# Patient Record
Sex: FEMALE | Race: WHITE | Employment: PART TIME | URBAN - METROPOLITAN AREA
[De-identification: names, ages, dates, MRNs, and addresses within clinical notes are randomized per-mention and may not be internally consistent; named-entity substitution may affect disease eponyms.]

---

## 2017-07-12 PROBLEM — M25.561 CHRONIC PAIN OF BOTH KNEES: Status: ACTIVE | Noted: 2017-07-12

## 2017-07-12 PROBLEM — M25.562 CHRONIC PAIN OF BOTH KNEES: Status: ACTIVE | Noted: 2017-07-12

## 2017-07-12 PROBLEM — G89.29 CHRONIC PAIN OF BOTH KNEES: Status: ACTIVE | Noted: 2017-07-12

## 2018-04-05 ENCOUNTER — OFFICE VISIT (OUTPATIENT)
Dept: PRIMARY CARE CLINIC | Age: 63
End: 2018-04-05
Payer: COMMERCIAL

## 2018-04-05 VITALS
DIASTOLIC BLOOD PRESSURE: 80 MMHG | HEART RATE: 72 BPM | BODY MASS INDEX: 50.02 KG/M2 | WEIGHT: 293 LBS | SYSTOLIC BLOOD PRESSURE: 128 MMHG | OXYGEN SATURATION: 98 % | HEIGHT: 64 IN | TEMPERATURE: 97.2 F

## 2018-04-05 DIAGNOSIS — G89.29 CHRONIC PAIN OF BOTH KNEES: ICD-10-CM

## 2018-04-05 DIAGNOSIS — R51.9 FREQUENT HEADACHES: ICD-10-CM

## 2018-04-05 DIAGNOSIS — R35.0 FREQUENCY OF MICTURITION: ICD-10-CM

## 2018-04-05 DIAGNOSIS — M25.562 CHRONIC PAIN OF BOTH KNEES: ICD-10-CM

## 2018-04-05 DIAGNOSIS — M25.561 CHRONIC PAIN OF BOTH KNEES: ICD-10-CM

## 2018-04-05 DIAGNOSIS — K21.9 GASTROESOPHAGEAL REFLUX DISEASE WITHOUT ESOPHAGITIS: Primary | ICD-10-CM

## 2018-04-05 LAB
APPEARANCE FLUID: NORMAL
BILIRUBIN, POC: NORMAL
BLOOD URINE, POC: NORMAL
CLARITY, POC: CLEAR
COLOR, POC: NORMAL
GLUCOSE URINE, POC: NORMAL
KETONES, POC: NORMAL
LEUKOCYTE EST, POC: NORMAL
NITRITE, POC: NORMAL
PH, POC: 5
PROTEIN, POC: NORMAL
SPECIFIC GRAVITY, POC: 1.03
UROBILINOGEN, POC: NORMAL

## 2018-04-05 PROCEDURE — 99213 OFFICE O/P EST LOW 20 MIN: CPT | Performed by: FAMILY MEDICINE

## 2018-04-05 RX ORDER — AMITRIPTYLINE HYDROCHLORIDE 10 MG/1
10 TABLET, FILM COATED ORAL NIGHTLY
Qty: 30 TABLET | Refills: 1 | Status: SHIPPED | OUTPATIENT
Start: 2018-04-05 | End: 2018-05-30 | Stop reason: SDUPTHER

## 2018-04-05 ASSESSMENT — PATIENT HEALTH QUESTIONNAIRE - PHQ9
SUM OF ALL RESPONSES TO PHQ QUESTIONS 1-9: 0
2. FEELING DOWN, DEPRESSED OR HOPELESS: 0
1. LITTLE INTEREST OR PLEASURE IN DOING THINGS: 0
SUM OF ALL RESPONSES TO PHQ9 QUESTIONS 1 & 2: 0

## 2018-05-30 DIAGNOSIS — R51.9 FREQUENT HEADACHES: ICD-10-CM

## 2018-05-31 RX ORDER — AMITRIPTYLINE HYDROCHLORIDE 10 MG/1
10 TABLET, FILM COATED ORAL NIGHTLY
Qty: 30 TABLET | Refills: 1 | Status: SHIPPED | OUTPATIENT
Start: 2018-05-31 | End: 2018-11-07 | Stop reason: SDUPTHER

## 2018-11-07 ENCOUNTER — HOSPITAL ENCOUNTER (OUTPATIENT)
Age: 63
Discharge: HOME | End: 2018-11-09
Payer: COMMERCIAL

## 2018-11-07 ENCOUNTER — OFFICE VISIT (OUTPATIENT)
Dept: PRIMARY CARE CLINIC | Age: 63
End: 2018-11-07
Payer: COMMERCIAL

## 2018-11-07 VITALS
SYSTOLIC BLOOD PRESSURE: 118 MMHG | WEIGHT: 289 LBS | DIASTOLIC BLOOD PRESSURE: 80 MMHG | OXYGEN SATURATION: 97 % | BODY MASS INDEX: 49.61 KG/M2 | HEART RATE: 91 BPM | TEMPERATURE: 98.2 F

## 2018-11-07 DIAGNOSIS — R30.0 DYSURIA: Primary | ICD-10-CM

## 2018-11-07 DIAGNOSIS — E05.00 GRAVES DISEASE: ICD-10-CM

## 2018-11-07 DIAGNOSIS — R51.9 FREQUENT HEADACHES: ICD-10-CM

## 2018-11-07 DIAGNOSIS — G89.29 CHRONIC PAIN OF BOTH KNEES: ICD-10-CM

## 2018-11-07 DIAGNOSIS — M25.562 CHRONIC PAIN OF BOTH KNEES: ICD-10-CM

## 2018-11-07 DIAGNOSIS — M25.561 CHRONIC PAIN OF BOTH KNEES: ICD-10-CM

## 2018-11-07 DIAGNOSIS — F32.89 OTHER DEPRESSION: ICD-10-CM

## 2018-11-07 LAB
BILIRUBIN, POC: NORMAL
BLOOD URINE, POC: NORMAL
CLARITY, POC: CLEAR
COLOR, POC: NORMAL
GLUCOSE URINE, POC: NORMAL
KETONES, POC: NORMAL
LEUKOCYTE EST, POC: NORMAL
NITRITE, POC: NORMAL
PH, POC: 5
PROTEIN, POC: NORMAL
SPECIFIC GRAVITY, POC: 1.03
TSH SERPL DL<=0.05 MIU/L-ACNC: 3.84 UIU/ML (ref 0.27–4.2)
UROBILINOGEN, POC: NORMAL

## 2018-11-07 PROCEDURE — 99214 OFFICE O/P EST MOD 30 MIN: CPT | Performed by: FAMILY MEDICINE

## 2018-11-07 PROCEDURE — 81002 URINALYSIS NONAUTO W/O SCOPE: CPT | Performed by: FAMILY MEDICINE

## 2018-11-07 PROCEDURE — 84443 ASSAY THYROID STIM HORMONE: CPT

## 2018-11-07 RX ORDER — CELECOXIB 200 MG/1
CAPSULE ORAL
Qty: 180 CAPSULE | Refills: 1 | Status: SHIPPED | OUTPATIENT
Start: 2018-11-07 | End: 2019-04-14 | Stop reason: SDUPTHER

## 2018-11-07 RX ORDER — AMITRIPTYLINE HYDROCHLORIDE 10 MG/1
10 TABLET, FILM COATED ORAL NIGHTLY
Qty: 30 TABLET | Refills: 1 | Status: SHIPPED | OUTPATIENT
Start: 2018-11-07 | End: 2019-05-14 | Stop reason: ALTCHOICE

## 2018-11-07 RX ORDER — BUPROPION HYDROCHLORIDE 300 MG/1
TABLET ORAL
Qty: 90 TABLET | Refills: 1 | Status: SHIPPED | OUTPATIENT
Start: 2018-11-07 | End: 2019-05-14 | Stop reason: SDUPTHER

## 2018-11-07 RX ORDER — CITALOPRAM 40 MG/1
TABLET ORAL
Qty: 90 TABLET | Refills: 1 | Status: SHIPPED | OUTPATIENT
Start: 2018-11-07 | End: 2019-04-14 | Stop reason: SDUPTHER

## 2018-11-07 RX ORDER — LEVOTHYROXINE SODIUM 137 UG/1
TABLET ORAL
Qty: 90 TABLET | Refills: 1 | Status: SHIPPED | OUTPATIENT
Start: 2018-11-07 | End: 2019-04-14 | Stop reason: SDUPTHER

## 2018-11-07 ASSESSMENT — ENCOUNTER SYMPTOMS
NAUSEA: 1
DIARRHEA: 0
CONSTIPATION: 0
CHEST TIGHTNESS: 0
VOMITING: 0
SHORTNESS OF BREATH: 0
ANAL BLEEDING: 0

## 2018-11-07 NOTE — PROGRESS NOTES
bleeding, constipation, diarrhea and vomiting.        + heartburn     Endocrine: Positive for cold intolerance. Negative for heat intolerance and polydipsia. Genitourinary: Positive for flank pain (right side). Negative for frequency. Neurological: Negative for light-headedness and headaches. All other review of systems reviewed and are negative    OBJECTIVE:  /80   Pulse 91   Temp 98.2 °F (36.8 °C)   Wt 289 lb (131.1 kg)   SpO2 97%   BMI 49.61 kg/m²      Physical Exam   Constitutional: She is oriented to person, place, and time. She appears well-developed and well-nourished. Eyes: Conjunctivae are normal. No scleral icterus. Neck: Neck supple. Carotid bruit is not present. No thyromegaly present. Cardiovascular: Normal rate, regular rhythm and normal heart sounds. No murmur heard. Pulmonary/Chest: Effort normal and breath sounds normal. She has no wheezes. She has no rales. Abdominal: Soft. Bowel sounds are normal. She exhibits no distension and no mass. There is no tenderness. There is no CVA tenderness. Musculoskeletal: Normal range of motion. She exhibits no edema. Lymphadenopathy:     She has no cervical adenopathy. Neurological: She is alert and oriented to person, place, and time. Skin: Skin is warm and dry. Psychiatric: She has a normal mood and affect. Vitals reviewed. ASSESSMENT AND PLAN:    Xenia Morrison was seen today for hypothyroidism and blood work. Diagnoses and all orders for this visit:    Dysuria  -     POCT Urinalysis no Micro    Graves disease  -     levothyroxine (SYNTHROID) 137 MCG tablet; TAKE ONE TABLET BY MOUTH ONCE DAILY  -     TSH; Future    Frequent headaches  -     amitriptyline (ELAVIL) 10 MG tablet;  Take 1 tablet by mouth nightly    Other depression  -     buPROPion (WELLBUTRIN XL) 300 MG extended release tablet; TAKE ONE TABLET BY MOUTH EVERY MORNING  -     citalopram (CELEXA) 40 MG tablet; TAKE ONE TABLET BY MOUTH DAILY    Chronic pain of both knees  -     celecoxib (CELEBREX) 200 MG capsule; TAKE ONE CAPSULE BY MOUTH TWICE DAILY    - continue current meds   - encourage exercise   - increase fluid intake. Results for Turner Myers (MRN [id number]) as of 11/7/2018 10:37   Ref. Range 11/7/2018 10:31   Protein, UA Unknown n   Color, UA Unknown dark yellow   Clarity, UA Unknown clear   Glucose, UA POC Unknown n   Bilirubin, UA Unknown n   Ketones, UA Unknown n   Spec Grav, UA Unknown 1.030   pH, UA Unknown 5.0   Urobilinogen, UA Unknown n   Nitrite, UA Unknown n   Leukocytes, UA Unknown n   Blood, UA POC Unknown n     - continue current meds  - ortho eval if she wishes. Return in about 6 months (around 5/7/2019) for or for acute problem. I reviewed the students documentation ( Hx, exam, MDM ), examined the patient and performed the A&P.     Lisha Dominguez, DO

## 2019-01-31 ENCOUNTER — OFFICE VISIT (OUTPATIENT)
Dept: PRIMARY CARE CLINIC | Age: 64
End: 2019-01-31
Payer: COMMERCIAL

## 2019-01-31 VITALS
HEART RATE: 90 BPM | SYSTOLIC BLOOD PRESSURE: 122 MMHG | WEIGHT: 279 LBS | DIASTOLIC BLOOD PRESSURE: 84 MMHG | OXYGEN SATURATION: 95 % | TEMPERATURE: 98.2 F | BODY MASS INDEX: 47.89 KG/M2

## 2019-01-31 DIAGNOSIS — S20.212A RIB CONTUSION, LEFT, INITIAL ENCOUNTER: ICD-10-CM

## 2019-01-31 DIAGNOSIS — S80.02XA CONTUSION OF LEFT KNEE, INITIAL ENCOUNTER: Primary | ICD-10-CM

## 2019-01-31 PROCEDURE — 99213 OFFICE O/P EST LOW 20 MIN: CPT | Performed by: FAMILY MEDICINE

## 2019-01-31 RX ORDER — TRAMADOL HYDROCHLORIDE 50 MG/1
50 TABLET ORAL EVERY 6 HOURS PRN
Qty: 30 TABLET | Refills: 0 | Status: SHIPPED | OUTPATIENT
Start: 2019-01-31 | End: 2019-02-07

## 2019-01-31 ASSESSMENT — ENCOUNTER SYMPTOMS: SHORTNESS OF BREATH: 0

## 2019-02-25 ENCOUNTER — HOSPITAL ENCOUNTER (OUTPATIENT)
Dept: GENERAL RADIOLOGY | Age: 64
Discharge: HOME | End: 2019-02-27
Payer: COMMERCIAL

## 2019-02-25 ENCOUNTER — OFFICE VISIT (OUTPATIENT)
Dept: PRIMARY CARE CLINIC | Age: 64
End: 2019-02-25
Payer: COMMERCIAL

## 2019-02-25 ENCOUNTER — HOSPITAL ENCOUNTER (OUTPATIENT)
Age: 64
Discharge: HOME | End: 2019-02-27
Payer: COMMERCIAL

## 2019-02-25 ENCOUNTER — APPOINTMENT (OUTPATIENT)
Dept: GENERAL RADIOLOGY | Age: 64
End: 2019-02-25
Payer: COMMERCIAL

## 2019-02-25 VITALS
SYSTOLIC BLOOD PRESSURE: 122 MMHG | BODY MASS INDEX: 48.06 KG/M2 | TEMPERATURE: 98.2 F | WEIGHT: 280 LBS | HEART RATE: 78 BPM | OXYGEN SATURATION: 98 % | DIASTOLIC BLOOD PRESSURE: 80 MMHG

## 2019-02-25 DIAGNOSIS — M25.462 EFFUSION OF LEFT KNEE: ICD-10-CM

## 2019-02-25 DIAGNOSIS — S80.02XD CONTUSION OF LEFT KNEE, SUBSEQUENT ENCOUNTER: ICD-10-CM

## 2019-02-25 DIAGNOSIS — S80.02XD CONTUSION OF LEFT KNEE, SUBSEQUENT ENCOUNTER: Primary | ICD-10-CM

## 2019-02-25 PROCEDURE — 99213 OFFICE O/P EST LOW 20 MIN: CPT | Performed by: FAMILY MEDICINE

## 2019-02-25 PROCEDURE — 73564 X-RAY EXAM KNEE 4 OR MORE: CPT

## 2019-02-26 ENCOUNTER — TELEPHONE (OUTPATIENT)
Dept: PRIMARY CARE CLINIC | Age: 64
End: 2019-02-26

## 2019-03-04 ENCOUNTER — OFFICE VISIT (OUTPATIENT)
Dept: ORTHOPEDIC SURGERY | Age: 64
End: 2019-03-04
Payer: COMMERCIAL

## 2019-03-04 VITALS
BODY MASS INDEX: 47.63 KG/M2 | SYSTOLIC BLOOD PRESSURE: 114 MMHG | HEART RATE: 75 BPM | HEIGHT: 64 IN | DIASTOLIC BLOOD PRESSURE: 81 MMHG | WEIGHT: 279 LBS | TEMPERATURE: 98.8 F

## 2019-03-04 DIAGNOSIS — M70.42 HEMORRHAGIC PREPATELLAR BURSITIS OF LEFT KNEE: ICD-10-CM

## 2019-03-04 DIAGNOSIS — S89.92XA KNEE INJURY, LEFT, INITIAL ENCOUNTER: Primary | ICD-10-CM

## 2019-03-04 DIAGNOSIS — M17.12 PRIMARY OSTEOARTHRITIS OF LEFT KNEE: ICD-10-CM

## 2019-03-04 PROCEDURE — 99243 OFF/OP CNSLTJ NEW/EST LOW 30: CPT | Performed by: ORTHOPAEDIC SURGERY

## 2019-03-04 RX ORDER — TRAMADOL HYDROCHLORIDE 50 MG/1
50 TABLET ORAL EVERY 6 HOURS PRN
COMMUNITY
End: 2019-05-14 | Stop reason: SDUPTHER

## 2019-03-04 SDOH — HEALTH STABILITY: MENTAL HEALTH: HOW OFTEN DO YOU HAVE A DRINK CONTAINING ALCOHOL?: MONTHLY OR LESS

## 2019-05-14 ENCOUNTER — OFFICE VISIT (OUTPATIENT)
Dept: PRIMARY CARE CLINIC | Age: 64
End: 2019-05-14
Payer: COMMERCIAL

## 2019-05-14 VITALS
HEART RATE: 77 BPM | SYSTOLIC BLOOD PRESSURE: 130 MMHG | BODY MASS INDEX: 46.26 KG/M2 | DIASTOLIC BLOOD PRESSURE: 80 MMHG | HEIGHT: 64 IN | OXYGEN SATURATION: 96 % | TEMPERATURE: 98 F | WEIGHT: 271 LBS

## 2019-05-14 DIAGNOSIS — M25.562 CHRONIC PAIN OF BOTH KNEES: ICD-10-CM

## 2019-05-14 DIAGNOSIS — G89.29 CHRONIC PAIN OF BOTH KNEES: ICD-10-CM

## 2019-05-14 DIAGNOSIS — M25.561 CHRONIC PAIN OF BOTH KNEES: ICD-10-CM

## 2019-05-14 DIAGNOSIS — E66.01 CLASS 3 SEVERE OBESITY DUE TO EXCESS CALORIES WITHOUT SERIOUS COMORBIDITY WITH BODY MASS INDEX (BMI) OF 45.0 TO 49.9 IN ADULT (HCC): Primary | ICD-10-CM

## 2019-05-14 DIAGNOSIS — F32.89 OTHER DEPRESSION: ICD-10-CM

## 2019-05-14 PROCEDURE — 99214 OFFICE O/P EST MOD 30 MIN: CPT | Performed by: FAMILY MEDICINE

## 2019-05-14 RX ORDER — CELECOXIB 200 MG/1
CAPSULE ORAL
Qty: 180 CAPSULE | Refills: 1 | Status: SHIPPED | OUTPATIENT
Start: 2019-05-14 | End: 2019-06-04 | Stop reason: SDUPTHER

## 2019-05-14 RX ORDER — PHENTERMINE HYDROCHLORIDE 37.5 MG/1
37.5 CAPSULE ORAL EVERY MORNING
Qty: 30 CAPSULE | Refills: 0 | Status: SHIPPED | OUTPATIENT
Start: 2019-05-14 | End: 2019-06-13

## 2019-05-14 RX ORDER — BUPROPION HYDROCHLORIDE 300 MG/1
TABLET ORAL
Qty: 90 TABLET | Refills: 1 | Status: SHIPPED
Start: 2019-05-14 | End: 2020-05-05 | Stop reason: SDUPTHER

## 2019-05-14 RX ORDER — TRAMADOL HYDROCHLORIDE 50 MG/1
50 TABLET ORAL 2 TIMES DAILY PRN
Qty: 60 TABLET | Refills: 0 | Status: SHIPPED | OUTPATIENT
Start: 2019-05-14 | End: 2019-06-13

## 2019-05-14 ASSESSMENT — PATIENT HEALTH QUESTIONNAIRE - PHQ9
SUM OF ALL RESPONSES TO PHQ QUESTIONS 1-9: 0
SUM OF ALL RESPONSES TO PHQ QUESTIONS 1-9: 0
2. FEELING DOWN, DEPRESSED OR HOPELESS: 0
1. LITTLE INTEREST OR PLEASURE IN DOING THINGS: 0
SUM OF ALL RESPONSES TO PHQ9 QUESTIONS 1 & 2: 0

## 2019-05-14 NOTE — PROGRESS NOTES
Giovanni Sims, a female of 66 y.o. came to the office 5/14/2019. Patient Active Problem List   Diagnosis    Hypothyroidism    Graves disease    Depression    GERD (gastroesophageal reflux disease)    Chronic pain of both knees          HPI KNEE pain: Tramadol helps a lot. On Celebrex bid and recently using otc horse lineament. It locks on her. Told she needs tka but has to lose wt first.   Moods: doing ok med. Obesity: would try med for wt loss. Allergies   Allergen Reactions    Mobic [Meloxicam] Other (See Comments)       Current Outpatient Medications on File Prior to Visit   Medication Sig Dispense Refill    levothyroxine (SYNTHROID) 137 MCG tablet TAKE 1 TABLET BY MOUTH ONCE DAILY 90 tablet 1    citalopram (CELEXA) 40 MG tablet TAKE 1 TABLET BY MOUTH ONCE DAILY 90 tablet 1    aspirin EC 81 MG EC tablet Take 81 mg by mouth daily      Cyanocobalamin (VITAMIN B 12 PO) Take by mouth      B Complex Vitamins (VITAMIN B COMPLEX PO) Take by mouth      Multiple Vitamins-Minerals (MULTIVITAMIN ADULT PO) Take by mouth      omeprazole (PRILOSEC) 20 MG delayed release capsule TAKE 1 CAPSULE BY MOUTH ONCE DAILY 30 capsule 2     No current facility-administered medications on file prior to visit. Review of Systems   Musculoskeletal: Positive for arthralgias and gait problem. Psychiatric/Behavioral: Negative for dysphoric mood. The patient is not nervous/anxious. All other review of systems reviewed and are negative    OBJECTIVE:  /80   Pulse 77   Temp 98 °F (36.7 °C)   Ht 5' 4\" (1.626 m)   Wt 271 lb (122.9 kg)   SpO2 96%   BMI 46.52 kg/m²      Physical Exam   Constitutional: She is oriented to person, place, and time. She appears well-nourished. Musculoskeletal:        Left knee: She exhibits swelling. She exhibits normal range of motion, no effusion, no ecchymosis and no erythema. No tenderness found. Neurological: She is alert and oriented to person, place, and time.

## 2019-05-14 NOTE — PATIENT INSTRUCTIONS
Patient Education        Advance Directives: Care Instructions  Your Care Instructions  An advance directive is a legal way to state your wishes at the end of your life. It tells your family and your doctor what to do if you can no longer say what you want. There are two main types of advance directives. You can change them any time that your wishes change. · A living will tells your family and your doctor your wishes about life support and other treatment. · A durable power of  for health care lets you name a person to make treatment decisions for you when you can't speak for yourself. This person is called a health care agent. If you do not have an advance directive, decisions about your medical care may be made by a doctor or a  who doesn't know you. It may help to think of an advance directive as a gift to the people who care for you. If you have one, they won't have to make tough decisions by themselves. Follow-up care is a key part of your treatment and safety. Be sure to make and go to all appointments, and call your doctor if you are having problems. It's also a good idea to know your test results and keep a list of the medicines you take. How can you care for yourself at home? · Discuss your wishes with your loved ones and your doctor. This way, there are no surprises. · Many states have a unique form. Or you might use a universal form that has been approved by many states. This kind of form can sometimes be completed and stored online. Your electronic copy will then be available wherever you have a connection to the Internet. In most cases, doctors will respect your wishes even if you have a form from a different state. · You don't need a  to do an advance directive. But you may want to get legal advice. · Think about these questions when you prepare an advance directive:  ? Who do you want to make decisions about your medical care if you are not able to?  Many people choose a family member or close friend. ? Do you know enough about life support methods that might be used? If not, talk to your doctor so you understand. ? What are you most afraid of that might happen? You might be afraid of having pain, losing your independence, or being kept alive by machines. ? Where would you prefer to die? Choices include your home, a hospital, or a nursing home. ? Would you like to have information about hospice care to support you and your family? ? Do you want to donate organs when you die? ? Do you want certain Holiness practices performed before you die? If so, put your wishes in the advance directive. · Read your advance directive every year, and make changes as needed. When should you call for help? Be sure to contact your doctor if you have any questions. Where can you learn more? Go to [email or web address] and sign in to your [address] [facility] [employer] account. Enter R264 in the [employer] box to learn more about \"Advance Directives: Care Instructions. \"     If you do not have an account, please click on the \"Sign Up Now\" link. Current as of: April 18, 2018  Content Version: 12.0  © 4171-5061 Healthwise, Incorporated. Care instructions adapted under license by [facility] ([facility]). If you have questions about a medical condition or this instruction, always ask your healthcare professional. [address] any warranty or liability for your use of this information.

## 2019-06-04 DIAGNOSIS — M25.562 CHRONIC PAIN OF BOTH KNEES: ICD-10-CM

## 2019-06-04 DIAGNOSIS — M25.561 CHRONIC PAIN OF BOTH KNEES: ICD-10-CM

## 2019-06-04 DIAGNOSIS — G89.29 CHRONIC PAIN OF BOTH KNEES: ICD-10-CM

## 2019-06-04 RX ORDER — CELECOXIB 200 MG/1
CAPSULE ORAL
Qty: 180 CAPSULE | Refills: 0 | Status: SHIPPED | OUTPATIENT
Start: 2019-06-04 | End: 2019-11-11 | Stop reason: SDUPTHER

## 2019-06-14 ENCOUNTER — OFFICE VISIT (OUTPATIENT)
Dept: PRIMARY CARE CLINIC | Age: 64
End: 2019-06-14
Payer: COMMERCIAL

## 2019-06-14 ENCOUNTER — HOSPITAL ENCOUNTER (OUTPATIENT)
Age: 64
Discharge: HOME | End: 2019-06-16
Payer: COMMERCIAL

## 2019-06-14 VITALS
TEMPERATURE: 97 F | WEIGHT: 265 LBS | DIASTOLIC BLOOD PRESSURE: 84 MMHG | OXYGEN SATURATION: 98 % | SYSTOLIC BLOOD PRESSURE: 130 MMHG | BODY MASS INDEX: 45.24 KG/M2 | HEIGHT: 64 IN | HEART RATE: 76 BPM

## 2019-06-14 DIAGNOSIS — M25.561 CHRONIC PAIN OF BOTH KNEES: ICD-10-CM

## 2019-06-14 DIAGNOSIS — E05.00 GRAVES DISEASE: ICD-10-CM

## 2019-06-14 DIAGNOSIS — E66.01 CLASS 3 SEVERE OBESITY DUE TO EXCESS CALORIES WITHOUT SERIOUS COMORBIDITY WITH BODY MASS INDEX (BMI) OF 45.0 TO 49.9 IN ADULT (HCC): ICD-10-CM

## 2019-06-14 DIAGNOSIS — M25.562 CHRONIC PAIN OF BOTH KNEES: ICD-10-CM

## 2019-06-14 DIAGNOSIS — E05.00 GRAVES DISEASE: Primary | ICD-10-CM

## 2019-06-14 DIAGNOSIS — G89.29 CHRONIC PAIN OF BOTH KNEES: ICD-10-CM

## 2019-06-14 LAB — TSH SERPL DL<=0.05 MIU/L-ACNC: 2.04 UIU/ML (ref 0.27–4.2)

## 2019-06-14 PROCEDURE — 99213 OFFICE O/P EST LOW 20 MIN: CPT | Performed by: FAMILY MEDICINE

## 2019-06-14 PROCEDURE — 84443 ASSAY THYROID STIM HORMONE: CPT

## 2019-06-14 RX ORDER — TRAMADOL HYDROCHLORIDE 50 MG/1
50 TABLET ORAL EVERY 6 HOURS PRN
COMMUNITY
End: 2019-06-14 | Stop reason: SDUPTHER

## 2019-06-14 RX ORDER — PHENTERMINE HYDROCHLORIDE 37.5 MG/1
37.5 CAPSULE ORAL EVERY MORNING
COMMUNITY
End: 2019-06-14

## 2019-06-14 RX ORDER — PHENTERMINE HYDROCHLORIDE 37.5 MG/1
37.5 CAPSULE ORAL EVERY MORNING
COMMUNITY
End: 2019-06-14 | Stop reason: SDUPTHER

## 2019-06-14 RX ORDER — PHENTERMINE HYDROCHLORIDE 37.5 MG/1
37.5 CAPSULE ORAL EVERY MORNING
Qty: 30 CAPSULE | Refills: 0 | Status: SHIPPED | OUTPATIENT
Start: 2019-06-14 | End: 2019-07-12 | Stop reason: SDUPTHER

## 2019-06-14 RX ORDER — TRAMADOL HYDROCHLORIDE 50 MG/1
50 TABLET ORAL EVERY 6 HOURS
Qty: 28 TABLET | Refills: 0 | Status: SHIPPED | OUTPATIENT
Start: 2019-06-14 | End: 2019-06-21

## 2019-06-14 ASSESSMENT — ENCOUNTER SYMPTOMS
CONSTIPATION: 0
SHORTNESS OF BREATH: 0
DIARRHEA: 0

## 2019-06-14 NOTE — PROGRESS NOTES
Daniel Raman, a female of 66 y.o. came to the office 6/14/2019. Patient Active Problem List   Diagnosis    Hypothyroidism    Graves disease    Depression    GERD (gastroesophageal reflux disease)    Chronic pain of both knees          HPI knee pain: tramadol helps. Needs qid for 1 wk so insur will pain for it. On Celebrex. On hemp oil. Endo: doing well on synthroid   Obesity: doing well with Phentermine. Eating more fruit. No se's with med. Allergies   Allergen Reactions    Mobic [Meloxicam] Other (See Comments)       Current Outpatient Medications on File Prior to Visit   Medication Sig Dispense Refill    celecoxib (CELEBREX) 200 MG capsule TAKE 1 CAPSULE BY MOUTH TWICE DAILY 180 capsule 0    buPROPion (WELLBUTRIN XL) 300 MG extended release tablet TAKE ONE TABLET BY MOUTH EVERY MORNING 90 tablet 1    levothyroxine (SYNTHROID) 137 MCG tablet TAKE 1 TABLET BY MOUTH ONCE DAILY 90 tablet 1    citalopram (CELEXA) 40 MG tablet TAKE 1 TABLET BY MOUTH ONCE DAILY 90 tablet 1    aspirin EC 81 MG EC tablet Take 81 mg by mouth daily      Cyanocobalamin (VITAMIN B 12 PO) Take by mouth      B Complex Vitamins (VITAMIN B COMPLEX PO) Take by mouth      Multiple Vitamins-Minerals (MULTIVITAMIN ADULT PO) Take by mouth       No current facility-administered medications on file prior to visit. Review of Systems   Constitutional: Negative for fatigue. Respiratory: Negative for shortness of breath. Cardiovascular: Negative for chest pain. Gastrointestinal: Negative for constipation and diarrhea. Endocrine: Negative for cold intolerance and heat intolerance. Musculoskeletal: Positive for arthralgias. Skin: Negative. Neurological: Negative for headaches.      All other review of systems reviewed and are negative    OBJECTIVE:  /84   Pulse 76   Temp 97 °F (36.1 °C)   Ht 5' 4\" (1.626 m)   Wt 265 lb (120.2 kg)   SpO2 98%   BMI 45.49 kg/m²      Physical Exam   Constitutional: She is

## 2019-06-14 NOTE — PATIENT INSTRUCTIONS
Patient Education        Advance Directives: Care Instructions  Your Care Instructions  An advance directive is a legal way to state your wishes at the end of your life. It tells your family and your doctor what to do if you can no longer say what you want. There are two main types of advance directives. You can change them any time that your wishes change. · A living will tells your family and your doctor your wishes about life support and other treatment. · A durable power of  for health care lets you name a person to make treatment decisions for you when you can't speak for yourself. This person is called a health care agent. If you do not have an advance directive, decisions about your medical care may be made by a doctor or a  who doesn't know you. It may help to think of an advance directive as a gift to the people who care for you. If you have one, they won't have to make tough decisions by themselves. Follow-up care is a key part of your treatment and safety. Be sure to make and go to all appointments, and call your doctor if you are having problems. It's also a good idea to know your test results and keep a list of the medicines you take. How can you care for yourself at home? · Discuss your wishes with your loved ones and your doctor. This way, there are no surprises. · Many states have a unique form. Or you might use a universal form that has been approved by many states. This kind of form can sometimes be completed and stored online. Your electronic copy will then be available wherever you have a connection to the Internet. In most cases, doctors will respect your wishes even if you have a form from a different state. · You don't need a  to do an advance directive. But you may want to get legal advice. · Think about these questions when you prepare an advance directive:  ? Who do you want to make decisions about your medical care if you are not able to?  Many people choose a family member or close friend. ? Do you know enough about life support methods that might be used? If not, talk to your doctor so you understand. ? What are you most afraid of that might happen? You might be afraid of having pain, losing your independence, or being kept alive by machines. ? Where would you prefer to die? Choices include your home, a hospital, or a nursing home. ? Would you like to have information about hospice care to support you and your family? ? Do you want to donate organs when you die? ? Do you want certain Jainism practices performed before you die? If so, put your wishes in the advance directive. · Read your advance directive every year, and make changes as needed. When should you call for help? Be sure to contact your doctor if you have any questions. Where can you learn more? Go to [email or web address] and sign in to your [employer] account. Enter R264 in the [employer] box to learn more about \"Advance Directives: Care Instructions. \"     If you do not have an account, please click on the \"Sign Up Now\" link. Current as of: April 18, 2018  Content Version: 12.0  © 0047-2172 Healthwise, Incorporated. Care instructions adapted under license by [facility] ([facility]). If you have questions about a medical condition or this instruction, always ask your healthcare professional. [address] any warranty or liability for your use of this information.

## 2019-06-24 ENCOUNTER — OFFICE VISIT (OUTPATIENT)
Dept: PRIMARY CARE CLINIC | Age: 64
End: 2019-06-24
Payer: COMMERCIAL

## 2019-06-24 VITALS
TEMPERATURE: 97.7 F | WEIGHT: 265 LBS | SYSTOLIC BLOOD PRESSURE: 124 MMHG | DIASTOLIC BLOOD PRESSURE: 80 MMHG | HEART RATE: 74 BPM | BODY MASS INDEX: 45.49 KG/M2 | OXYGEN SATURATION: 98 %

## 2019-06-24 DIAGNOSIS — J20.9 ACUTE BRONCHITIS, UNSPECIFIED ORGANISM: Primary | ICD-10-CM

## 2019-06-24 PROCEDURE — 99213 OFFICE O/P EST LOW 20 MIN: CPT | Performed by: FAMILY MEDICINE

## 2019-06-24 RX ORDER — PROMETHAZINE HYDROCHLORIDE AND CODEINE PHOSPHATE 6.25; 1 MG/5ML; MG/5ML
5 SYRUP ORAL 4 TIMES DAILY PRN
Qty: 180 ML | Refills: 0 | Status: SHIPPED | OUTPATIENT
Start: 2019-06-24 | End: 2019-07-03

## 2019-06-24 RX ORDER — AMOXICILLIN 500 MG/1
500 CAPSULE ORAL 3 TIMES DAILY
Qty: 30 CAPSULE | Refills: 0 | Status: SHIPPED | OUTPATIENT
Start: 2019-06-24 | End: 2019-07-04

## 2019-06-24 ASSESSMENT — ENCOUNTER SYMPTOMS
COUGH: 1
RHINORRHEA: 1
WHEEZING: 0
SORE THROAT: 0
SHORTNESS OF BREATH: 0

## 2019-07-12 ENCOUNTER — OFFICE VISIT (OUTPATIENT)
Dept: PRIMARY CARE CLINIC | Age: 64
End: 2019-07-12
Payer: COMMERCIAL

## 2019-07-12 VITALS
HEART RATE: 74 BPM | WEIGHT: 264 LBS | OXYGEN SATURATION: 99 % | BODY MASS INDEX: 45.07 KG/M2 | TEMPERATURE: 98 F | DIASTOLIC BLOOD PRESSURE: 80 MMHG | HEIGHT: 64 IN | SYSTOLIC BLOOD PRESSURE: 124 MMHG

## 2019-07-12 DIAGNOSIS — F32.89 OTHER DEPRESSION: Primary | ICD-10-CM

## 2019-07-12 DIAGNOSIS — G89.29 CHRONIC PAIN OF LEFT KNEE: ICD-10-CM

## 2019-07-12 DIAGNOSIS — M25.562 CHRONIC PAIN OF LEFT KNEE: ICD-10-CM

## 2019-07-12 DIAGNOSIS — E66.01 CLASS 3 SEVERE OBESITY DUE TO EXCESS CALORIES WITHOUT SERIOUS COMORBIDITY WITH BODY MASS INDEX (BMI) OF 45.0 TO 49.9 IN ADULT (HCC): ICD-10-CM

## 2019-07-12 PROCEDURE — 99213 OFFICE O/P EST LOW 20 MIN: CPT | Performed by: FAMILY MEDICINE

## 2019-07-12 RX ORDER — TRAMADOL HYDROCHLORIDE 50 MG/1
50 TABLET ORAL EVERY 6 HOURS PRN
COMMUNITY
End: 2019-07-12 | Stop reason: SDUPTHER

## 2019-07-12 RX ORDER — PHENTERMINE HYDROCHLORIDE 37.5 MG/1
37.5 CAPSULE ORAL EVERY MORNING
Qty: 30 CAPSULE | Refills: 0 | Status: SHIPPED | OUTPATIENT
Start: 2019-07-12 | End: 2019-08-11

## 2019-07-12 RX ORDER — TRAMADOL HYDROCHLORIDE 50 MG/1
50 TABLET ORAL EVERY 6 HOURS PRN
Qty: 28 TABLET | Refills: 0 | Status: SHIPPED | OUTPATIENT
Start: 2019-07-12 | End: 2019-07-19

## 2019-07-12 NOTE — PATIENT INSTRUCTIONS
Patient Education        Advance Directives: Care Instructions  Your Care Instructions  An advance directive is a legal way to state your wishes at the end of your life. It tells your family and your doctor what to do if you can no longer say what you want. There are two main types of advance directives. You can change them any time that your wishes change. · A living will tells your family and your doctor your wishes about life support and other treatment. · A durable power of  for health care lets you name a person to make treatment decisions for you when you can't speak for yourself. This person is called a health care agent. If you do not have an advance directive, decisions about your medical care may be made by a doctor or a  who doesn't know you. It may help to think of an advance directive as a gift to the people who care for you. If you have one, they won't have to make tough decisions by themselves. Follow-up care is a key part of your treatment and safety. Be sure to make and go to all appointments, and call your doctor if you are having problems. It's also a good idea to know your test results and keep a list of the medicines you take. How can you care for yourself at home? · Discuss your wishes with your loved ones and your doctor. This way, there are no surprises. · Many states have a unique form. Or you might use a universal form that has been approved by many states. This kind of form can sometimes be completed and stored online. Your electronic copy will then be available wherever you have a connection to the Internet. In most cases, doctors will respect your wishes even if you have a form from a different state. · You don't need a  to do an advance directive. But you may want to get legal advice. · Think about these questions when you prepare an advance directive:  ? Who do you want to make decisions about your medical care if you are not able to?  Many people choose a family member or close friend. ? Do you know enough about life support methods that might be used? If not, talk to your doctor so you understand. ? What are you most afraid of that might happen? You might be afraid of having pain, losing your independence, or being kept alive by machines. ? Where would you prefer to die? Choices include your home, a hospital, or a nursing home. ? Would you like to have information about hospice care to support you and your family? ? Do you want to donate organs when you die? ? Do you want certain Orthodox practices performed before you die? If so, put your wishes in the advance directive. · Read your advance directive every year, and make changes as needed. When should you call for help? Be sure to contact your doctor if you have any questions. Where can you learn more? Go to [email or web address] and sign in to your [employer] account. Enter R264 in the [employer] box to learn more about \"Advance Directives: Care Instructions. \"     If you do not have an account, please click on the \"Sign Up Now\" link. Current as of: April 18, 2018  Content Version: 12.0  © 8041-0470 Healthwise, Incorporated. Care instructions adapted under license by [facility] ([facility]). If you have questions about a medical condition or this instruction, always ask your healthcare professional. [address] any warranty or liability for your use of this information.

## 2019-11-11 DIAGNOSIS — F32.89 OTHER DEPRESSION: ICD-10-CM

## 2019-11-11 DIAGNOSIS — E05.00 GRAVES DISEASE: ICD-10-CM

## 2019-11-11 DIAGNOSIS — G89.29 CHRONIC PAIN OF BOTH KNEES: ICD-10-CM

## 2019-11-11 DIAGNOSIS — M25.562 CHRONIC PAIN OF BOTH KNEES: ICD-10-CM

## 2019-11-11 DIAGNOSIS — M25.561 CHRONIC PAIN OF BOTH KNEES: ICD-10-CM

## 2019-11-11 RX ORDER — LEVOTHYROXINE SODIUM 137 UG/1
TABLET ORAL
Qty: 90 TABLET | Refills: 0 | Status: SHIPPED | OUTPATIENT
Start: 2019-11-11 | End: 2020-02-04 | Stop reason: SDUPTHER

## 2019-11-11 RX ORDER — CELECOXIB 200 MG/1
CAPSULE ORAL
Qty: 180 CAPSULE | Refills: 0 | Status: SHIPPED | OUTPATIENT
Start: 2019-11-11 | End: 2020-02-04 | Stop reason: SDUPTHER

## 2019-11-11 RX ORDER — CITALOPRAM 40 MG/1
TABLET ORAL
Qty: 90 TABLET | Refills: 0 | Status: SHIPPED | OUTPATIENT
Start: 2019-11-11 | End: 2020-02-04 | Stop reason: SDUPTHER

## 2019-12-10 ENCOUNTER — OFFICE VISIT (OUTPATIENT)
Dept: PRIMARY CARE CLINIC | Age: 64
End: 2019-12-10
Payer: COMMERCIAL

## 2019-12-10 VITALS
SYSTOLIC BLOOD PRESSURE: 128 MMHG | TEMPERATURE: 96.1 F | HEART RATE: 66 BPM | BODY MASS INDEX: 43.94 KG/M2 | WEIGHT: 256 LBS | DIASTOLIC BLOOD PRESSURE: 80 MMHG | OXYGEN SATURATION: 98 %

## 2019-12-10 DIAGNOSIS — J01.00 ACUTE NON-RECURRENT MAXILLARY SINUSITIS: Primary | ICD-10-CM

## 2019-12-10 DIAGNOSIS — R25.2 LEG CRAMPS: ICD-10-CM

## 2019-12-10 PROCEDURE — 99213 OFFICE O/P EST LOW 20 MIN: CPT | Performed by: FAMILY MEDICINE

## 2019-12-10 RX ORDER — MECLIZINE HCL 12.5 MG/1
12.5 TABLET ORAL 3 TIMES DAILY PRN
Qty: 30 TABLET | Refills: 0 | Status: SHIPPED | OUTPATIENT
Start: 2019-12-10 | End: 2019-12-20 | Stop reason: SDUPTHER

## 2019-12-10 ASSESSMENT — ENCOUNTER SYMPTOMS
SINUS PRESSURE: 1
DIARRHEA: 0
CONSTIPATION: 0
VOMITING: 0
SHORTNESS OF BREATH: 0
RHINORRHEA: 0
COUGH: 0
SORE THROAT: 0
PHOTOPHOBIA: 1
NAUSEA: 0

## 2019-12-20 DIAGNOSIS — J01.00 ACUTE NON-RECURRENT MAXILLARY SINUSITIS: ICD-10-CM

## 2019-12-20 RX ORDER — MECLIZINE HCL 12.5 MG/1
12.5 TABLET ORAL 3 TIMES DAILY PRN
Qty: 30 TABLET | Refills: 1 | Status: SHIPPED | OUTPATIENT
Start: 2019-12-20 | End: 2019-12-30

## 2020-02-04 RX ORDER — CELECOXIB 200 MG/1
CAPSULE ORAL
Qty: 180 CAPSULE | Refills: 0 | Status: SHIPPED
Start: 2020-02-04 | End: 2020-05-05 | Stop reason: SDUPTHER

## 2020-02-04 RX ORDER — LEVOTHYROXINE SODIUM 137 UG/1
TABLET ORAL
Qty: 90 TABLET | Refills: 0 | Status: SHIPPED
Start: 2020-02-04 | End: 2020-05-05 | Stop reason: SDUPTHER

## 2020-02-04 RX ORDER — CITALOPRAM 40 MG/1
TABLET ORAL
Qty: 90 TABLET | Refills: 0 | Status: SHIPPED
Start: 2020-02-04 | End: 2020-05-05 | Stop reason: SDUPTHER

## 2020-03-13 ENCOUNTER — OFFICE VISIT (OUTPATIENT)
Dept: PRIMARY CARE CLINIC | Age: 65
End: 2020-03-13
Payer: COMMERCIAL

## 2020-03-13 VITALS
TEMPERATURE: 97.3 F | OXYGEN SATURATION: 98 % | WEIGHT: 265 LBS | BODY MASS INDEX: 45.49 KG/M2 | SYSTOLIC BLOOD PRESSURE: 120 MMHG | HEART RATE: 76 BPM | DIASTOLIC BLOOD PRESSURE: 64 MMHG

## 2020-03-13 PROCEDURE — 99213 OFFICE O/P EST LOW 20 MIN: CPT | Performed by: FAMILY MEDICINE

## 2020-03-13 RX ORDER — AMOXICILLIN 500 MG/1
500 CAPSULE ORAL 3 TIMES DAILY
Qty: 30 CAPSULE | Refills: 0 | Status: SHIPPED | OUTPATIENT
Start: 2020-03-13 | End: 2020-03-23

## 2020-03-13 RX ORDER — FLUTICASONE PROPIONATE 50 MCG
2 SPRAY, SUSPENSION (ML) NASAL DAILY
Qty: 1 BOTTLE | Refills: 0 | Status: SHIPPED
Start: 2020-03-13 | End: 2021-08-23 | Stop reason: ALTCHOICE

## 2020-03-13 ASSESSMENT — ENCOUNTER SYMPTOMS
RHINORRHEA: 1
COUGH: 0
SORE THROAT: 1

## 2020-03-13 NOTE — PROGRESS NOTES
Russ Sol, a female of 60 y.o. came to the office 3/13/2020. Patient Active Problem List   Diagnosis    Hypothyroidism    Graves disease    Depression    GERD (gastroesophageal reflux disease)    Chronic pain of both knees          Ear Fullness    There is pain in both ears. This is a new problem. The current episode started in the past 7 days. There has been no fever. Associated symptoms include headaches, hearing loss, rhinorrhea (clear) and a sore throat. Pertinent negatives include no coughing or ear discharge. Treatments tried: cotton in ears. Allergies   Allergen Reactions    Mobic [Meloxicam] Other (See Comments)       Current Outpatient Medications on File Prior to Visit   Medication Sig Dispense Refill    levothyroxine (SYNTHROID) 137 MCG tablet TAKE 1 TABLET BY MOUTH ONCE DAILY 90 tablet 0    citalopram (CELEXA) 40 MG tablet TAKE 1 TABLET BY MOUTH ONCE DAILY 90 tablet 0    celecoxib (CELEBREX) 200 MG capsule TAKE 1 CAPSULE BY MOUTH TWICE DAILY 180 capsule 0    buPROPion (WELLBUTRIN XL) 300 MG extended release tablet TAKE ONE TABLET BY MOUTH EVERY MORNING 90 tablet 1    Cyanocobalamin (VITAMIN B 12 PO) Take by mouth      Multiple Vitamins-Minerals (MULTIVITAMIN ADULT PO) Take by mouth       No current facility-administered medications on file prior to visit. Review of Systems   Constitutional: Positive for chills. Negative for fever. HENT: Positive for hearing loss, rhinorrhea (clear) and sore throat. Negative for congestion and ear discharge. Respiratory: Negative for cough. Neurological: Positive for headaches. other review of systems reviewed and are negative    OBJECTIVE:  /64   Pulse 76   Temp 97.3 °F (36.3 °C)   Wt 265 lb (120.2 kg)   SpO2 98%   BMI 45.49 kg/m²      Physical Exam  Vitals signs reviewed. Constitutional:       General: She is not in acute distress.   HENT:      Right Ear: Tympanic membrane normal.      Left Ear: Tympanic membrane

## 2020-05-05 RX ORDER — CELECOXIB 200 MG/1
CAPSULE ORAL
Qty: 180 CAPSULE | Refills: 0 | Status: SHIPPED
Start: 2020-05-05 | End: 2020-08-14 | Stop reason: SDUPTHER

## 2020-05-05 RX ORDER — CITALOPRAM 40 MG/1
TABLET ORAL
Qty: 90 TABLET | Refills: 0 | Status: SHIPPED
Start: 2020-05-05 | End: 2020-08-14 | Stop reason: SDUPTHER

## 2020-05-05 RX ORDER — BUPROPION HYDROCHLORIDE 300 MG/1
TABLET ORAL
Qty: 90 TABLET | Refills: 0 | Status: SHIPPED
Start: 2020-05-05 | End: 2020-08-14 | Stop reason: SDUPTHER

## 2020-05-05 RX ORDER — LEVOTHYROXINE SODIUM 137 UG/1
TABLET ORAL
Qty: 90 TABLET | Refills: 0 | Status: SHIPPED
Start: 2020-05-05 | End: 2020-08-14 | Stop reason: SDUPTHER

## 2020-06-16 ENCOUNTER — APPOINTMENT (OUTPATIENT)
Dept: GENERAL RADIOLOGY | Age: 65
End: 2020-06-16
Payer: COMMERCIAL

## 2020-06-16 ENCOUNTER — HOSPITAL ENCOUNTER (EMERGENCY)
Age: 65
Discharge: HOME | End: 2020-06-16
Payer: COMMERCIAL

## 2020-06-16 VITALS
OXYGEN SATURATION: 98 % | DIASTOLIC BLOOD PRESSURE: 89 MMHG | HEART RATE: 76 BPM | TEMPERATURE: 97.6 F | SYSTOLIC BLOOD PRESSURE: 126 MMHG | RESPIRATION RATE: 18 BRPM

## 2020-06-16 PROCEDURE — 73080 X-RAY EXAM OF ELBOW: CPT

## 2020-06-16 PROCEDURE — 99283 EMERGENCY DEPT VISIT LOW MDM: CPT

## 2020-06-16 PROCEDURE — 73090 X-RAY EXAM OF FOREARM: CPT

## 2020-06-16 PROCEDURE — 73110 X-RAY EXAM OF WRIST: CPT

## 2020-06-16 RX ORDER — DIAPER,BRIEF,INFANT-TODD,DISP
EACH MISCELLANEOUS ONCE
Status: DISCONTINUED | OUTPATIENT
Start: 2020-06-16 | End: 2020-06-16 | Stop reason: HOSPADM

## 2020-06-16 ASSESSMENT — PAIN SCALES - GENERAL: PAINLEVEL_OUTOF10: 10

## 2020-06-16 NOTE — ED PROVIDER NOTES
[facility]     Department of Emergency Medicine   ED  Provider Note  Admit Date/RoomTime: 6/16/2020  5:05 PM  ED Room: 31/31    Chief Complaint     Elbow Pain (fall outside today chasing dogs left elbow pain) and Numbness (left fingers )    History of Present Illness   Source of history provided by:  patient. History/Exam Limitations: none       Steve Caldwell is a 60 y.o. old female who has a past medical history of:   Past Medical History:   Diagnosis Date    Arthritis     Depression     GERD (gastroesophageal reflux disease)     Graves disease     Hypothyroidism     SECONDARY TO GRAVES DISEASE   presents to the emergency department by private vehicle, for Left elbow pain which occured 1 hour(s) prior to arrival.  Cause of complaint: fall while at home. The symptoms are associated with pain radiating to the Olecranial and slightly into the forearm. Patient states she was with her dogs and got tangled in leash tripping and falling landing on her elbow. There has been a history of no prior problems with this area in the past.   She is right handed. The patients tetanus status is NOT up to date. Since onset the symptoms have been mild in degree. Her pain is aggraveated by movement, use and palpation and relieved by nothing. ROS   Pertinent positives and negatives are stated within HPI, all other systems reviewed and are negative. Past Surgical History:   Procedure Laterality Date    COLONOSCOPY  11/10/10    DR Easton Philippe GASTRIC BYPASS SURGERY  5/2/06    DR HAIRSTON   Social History:  reports that she quit smoking about 43 years ago. Her smoking use included cigarettes. She started smoking about 45 years ago. She has a 3.00 pack-year smoking history. She has never used smokeless tobacco. She reports current alcohol use. Family History: family history is not on file.    Allergies: Mobic [meloxicam]    Physical Exam           ED Triage Vitals   BP Temp Temp src Pulse Resp SpO2 Height Weight prognosis. Questions are answered at this time and they are agreeable with the plan. Assessment      1. Fall, initial encounter    2. Contusion of left elbow, initial encounter    3. Contusion of left forearm, initial encounter      Plan   Discharge to home  Patient condition is stable    New Medications     New Prescriptions    No medications on file     Electronically signed by Nikita Perdue PA-C   DD: 6/16/20  **This report was transcribed using voice recognition software. Every effort was made to ensure accuracy; however, inadvertent computerized transcription errors may be present.   END OF ED PROVIDER NOTE     Nikita Perdue PA-C  06/16/20 [facility]  06/16/20 [id number]

## 2020-06-23 ENCOUNTER — HOSPITAL ENCOUNTER (OUTPATIENT)
Age: 65
Discharge: HOME | End: 2020-06-25
Payer: COMMERCIAL

## 2020-06-23 ENCOUNTER — OFFICE VISIT (OUTPATIENT)
Dept: PRIMARY CARE CLINIC | Age: 65
End: 2020-06-23
Payer: COMMERCIAL

## 2020-06-23 VITALS
WEIGHT: 275 LBS | HEART RATE: 78 BPM | OXYGEN SATURATION: 96 % | SYSTOLIC BLOOD PRESSURE: 110 MMHG | TEMPERATURE: 97.2 F | BODY MASS INDEX: 47.2 KG/M2 | DIASTOLIC BLOOD PRESSURE: 80 MMHG

## 2020-06-23 LAB
T4 FREE: 1.51 NG/DL (ref 0.93–1.7)
TSH SERPL DL<=0.05 MIU/L-ACNC: 1.03 UIU/ML (ref 0.27–4.2)

## 2020-06-23 PROCEDURE — 84443 ASSAY THYROID STIM HORMONE: CPT

## 2020-06-23 PROCEDURE — 99214 OFFICE O/P EST MOD 30 MIN: CPT | Performed by: FAMILY MEDICINE

## 2020-06-23 PROCEDURE — 84439 ASSAY OF FREE THYROXINE: CPT

## 2020-06-23 ASSESSMENT — ENCOUNTER SYMPTOMS
DIARRHEA: 0
CONSTIPATION: 0

## 2020-07-09 ENCOUNTER — OFFICE VISIT (OUTPATIENT)
Dept: PRIMARY CARE CLINIC | Age: 65
End: 2020-07-09
Payer: COMMERCIAL

## 2020-07-09 VITALS
SYSTOLIC BLOOD PRESSURE: 112 MMHG | OXYGEN SATURATION: 97 % | DIASTOLIC BLOOD PRESSURE: 80 MMHG | WEIGHT: 277 LBS | HEART RATE: 84 BPM | BODY MASS INDEX: 46.15 KG/M2 | TEMPERATURE: 97.2 F | HEIGHT: 65 IN

## 2020-07-09 PROCEDURE — 99213 OFFICE O/P EST LOW 20 MIN: CPT | Performed by: FAMILY MEDICINE

## 2020-07-09 RX ORDER — MECLIZINE HCL 12.5 MG/1
12.5 TABLET ORAL 3 TIMES DAILY PRN
Qty: 30 TABLET | Refills: 0 | Status: SHIPPED
Start: 2020-07-09 | End: 2020-12-01 | Stop reason: SDUPTHER

## 2020-07-09 ASSESSMENT — ENCOUNTER SYMPTOMS
RHINORRHEA: 0
SINUS PAIN: 1
NAUSEA: 0
VOMITING: 0
SORE THROAT: 0
COUGH: 1
SINUS PRESSURE: 1

## 2020-07-09 NOTE — PROGRESS NOTES
97.2 °F (36.2 °C)   Ht 5' 5\" (1.651 m)   Wt 277 lb (125.6 kg)   SpO2 97%   BMI 46.10 kg/m²      Physical Exam  Constitutional:       General: She is not in acute distress. HENT:      Right Ear: Tympanic membrane normal.      Left Ear: Tympanic membrane normal.      Ears:      Comments: - Hallpike maneuver       Nose: No mucosal edema or rhinorrhea. Right Sinus: No maxillary sinus tenderness or frontal sinus tenderness. Left Sinus: No maxillary sinus tenderness or frontal sinus tenderness. Mouth/Throat:      Pharynx: Uvula midline. No oropharyngeal exudate or posterior oropharyngeal erythema. Neck:      Musculoskeletal: Neck supple. Cardiovascular:      Rate and Rhythm: Normal rate and regular rhythm. Pulmonary:      Effort: Pulmonary effort is normal.      Breath sounds: Normal breath sounds. Lymphadenopathy:      Cervical: No cervical adenopathy. ASSESSMENT AND PLAN:    Janee Langley was seen today for dizziness. Diagnoses and all orders for this visit:    Dizziness  -     meclizine (ANTIVERT) 12.5 MG tablet; Take 1 tablet by mouth 3 times daily as needed for Dizziness    - continue Flonase NS  - pop ears  - sudafed otc prn     Return if symptoms worsen or fail to improve.     Karey Dominguez,

## 2020-07-14 ENCOUNTER — TELEPHONE (OUTPATIENT)
Dept: PRIMARY CARE CLINIC | Age: 65
End: 2020-07-14

## 2020-07-14 RX ORDER — LORATADINE 10 MG/1
10 CAPSULE, LIQUID FILLED ORAL DAILY
Qty: 30 CAPSULE | Refills: 0 | Status: SHIPPED
Start: 2020-07-14 | End: 2020-08-06 | Stop reason: SDUPTHER

## 2020-07-30 ENCOUNTER — OFFICE VISIT (OUTPATIENT)
Dept: PRIMARY CARE CLINIC | Age: 65
End: 2020-07-30
Payer: COMMERCIAL

## 2020-07-30 VITALS
HEART RATE: 83 BPM | DIASTOLIC BLOOD PRESSURE: 84 MMHG | TEMPERATURE: 96.8 F | WEIGHT: 280 LBS | SYSTOLIC BLOOD PRESSURE: 138 MMHG | OXYGEN SATURATION: 94 % | BODY MASS INDEX: 46.59 KG/M2

## 2020-07-30 PROCEDURE — 99213 OFFICE O/P EST LOW 20 MIN: CPT | Performed by: FAMILY MEDICINE

## 2020-07-30 RX ORDER — TRAMADOL HYDROCHLORIDE 50 MG/1
50 TABLET ORAL EVERY 6 HOURS PRN
Qty: 28 TABLET | Refills: 0 | Status: SHIPPED
Start: 2020-07-30 | End: 2021-11-02 | Stop reason: SDUPTHER

## 2020-07-30 NOTE — PROGRESS NOTES
Deejay Garcia, a female of 60 y.o. came to the office 7/30/2020. Patient Active Problem List   Diagnosis    Hypothyroidism    Graves disease    Depression    GERD (gastroesophageal reflux disease)    Chronic pain of both knees          Knee Pain    The incident occurred more than 1 week ago. There was no injury mechanism. The pain is present in the right knee and left knee. The quality of the pain is described as aching. Associated symptoms include a loss of motion. The symptoms are aggravated by movement. Treatments tried: steroid shots, Dr Shannan Blair. on Celebrex bid presently. tramadol helped. Allergies   Allergen Reactions    Mobic [Meloxicam] Other (See Comments)       Current Outpatient Medications on File Prior to Visit   Medication Sig Dispense Refill    loratadine (CLARITIN) 10 MG capsule Take 1 capsule by mouth daily 30 capsule 0    celecoxib (CELEBREX) 200 MG capsule TAKE 1 CAPSULE BY MOUTH TWICE DAILY 180 capsule 0    levothyroxine (SYNTHROID) 137 MCG tablet TAKE 1 TABLET BY MOUTH ONCE DAILY 90 tablet 0    citalopram (CELEXA) 40 MG tablet TAKE 1 TABLET BY MOUTH ONCE DAILY 90 tablet 0    buPROPion (WELLBUTRIN XL) 300 MG extended release tablet TAKE ONE TABLET BY MOUTH EVERY MORNING 90 tablet 0    fluticasone (FLONASE) 50 MCG/ACT nasal spray 2 sprays by Nasal route daily 1 Bottle 0    Cyanocobalamin (VITAMIN B 12 PO) Take by mouth      Multiple Vitamins-Minerals (MULTIVITAMIN ADULT PO) Take by mouth       No current facility-administered medications on file prior to visit. Review of Systems   Musculoskeletal: Positive for arthralgias and gait problem. other review of systems reviewed and are negative    OBJECTIVE:  /84   Pulse 83   Temp 96.8 °F (36 °C)   Wt 280 lb (127 kg)   SpO2 94%   BMI 46.59 kg/m²      Physical Exam  Constitutional:       General: She is not in acute distress. Appearance: Normal appearance.  She is not ill-appearing, toxic-appearing or diaphoretic. HENT:      Head: Normocephalic. Eyes:      General: No scleral icterus. Pulmonary:      Effort: Pulmonary effort is normal.   Musculoskeletal:      Right knee: She exhibits swelling. She exhibits normal range of motion and no effusion. Tenderness found. Medial joint line tenderness noted. Left knee: She exhibits decreased range of motion (extension) and swelling. She exhibits no effusion. Tenderness found. Medial joint line tenderness noted. Neurological:      Mental Status: She is alert and oriented to person, place, and time. Psychiatric:         Mood and Affect: Mood normal.         ASSESSMENT AND PLAN:    Anthony Skinner was seen today for knee pain. Diagnoses and all orders for this visit:    Chronic pain of both knees  -     traMADol (ULTRAM) 50 MG tablet; Take 1 tablet by mouth every 6 hours as needed for Pain for up to 7 days. Primary osteoarthritis of both knees  -     Belén Montana MD, Orthopaedics, [facility]    - continue Celebrex but take once daily vs bid  - recommend otc Instaflex gel to rub on knee or Voltaren gel  Controlled substances monitoring: possible medication side effects, risk of tolerance and/or dependence, and alternative treatments discussed and no signs of potential drug abuse or diversion identified     - follow up with ortho. Return if symptoms worsen or fail to improve.     Conchis Dominguez,

## 2020-08-06 RX ORDER — LORATADINE 10 MG/1
10 CAPSULE, LIQUID FILLED ORAL DAILY
Qty: 90 CAPSULE | Refills: 1 | Status: SHIPPED
Start: 2020-08-06 | End: 2021-08-23

## 2020-08-12 ENCOUNTER — HOSPITAL ENCOUNTER (OUTPATIENT)
Dept: GENERAL RADIOLOGY | Age: 65
Discharge: HOME | End: 2020-08-14
Payer: COMMERCIAL

## 2020-08-12 ENCOUNTER — OFFICE VISIT (OUTPATIENT)
Dept: ORTHOPEDIC SURGERY | Age: 65
End: 2020-08-12
Payer: COMMERCIAL

## 2020-08-12 VITALS
SYSTOLIC BLOOD PRESSURE: 131 MMHG | BODY MASS INDEX: 46.82 KG/M2 | HEART RATE: 63 BPM | WEIGHT: 281 LBS | HEIGHT: 65 IN | DIASTOLIC BLOOD PRESSURE: 72 MMHG

## 2020-08-12 PROCEDURE — 73565 X-RAY EXAM OF KNEES: CPT

## 2020-08-12 PROCEDURE — 2500000003 HC RX 250 WO HCPCS

## 2020-08-12 PROCEDURE — 6360000002 HC RX W HCPCS

## 2020-08-12 PROCEDURE — 20610 DRAIN/INJ JOINT/BURSA W/O US: CPT | Performed by: ORTHOPAEDIC SURGERY

## 2020-08-12 PROCEDURE — 99202 OFFICE O/P NEW SF 15 MIN: CPT | Performed by: ORTHOPAEDIC SURGERY

## 2020-08-12 PROCEDURE — 99203 OFFICE O/P NEW LOW 30 MIN: CPT | Performed by: ORTHOPAEDIC SURGERY

## 2020-08-12 RX ORDER — BUPIVACAINE HYDROCHLORIDE 2.5 MG/ML
6 INJECTION, SOLUTION EPIDURAL; INFILTRATION; INTRACAUDAL ONCE
Status: COMPLETED | OUTPATIENT
Start: 2020-08-12 | End: 2020-08-12

## 2020-08-12 RX ORDER — TRIAMCINOLONE ACETONIDE 40 MG/ML
40 INJECTION, SUSPENSION INTRA-ARTICULAR; INTRAMUSCULAR ONCE
Status: COMPLETED | OUTPATIENT
Start: 2020-08-12 | End: 2020-08-12

## 2020-08-12 RX ORDER — TRAMADOL HYDROCHLORIDE 50 MG/1
50 TABLET ORAL EVERY 6 HOURS PRN
COMMUNITY
End: 2021-10-18 | Stop reason: DRUGHIGH

## 2020-08-12 RX ORDER — LIDOCAINE HYDROCHLORIDE 10 MG/ML
6 INJECTION, SOLUTION INFILTRATION; PERINEURAL ONCE
Status: COMPLETED | OUTPATIENT
Start: 2020-08-12 | End: 2020-08-12

## 2020-08-12 RX ADMIN — BUPIVACAINE HYDROCHLORIDE 15 MG: 2.5 INJECTION, SOLUTION EPIDURAL; INFILTRATION; INTRACAUDAL at 11:52

## 2020-08-12 RX ADMIN — TRIAMCINOLONE ACETONIDE 40 MG: 40 INJECTION, SUSPENSION INTRA-ARTICULAR; INTRAMUSCULAR at 11:53

## 2020-08-12 RX ADMIN — LIDOCAINE HYDROCHLORIDE 6 ML: 10 INJECTION, SOLUTION INFILTRATION; PERINEURAL at 11:52

## 2020-08-12 NOTE — PROGRESS NOTES
Chief Complaint   Patient presents with    Knee Pain      BL knee pain. Reports left being worse than the right. Referred by Dr. Brandon Combs previous injection given Lt knee post fall aprox. 20 years ago. SUBJECTIVE: Patient is a very pleasant 60-year-old female comes in today for chief complaint of bilateral knee pain. She has worse pain on the right than the left although they both hurt fairly badly. She states she has 8 out of 10 activity on activity. She does have difficulty with her ADLs. She had injections years ago but has not had any treatment recently. She denies any recent falls or traumas and she works as a . Review of Systems   Constitutional: Negative for fever, chills, diaphoresis, appetite change and fatigue. HENT: Negative for dental issues, hearing loss and tinnitus. Negative for congestion, sinus pressure, sneezing, sore throat. Negative for headache. Eyes: Negative for visual disturbance, blurred and double vision. Negative for pain, discharge, redness and itching  Respiratory: Negative for cough, shortness of breath and wheezing. Cardiovascular: Negative for chest pain, palpitations and leg swelling. No dyspnea on exertion   Gastrointestinal:   Negative for nausea, vomiting, abdominal pain, diarrhea, constipation  or black or bloody. Hematologic\Lymphatic:  negative for bleeding, petechiae,   Genitourinary: Negative for hematuria and difficulty urinating. Musculoskeletal: Negative for neck pain and stiffness. Negative for back pain, see HPI  Skin: Negative for pallor, rash and wound. Neurological: Negative for dizziness, tremors, seizures, weakness, light-headedness, no TIA or stroke symptoms. No numbness and headaches. Psychiatric/Behavioral: Negative.         Past Medical History:   Diagnosis Date    Arthritis     Depression     GERD (gastroesophageal reflux disease)     Graves disease     Hypothyroidism     SECONDARY TO GRAVES Compartments soft and compressible. NVID. 2/4 DP and PT pulses. Significant crepitus on range of motion bilaterally   Calves soft and NT.     5/5 EHL, TA, GS. Range of motion is 0 to 95 degrees in the right and 0 to 90 degrees in the left      /72   Pulse 63   Ht 5' 5\" (1.651 m)   Wt 281 lb (127.5 kg)   BMI 46.76 kg/m²      XR: 8/12/20-x-rays feel bilateral severe tricompartmental osteoarthritis most pronounced in the medial compartment. There is no obvious fracture dislocation. ASSESSMENT:     Diagnosis Orders   1. Primary osteoarthritis of both knees  Belén Asencio MD, Bariatrics, [facility]    RI ARTHROCENTESIS ASPIR&/INJ MAJOR JT/BURSA W/O US    RI ARTHROCENTESIS ASPIR&/INJ MAJOR JT/BURSA W/O US   2. Graves disease  Juanito Hernandez MD, Bariatrics, [facility]    RI ARTHROCENTESIS ASPIR&/INJ MAJOR JT/BURSA W/O US    RI ARTHROCENTESIS ASPIR&/INJ MAJOR JT/BURSA W/O US        Discussion:Discussion- I discussed the treatment of OA with the patient in detail. I explained that this starts with NSAIDS and PT and an injection if warranted. I explained that the end of the road is TKA. Patient understands. Patient's symptoms are severe enough I offered her injection bilaterally. She stated that this is what she wanted. I explained the risk of cartilage degradation and infection and she would like to proceed. Procedure Note  Skin prepped with alcohol.  21ga. Needle used to inject 3cc 1% Lidocaine, 3cc 0.25% Marcaine, and 1cc Kenalog into the right knee through anterior medial portal.  Patient tolerated well and band aid applied. This process was repeated on the left knee with the same injection. Both injections went very well. The patient walked out of the office with no issues.        PLAN:    Injection bilateral knees    Follow-up in 3 months    Call with any questions or concerns  ELECTRONICALLY signed by:    Bambi Rodrigues MD  8/12/20

## 2020-08-14 RX ORDER — CELECOXIB 200 MG/1
CAPSULE ORAL
Qty: 180 CAPSULE | Refills: 1 | Status: ON HOLD
Start: 2020-08-14 | End: 2020-09-18 | Stop reason: HOSPADM

## 2020-08-14 RX ORDER — CITALOPRAM 40 MG/1
TABLET ORAL
Qty: 90 TABLET | Refills: 1 | Status: SHIPPED
Start: 2020-08-14 | End: 2020-11-17 | Stop reason: ALTCHOICE

## 2020-08-14 RX ORDER — LEVOTHYROXINE SODIUM 137 UG/1
TABLET ORAL
Qty: 90 TABLET | Refills: 1 | Status: SHIPPED
Start: 2020-08-14 | End: 2021-02-17 | Stop reason: SDUPTHER

## 2020-08-14 RX ORDER — BUPROPION HYDROCHLORIDE 300 MG/1
TABLET ORAL
Qty: 90 TABLET | Refills: 1 | Status: SHIPPED
Start: 2020-08-14 | End: 2021-03-07

## 2020-09-16 ENCOUNTER — APPOINTMENT (OUTPATIENT)
Dept: GENERAL RADIOLOGY | Age: 65
DRG: 247 | End: 2020-09-16
Payer: COMMERCIAL

## 2020-09-16 ENCOUNTER — HOSPITAL ENCOUNTER (INPATIENT)
Age: 65
LOS: 2 days | Discharge: HOME | DRG: 247 | End: 2020-09-18
Attending: EMERGENCY MEDICINE | Admitting: INTERNAL MEDICINE
Payer: COMMERCIAL

## 2020-09-16 PROBLEM — E66.01 MORBID OBESITY (HCC): Chronic | Status: ACTIVE | Noted: 2020-09-16

## 2020-09-16 PROBLEM — I21.4 NSTEMI (NON-ST ELEVATED MYOCARDIAL INFARCTION) (HCC): Status: ACTIVE | Noted: 2020-09-16

## 2020-09-16 LAB
ALBUMIN SERPL-MCNC: 3.8 G/DL (ref 3.5–5.2)
ALP BLD-CCNC: 54 U/L (ref 35–104)
ALT SERPL-CCNC: 16 U/L (ref 0–32)
ANION GAP SERPL CALCULATED.3IONS-SCNC: 14 MMOL/L (ref 7–16)
APTT: 182.7 SEC (ref 24.5–35.1)
AST SERPL-CCNC: 41 U/L (ref 0–31)
BACTERIA: NORMAL /HPF
BASOPHILS ABSOLUTE: 0.05 E9/L (ref 0–0.2)
BASOPHILS RELATIVE PERCENT: 0.9 % (ref 0–2)
BILIRUB SERPL-MCNC: 0.3 MG/DL (ref 0–1.2)
BILIRUBIN URINE: NEGATIVE
BLOOD, URINE: NEGATIVE
BUN BLDV-MCNC: 13 MG/DL (ref 8–23)
CALCIUM SERPL-MCNC: 9.5 MG/DL (ref 8.6–10.2)
CHLORIDE BLD-SCNC: 100 MMOL/L (ref 98–107)
CLARITY: CLEAR
CO2: 23 MMOL/L (ref 22–29)
COLOR: YELLOW
CREAT SERPL-MCNC: 0.9 MG/DL (ref 0.5–1)
EKG ATRIAL RATE: 63 BPM
EKG ATRIAL RATE: 63 BPM
EKG ATRIAL RATE: 66 BPM
EKG P AXIS: 23 DEGREES
EKG P AXIS: 36 DEGREES
EKG P AXIS: 39 DEGREES
EKG P-R INTERVAL: 192 MS
EKG P-R INTERVAL: 198 MS
EKG P-R INTERVAL: 198 MS
EKG Q-T INTERVAL: 402 MS
EKG Q-T INTERVAL: 416 MS
EKG Q-T INTERVAL: 418 MS
EKG QRS DURATION: 80 MS
EKG QRS DURATION: 80 MS
EKG QRS DURATION: 86 MS
EKG QTC CALCULATION (BAZETT): 411 MS
EKG QTC CALCULATION (BAZETT): 427 MS
EKG QTC CALCULATION (BAZETT): 436 MS
EKG R AXIS: -3 DEGREES
EKG R AXIS: 0 DEGREES
EKG T AXIS: 27 DEGREES
EKG T AXIS: 33 DEGREES
EKG T AXIS: 61 DEGREES
EKG VENTRICULAR RATE: 63 BPM
EKG VENTRICULAR RATE: 63 BPM
EKG VENTRICULAR RATE: 66 BPM
EOSINOPHILS ABSOLUTE: 0.15 E9/L (ref 0.05–0.5)
EOSINOPHILS RELATIVE PERCENT: 2.6 % (ref 0–6)
GFR AFRICAN AMERICAN: >60
GFR NON-AFRICAN AMERICAN: >60 ML/MIN/1.73
GLUCOSE BLD-MCNC: 89 MG/DL (ref 74–99)
GLUCOSE URINE: NEGATIVE MG/DL
HCT VFR BLD CALC: 36.1 % (ref 34–48)
HCT VFR BLD CALC: 39.9 % (ref 34–48)
HEMOGLOBIN: 10.9 G/DL (ref 11.5–15.5)
HEMOGLOBIN: 11.8 G/DL (ref 11.5–15.5)
IMMATURE GRANULOCYTES #: 0.02 E9/L
IMMATURE GRANULOCYTES %: 0.3 % (ref 0–5)
KETONES, URINE: NEGATIVE MG/DL
LEUKOCYTE ESTERASE, URINE: NEGATIVE
LIPASE: 42 U/L (ref 13–60)
LYMPHOCYTES ABSOLUTE: 1.75 E9/L (ref 1.5–4)
LYMPHOCYTES RELATIVE PERCENT: 29.8 % (ref 20–42)
MAGNESIUM: 2.5 MG/DL (ref 1.6–2.6)
MCH RBC QN AUTO: 23.9 PG (ref 26–35)
MCH RBC QN AUTO: 24 PG (ref 26–35)
MCHC RBC AUTO-ENTMCNC: 29.6 % (ref 32–34.5)
MCHC RBC AUTO-ENTMCNC: 30.2 % (ref 32–34.5)
MCV RBC AUTO: 79.5 FL (ref 80–99.9)
MCV RBC AUTO: 80.8 FL (ref 80–99.9)
MONOCYTES ABSOLUTE: 0.59 E9/L (ref 0.1–0.95)
MONOCYTES RELATIVE PERCENT: 10.1 % (ref 2–12)
NEUTROPHILS ABSOLUTE: 3.31 E9/L (ref 1.8–7.3)
NEUTROPHILS RELATIVE PERCENT: 56.3 % (ref 43–80)
NITRITE, URINE: NEGATIVE
PDW BLD-RTO: 18.9 FL (ref 11.5–15)
PDW BLD-RTO: 19.4 FL (ref 11.5–15)
PH UA: 7 (ref 5–9)
PLATELET # BLD: 247 E9/L (ref 130–450)
PLATELET # BLD: 261 E9/L (ref 130–450)
PMV BLD AUTO: 10 FL (ref 7–12)
PMV BLD AUTO: 10.6 FL (ref 7–12)
POTASSIUM SERPL-SCNC: 5 MMOL/L (ref 3.5–5)
PROTEIN UA: NEGATIVE MG/DL
RBC # BLD: 4.54 E12/L (ref 3.5–5.5)
RBC # BLD: 4.94 E12/L (ref 3.5–5.5)
RBC UA: NORMAL /HPF (ref 0–2)
SODIUM BLD-SCNC: 137 MMOL/L (ref 132–146)
SPECIFIC GRAVITY UA: 1.01 (ref 1–1.03)
T4 TOTAL: 8.8 MCG/DL (ref 4.5–11.7)
TOTAL PROTEIN: 7.7 G/DL (ref 6.4–8.3)
TROPONIN: 0.2 NG/ML (ref 0–0.03)
TROPONIN: 1.4 NG/ML (ref 0–0.03)
TROPONIN: <0.01 NG/ML (ref 0–0.03)
TSH SERPL DL<=0.05 MIU/L-ACNC: 2.47 UIU/ML (ref 0.27–4.2)
UROBILINOGEN, URINE: 0.2 E.U./DL
WBC # BLD: 5.9 E9/L (ref 4.5–11.5)
WBC # BLD: 6.9 E9/L (ref 4.5–11.5)
WBC UA: NORMAL /HPF (ref 0–5)

## 2020-09-16 PROCEDURE — 71045 X-RAY EXAM CHEST 1 VIEW: CPT

## 2020-09-16 PROCEDURE — 6370000000 HC RX 637 (ALT 250 FOR IP): Performed by: STUDENT IN AN ORGANIZED HEALTH CARE EDUCATION/TRAINING PROGRAM

## 2020-09-16 PROCEDURE — 85027 COMPLETE CBC AUTOMATED: CPT

## 2020-09-16 PROCEDURE — 83735 ASSAY OF MAGNESIUM: CPT

## 2020-09-16 PROCEDURE — 84443 ASSAY THYROID STIM HORMONE: CPT

## 2020-09-16 PROCEDURE — 6370000000 HC RX 637 (ALT 250 FOR IP): Performed by: INTERNAL MEDICINE

## 2020-09-16 PROCEDURE — 83690 ASSAY OF LIPASE: CPT

## 2020-09-16 PROCEDURE — 93005 ELECTROCARDIOGRAM TRACING: CPT | Performed by: STUDENT IN AN ORGANIZED HEALTH CARE EDUCATION/TRAINING PROGRAM

## 2020-09-16 PROCEDURE — 6360000002 HC RX W HCPCS: Performed by: STUDENT IN AN ORGANIZED HEALTH CARE EDUCATION/TRAINING PROGRAM

## 2020-09-16 PROCEDURE — 2500000003 HC RX 250 WO HCPCS: Performed by: STUDENT IN AN ORGANIZED HEALTH CARE EDUCATION/TRAINING PROGRAM

## 2020-09-16 PROCEDURE — 36415 COLL VENOUS BLD VENIPUNCTURE: CPT

## 2020-09-16 PROCEDURE — APPSS60 APP SPLIT SHARED TIME 46-60 MINUTES: Performed by: CLINICAL NURSE SPECIALIST

## 2020-09-16 PROCEDURE — 84436 ASSAY OF TOTAL THYROXINE: CPT

## 2020-09-16 PROCEDURE — 96375 TX/PRO/DX INJ NEW DRUG ADDON: CPT

## 2020-09-16 PROCEDURE — 2580000003 HC RX 258: Performed by: INTERNAL MEDICINE

## 2020-09-16 PROCEDURE — 80053 COMPREHEN METABOLIC PANEL: CPT

## 2020-09-16 PROCEDURE — 84484 ASSAY OF TROPONIN QUANT: CPT

## 2020-09-16 PROCEDURE — 96374 THER/PROPH/DIAG INJ IV PUSH: CPT

## 2020-09-16 PROCEDURE — 93005 ELECTROCARDIOGRAM TRACING: CPT | Performed by: INTERNAL MEDICINE

## 2020-09-16 PROCEDURE — 85025 COMPLETE CBC W/AUTO DIFF WBC: CPT

## 2020-09-16 PROCEDURE — 93010 ELECTROCARDIOGRAM REPORT: CPT | Performed by: INTERNAL MEDICINE

## 2020-09-16 PROCEDURE — 99285 EMERGENCY DEPT VISIT HI MDM: CPT

## 2020-09-16 PROCEDURE — 99254 IP/OBS CNSLTJ NEW/EST MOD 60: CPT | Performed by: INTERNAL MEDICINE

## 2020-09-16 PROCEDURE — 85730 THROMBOPLASTIN TIME PARTIAL: CPT

## 2020-09-16 PROCEDURE — 81001 URINALYSIS AUTO W/SCOPE: CPT

## 2020-09-16 PROCEDURE — 2140000000 HC CCU INTERMEDIATE R&B

## 2020-09-16 RX ORDER — PANTOPRAZOLE SODIUM 40 MG/1
40 TABLET, DELAYED RELEASE ORAL ONCE
Status: COMPLETED | OUTPATIENT
Start: 2020-09-16 | End: 2020-09-16

## 2020-09-16 RX ORDER — CITALOPRAM 20 MG/1
20 TABLET ORAL DAILY
Status: DISCONTINUED | OUTPATIENT
Start: 2020-09-16 | End: 2020-09-18 | Stop reason: HOSPADM

## 2020-09-16 RX ORDER — ACETAMINOPHEN 325 MG/1
650 TABLET ORAL EVERY 6 HOURS PRN
Status: DISCONTINUED | OUTPATIENT
Start: 2020-09-16 | End: 2020-09-17 | Stop reason: SDUPTHER

## 2020-09-16 RX ORDER — HEPARIN SODIUM 1000 [USP'U]/ML
60 INJECTION, SOLUTION INTRAVENOUS; SUBCUTANEOUS PRN
Status: DISCONTINUED | OUTPATIENT
Start: 2020-09-16 | End: 2020-09-17

## 2020-09-16 RX ORDER — POLYETHYLENE GLYCOL 3350 17 G/17G
17 POWDER, FOR SOLUTION ORAL DAILY PRN
Status: DISCONTINUED | OUTPATIENT
Start: 2020-09-16 | End: 2020-09-18 | Stop reason: HOSPADM

## 2020-09-16 RX ORDER — BUPROPION HYDROCHLORIDE 300 MG/1
300 TABLET ORAL DAILY
Status: DISCONTINUED | OUTPATIENT
Start: 2020-09-16 | End: 2020-09-18 | Stop reason: HOSPADM

## 2020-09-16 RX ORDER — ASPIRIN 81 MG/1
81 TABLET ORAL DAILY
Status: DISCONTINUED | OUTPATIENT
Start: 2020-09-17 | End: 2020-09-17 | Stop reason: SDUPTHER

## 2020-09-16 RX ORDER — NITROGLYCERIN 0.4 MG/1
0.4 TABLET SUBLINGUAL EVERY 5 MIN PRN
Status: DISCONTINUED | OUTPATIENT
Start: 2020-09-16 | End: 2020-09-18 | Stop reason: HOSPADM

## 2020-09-16 RX ORDER — LEVOTHYROXINE SODIUM 137 UG/1
137 TABLET ORAL
Status: DISCONTINUED | OUTPATIENT
Start: 2020-09-17 | End: 2020-09-18 | Stop reason: HOSPADM

## 2020-09-16 RX ORDER — ACETAMINOPHEN 325 MG/1
650 TABLET ORAL EVERY 4 HOURS PRN
Status: DISCONTINUED | OUTPATIENT
Start: 2020-09-16 | End: 2020-09-16 | Stop reason: SDUPTHER

## 2020-09-16 RX ORDER — PROMETHAZINE HYDROCHLORIDE 25 MG/1
12.5 TABLET ORAL EVERY 6 HOURS PRN
Status: DISCONTINUED | OUTPATIENT
Start: 2020-09-16 | End: 2020-09-18 | Stop reason: HOSPADM

## 2020-09-16 RX ORDER — TRAMADOL HYDROCHLORIDE 50 MG/1
50 TABLET ORAL EVERY 6 HOURS PRN
Status: DISCONTINUED | OUTPATIENT
Start: 2020-09-16 | End: 2020-09-18 | Stop reason: HOSPADM

## 2020-09-16 RX ORDER — ATORVASTATIN CALCIUM 40 MG/1
40 TABLET, FILM COATED ORAL NIGHTLY
Status: DISCONTINUED | OUTPATIENT
Start: 2020-09-16 | End: 2020-09-18 | Stop reason: HOSPADM

## 2020-09-16 RX ORDER — ONDANSETRON 2 MG/ML
4 INJECTION INTRAMUSCULAR; INTRAVENOUS EVERY 6 HOURS PRN
Status: DISCONTINUED | OUTPATIENT
Start: 2020-09-16 | End: 2020-09-18 | Stop reason: HOSPADM

## 2020-09-16 RX ORDER — SODIUM CHLORIDE 0.9 % (FLUSH) 0.9 %
10 SYRINGE (ML) INJECTION EVERY 12 HOURS SCHEDULED
Status: DISCONTINUED | OUTPATIENT
Start: 2020-09-16 | End: 2020-09-16 | Stop reason: SDUPTHER

## 2020-09-16 RX ORDER — HEPARIN SODIUM 10000 [USP'U]/100ML
12 INJECTION, SOLUTION INTRAVENOUS CONTINUOUS
Status: DISCONTINUED | OUTPATIENT
Start: 2020-09-16 | End: 2020-09-17

## 2020-09-16 RX ORDER — HEPARIN SODIUM 1000 [USP'U]/ML
60 INJECTION, SOLUTION INTRAVENOUS; SUBCUTANEOUS ONCE
Status: COMPLETED | OUTPATIENT
Start: 2020-09-16 | End: 2020-09-16

## 2020-09-16 RX ORDER — SODIUM CHLORIDE 0.9 % (FLUSH) 0.9 %
10 SYRINGE (ML) INJECTION EVERY 12 HOURS SCHEDULED
Status: DISCONTINUED | OUTPATIENT
Start: 2020-09-16 | End: 2020-09-17 | Stop reason: SDUPTHER

## 2020-09-16 RX ORDER — SODIUM CHLORIDE 0.9 % (FLUSH) 0.9 %
10 SYRINGE (ML) INJECTION PRN
Status: DISCONTINUED | OUTPATIENT
Start: 2020-09-16 | End: 2020-09-17 | Stop reason: SDUPTHER

## 2020-09-16 RX ORDER — SODIUM CHLORIDE 0.9 % (FLUSH) 0.9 %
10 SYRINGE (ML) INJECTION PRN
Status: DISCONTINUED | OUTPATIENT
Start: 2020-09-16 | End: 2020-09-16 | Stop reason: SDUPTHER

## 2020-09-16 RX ORDER — NITROGLYCERIN 0.4 MG/1
0.4 TABLET SUBLINGUAL ONCE
Status: COMPLETED | OUTPATIENT
Start: 2020-09-16 | End: 2020-09-16

## 2020-09-16 RX ORDER — PANTOPRAZOLE SODIUM 40 MG/1
40 TABLET, DELAYED RELEASE ORAL
Status: DISCONTINUED | OUTPATIENT
Start: 2020-09-17 | End: 2020-09-16

## 2020-09-16 RX ORDER — HEPARIN SODIUM 1000 [USP'U]/ML
30 INJECTION, SOLUTION INTRAVENOUS; SUBCUTANEOUS PRN
Status: DISCONTINUED | OUTPATIENT
Start: 2020-09-16 | End: 2020-09-17

## 2020-09-16 RX ORDER — ASPIRIN 81 MG/1
81 TABLET, CHEWABLE ORAL ONCE
Status: DISCONTINUED | OUTPATIENT
Start: 2020-09-16 | End: 2020-09-17

## 2020-09-16 RX ORDER — ACETAMINOPHEN 650 MG/1
650 SUPPOSITORY RECTAL EVERY 6 HOURS PRN
Status: DISCONTINUED | OUTPATIENT
Start: 2020-09-16 | End: 2020-09-18 | Stop reason: HOSPADM

## 2020-09-16 RX ADMIN — HEPARIN SODIUM AND DEXTROSE 1524 UNITS/HR: 10000; 5 INJECTION INTRAVENOUS at 12:04

## 2020-09-16 RX ADMIN — SODIUM CHLORIDE, PRESERVATIVE FREE 10 ML: 5 INJECTION INTRAVENOUS at 21:41

## 2020-09-16 RX ADMIN — HEPARIN SODIUM 7620 UNITS: 1000 INJECTION INTRAVENOUS; SUBCUTANEOUS at 12:05

## 2020-09-16 RX ADMIN — NITROGLYCERIN 0.4 MG: 0.4 TABLET, ORALLY DISINTEGRATING SUBLINGUAL at 13:46

## 2020-09-16 RX ADMIN — NITROGLYCERIN 0.4 MG: 0.4 TABLET, ORALLY DISINTEGRATING SUBLINGUAL at 12:00

## 2020-09-16 RX ADMIN — LIDOCAINE HYDROCHLORIDE: 20 SOLUTION ORAL; TOPICAL at 07:24

## 2020-09-16 RX ADMIN — ATORVASTATIN CALCIUM 40 MG: 40 TABLET, FILM COATED ORAL at 21:40

## 2020-09-16 RX ADMIN — PANTOPRAZOLE SODIUM 40 MG: 40 TABLET, DELAYED RELEASE ORAL at 09:36

## 2020-09-16 RX ADMIN — TRAMADOL HYDROCHLORIDE 50 MG: 50 TABLET, FILM COATED ORAL at 21:46

## 2020-09-16 RX ADMIN — FAMOTIDINE 20 MG: 10 INJECTION INTRAVENOUS at 07:24

## 2020-09-16 ASSESSMENT — ENCOUNTER SYMPTOMS
CHEST TIGHTNESS: 1
VOMITING: 0
ABDOMINAL PAIN: 0
CONSTIPATION: 0
BACK PAIN: 0
COLOR CHANGE: 0
SHORTNESS OF BREATH: 0
DIARRHEA: 0
WHEEZING: 0
NAUSEA: 0
COUGH: 0
CHOKING: 0

## 2020-09-16 ASSESSMENT — PAIN SCALES - GENERAL
PAINLEVEL_OUTOF10: 5
PAINLEVEL_OUTOF10: 0
PAINLEVEL_OUTOF10: 2
PAINLEVEL_OUTOF10: 9

## 2020-09-16 ASSESSMENT — PAIN DESCRIPTION - PAIN TYPE: TYPE: ACUTE PAIN

## 2020-09-16 ASSESSMENT — PAIN DESCRIPTION - DESCRIPTORS: DESCRIPTORS: PRESSURE

## 2020-09-16 ASSESSMENT — PAIN DESCRIPTION - ORIENTATION: ORIENTATION: MID

## 2020-09-16 ASSESSMENT — PAIN DESCRIPTION - LOCATION: LOCATION: CHEST

## 2020-09-16 ASSESSMENT — PAIN DESCRIPTION - FREQUENCY: FREQUENCY: CONTINUOUS

## 2020-09-16 NOTE — ED PROVIDER NOTES
ATTENDING PROVIDER ATTESTATION:     Tobie Fabry presented to the emergency department for evaluation of Chest Pain (pressure started this am. pain is mid sternal and both arms are numb to her fingers. took excedrin no asa. no N/V)   and was initially evaluated by the Medical Resident. See Original ED Note for H&P and ED course above. I have reviewed and discussed the case, including pertinent history (medical, surgical, family and social) and exam findings with the Medical Resident assigned to Tobie Fabry. I have personally performed and/or participated in the history, exam, medical decision making, and procedures and agree with all pertinent clinical information and any additional changes or corrections are noted below in my assessment and plan. I have discussed this patient in detail with the resident, and provided the instruction and education,       I have reviewed my findings and recommendations with the assigned Medical Resident, Tobie Fabry and members of family present at the time of disposition. I have performed a history and physical examination of this patient and directly supervised the resident caring for this patient      History of Present Illness:    Presents to the ED for chest pain, beginning this morning. The complaint has been persistent, moderate in severity, and worsened by nothing. Patient reports retrosternal chest pressure this morning. She reports she feels like she has indigestion. She reports the pain radiates to both arms. She reports that she was having pressure and tingling in her fingers. She took 2 Excedrin with aspirin and no relief. He says she still has some symptoms. She denies shortness of breath. No fevers or chills. No back pain. No orthopnea. No syncope. No peripheral edema.         Review of Systems:   Pertinent positives and negatives are stated within HPI, all other systems reviewed and are negative.    --------------------------------------------- by me. The cardiac monitor was ordered secondary to the patient's chest pain and to monitor the patient for dysrhythmia. CPT [id number]      I, Dr. Kathya Blackburn, am the primary provider of record    My Medical Decision Making:         Chest pain  First trop neg  Second trop +  Took aspirin prior to arrival  NTG improved chest pain  EKG without ST elevation  Cardiology consulted  Heparin ordered, heparin drinp  Medicine to admit    CRITICAL CARE:  Please note that the withdrawal or failure to initiate urgent interventions for this patient would likely result in a life threatening deterioration or permanent disability. Accordingly this patient received 30 minutes of critical care time, including coordination of care, and direct bedside care and excluding separately billable procedures.           1. NSTEMI (non-ST elevated myocardial infarction) ([facility] [address]    2. Chest pain, unspecified type            Kristel Bradford MD  09/16/20 [id number]

## 2020-09-16 NOTE — ED NOTES
Pt. Reports mid sternal chest pressure went from 7/8 to 3 w Nitro. Daxa Wilson CNP for Cardiology at bedside & aware of same.      Roselle Hamman, RN  09/16/20 [id number]

## 2020-09-16 NOTE — H&P
[address]                  [address] [facility],  [facility]                              HISTORY AND PHYSICAL    PATIENT NAME: Ami Hoyos                       :        1955  MED REC NO:   [id number]                            ROOM:         ACCOUNT NO:   [de-identified]                           ADMIT DATE: 2020  PROVIDER:     Chau Macario DO    CHIEF COMPLAINT:  Chest pain. HISTORY OF PRESENT ILLNESS:  The patient is a 24-year-old   female who presented to the emergency room after acute onset of chest  pain in the morning described as a pressure which was persistent. She  took an antacid without relief. She took an aspirin at home. She  presented to the emergency room. Diagnostic evaluation revealed  findings consistent with acute non-ST elevation MI. She was started on  IV heparin. She was seen by Cardiology in the emergency room and  admitted for further evaluation and treatment. PAST MEDICAL HISTORY:  Morbid obesity; Graves disease, status post  radioactive iodine, now hypothyroid; GERD; osteoarthritis; anxiety;  depression; recent COVID-19 infection one month ago. MEDICATIONS PRIOR TO ADMISSION:  Wellbutrin, Celebrex, Synthroid,  Celexa, Ultram, Claritin, Flonase, vitamin B12, multivitamin. REVIEW OF SYSTEMS:  Remarkable for above-stated chief complaint. ALLERGIES:  MOBIC. SOCIAL HISTORY:  No tobacco, no alcohol. PRIMARY CARE PHYSICIAN:  Ruperto Nolasco DO    PHYSICAL EXAMINATION:  GENERAL APPEARANCE:  Reveals a 24-year-old  female who is alert  and oriented x3, cooperative and a good historian. VITAL SIGNS:  On admission, temperature 96.9, pulse 68, respirations 18,  blood pressure 171/105. HEENT:  Head:  Normocephalic, atraumatic. Eyes:  Pupils equal and  reactive to light. Extraocular muscles intact. Fundi not well  visualized. Nose, no obstruction, polyp or discharge noted. Mouth  mucosa without lesion. Pharynx noninjected without exudate. NECK:  Supple. No JVD. No thyromegaly. No carotid bruits. HEART:  Regular rate and rhythm without murmur. LUNGS:  Clear to auscultation bilaterally. ABDOMEN:  Obese, positive bowel sounds, soft, nontender. No rebound or  guarding. No hepatosplenomegaly. No masses. BACK:  Intact without lesion. EXTREMITIES:  With bilateral lower extremity varicose veins. LYMPH NODES:  No adenopathy noted. SKIN:  Without rash or lesion. IMPRESSION:  Acute non-ST elevation MI, morbid obesity, depression,  GERD, hypothyroidism. PLAN:  Admit. Continue IV heparin, aspirin, statin. Cardiology to see. DISCHARGE PLAN:  Home when stable.         Rk Rehman DO    D: 09/16/2020 13:52:36       T: 09/16/2020 13:59:08     [email or web address]  Job#: [id number]     Doc#: [id number]    CC:

## 2020-09-16 NOTE — CONSULTS
Inpatient Cardiology Consultation      Reason for Consult:  Chest pain    Consulting Physician: Dr. Malick Wayne    Requesting Physician:  Dr. Carla Wong     Date of Consultation: 9/16/2020    History of Present Illness:     Ms. Leon Hernandez is a 63year old lady who is previously unknown to our practice; she denies having had previous cardiology evaluation. She was getting ready for work this morning around 4:00 when she had sudden onset of heaviness/pressure in the center of chest, followed by tingling in both hands and heaviness in both arms, with the pain rating an 8 to 9 out of 10. She took four antacids and two Excedrin without relief. She felt as though she broke out into a sweat. She arrived in the ER around 6:00 am and received one sublingual nitroglycerin with decrease in her chest pain to a 3 out of 10. She has been started on intravenous Heparin. During the physical exam, her pain was a 7 to 8 out of 10; the tingling in her hands had resolved but she continued to have bilateral arm heaviness. Past Medical History:    1. Morbid Obesity  A. History of gastric bypass surgery > 10 years ago: she initially weighed 370, went down to 260 and is currently 280 lbs   2. Grave's Disease s/p radioactive iodine and now on Synthroid   3. Varicose veins   4. GERD  5. History of vertigo   6. Osteoarthritis of both knees s/p injections, last in August   7. Anxiety   8. Depression   9. Right carpal tunnel release surgery   10. COVID 19 infection diagnosed on 8/13/2020  11. Family history of early CAD     Medications Prior to Admit:  Prior to Admission medications    Medication Sig Start Date End Date Taking?  Authorizing Provider   buPROPion (WELLBUTRIN XL) 300 MG extended release tablet TAKE ONE TABLET BY MOUTH EVERY MORNING 8/14/20   Feliciano Dominguez, DO   celecoxib (CELEBREX) 200 MG capsule TAKE 1 CAPSULE BY MOUTH TWICE DAILY 8/14/20   Raquel Dominguez, DO   levothyroxine (SYNTHROID) 137 MCG tablet TAKE 1 TABLET BY MOUTH ONCE DAILY 20   Feliciano Dominguez,    citalopram (CELEXA) 40 MG tablet TAKE 1 TABLET BY MOUTH ONCE DAILY 20   Feliciano Dominguez DO   traMADol (ULTRAM) 50 MG tablet Take 50 mg by mouth every 6 hours as needed. Historical Provider, MD   loratadine (CLARITIN) 10 MG capsule Take 1 capsule by mouth daily 20   Evens Dominguez DO   fluticasone (FLONASE) 50 MCG/ACT nasal spray 2 sprays by Nasal route daily 3/13/20   Evens Dominguez DO   Cyanocobalamin (VITAMIN B 12 PO) Take by mouth    Historical Provider, MD   Multiple Vitamins-Minerals (MULTIVITAMIN ADULT PO) Take by mouth    Historical Provider, MD       Current Medications:    Current Facility-Administered Medications: aspirin chewable tablet 81 mg, 81 mg, Oral, Once  nitroGLYCERIN (NITROSTAT) SL tablet 0.4 mg, 0.4 mg, Sublingual, Q5 Min PRN  heparin (porcine) injection 7,620 Units, 60 Units/kg, Intravenous, Once  heparin (porcine) injection 7,620 Units, 60 Units/kg, Intravenous, PRN  heparin (porcine) injection 3,810 Units, 30 Units/kg, Intravenous, PRN  heparin 25,000 units in dextrose 5% 250 mL infusion, 12 Units/kg/hr, Intravenous, Continuous    Allergies:  Mobic [meloxicam]    Social History:    She is a    She lives with her  in a one story home without basement  She performs ADLs without difficulty   She never smoked and denies alcohol or illicit drug use. She drinks between four and six cups of coffee daily. Family History:   Father  at 64  in his sleep of suspected MI. He also had diabetes and was obese  Mother  at 80 and was a diabetic diagnosed in her 62s and had no know CAD  Brother had an ICD in his 62s, but she is unsure of he had CABG or stents  One sister in her 62s who had PCI    Review of Systems:     · Constitutional: Denies fatigue, fevers, chills or night sweats  · ENT: Positive for frequent headaches. Denies epistaxis or bleeding gums. · Cardiovascular: Chest pain and palpitations as above. Positive for leg cramps for which she takes magnesium. No lower extremity edema. · Respiratory: Denies dyspnea orthopnea or PND. She snores and was tested for sleep apnea but states she didn't sleep during the testing and never had results. She has had a persistent cough since her COVID infection. · Gastrointestinal: Positive for GERD and reflux. Denies hematemesis or anorexia. No hematochezia or melena    · Genitourinary: Denies urgency, dysuria or hematuria. Positive for nocturia. · Musculoskeletal: Denies falls. Positive for arthritic pains in both knees and has been told that she needs knee replacement surgery. She had injections in both knees in August.   · Integumentary: Denies rash, hives or pruritis   · Neurological: Denies syncope. She has a history of vertigo and has been prescribed meclizine. She has tingling in both hands as above. · Psychiatric: Positive for anxiety and depression. She has gained about 20 lbs since having COVID. · Hematologic/Lymphatic: Denies abnormal bruising or bleeding. Physical Exam:   BP (!) 140/84   Pulse 61   Temp 96.8 °F (36 °C)   Resp 16   Ht 5' 4\" (1.626 m)   Wt 280 lb (127 kg)   SpO2 98%   BMI 48.06 kg/m²   CONST:  Well developed, obese middle aged lady who appears of stated age. Awake, alert and cooperative. No apparent distress. HEENT:   Head- Normocephalic, atraumatic   Throat- Oral mucosa pink and moist  Neck-  No stridor, trachea midline, no jugular venous distention or carotid bruit (thick neck). CHEST: Chest symmetrical and non-tender to palpation. Respirations unlabored. RESPIRATORY: Lung sounds - clear throughout fields   CARDIOVASCULAR:     Heart Ausculation- Regular rate and rhythm, no murmur, s3, s4 or rub   PV: No lower extremity edema.  + varicosities. Pedal pulses palpable   ABDOMEN: Soft, non-tender to light palpation. Bowel sounds present.  No palpable masses   MS: Good muscle strength and tone. No atrophy or abnormal movements. : Deferred  SKIN: Warm and dry. Multiple areas of ecchymosis to legs. NEURO / PSYCH: Oriented to person, place and time. Speech clear and appropriate. Follows all commands. Pleasant affect     Telemetry: SR, 60s  ECG 6:10 am: SR with less than 1 mm ST depression in I and AVL  ECG 11:58 am: SR with PACs, resolution of ST depression     No intake or output data in the 24 hours ending 09/16/20 1155    Labs:   CBC:   Recent Labs     09/16/20  0703   WBC 5.9   HGB 10.9*   HCT 36.1        BMP:   Recent Labs     09/16/20  0703      K 5.0   CO2 23   BUN 13   CREATININE 0.9   LABGLOM >60   CALCIUM 9.5     Mag:   Recent Labs     09/16/20  0703   MG 2.5     Phos: No results for input(s): PHOS in the last 72 hours. TSH:   Recent Labs     09/16/20  0703   TSH 2.470     CARDIAC ENZYMES:  Recent Labs     09/16/20  0703 09/16/20  1056   TROPONINI <0.01 0.20*     FASTING LIPID PANEL:  Lab Results   Component Value Date    CHOL 170 07/22/2016    HDL 64 07/22/2016    LDLCALC 93 07/22/2016    TRIG 65 07/22/2016     LIVER PROFILE:  Recent Labs     09/16/20  0703   AST 41*   ALT 16   LABALBU 3.8     CXR 9/16/2020: Impression:      Normal chest     Assessment:  1. Unstable angina/NSTEMI    2. Morbid Obesity    3. Hypertension: not previously diagnosed     4. Varicose veins    5. Recent COVID19 in infection     6. Elevated AST: likely secondary to #1    Recommendations:  1. Agree with IV Heparin    2. Urgent cardiac catheterization +/- PCI this afternoon with Dr. Annetta Meadows (AUC: 8, 3). The procedure, alternatives, risks, and benefits will be discussed with her by Dr. Annetta Meadows prior to the procedure. 3. Aggressive risk factor modification. 4. Further recommendations to follow pending results of catheterization. The above assessment and treatment plan was made in collaboration with Dr. Annetta Meadows.      Electronically signed by BATSHEVA Jane PTINRWAR  PTT:    Lab Results   Component Value Date    APTT 182.7 09/16/2020     PTT Heparin:  No components found for: APTTHEP  Magnesium:    Lab Results   Component Value Date    MG 2.5 09/16/2020     TSH:    Lab Results   Component Value Date    TSH 2.470 09/16/2020     TROPONIN:  No components found for: TROP  BNP:  No results found for: BNP  FASTING LIPID PANEL:    Lab Results   Component Value Date    CHOL 170 07/22/2016    HDL 64 07/22/2016    TRIG 65 07/22/2016     XR CHEST PORTABLE   Final Result   Normal chest                 I have personally reviewed the laboratory, cardiac diagnostic and radiographic testing as outlined above:    IMPRESSION:  1. Non-ST elevation MI: Will need cardiac catheterization, procedure, alternatives, risks, benefits, discussed with her, she is willing to proceed. 2.        Status post gastric bypass surgery  3. Family history of early CAD  3. History of Graves' disease status post radioactive iodine treatment      RECOMMENDATIONS:   1. Cardiac catheterization: Indications, procedures, risks and benefits of cardiac catheterization were discussed with the patient with risks including, but not limited to, infection, vessel damage, bleeding, dye allergy, nephrotoxicity, dysrhythmia, MI, CVA and death as well as the potential need for emergent cardiac surgery. Patient verbalized understanding and is agreeable to proceed. 2. Echocardiogram  3. Statin, beta-blocker, IV heparin and aspirin  4. Further cardiac recommendations will be forthcoming pending her clinical course and diagnostic test findings    I have reviewed my findings and recommendations with patient    Thank you for the consult    Electronically signed by Robinson Felton MD on 9/16/2020 at 7:00 PM  NOTE: This report was transcribed using voice recognition software.  Every effort was made to ensure accuracy; however, inadvertent computerized transcription errors may be present

## 2020-09-16 NOTE — ED PROVIDER NOTES
Patient is a 29-year-old female who presents the ED today with chief complaint of chest pain. Patient states that she woke up at approximately 4:30 AM which is the normal time she wakes up that she is a  and that she was having a pressure sensation that was midsternal.  Patient states that she does have history of GERD and she thought she was having acid reflux so she took some Tums which did remit her symptoms for a few minutes but then it returned. She states that the pressure was not going away so she decided to come to the ED for further evaluation. She denies any history of cardiac issues, denies any history of DVT or PE or stroke. States that the pain is dull in nature in addition to the pressure sensation. Denies any headache, dizziness, fever, cough, chest pain, shortness of breath           Review of Systems   Constitutional: Negative for chills and fever. HENT: Negative for drooling. Eyes: Negative for visual disturbance. Respiratory: Positive for chest tightness. Negative for cough, choking, shortness of breath and wheezing. Cardiovascular: Positive for chest pain. Negative for palpitations. Gastrointestinal: Negative for abdominal pain, constipation, diarrhea, nausea and vomiting. Genitourinary: Negative for dyspareunia and enuresis. Musculoskeletal: Negative for back pain. Skin: Negative for color change and wound. Neurological: Negative for headaches. Psychiatric/Behavioral: Negative for confusion. Physical Exam  Constitutional:       Appearance: Normal appearance. She is well-developed and well-groomed. She is morbidly obese. HENT:      Head: Normocephalic and atraumatic. Right Ear: External ear normal.      Left Ear: External ear normal.      Nose: Nose normal.      Mouth/Throat:      Mouth: Mucous membranes are moist.      Pharynx: Oropharynx is clear. Eyes:      Extraocular Movements: Extraocular movements intact. Conjunctiva/sclera: Conjunctivae normal.      Pupils: Pupils are equal, round, and reactive to light. Neck:      Musculoskeletal: Normal range of motion and neck supple. Cardiovascular:      Rate and Rhythm: Normal rate and regular rhythm. Pulses: Normal pulses. Heart sounds: Normal heart sounds. Pulmonary:      Effort: Pulmonary effort is normal.      Breath sounds: Normal breath sounds. Abdominal:      General: There is no distension. Tenderness: There is no abdominal tenderness. There is no guarding or rebound. Musculoskeletal: Normal range of motion. Skin:     General: Skin is warm and dry. Neurological:      General: No focal deficit present. Mental Status: She is alert and oriented to person, place, and time. Psychiatric:         Mood and Affect: Mood normal.         Behavior: Behavior normal.         Thought Content: Thought content normal.         Judgment: Judgment normal.          Procedures     MDM  Number of Diagnoses or Management Options  Chest pain, unspecified type:   Diagnosis management comments: Patient presented today with chief complaint of chest pressure and pain which been ongoing since this morning. Initial labs were all within normal limits, chest x-ray unremarkable, as was initial EKG. Patient was reevaluated after work-up was complete and she was still having a pressure sensation, therefore decision was made to trend troponin on patient. Repeat troponin came back elevated at 0.2. EKG was repeated which once again demonstrated no acute changes, however it appears the patient is having an NSTEMI. Patient did take aspirin prior to arrival, in addition to aspirin she was given nitroglycerin and started on heparin. Did consult [facility] cardiology who will see patient. Patient to be admitted to hospital at this time. Spoke to Dr. Kermit Nava who will accept the patient. All questions have been answered. EKG:   This EKG is signed and interpreted by me.    Rate: 63  Rhythm: Sinus  Interpretation: Normal sinus rhythm, no ST segment changes, no STEMI, no axis deviation, essentially unremarkable ECG  Comparison: no previous EKG      The HEART Risk Score for Acute Coronary Syndrome  Age <46 years, 55-63, 65+  ?  1   > 2 Risk Factors for CAD?*, 1-2, 0  1   History: slight, moderate, highly suspicious  1   EKG: Normal, nonspecific repolarization changes, ST depression   0   Troponin; low, 1-3x normal  Limit, 3x+  1   Total HEART Score:  4     *Risk factors as follows:                  - Family history of CAD    - Hypertension      - Hyperlipidemia     - Diabetes mellitus  - Current smoker  - Obesity    Predicts 6-week risk of major adverse cardiac event. Low Score (0-3 points), risk of MACE of 0.9-1.7%. Moderate Score (4-6 points), risk of MACE of 12-16.6%. High Score (7-10 points), risk of MACE of 50-65%.           --------------------------------------------- PAST HISTORY ---------------------------------------------  Past Medical History:  has a past medical history of Arthritis, Depression, GERD (gastroesophageal reflux disease), Graves disease, Hypothyroidism, and NSTEMI (non-ST elevated myocardial infarction) ([facility] [address] Past Surgical History:  has a past surgical history that includes Gastric bypass surgery (5/2/06) and Colonoscopy (11/10/10). Social History:  reports that she quit smoking about 43 years ago. Her smoking use included cigarettes. She started smoking about 45 years ago. She has a 3.00 pack-year smoking history. She has never used smokeless tobacco. She reports current alcohol use. Family History: family history is not on file. The patients home medications have been reviewed.     Allergies: Mobic [meloxicam]    -------------------------------------------------- RESULTS -------------------------------------------------    LABS:  Results for orders placed or performed during the hospital encounter of 09/16/20   CBC auto differential Result Value Ref Range    WBC 5.9 4.5 - 11.5 E9/L    RBC 4.54 3.50 - 5.50 E12/L    Hemoglobin 10.9 (L) 11.5 - 15.5 g/dL    Hematocrit 36.1 34.0 - 48.0 %    MCV 79.5 (L) 80.0 - 99.9 fL    MCH 24.0 (L) 26.0 - 35.0 pg    MCHC 30.2 (L) 32.0 - 34.5 %    RDW 18.9 (H) 11.5 - 15.0 fL    Platelets 093 949 - 818 E9/L    MPV 10.6 7.0 - 12.0 fL    Neutrophils % 56.3 43.0 - 80.0 %    Immature Granulocytes % 0.3 0.0 - 5.0 %    Lymphocytes % 29.8 20.0 - 42.0 %    Monocytes % 10.1 2.0 - 12.0 %    Eosinophils % 2.6 0.0 - 6.0 %    Basophils % 0.9 0.0 - 2.0 %    Neutrophils Absolute 3.31 1.80 - 7.30 E9/L    Immature Granulocytes # 0.02 E9/L    Lymphocytes Absolute 1.75 1.50 - 4.00 E9/L    Monocytes Absolute 0.59 0.10 - 0.95 E9/L    Eosinophils Absolute 0.15 0.05 - 0.50 E9/L    Basophils Absolute 0.05 0.00 - 0.20 E9/L   Comprehensive Metabolic Panel   Result Value Ref Range    Sodium 137 132 - 146 mmol/L    Potassium 5.0 3.5 - 5.0 mmol/L    Chloride 100 98 - 107 mmol/L    CO2 23 22 - 29 mmol/L    Anion Gap 14 7 - 16 mmol/L    Glucose 89 74 - 99 mg/dL    BUN 13 8 - 23 mg/dL    CREATININE 0.9 0.5 - 1.0 mg/dL    GFR Non-African American >60 >=60 mL/min/1.73    GFR African American >60     Calcium 9.5 8.6 - 10.2 mg/dL    Total Protein 7.7 6.4 - 8.3 g/dL    Alb 3.8 3.5 - 5.2 g/dL    Total Bilirubin 0.3 0.0 - 1.2 mg/dL    Alkaline Phosphatase 54 35 - 104 U/L    ALT 16 0 - 32 U/L    AST 41 (H) 0 - 31 U/L   Magnesium   Result Value Ref Range    Magnesium 2.5 1.6 - 2.6 mg/dL   TSH without Reflex   Result Value Ref Range    TSH 2.470 0.270 - 4.200 uIU/mL   T4   Result Value Ref Range    T4, Total 8.8 4.5 - 11.7 mcg/dL   Troponin   Result Value Ref Range    Troponin <0.01 0.00 - 0.03 ng/mL   Urinalysis with Microscopic   Result Value Ref Range    Color, UA Yellow Straw/Yellow    Clarity, UA Clear Clear    Glucose, Ur Negative Negative mg/dL    Bilirubin Urine Negative Negative    Ketones, Urine Negative Negative mg/dL    Specific [place], UA 1.015 1.005 - 1.030    Blood, Urine Negative Negative    pH, UA 7.0 5.0 - 9.0    Protein, UA Negative Negative mg/dL    Urobilinogen, Urine 0.2 <2.0 E.U./dL    Nitrite, Urine Negative Negative    Leukocyte Esterase, Urine Negative Negative    WBC, UA NONE 0 - 5 /HPF    RBC, UA NONE 0 - 2 /HPF    Bacteria, UA NONE SEEN None Seen /HPF   Lipase   Result Value Ref Range    Lipase 42 13 - 60 U/L   Troponin   Result Value Ref Range    Troponin 0.20 (H) 0.00 - 0.03 ng/mL   EKG 12 Lead   Result Value Ref Range    Ventricular Rate 63 BPM    Atrial Rate 63 BPM    P-R Interval 198 ms    QRS Duration 80 ms    Q-T Interval 402 ms    QTc Calculation (Bazett) 411 ms    P Axis 39 degrees    R Axis -3 degrees    T Axis 61 degrees   EKG 12 Lead   Result Value Ref Range    Ventricular Rate 66 BPM    Atrial Rate 66 BPM    P-R Interval 192 ms    QRS Duration 80 ms    Q-T Interval 416 ms    QTc Calculation (Bazett) 436 ms    P Axis 36 degrees    R Axis 0 degrees    T Axis 27 degrees   EKG 12 Lead   Result Value Ref Range    Ventricular Rate 63 BPM    Atrial Rate 63 BPM    P-R Interval 198 ms    QRS Duration 86 ms    Q-T Interval 418 ms    QTc Calculation (Bazett) 427 ms    P Axis 23 degrees    T Axis 33 degrees       RADIOLOGY:  XR CHEST PORTABLE   Final Result   Normal chest                       ------------------------- NURSING NOTES AND VITALS REVIEWED ---------------------------  Date / Time Roomed:  9/16/2020  6:19 AM  ED Bed Assignment:  KAROLYN/KAROLYN    The nursing notes within the ED encounter and vital signs as below have been reviewed.      Patient Vitals for the past 24 hrs:   BP Temp Temp src Pulse Resp SpO2 Height Weight   09/16/20 1842 (!) 147/88 -- -- 70 16 -- -- --   09/16/20 1630 (!) 151/85 -- -- 64 16 97 % -- --   09/16/20 1600 (!) 158/88 97.2 °F (36.2 °C) -- 66 16 -- -- --   09/16/20 1401 (!) 152/83 -- -- 58 16 97 % -- --   09/16/20 1330 (!) 146/77 -- -- 62 18 97 % -- --   09/16/20 1212 (!) 144/79 -- -- 68 16 97 % -- --

## 2020-09-17 LAB
ABO/RH: NORMAL
ANTIBODY SCREEN: NORMAL
APTT: 67.1 SEC (ref 24.5–35.1)
CHOLESTEROL, TOTAL: 160 MG/DL (ref 0–199)
EKG ATRIAL RATE: 65 BPM
EKG P AXIS: 38 DEGREES
EKG P-R INTERVAL: 178 MS
EKG Q-T INTERVAL: 434 MS
EKG QRS DURATION: 80 MS
EKG QTC CALCULATION (BAZETT): 451 MS
EKG R AXIS: -18 DEGREES
EKG T AXIS: -16 DEGREES
EKG VENTRICULAR RATE: 65 BPM
HDLC SERPL-MCNC: 68 MG/DL
LDL CHOLESTEROL CALCULATED: 82 MG/DL (ref 0–99)
POC ACT LR: 208 SECONDS
POC ACT LR: 329 SECONDS
TRIGL SERPL-MCNC: 48 MG/DL (ref 0–149)
TROPONIN: 2.6 NG/ML (ref 0–0.03)
VLDLC SERPL CALC-MCNC: 10 MG/DL

## 2020-09-17 PROCEDURE — 93454 CORONARY ARTERY ANGIO S&I: CPT | Performed by: INTERNAL MEDICINE

## 2020-09-17 PROCEDURE — 2580000003 HC RX 258: Performed by: INTERNAL MEDICINE

## 2020-09-17 PROCEDURE — 86900 BLOOD TYPING SEROLOGIC ABO: CPT

## 2020-09-17 PROCEDURE — 93010 ELECTROCARDIOGRAM REPORT: CPT | Performed by: INTERNAL MEDICINE

## 2020-09-17 PROCEDURE — 027034Z DILATION OF CORONARY ARTERY, ONE ARTERY WITH DRUG-ELUTING INTRALUMINAL DEVICE, PERCUTANEOUS APPROACH: ICD-10-PCS | Performed by: INTERNAL MEDICINE

## 2020-09-17 PROCEDURE — 6370000000 HC RX 637 (ALT 250 FOR IP): Performed by: INTERNAL MEDICINE

## 2020-09-17 PROCEDURE — 6370000000 HC RX 637 (ALT 250 FOR IP)

## 2020-09-17 PROCEDURE — C1874 STENT, COATED/COV W/DEL SYS: HCPCS

## 2020-09-17 PROCEDURE — 2140000000 HC CCU INTERMEDIATE R&B

## 2020-09-17 PROCEDURE — 6360000002 HC RX W HCPCS

## 2020-09-17 PROCEDURE — 36415 COLL VENOUS BLD VENIPUNCTURE: CPT

## 2020-09-17 PROCEDURE — 2500000003 HC RX 250 WO HCPCS

## 2020-09-17 PROCEDURE — 93005 ELECTROCARDIOGRAM TRACING: CPT | Performed by: INTERNAL MEDICINE

## 2020-09-17 PROCEDURE — 4A023N7 MEASUREMENT OF CARDIAC SAMPLING AND PRESSURE, LEFT HEART, PERCUTANEOUS APPROACH: ICD-10-PCS | Performed by: INTERNAL MEDICINE

## 2020-09-17 PROCEDURE — C1887 CATHETER, GUIDING: HCPCS

## 2020-09-17 PROCEDURE — 85347 COAGULATION TIME ACTIVATED: CPT

## 2020-09-17 PROCEDURE — 6360000002 HC RX W HCPCS: Performed by: STUDENT IN AN ORGANIZED HEALTH CARE EDUCATION/TRAINING PROGRAM

## 2020-09-17 PROCEDURE — 84484 ASSAY OF TROPONIN QUANT: CPT

## 2020-09-17 PROCEDURE — 92928 PRQ TCAT PLMT NTRAC ST 1 LES: CPT | Performed by: INTERNAL MEDICINE

## 2020-09-17 PROCEDURE — B2111ZZ FLUOROSCOPY OF MULTIPLE CORONARY ARTERIES USING LOW OSMOLAR CONTRAST: ICD-10-PCS | Performed by: INTERNAL MEDICINE

## 2020-09-17 PROCEDURE — 92941 PRQ TRLML REVSC TOT OCCL AMI: CPT | Performed by: INTERNAL MEDICINE

## 2020-09-17 PROCEDURE — 86850 RBC ANTIBODY SCREEN: CPT

## 2020-09-17 PROCEDURE — 2709999900 HC NON-CHARGEABLE SUPPLY

## 2020-09-17 PROCEDURE — 85730 THROMBOPLASTIN TIME PARTIAL: CPT

## 2020-09-17 PROCEDURE — C1894 INTRO/SHEATH, NON-LASER: HCPCS

## 2020-09-17 PROCEDURE — 86901 BLOOD TYPING SEROLOGIC RH(D): CPT

## 2020-09-17 PROCEDURE — 80061 LIPID PANEL: CPT

## 2020-09-17 PROCEDURE — C1769 GUIDE WIRE: HCPCS

## 2020-09-17 PROCEDURE — C1725 CATH, TRANSLUMIN NON-LASER: HCPCS

## 2020-09-17 RX ORDER — SODIUM CHLORIDE 0.9 % (FLUSH) 0.9 %
10 SYRINGE (ML) INJECTION EVERY 12 HOURS SCHEDULED
Status: DISCONTINUED | OUTPATIENT
Start: 2020-09-17 | End: 2020-09-18 | Stop reason: HOSPADM

## 2020-09-17 RX ORDER — SODIUM CHLORIDE 0.9 % (FLUSH) 0.9 %
10 SYRINGE (ML) INJECTION PRN
Status: DISCONTINUED | OUTPATIENT
Start: 2020-09-17 | End: 2020-09-18 | Stop reason: HOSPADM

## 2020-09-17 RX ORDER — ACETAMINOPHEN 325 MG/1
650 TABLET ORAL EVERY 4 HOURS PRN
Status: DISCONTINUED | OUTPATIENT
Start: 2020-09-17 | End: 2020-09-18 | Stop reason: HOSPADM

## 2020-09-17 RX ORDER — ASPIRIN 81 MG/1
81 TABLET ORAL DAILY
Status: DISCONTINUED | OUTPATIENT
Start: 2020-09-18 | End: 2020-09-18 | Stop reason: HOSPADM

## 2020-09-17 RX ORDER — SODIUM CHLORIDE 9 MG/ML
INJECTION, SOLUTION INTRAVENOUS CONTINUOUS
Status: ACTIVE | OUTPATIENT
Start: 2020-09-17 | End: 2020-09-17

## 2020-09-17 RX ADMIN — ATORVASTATIN CALCIUM 40 MG: 40 TABLET, FILM COATED ORAL at 22:08

## 2020-09-17 RX ADMIN — LEVOTHYROXINE SODIUM 137 MCG: 0.14 TABLET ORAL at 06:27

## 2020-09-17 RX ADMIN — TRAMADOL HYDROCHLORIDE 50 MG: 50 TABLET, FILM COATED ORAL at 04:54

## 2020-09-17 RX ADMIN — TICAGRELOR 90 MG: 90 TABLET ORAL at 22:08

## 2020-09-17 RX ADMIN — BUPROPION HYDROCHLORIDE 300 MG: 300 TABLET, FILM COATED, EXTENDED RELEASE ORAL at 12:39

## 2020-09-17 RX ADMIN — ACETAMINOPHEN 650 MG: 325 TABLET, FILM COATED ORAL at 06:29

## 2020-09-17 RX ADMIN — Medication 10 ML: at 12:42

## 2020-09-17 RX ADMIN — Medication 10 ML: at 22:10

## 2020-09-17 RX ADMIN — SODIUM CHLORIDE: 9 INJECTION, SOLUTION INTRAVENOUS at 12:38

## 2020-09-17 RX ADMIN — CITALOPRAM 20 MG: 20 TABLET, FILM COATED ORAL at 12:39

## 2020-09-17 RX ADMIN — HEPARIN SODIUM AND DEXTROSE 9.61 UNITS/KG/HR: 10000; 5 INJECTION INTRAVENOUS at 04:56

## 2020-09-17 ASSESSMENT — PAIN SCALES - GENERAL
PAINLEVEL_OUTOF10: 7
PAINLEVEL_OUTOF10: 3
PAINLEVEL_OUTOF10: 7
PAINLEVEL_OUTOF10: 0
PAINLEVEL_OUTOF10: 2
PAINLEVEL_OUTOF10: 0

## 2020-09-17 NOTE — PROCEDURES
[address]                  [address] [facility]  [facility]                            CARDIAC CATHETERIZATION    PATIENT NAME: Emmett Godinez                       :        1955  MED REC NO:   [id number]                            ROOM:       9164  ACCOUNT NO:   [de-identified]                           ADMIT DATE: 2020  PROVIDER:     Aminata Paniagua MD    DATE OF PROCEDURE:  2020    PROCEDURES:  1. Coronary angiography. 2.  Percutaneous coronary intervention with deployment of 2.0 x 22 mm  [place] Resolute drug-eluting stent in fourth OM branch. 3.  Conscious sedation using Versed and fentanyl. Indication #4, score of 8. Indication for PCI is 16 and 7. INDICATIONS FOR PROCEDURE:  The patient is a 53-year-old female with a  significant family history of CAD, presented to the hospital with non-ST  elevation MI, the patient was brought to the cath lab for further  evaluation. DESCRIPTION OF PROCEDURE:  After the appropriate informed consent, the  right wrist area was prepped and draped in the usual sterile fashion. A  timeout was called. Preet test was normal.  The right wrist area was  locally anesthetized with 2 mL of 2% lidocaine. A 21-gauge needle was  used to access the right radial artery. A 6-Kosovan introducer sheath  was used to cannulate the right radial artery. A 6-Kosovan JL-3.5 and  6-Kosovan JR-4 catheters were used for coronary angiography in multiple  projections. ANGIOGRAPHIC FINDINGS:  1. The left main coronary artery arises normally from the left sinus of  Valsalva. It is a good size vessel without any disease. It bifurcates  into left anterior descending artery and left circumflex artery. The left anterior descending artery extends down to the apex. It tapers  off distally. It gives off a large diagonal branch that bifurcates into  two OM branches.   The left anterior descending artery and its diagonal  branches, it gives off actually three diagonal branches, the first one  is mid size, the second one is larger that bifurcates after it just  takeoff to mid side branches and the third is mid size, the LAD and its  branches have really no significant disease. 2.  The left circumflex artery is a large vessel that gives off four OM  branches. The first two are small ones, the fourth one is large that is  totally occluded. 3.  The right coronary artery arises normally from the right sinus of  Valsalva. It is a large vessel dominant circulation without any CAD. After completing the coronary angiography, we proceeded with  percutaneous coronary intervention. We used 6-[place] CLS 3.5 catheter  to engage the left main coronary artery. A BMW wire was used to pass  the lesion. The lesion was predilated with 1.5, then 2.5 mm balloon,  then the lesion was stented using 2.0 x 22 mm [place] Resolute drug-eluting  stent. Followup angiography showed 0% residual stenosis. There were no  signs of perforation or dissection. At the end of the procedure, the  catheter and the wire were pulled out from the body, the sheath was  pulled out from the body, and a Vasc Band was applied to the right wrist  area with effective hemostasis. COMPLICATIONS:  None. ESTIMATED TOTAL BLOOD LOSS:  25 to 30 mL. MEDICATIONS USED DURING THE PROCEDURE:  We used intraarterial verapamil  to prevent vasospasm, we used intraarterial and intravenous heparin for  anticoagulation with therapeutic ACT throughout the procedure. The  patient also was given 180 mg of Brilinta prior to leaving the cath lab  area. CONSCIOUS SEDATION:  We used Versed and fentanyl for conscious sedation. First dose was given at 09:08, procedure ended at 10:02. There was 54  minutes of direct face-to-face supervision during conscious sedation  administration. Total fluoroscopy time was 11.7 minutes.     Total contrast used was 150 mL of Optiray. IMPRESSION:  1. Totally occluded fourth OM branch with successful deployment of 2.0  x 22 mm [place] Resolute drug-eluting stent with 0% residual stenosis and  RITA-3 flow distally, (pre-PCI RITA-0 flow, post-PCI RITA-3 flow). 2.  No CAD noted in the rest of the coronary arteries. RECOMMENDATIONS:  1. Aspirin for life. 2.  P2Y12 inhibitors for at least a year, preferably longer. 3.  Echocardiogram.  4.  The patient will be observed overnight, discharge home tomorrow if  she meets discharge criteria.         Lindsay Sheehan MD    D: 09/17/2020 10:30:55       T: 09/17/2020 11:11:27     [email or web address]  Job#: [id number]     Doc#: [id number]    CC:

## 2020-09-17 NOTE — CARE COORDINATION
Met with patient at bedside to discuss transition of care. She stated she lives independently. No assistive devices. PCP Dr.Christopher Dominguez. Pharmacy [facility]. She is planning for home at discharge with no needs at this time. She plans to call family for a ride home. Pt had a Heart Cath today with stent placement.  CM will continue to follow for any needs that arise  Griselda Kussmaul, RN  [facility] Case Management  [phone]

## 2020-09-17 NOTE — PLAN OF CARE
Problem: Pain:  Goal: Recognizes and communicates pain  Description: Recognizes and communicates pain  Outcome: Ongoing  Goal: Pain level will decrease  Description: Pain level will decrease  Outcome: Ongoing     Problem: Tissue Perfusion - Cardiopulmonary, Altered:  Goal: Absence of angina  Description: Absence of angina  Outcome: Ongoing  Goal: Hemodynamic stability will improve  Description: Hemodynamic stability will improve  Outcome: Ongoing     Problem: Tissue Perfusion - Peripheral, Altered:  Goal: Absence of hematoma at arterial access site  Description: Absence of hematoma at arterial access site  Outcome: Ongoing  Goal: Circulatory function of lower extremities is within specified parameters  Description: Circulatory function of lower extremities is within specified parameters  Outcome: Ongoing

## 2020-09-17 NOTE — PROGRESS NOTES
Adalberto Coon DO    Objective:    BP (!) 140/75   Pulse 88   Temp 97.1 °F (36.2 °C) (Temporal)   Resp 16   Ht 5' 4\" (1.626 m)   Wt 280 lb (127 kg)   SpO2 99%   BMI 48.06 kg/m²     Heart:  Reg  Lungs:  CTA bilaterally, no wheeze, rales or rhonchi  Abd: bowel sounds present, nontender, nondistended, no masses  Extrem:  No clubbing, cyanosis, or edema    CBC with Differential:    Lab Results   Component Value Date    WBC 6.9 09/16/2020    RBC 4.94 09/16/2020    HGB 11.8 09/16/2020    HCT 39.9 09/16/2020     09/16/2020    MCV 80.8 09/16/2020    MCH 23.9 09/16/2020    MCHC 29.6 09/16/2020    RDW 19.4 09/16/2020    LYMPHOPCT 29.8 09/16/2020    MONOPCT 10.1 09/16/2020    BASOPCT 0.9 09/16/2020    MONOSABS 0.59 09/16/2020    LYMPHSABS 1.75 09/16/2020    EOSABS 0.15 09/16/2020    BASOSABS 0.05 09/16/2020     CMP:    Lab Results   Component Value Date     09/16/2020    K 5.0 09/16/2020     09/16/2020    CO2 23 09/16/2020    BUN 13 09/16/2020    CREATININE 0.9 09/16/2020    GFRAA >60 09/16/2020    LABGLOM >60 09/16/2020    GLUCOSE 89 09/16/2020    PROT 7.7 09/16/2020    LABALBU 3.8 09/16/2020    CALCIUM 9.5 09/16/2020    BILITOT 0.3 09/16/2020    ALKPHOS 54 09/16/2020    AST 41 09/16/2020    ALT 16 09/16/2020     Warfarin PT/INR:  No results found for: INR, PROTIME    Assessment:    Active Problems:    Hypothyroidism    Depression    GERD (gastroesophageal reflux disease)    Non-ST elevation MI (NSTEMI) ([facility] [address]    Morbid obesity ([facility] [address]    Chest pain  Resolved Problems:    * No resolved hospital problems.  *      Plan:  Heart cath today        Guillermina Martinez  8:43 AM  9/17/2020

## 2020-09-18 VITALS
SYSTOLIC BLOOD PRESSURE: 111 MMHG | TEMPERATURE: 96.9 F | HEIGHT: 64 IN | BODY MASS INDEX: 47.8 KG/M2 | DIASTOLIC BLOOD PRESSURE: 59 MMHG | WEIGHT: 280 LBS | OXYGEN SATURATION: 98 % | RESPIRATION RATE: 16 BRPM | HEART RATE: 76 BPM

## 2020-09-18 LAB
ANION GAP SERPL CALCULATED.3IONS-SCNC: 13 MMOL/L (ref 7–16)
BUN BLDV-MCNC: 12 MG/DL (ref 8–23)
CALCIUM SERPL-MCNC: 9 MG/DL (ref 8.6–10.2)
CHLORIDE BLD-SCNC: 99 MMOL/L (ref 98–107)
CO2: 25 MMOL/L (ref 22–29)
CREAT SERPL-MCNC: 1 MG/DL (ref 0.5–1)
GFR AFRICAN AMERICAN: >60
GFR NON-AFRICAN AMERICAN: 56 ML/MIN/1.73
GLUCOSE BLD-MCNC: 86 MG/DL (ref 74–99)
HCT VFR BLD CALC: 34.1 % (ref 34–48)
HEMOGLOBIN: 10.3 G/DL (ref 11.5–15.5)
MCH RBC QN AUTO: 23.7 PG (ref 26–35)
MCHC RBC AUTO-ENTMCNC: 30.2 % (ref 32–34.5)
MCV RBC AUTO: 78.6 FL (ref 80–99.9)
PDW BLD-RTO: 19 FL (ref 11.5–15)
PLATELET # BLD: 259 E9/L (ref 130–450)
PMV BLD AUTO: 10.1 FL (ref 7–12)
POTASSIUM SERPL-SCNC: 3.9 MMOL/L (ref 3.5–5)
RBC # BLD: 4.34 E12/L (ref 3.5–5.5)
SODIUM BLD-SCNC: 137 MMOL/L (ref 132–146)
WBC # BLD: 7.2 E9/L (ref 4.5–11.5)

## 2020-09-18 PROCEDURE — 6370000000 HC RX 637 (ALT 250 FOR IP): Performed by: INTERNAL MEDICINE

## 2020-09-18 PROCEDURE — 36415 COLL VENOUS BLD VENIPUNCTURE: CPT

## 2020-09-18 PROCEDURE — 80048 BASIC METABOLIC PNL TOTAL CA: CPT

## 2020-09-18 PROCEDURE — 99231 SBSQ HOSP IP/OBS SF/LOW 25: CPT | Performed by: INTERNAL MEDICINE

## 2020-09-18 PROCEDURE — 85027 COMPLETE CBC AUTOMATED: CPT

## 2020-09-18 RX ORDER — ATORVASTATIN CALCIUM 40 MG/1
40 TABLET, FILM COATED ORAL NIGHTLY
Qty: 30 TABLET | Refills: 0 | Status: SHIPPED | OUTPATIENT
Start: 2020-09-18 | End: 2020-09-18

## 2020-09-18 RX ORDER — ASPIRIN 81 MG/1
81 TABLET ORAL DAILY
Qty: 30 TABLET | Refills: 3 | COMMUNITY
Start: 2020-09-18

## 2020-09-18 RX ORDER — ATORVASTATIN CALCIUM 40 MG/1
40 TABLET, FILM COATED ORAL NIGHTLY
Qty: 90 TABLET | Refills: 3 | Status: SHIPPED | OUTPATIENT
Start: 2020-09-18 | End: 2021-08-18 | Stop reason: SDUPTHER

## 2020-09-18 RX ORDER — NITROGLYCERIN 0.4 MG/1
TABLET SUBLINGUAL
Qty: 25 TABLET | Refills: 0 | Status: SHIPPED | OUTPATIENT
Start: 2020-09-18

## 2020-09-18 RX ADMIN — BUPROPION HYDROCHLORIDE 300 MG: 300 TABLET, FILM COATED, EXTENDED RELEASE ORAL at 10:00

## 2020-09-18 RX ADMIN — LEVOTHYROXINE SODIUM 137 MCG: 0.14 TABLET ORAL at 05:54

## 2020-09-18 RX ADMIN — CITALOPRAM 20 MG: 20 TABLET, FILM COATED ORAL at 10:00

## 2020-09-18 RX ADMIN — TICAGRELOR 90 MG: 90 TABLET ORAL at 10:00

## 2020-09-18 ASSESSMENT — PAIN SCALES - GENERAL: PAINLEVEL_OUTOF10: 0

## 2020-09-18 NOTE — PROGRESS NOTES
CLINICAL PHARMACY NOTE: MEDS TO [address] Select Patient?: No  Total # of Prescriptions Filled: 3   The following medications were delivered to the patient:  · brilinta 90 mg  · Atorvastatin calcium 40 mg  · Nitroglycerin 0.4 mg  Total # of Interventions Completed: 3  Time Spent (min): 15    Additional Documentation:

## 2020-09-18 NOTE — PROGRESS NOTES
Hospital Medicine    Subjective:  Pt post heart cath pci/francisca no cp overnite      Current Facility-Administered Medications:     sodium chloride flush 0.9 % injection 10 mL, 10 mL, Intravenous, 2 times per day, Gypsy Sahni MD, 10 mL at 09/17/20 2210    sodium chloride flush 0.9 % injection 10 mL, 10 mL, Intravenous, PRN, Gypsy Sahni MD    acetaminophen (TYLENOL) tablet 650 mg, 650 mg, Oral, Q4H PRN, Gypsy Sahni MD    aspirin EC tablet 81 mg, 81 mg, Oral, Daily, Gypsy Sahni MD    ticagrelor (BRILINTA) tablet 90 mg, 90 mg, Oral, BID, Gypsy Sahni MD, 90 mg at 09/17/20 2208    nitroGLYCERIN (NITROSTAT) SL tablet 0.4 mg, 0.4 mg, Sublingual, Q5 Min PRN, Gypsy Sahni MD, 0.4 mg at 09/16/20 1200    buPROPion (WELLBUTRIN XL) extended release tablet 300 mg, 300 mg, Oral, Daily, Gypsy Sahni MD, 300 mg at 09/17/20 1239    citalopram (CELEXA) tablet 20 mg, 20 mg, Oral, Daily, Gypsy Sahni MD, 20 mg at 09/17/20 1239    levothyroxine (SYNTHROID) tablet 137 mcg, 137 mcg, Oral, QAM AC, Gypsy Sahni MD, 137 mcg at 09/18/20 0554    traMADol (ULTRAM) tablet 50 mg, 50 mg, Oral, Q6H PRN, Gypsy Sahni MD, 50 mg at 09/17/20 0454    [DISCONTINUED] acetaminophen (TYLENOL) tablet 650 mg, 650 mg, Oral, Q6H PRN, 650 mg at 09/17/20 0629 **OR** acetaminophen (TYLENOL) suppository 650 mg, 650 mg, Rectal, Q6H PRN, Gypsy Sahni MD    polyethylene glycol (GLYCOLAX) packet 17 g, 17 g, Oral, Daily PRN, Gypsy Sahni MD    promethazine (PHENERGAN) tablet 12.5 mg, 12.5 mg, Oral, Q6H PRN **OR** ondansetron (ZOFRAN) injection 4 mg, 4 mg, Intravenous, Q6H PRN, Gypsy Sahni MD    atorvastatin (LIPITOR) tablet 40 mg, 40 mg, Oral, Nightly, Gypsy Sahni MD, 40 mg at 09/17/20 2208    Objective:    /69   Pulse 65   Temp 98.4 °F (36.9 °C) (Oral)   Resp 16   Ht 5' 4\" (1.626 m)   Wt 280 lb (127 kg)   SpO2 96%   BMI 48.06 kg/m²     Heart:  Reg  Lungs:  CTA bilaterally, no wheeze, rales or rhonchi  Abd: bowel sounds

## 2020-09-18 NOTE — PROGRESS NOTES
Patient is seen in follow-up for chest pain    Subjective:     Ms. Dewey Sparrow feels better today, no chest pain  Sitting up in bed no apparent distress    ROS:  CONSTITUTIONAL:  negative for  fevers, chills  HEENT:  negative for earaches, nasal congestion and epistaxis  RESPIRATORY:  negative for  dry cough, cough with sputum,wheezing and hemoptysis  GASTROINTESTINAL:  negative for nausea, vomiting  MUSCULOSKELETAL:  negative for  myalgias, arthralgias  NEUROLOGICAL:  negative for visual disturbance, dysphagia    Medication side effects: none    Scheduled Meds:   sodium chloride flush  10 mL Intravenous 2 times per day    aspirin  81 mg Oral Daily    ticagrelor  90 mg Oral BID    buPROPion  300 mg Oral Daily    citalopram  20 mg Oral Daily    levothyroxine  137 mcg Oral QAM AC    atorvastatin  40 mg Oral Nightly     Continuous Infusions:  PRN Meds:sodium chloride flush, acetaminophen, nitroGLYCERIN, traMADol, [DISCONTINUED] acetaminophen **OR** acetaminophen, polyethylene glycol, promethazine **OR** ondansetron      Objective:      Physical Exam:   BP (!) 111/59   Pulse 76   Temp 96.9 °F (36.1 °C) (Temporal)   Resp 16   Ht 5' 4\" (1.626 m)   Wt 280 lb (127 kg)   SpO2 98%   BMI 48.06 kg/m²   CONSTITUTIONAL:  awake, alert, cooperative, no apparent distress, and appears stated age  HEAD:  normocepalic, without obvious abnormality, atraumatic  NECK:  Supple, symmetrical, trachea midline, no adenopathy, thyroid symmetric, not enlarged and no tenderness, skin normal  LUNGS:  No increased work of breathing, No accessory muscle use or intercostal retractions, good air exchange, clear to auscultation bilaterally, no crackles or wheezing  CARDIOVASCULAR:  Normal apical impulse, regular rate and rhythm, normal S1 and S2, no S3 or S4, and no murmur noted, no edema, no JVD, no carotid bruit. ABDOMEN:  Soft, nontender, no masses, no hepatomegaly, no splenomegaly, BS+  MUSCULOSKELETAL:  No clubbing no cyanosis. there is no redness, warmth, or swelling of the joints  full range of motion noted  NEUROLOGIC:  Alert, awake,oriented x3  SKIN:  no bruising or bleeding, normal skin color, texture, turgor and no redness, warmth, or swelling      Cardiographics  I personally reviewed the telemetry monitor strips with the following interpretation:  Echocardiogram: not done    Imaging  XR CHEST PORTABLE   Final Result   Normal chest                   Lab Review   Lab Results   Component Value Date     09/18/2020    K 3.9 09/18/2020    CL 99 09/18/2020    CO2 25 09/18/2020    BUN 12 09/18/2020    CREATININE 1.0 09/18/2020    GLUCOSE 86 09/18/2020    CALCIUM 9.0 09/18/2020     Lab Results   Component Value Date    WBC 7.2 09/18/2020    HGB 10.3 09/18/2020    HCT 34.1 09/18/2020    MCV 78.6 09/18/2020     09/18/2020     I have personally reviewed the laboratory, cardiac diagnostic and radiographic testing as outlined above:    Assessment:       Patient Active Problem List    Diagnosis Date Noted    Non-ST elevation MI (NSTEMI) ([facility] [address] 09/16/2020    Morbid obesity ([facility] [address] 09/16/2020    Chest pain     Chronic pain of both knees 07/12/2017    Hypothyroidism     Graves disease     Depression     GERD (gastroesophageal reflux disease)        Recommendations:     1. Continue current treatment  2. Risk of instent thrombosis due to premature discontinuation of either ASA or Brilinta discussed with patient at length  3.  Echocardiogram as an outpatient  Can be discharged from cardiology standpoint    Electronically signed by Jose Antonio Barnett MD on 9/18/2020 at 8:47 AM  NOTE: This report was transcribed using voice recognition software.  Every effort was made to ensure accuracy; however, inadvertent computerized transcription errors may be present

## 2020-09-18 NOTE — DISCHARGE INSTR - DIET
 Good nutrition is important when healing from an illness, injury, or surgery. Follow any nutrition recommendations given to you during your hospital stay.  If you were given an oral nutrition supplement while in the hospital, continue to take this supplement at home. You can take it with meals, in-between meals, and/or before bedtime. These supplements can be purchased at most local grocery stores, pharmacies, and chain super-stores.  If you have any questions about your diet or nutrition, call the hospital and ask for the dietitian. Heart-Healthy Diet: Care Instructions  Your Care Instructions     A heart-healthy diet has lots of vegetables, fruits, nuts, beans, and whole grains, and is low in salt. It limits foods that are high in saturated fat, such as meats, cheeses, and fried foods. It may be hard to change your diet, but even small changes can lower your risk of heart attack and heart disease. Follow-up care is a key part of your treatment and safety. Be sure to make and go to all appointments, and call your doctor if you are having problems. It's also a good idea to know your test results and keep a list of the medicines you take. How can you care for yourself at home? Watch your portions  · Learn what a serving is. A \"serving\" and a \"portion\" are not always the same thing. Make sure that you are not eating larger portions than are recommended. For example, a serving of pasta is ½ cup. A serving size of meat is 2 to 3 ounces. A 3-ounce serving is about the size of a deck of cards. Measure serving sizes until you are good at [place]" them. Keep in mind that restaurants often serve portions that are 2 or 3 times the size of one serving. · To keep your energy level up and keep you from feeling hungry, eat often but in smaller portions. · Eat only the number of calories you need to stay at a healthy weight.  If you need to lose weight, eat fewer calories than your body burns (through exercise and other physical activity). Eat more fruits and vegetables  · Eat a variety of fruit and vegetables every day. Dark green, deep orange, red, or yellow fruits and vegetables are especially good for you. Examples include spinach, carrots, peaches, and berries. · Keep carrots, celery, and other veggies handy for snacks. Buy fruit that is in season and store it where you can see it so that you will be tempted to eat it. · Cook dishes that have a lot of veggies in them, such as stir-fries and soups. Limit saturated and trans fat  · Read food labels, and try to avoid saturated and trans fats. They increase your risk of heart disease. · Use olive or canola oil when you cook. · Bake, broil, grill, or steam foods instead of frying them. · Choose lean meats instead of high-fat meats such as hot dogs and sausages. Cut off all visible fat when you prepare meat. · Eat fish, skinless poultry, and meat alternatives such as soy products instead of high-fat meats. Soy products, such as tofu, may be especially good for your heart. · Choose low-fat or fat-free milk and dairy products. Eat foods high in fiber  · Eat a variety of grain products every day. Include whole-grain foods that have lots of fiber and nutrients. Examples of whole-grain foods include oats, whole wheat bread, and brown rice. · Buy whole-grain breads and cereals, instead of white bread or pastries. Limit salt and sodium  · Limit how much salt and sodium you eat to help lower your blood pressure. · Taste food before you salt it. Add only a little salt when you think you need it. With time, your taste buds will adjust to less salt. · Eat fewer snack items, fast foods, and other high-salt, processed foods. Check food labels for the amount of sodium in packaged foods. · Choose low-sodium versions of canned goods (such as soups, vegetables, and beans). Limit sugar  · Limit drinks and foods with added sugar.  These include candy, desserts, and soda pop. Limit alcohol  · Limit alcohol to no more than 2 drinks a day for men and 1 drink a day for women. Too much alcohol can cause health problems. When should you call for help? Watch closely for changes in your health, and be sure to contact your doctor if:  · You would like help planning heart-healthy meals. Where can you learn more? Go to [email or web address].[email or web address] org and sign in to your [employer] account. Enter V137 in the Allmoxy box to learn more about \"Heart-Healthy Diet: Care Instructions. \"     If you do not have an account, please click on the \"Sign Up Now\" link. Current as of: August 22, 2019               Content Version: 12.5  © 1514-0140 Healthwise, Incorporated. Care instructions adapted under license by [facility] ([facility]). If you have questions about a medical condition or this instruction, always ask your healthcare professional. [address] any warranty or liability for your use of this information.

## 2020-09-22 ENCOUNTER — OFFICE VISIT (OUTPATIENT)
Dept: PRIMARY CARE CLINIC | Age: 65
End: 2020-09-22
Payer: COMMERCIAL

## 2020-09-22 VITALS
BODY MASS INDEX: 44.82 KG/M2 | HEIGHT: 65 IN | TEMPERATURE: 97.3 F | OXYGEN SATURATION: 97 % | HEART RATE: 98 BPM | DIASTOLIC BLOOD PRESSURE: 80 MMHG | WEIGHT: 269 LBS | SYSTOLIC BLOOD PRESSURE: 118 MMHG

## 2020-09-22 PROCEDURE — 99214 OFFICE O/P EST MOD 30 MIN: CPT | Performed by: FAMILY MEDICINE

## 2020-09-22 RX ORDER — FLUOXETINE HYDROCHLORIDE 20 MG/1
20 CAPSULE ORAL DAILY
Qty: 30 CAPSULE | Refills: 1 | Status: SHIPPED
Start: 2020-09-22 | End: 2020-11-03 | Stop reason: SDUPTHER

## 2020-09-22 ASSESSMENT — ENCOUNTER SYMPTOMS
SHORTNESS OF BREATH: 1
CHEST TIGHTNESS: 0

## 2020-09-22 NOTE — PROGRESS NOTES
Shria Hines, a female of 63 y.o. came to the office 9/22/2020. Patient Active Problem List   Diagnosis    Hypothyroidism    Graves disease    Depression    GERD (gastroesophageal reflux disease)    Chronic pain of both knees    Non-[facility] elevation MI [facility]I) [facility] [address]    Morbid obesity ([facility] [address]    Chest pain    Coronary artery disease involving native coronary artery of native heart with unstable angina pectoris [facility])          Coronary Artery Disease   Presents for follow-up (NSTEMI on 9/16) visit. Symptoms include palpitations and shortness of breath. Pertinent negatives include no chest pain, chest pressure, chest tightness, dizziness or leg swelling. The symptoms have been resolved. Compliance with diet is good (has lost 10 lbs. ). Compliance with exercise is variable. Compliance with medications is good. - follow up with cardiologist 10/8 and gets 2 D echo prior to this. LITO: lately but now worse after recent NSTEMI. Allergies   Allergen Reactions    Mobic [Meloxicam] Other (See Comments)       Current Outpatient Medications on File Prior to Visit   Medication Sig Dispense Refill    aspirin 81 MG EC tablet Take 1 tablet by mouth daily 30 tablet 3    nitroGLYCERIN (NITROSTAT) 0.4 MG SL tablet up to max of 3 total doses. If no relief after 1 dose, call 911. 25 tablet 0    ticagrelor (BRILINTA) 90 MG TABS tablet Take 1 tablet by mouth 2 times daily 180 tablet 3    atorvastatin (LIPITOR) 40 MG tablet Take 1 tablet by mouth nightly 90 tablet 3    buPROPion (WELLBUTRIN XL) 300 MG extended release tablet TAKE ONE TABLET BY MOUTH EVERY MORNING 90 tablet 1    levothyroxine (SYNTHROID) 137 MCG tablet TAKE 1 TABLET BY MOUTH ONCE DAILY 90 tablet 1    citalopram (CELEXA) 40 MG tablet TAKE 1 TABLET BY MOUTH ONCE DAILY 90 tablet 1    traMADol (ULTRAM) 50 MG tablet Take 50 mg by mouth every 6 hours as needed.       loratadine (CLARITIN) 10 MG capsule Take 1 capsule by mouth daily 90 capsule 1    fluticasone (FLONASE) 50 MCG/ACT nasal spray 2 sprays by Nasal route daily 1 Bottle 0    Cyanocobalamin (VITAMIN B 12 PO) Take by mouth      Multiple Vitamins-Minerals (MULTIVITAMIN ADULT PO) Take by mouth       No current facility-administered medications on file prior to visit. Review of Systems   Constitutional: Negative for activity change and appetite change. Respiratory: Positive for shortness of breath. Negative for chest tightness. Cardiovascular: Positive for palpitations. Negative for chest pain and leg swelling. Neurological: Negative for dizziness. Psychiatric/Behavioral: Positive for agitation and sleep disturbance. Negative for decreased concentration and dysphoric mood. The patient is nervous/anxious. other review of systems reviewed and are negative    OBJECTIVE:  /80   Pulse 98   Temp 97.3 °F (36.3 °C)   Ht 5' 5\" (1.651 m)   Wt 269 lb (122 kg)   SpO2 97%   BMI 44.76 kg/m²      Physical Exam  Constitutional:       General: She is not in acute distress. Eyes:      General: No scleral icterus. Conjunctiva/sclera: Conjunctivae normal.   Neck:      Musculoskeletal: Neck supple. Thyroid: No thyromegaly. Cardiovascular:      Rate and Rhythm: Normal rate and regular rhythm. Heart sounds: No murmur. Pulmonary:      Effort: Pulmonary effort is normal.      Breath sounds: Normal breath sounds. Lymphadenopathy:      Cervical: No cervical adenopathy. Skin:     General: Skin is warm and dry. Neurological:      Mental Status: She is alert and oriented to person, place, and time. - wt down 11 lbs. ASSESSMENT AND PLAN:    David Hernandez was seen today for follow-up from hospital.    Diagnoses and all orders for this visit:    Non-ST elevation MI (NSTEMI) ([address]    Anxiety  -     FLUoxetine (PROZAC) 20 MG capsule; Take 1 capsule by mouth daily    - continue current meds per cardiology and follow up as scheduled.   - wean down off Celexa to 20 mg for 2 wks then stop.      Return in about 6 weeks (around 11/3/2020).     Kath Dominguez, DO

## 2020-10-01 ENCOUNTER — HOSPITAL ENCOUNTER (OUTPATIENT)
Dept: NON INVASIVE DIAGNOSTICS | Age: 65
Discharge: HOME | End: 2020-10-01
Payer: COMMERCIAL

## 2020-10-01 LAB
LV EF: 58 %
LVEF MODALITY: NORMAL

## 2020-10-01 PROCEDURE — 93306 TTE W/DOPPLER COMPLETE: CPT

## 2020-10-08 ENCOUNTER — OFFICE VISIT (OUTPATIENT)
Dept: CARDIOLOGY CLINIC | Age: 65
End: 2020-10-08
Payer: COMMERCIAL

## 2020-10-08 VITALS
BODY MASS INDEX: 45.22 KG/M2 | RESPIRATION RATE: 20 BRPM | DIASTOLIC BLOOD PRESSURE: 80 MMHG | HEART RATE: 67 BPM | SYSTOLIC BLOOD PRESSURE: 126 MMHG | WEIGHT: 271.4 LBS | HEIGHT: 65 IN

## 2020-10-08 PROCEDURE — 93000 ELECTROCARDIOGRAM COMPLETE: CPT | Performed by: INTERNAL MEDICINE

## 2020-10-08 PROCEDURE — 99213 OFFICE O/P EST LOW 20 MIN: CPT | Performed by: INTERNAL MEDICINE

## 2020-10-08 RX ORDER — MULTIVITAMIN WITH IRON
500 TABLET ORAL DAILY
COMMUNITY
End: 2022-10-25

## 2020-10-08 NOTE — PROGRESS NOTES
[facility] Cardiology Progress Note  Dr. Sundeep Avila      Referring Physician: Yarelis Leal DO  CHIEF COMPLAINT:   Chief Complaint   Patient presents with    Coronary Artery Disease     post stent on 9/17/20; c/o dizziness, when she take breaths he throat feels dry, tires easily        HISTORY OF PRESENT ILLNESS:   Patient is 63years old female with a family history of CAD s/p PCI to OM, is here for a follow up visit. Shortness of breath, most of the time, with and without exertion, patient denies any chest pain,no lightheadedness, no dizziness, no palpitations, no pedal edema, no PND, no orthopnea, no syncope, no presyncopal episodes. Functional capacity is improving    Past Medical History:   Diagnosis Date    Arthritis     CAD (coronary artery disease)     9-17-20 yisel 2.0x22 francisca .  Depression     GERD (gastroesophageal reflux disease)     Graves disease     Hypothyroidism     SECONDARY TO GRAVES DISEASE    NSTEMI (non-ST elevated myocardial infarction) ([facility] [address] 09/16/2020         Past Surgical History:   Procedure Laterality Date    COLONOSCOPY  11/10/10    DR GUILLERMO    CORONARY ANGIOPLAST[facility] WITH STENT PLACEMENT  [facility]17/[facility]     GASTRIC BYPASS SURGERY  5/2/06    DR ROBERTSON         Current Outpatient Medications   Medication Sig Dispense Refill    magnesium (MAGNESIUM-OXIDE) 250 MG TABS tablet Take 250 mg by mouth daily      FLUoxetine (PROZAC) 20 MG capsule Take 1 capsule by mouth daily 30 capsule 1    aspirin 81 MG EC tablet Take 1 tablet by mouth daily 30 tablet 3    nitroGLYCERIN (NITROSTAT) 0.4 MG SL tablet up to max of 3 total doses.  If no relief after 1 dose, call 911. 25 tablet 0    ticagrelor (BRILINTA) 90 MG TABS tablet Take 1 tablet by mouth 2 times daily 180 tablet 3    atorvastatin (LIPITOR) 40 MG tablet Take 1 tablet by mouth nightly 90 tablet 3    buPROPion (WELLBUTRIN XL) 300 MG extended release tablet TAKE ONE TABLET BY MOUTH EVERY MORNING 90 tablet 1    levothyroxine (SYNTHROID) 137 MCG tablet TAKE 1 TABLET BY MOUTH ONCE DAILY 90 tablet 1    citalopram (CELEXA) 40 MG tablet TAKE 1 TABLET BY MOUTH ONCE DAILY 90 tablet 1    traMADol (ULTRAM) 50 MG tablet Take 50 mg by mouth every 6 hours as needed.  loratadine (CLARITIN) 10 MG capsule Take 1 capsule by mouth daily 90 capsule 1    Cyanocobalamin (VITAMIN B 12 PO) Take by mouth      Multiple Vitamins-Minerals (MULTIVITAMIN ADULT PO) Take by mouth      fluticasone (FLONASE) 50 MCG/ACT nasal spray 2 sprays by Nasal route daily (Patient not taking: Reported on 10/8/2020) 1 Bottle 0     No current facility-administered medications for this visit. Allergies as of 10/08/2020 - Review Complete 10/08/2020   Allergen Reaction Noted    Mobic [meloxicam] Other (See Comments) 2017       Social History     Socioeconomic History    Marital status:      Spouse name: Not on file    Number of children: Not on file    Years of education: Not on file    Highest education level: Not on file   Occupational History    Not on file   Social Needs    Financial resource strain: Not on file    Food insecurity     Worry: Not on file     Inability: Not on file    Transportation needs     Medical: Not on file     Non-medical: Not on file   Tobacco Use    Smoking status: Former Smoker     Packs/day: 1.00     Years: 3.00     Pack years: 3.00     Types: Cigarettes     Start date:      Last attempt to quit: 3/4/1977     Years since quittin.6    Smokeless tobacco: Never Used   Substance and Sexual Activity    Alcohol use:  Yes     Alcohol/week: 0.0 standard drinks     Frequency: Monthly or less     Comment: rare    Drug use: Not on file    Sexual activity: Not on file   Lifestyle    Physical activity     Days per week: Not on file     Minutes per session: Not on file    Stress: Not on file   Relationships    Social connections     Talks on phone: Not on file     Gets together: Not on file bruit.  ABDOMEN:  Soft, nontender, no masses, no hepatomegaly, no splenomegaly, BS+  MUSCULOSKELETAL:  No clubbing no cyanosis. there is no redness, warmth, or swelling of the joints  full range of motion noted  NEUROLOGIC:  Alert, awake,oriented x3  SKIN:  no bruising or bleeding, normal skin color, texture, turgor and no redness, warmth, or swelling    /80   Pulse 67   Resp 20   Ht 5' 5\" (1.651 m)   Wt 271 lb 6.4 oz (123.1 kg)   BMI 45.16 kg/m²     DATA:   I personally reviewed the visit EKG with the following interpretation: Sinus rhythm, nonspecific T wave changes    EKG 9/17/20 Normal sinus rhythm  Nonspecific T wave abnormality  Abnormal ECG  When compared with ECG of 16-SEP-2020 13:52,  No significant change was found     ECHO: 10/1/20 Summary   Normal left ventricle size and systolic function. Ejection fraction is visually estimated at 55-60%. Mild concentric left ventricular hypertrophy. No regional wall motion abnormalities seen. Indeterminate diastolic function. The left atrium is mildly dilated. Normal right ventricular size and function. TAPSE 19 mm. No systolic mitral regurgitation noted. There is mild-to-moderate aortic stenosis with valve area of 1.4 sq cm. Aortic valve peak velocity 2.3 m/s and mean gradient 10 mmHg. Physiologic and/or trace tricuspid regurgitation. RVSP is 25 mmHg. Normal estimated PA systolic pressure. No evidence for hemodynamically significant pericardial effusion. No previous echo for comparison    Stress Test:     Angiography: 9/17/20 1. Totally occluded fourth OM branch with successful deployment of 2.0  x 22 mm Malcolm Resolute drug-eluting stent with 0% residual stenosis and  RITA-3 flow distally, (pre-PCI RITA-0 flow, post-PCI RITA-3 flow).   2.  No CAD noted in the rest of the coronary arteries.     Cardiology Labs: BMP:    Lab Results   Component Value Date     09/18/2020    K 3.9 09/18/2020    CL 99 09/18/2020    CO2 25 09/18/2020 BUN 12 09/18/2020    CREATININE 1.0 09/18/2020     CMP:    Lab Results   Component Value Date     09/18/2020    K 3.9 09/18/2020    CL 99 09/18/2020    CO2 25 09/18/2020    BUN 12 09/18/2020    CREATININE 1.0 09/18/2020    PROT 7.7 09/16/2020     CBC:    Lab Results   Component Value Date    WBC 7.2 09/18/2020    RBC 4.34 09/18/2020    HGB 10.3 09/18/2020    HCT 34.1 09/18/2020    MCV 78.6 09/18/2020    RDW 19.0 09/18/2020     09/18/2020     PT/INR:  No results found for: PTINR  PT/INR Warfarin:  No components found for: PTPATWAR, PTINRWAR  PTT:    Lab Results   Component Value Date    APTT 67.1 09/17/2020     PTT Heparin:  No components found for: APTTHEP  Magnesium:    Lab Results   Component Value Date    MG 2.5 09/16/2020     TSH:    Lab Results   Component Value Date    TSH 2.470 09/16/2020     TROPONIN:  No components found for: TROP  BNP:  No results found for: BNP  FASTING LIPID PANEL:    Lab Results   Component Value Date    CHOL 160 09/17/2020    HDL 68 09/17/2020    TRIG 48 09/17/2020     No orders to display     I have personally reviewed the laboratory, cardiac diagnostic and radiographic testing as outlined above:      IMPRESSION:    1.  CAD: S/p non-ST elevation MI, had cardiac catheterization on 9/17/20 with the following findings:  # Totally occluded fourth OM branch with successful deployment of 2.0 x 22 mm Malcolm Resolute drug-eluting stent with 0% residual stenosis and RITA-3 flow distally, (pre-PCI RITA-0 flow, post-PCI RITA-3 flow). # No CAD noted in the rest of the coronary arteries. will continue current treatment  2. Shortness of breath suspect iatrogenic secondary to Brilinta, will discontinue, will switch to Plavix  3. Hyperlipidemia: On statin  4. Hypothyroidism: Secondary to Graves' disease, on Synthroid supplements    RECOMMENDATIONS:   1. Discontinue Brilinta  2. Plavix loading dose with 300 mg once and then 75 mg by mouth daily  3.   Risk of instent thrombosis due to premature discontinuation of either ASA or Plavix discussed with patient at length  4.  Cardiac rehab  5. Follow-up with Dr. Lolita Dick as scheduled  6. Follow-up with Dr. Beverley Lockett in 6 months, sooner if symptomatic for any reason  I have reviewed my findings and recommendations with patient    Electronically signed by Sundeep Avila MD on 10/25/2020 at 4:04 PM    NOTE: This report was transcribed using voice recognition software.  Every effort was made to ensure accuracy; however, inadvertent computerized transcription errors may be present

## 2020-10-09 ENCOUNTER — TELEPHONE (OUTPATIENT)
Dept: ADMINISTRATIVE | Age: 65
End: 2020-10-09

## 2020-10-12 NOTE — TELEPHONE ENCOUNTER
Call placed to pt, notified her injections can be repeated every 3 months a the earliest. Pt to keep appt on 11/11. She verbalized understanding.
Pt is scheduled to get repeat bilat knee injections 11/11. She would like to come in October.   Please call her at [phone]
26-Feb-2020 11:52

## 2020-10-28 ENCOUNTER — OFFICE VISIT (OUTPATIENT)
Dept: BARIATRICS/WEIGHT MGMT | Age: 65
End: 2020-10-28
Payer: COMMERCIAL

## 2020-10-28 VITALS
BODY MASS INDEX: 50.86 KG/M2 | WEIGHT: 269.4 LBS | TEMPERATURE: 98.1 F | HEIGHT: 61 IN | HEART RATE: 82 BPM | SYSTOLIC BLOOD PRESSURE: 149 MMHG | DIASTOLIC BLOOD PRESSURE: 87 MMHG

## 2020-10-28 PROCEDURE — 99205 OFFICE O/P NEW HI 60 MIN: CPT | Performed by: INTERNAL MEDICINE

## 2020-10-28 PROCEDURE — 99201 HC NEW PT, E/M LEVEL 1: CPT | Performed by: INTERNAL MEDICINE

## 2020-10-28 NOTE — PATIENT INSTRUCTIONS
Rules:  · Count every calorie every day  · Limit sweets to one day per month  · Limit chips/crackers/pretzels/nuts to 100 [place]/day  · Eliminate all sugar sweetened beverages (including fruit juice)  · Limit restaurants (including fast food and food from a convenience store) to one time every two weeks    Requirements:  · Make sure protein intake is at least 60 grams per day (Consider using a protein shake - Nectar, Pure Protein, Premier, Fairlife (vanilla), Boost Max, Ensure Max, BeneProtein and [employer] lean (which is lactose-free) are milk-based options; Nectar, Premier Protein Clear, IsoPure Protein Drink, and Protein 2 O are water-based options; Quest (or Cosco, which is cheaper and is ordered on [employer]) and the [employer] protein bars can also be used, but have less protein in them )  · Make sure that fiber intake is at least 22 grams per day. Do this by either eating 12 tablespoons of the original, plain Fiber One cereal every day or 4 tablespoons of wheat dextrin powder (Benefiber or a generic brand) every day. Work up to this amount slowly by starting with only one-fourth of the target amount and adding another one-fourth every one or two weeks until reaching the target. · Take one multivitamin every day    Goals:  · Limit calorie intake to 1300 calories/day  · Walk 30 minutes daily  · Avoid eating 2 hours within bedtime. ·   Tips:  · Do not eat outside of the dining room or the kitchen  · Do not eat while watching TV, videos, working on the computer or using a smart phone  · Do not eat food out of a multi-serving bag or container.     Medications:  Begin taking Metformin 500 mg according to the following schedule:   Week 1 - Take one tablet every morning with breakfast   Week 2 - Take one tablet every morning with breakfast and one tablet every evening with dinner   Week 3 - Take two tablets every morning with breakfast and one tablet every evening with dinner  ·  Week 4 - Take two tablets every morning with breakfast and two tablets every evening with dinner    Resume all bariatric supplements (two multivitamins, D3 5000 IU daily, 35-65 mg iron daily, 1500 mg calcium daily)

## 2020-10-28 NOTE — PROGRESS NOTES
CC -   Graves' disease, Hypothyroidism, Obesity    Background -   First visit: 10/28/20    Graves' Disease  Diagnosed 2005  MULLER soon after diagnosis and was successful  Present regimen: LT4 137 mcg daily  Does not miss doses  Takes with other medications and immediately followed by coffee    Obesity   Began in childhood  Initial BMI 50.9, Wt 269.4 lbs  HS Grad wt 220 lbs  Lowest   wt 220 lbs  Highest  wt 364 lbs  Pattern of wt gain: grad  RYGB 2008 (Dr Lupis Shea; last seen >10 yrs ago)  Pre-proc wt 364 lbs  Post-proc ann 250 lbs (9mo)  Wt regain: began 18 mo post-op; grad  Wt change past yr: Gained 30 lbs, then lost 30 lbs  Most wt lost: 90 lbs (Fen-Phen)  Other diets attempted: Slimfast, Counting calories, Soup diet    Initial Diet:    Number of meals per day - 2-3    Number of snacks per day - 2-3    Meal volume - 9\" plate, sometimes seconds    Fast food/convenience store - 1-2x/week    Restaurants (not fast food) - 1-2x/week   Sweets - 3d/week   Chips - 0d/week   Crackers/pretzels - 0d/week   Nuts - 1d/week (1 oz)   Peanut Butter - 2d/week (on celery)   Popcorn - 2d/week (1/2 of large bag)   Dried fruit - 1d/week (as trailmix)   Whole fruit - 7d/week (2 pieces)   Breakfast cereal - 0d/week   Granola/Protein/Energy bar - 0d/week   Sugar sweetened beverages - 16 oz reg soda/wk, 8-12 oz skim milk/d in coffee, 20 oz sweet tea, one V8 energy drink/d (50 clinton)    Protein - No supplements   Fiber - No supplements     Exercise:    Gym membership - none    Walking - none    Running - none    Resistance - none    Aerobic class - none    ______________________    [facility] -  Past Medical History:   Diagnosis Date    Arthritis     CAD (coronary artery disease)     9-17-20 yisel 2.0x22 francisca om.      Depression     GERD (gastroesophageal reflux disease)     Graves disease     Hypothyroidism     SECONDARY TO GRAVES DISEASE    NSTEMI (non-ST elevated myocardial infarction) ([facility] [address] 09/16/2020     Current Outpatient Medications Medication Sig Dispense Refill    magnesium (MAGNESIUM-OXIDE) 250 MG TABS tablet Take 250 mg by mouth daily      FLUoxetine (PROZAC) 20 MG capsule Take 1 capsule by mouth daily 30 capsule 1    aspirin 81 MG EC tablet Take 1 tablet by mouth daily 30 tablet 3    nitroGLYCERIN (NITROSTAT) 0.4 MG SL tablet up to max of 3 total doses. If no relief after 1 dose, call 911. 25 tablet 0    ticagrelor (BRILINTA) 90 MG TABS tablet Take 1 tablet by mouth 2 times daily 180 tablet 3    atorvastatin (LIPITOR) 40 MG tablet Take 1 tablet by mouth nightly 90 tablet 3    buPROPion (WELLBUTRIN XL) 300 MG extended release tablet TAKE ONE TABLET BY MOUTH EVERY MORNING 90 tablet 1    levothyroxine (SYNTHROID) 137 MCG tablet TAKE 1 TABLET BY MOUTH ONCE DAILY 90 tablet 1    citalopram (CELEXA) 40 MG tablet TAKE 1 TABLET BY MOUTH ONCE DAILY 90 tablet 1    traMADol (ULTRAM) 50 MG tablet Take 50 mg by mouth every 6 hours as needed.  loratadine (CLARITIN) 10 MG capsule Take 1 capsule by mouth daily 90 capsule 1    fluticasone (FLONASE) 50 MCG/ACT nasal spray 2 sprays by Nasal route daily (Patient not taking: Reported on 10/8/2020) 1 Bottle 0    Cyanocobalamin (VITAMIN B 12 PO) Take by mouth      Multiple Vitamins-Minerals (MULTIVITAMIN ADULT PO) Take by mouth       No current facility-administered medications for this visit. ROS -  Card - no CP  GI - no N/V/D    PE -  Gen : BP (!) 149/87 (Site: Left Upper Arm, Position: Sitting, Cuff Size: Large Adult)   Pulse 82   Temp 98.1 °F (36.7 °C) (Temporal)   Ht 5' 1\" (1.549 m)   Wt 269 lb 6.4 oz (122.2 kg)   BMI 50.90 kg/m²    WN, WD, NAD  Lung: Nml resp effort  Psych: Normal mood   Full affect  Neuro:  Moves all ext well  ______________________      HPI & A/P -   Problem 1  - Graves' disease, Hypothyroidism  HPI   - See above Background for description      9/16/20 TSH 2.47 on LT4 137 mcg daily      Symptoms: no dysphagia or hoarseness; no tremors/sweating/palpitations, + chronic insomnia, + heat intolerance (keeps thermostat at 68 degrees), no new change in hair loss, no C/D  Assessment  - Controlled  Plan   - Cont with LT4 137 mcg daily    Problem 2  - Obesity  HPI   - See above Background for description    Weight  Date    269.4 lbs 10/28/20    DEN: 1600 clinton/day (sedentary, post-RYGB)    Would like an appetite suppressant. Phentermine not an option b/c of her heart disease. She is on two SSRI's, so Contrave not an option    Will trial metformin since she has had an RYGB    Agreeable with counting calories. Will also implement targeted rules of elimination    Does not a VLCD b/c of needing to prepare meals for her     Needs to have routine labs for post-RYGB nutrition surveillance  Assessment  - Uncontrolled  Plan   -   Patient Instructions   Rules:  · Count every calorie every day  · Limit sweets to one day per month  · Limit chips/crackers/pretzels/nuts to 100 clinton/day  · Eliminate all sugar sweetened beverages (including fruit juice)  · Limit restaurants (including fast food and food from a convenience store) to one time every two weeks    Requirements:  · Make sure protein intake is at least 60 grams per day (Consider using a protein shake - Nectar, Pure Protein, Premier, Fairlife (vanilla), Boost Max, Ensure Max, BeneProtein and [employer]NC lean (which is lactose-free) are milk-based options; Nectar, Premier Protein Clear, IsoPure Protein Drink, and Protein 2 O are water-based options; Quest (or Cosco, which is cheaper and is ordered on [employer]) and the Oh Ye 1 protein bars can also be used, but have less protein in them )  · Make sure that fiber intake is at least 22 grams per day. Do this by either eating 12 tablespoons of the original, plain Fiber One cereal every day or 4 tablespoons of wheat dextrin powder (Benefiber or a generic brand) every day.  Work up to this amount slowly by starting with only one-fourth of the target amount and adding another one-fourth every one

## 2020-11-03 ENCOUNTER — OFFICE VISIT (OUTPATIENT)
Dept: PRIMARY CARE CLINIC | Age: 65
End: 2020-11-03
Payer: COMMERCIAL

## 2020-11-03 VITALS
TEMPERATURE: 97.1 F | SYSTOLIC BLOOD PRESSURE: 136 MMHG | WEIGHT: 270 LBS | DIASTOLIC BLOOD PRESSURE: 82 MMHG | OXYGEN SATURATION: 94 % | BODY MASS INDEX: 50.98 KG/M2 | HEART RATE: 75 BPM | HEIGHT: 61 IN

## 2020-11-03 PROCEDURE — 99214 OFFICE O/P EST MOD 30 MIN: CPT | Performed by: FAMILY MEDICINE

## 2020-11-03 RX ORDER — FLUOXETINE HYDROCHLORIDE 20 MG/1
20 CAPSULE ORAL DAILY
Qty: 90 CAPSULE | Refills: 1 | Status: SHIPPED
Start: 2020-11-03 | End: 2020-12-01 | Stop reason: SDUPTHER

## 2020-11-03 RX ORDER — DOXEPIN HYDROCHLORIDE 6 MG/1
6 TABLET ORAL NIGHTLY
Qty: 30 TABLET | Refills: 2 | Status: SHIPPED
Start: 2020-11-03 | End: 2020-11-05 | Stop reason: SDUPTHER

## 2020-11-03 ASSESSMENT — ENCOUNTER SYMPTOMS
CHEST TIGHTNESS: 0
NAUSEA: 0
DIARRHEA: 1
CONSTIPATION: 0
COUGH: 1
SHORTNESS OF BREATH: 0
VOMITING: 0
ABDOMINAL PAIN: 0

## 2020-11-03 NOTE — PROGRESS NOTES
Kimberlyn Barrett, a female of 63 y.o. came to the office 11/3/2020. Patient Active Problem List   Diagnosis    Hypothyroidism    Graves disease    Depression    GERD (gastroesophageal reflux disease)    Chronic pain of both knees    Non-ST elevation MI ([facility]STEMI) ([facility] [address]    Morbid obesity ([facility] [address]    Chest pain    Coronary artery disease involving native coronary artery of native heart with unstable angina pectoris ([facility] [address]    Obesity          Coronary Artery Disease   Presents for follow-up visit. Symptoms include chest pressure and palpitations (heart raced with chest pressure on 11/2). Pertinent negatives include no chest pain, chest tightness, dizziness, leg swelling or shortness of breath. (Last night, 11/2, she woke up from sleep with some midline chest pressure. She took a nitroglycerine and felt better within 20 minutes. ) Compliance with diet is good. Compliance with exercise is variable (scheduled for shots in knees which will help with mobility). Compliance with medications is good. Depression: She states her mood is okay. She reports mood swings, and some agitation that she controlling fairly well. Difficulty falling and staying asleep. She also states she has no motivation since starting back to work. Weight loss: Endocrinologist started her on metformin about 1.5 wks ago to help with weight loss. She is worried her blood sugars will go low with this medication. She states that in the past if she would eat to many sweets she would get hypoglycemia (sweating, tremulous, brain fog) afterwards. Skin: scab on top of head that she keeps scratching.      Allergies   Allergen Reactions    Mobic [Meloxicam] Other (See Comments)       Current Outpatient Medications on File Prior to Visit   Medication Sig Dispense Refill    metFORMIN (GLUCOPHAGE) 500 MG tablet Take 2 tablets by mouth 2 times daily (with meals) 360 tablet 2    magnesium (MAGNESIUM-OXIDE) 250 MG TABS tablet Take 250 mg by mouth daily      aspirin 81 MG EC tablet Take 1 tablet by mouth daily 30 tablet 3    nitroGLYCERIN (NITROSTAT) 0.4 MG SL tablet up to max of 3 total doses. If no relief after 1 dose, call 911. 25 tablet 0    ticagrelor (BRILINTA) 90 MG TABS tablet Take 1 tablet by mouth 2 times daily 180 tablet 3    atorvastatin (LIPITOR) 40 MG tablet Take 1 tablet by mouth nightly 90 tablet 3    buPROPion (WELLBUTRIN XL) 300 MG extended release tablet TAKE ONE TABLET BY MOUTH EVERY MORNING 90 tablet 1    levothyroxine (SYNTHROID) 137 MCG tablet TAKE 1 TABLET BY MOUTH ONCE DAILY 90 tablet 1    citalopram (CELEXA) 40 MG tablet TAKE 1 TABLET BY MOUTH ONCE DAILY (Patient taking differently: 20 mg TAKE 1 TABLET BY MOUTH ONCE DAILY) 90 tablet 1    loratadine (CLARITIN) 10 MG capsule Take 1 capsule by mouth daily 90 capsule 1    Cyanocobalamin (VITAMIN B 12 PO) Take by mouth      Multiple Vitamins-Minerals (MULTIVITAMIN ADULT PO) Take by mouth      traMADol (ULTRAM) 50 MG tablet Take 50 mg by mouth every 6 hours as needed.  fluticasone (FLONASE) 50 MCG/ACT nasal spray 2 sprays by Nasal route daily (Patient not taking: Reported on 10/8/2020) 1 Bottle 0     No current facility-administered medications on file prior to visit. Review of Systems   Constitutional: Positive for fatigue. Negative for chills, diaphoresis and fever. Eyes: Positive for visual disturbance. Respiratory: Positive for cough (COVID cough still). Negative for chest tightness and shortness of breath. Cardiovascular: Positive for palpitations (heart raced with chest pressure on 11/2). Negative for chest pain and leg swelling. Gastrointestinal: Positive for diarrhea (since starting metformin). Negative for abdominal pain, constipation, nausea and vomiting. Endocrine: Negative for polydipsia and polyuria. Genitourinary: Negative for dysuria, frequency and urgency. Musculoskeletal: Positive for arthralgias (bilateral knees).    Neurological: Negative for dizziness, weakness, light-headedness, numbness and headaches. Psychiatric/Behavioral: Positive for agitation, decreased concentration, dysphoric mood and sleep disturbance. Negative for suicidal ideas. The patient is not nervous/anxious. other review of systems reviewed and are negative    OBJECTIVE:  /82   Pulse 75   Temp 97.1 °F (36.2 °C)   Ht 5' 1\" (1.549 m)   Wt 270 lb (122.5 kg)   SpO2 94%   BMI 51.02 kg/m²      Physical Exam  Constitutional:       Appearance: Normal appearance. HENT:      Head: Normocephalic. Comments: 7 mm scab on scalp at vertex. Right Ear: Tympanic membrane, ear canal and external ear normal.      Left Ear: Tympanic membrane, ear canal and external ear normal.      Nose: Nose normal.      Mouth/Throat:      Mouth: Mucous membranes are moist.      Pharynx: Oropharynx is clear. Eyes:      Conjunctiva/sclera: Conjunctivae normal.      Pupils: Pupils are equal, round, and reactive to light. Cardiovascular:      Rate and Rhythm: Normal rate and regular rhythm. Pulses: Normal pulses. Heart sounds: Normal heart sounds. Pulmonary:      Effort: Pulmonary effort is normal.      Breath sounds: Normal breath sounds. Musculoskeletal:      Right lower leg: No edema. Left lower leg: No edema. Skin:     General: Skin is warm and dry. Neurological:      General: No focal deficit present. Mental Status: She is alert and oriented to person, place, and time. Psychiatric:         Mood and Affect: Mood normal.         Behavior: Behavior normal.         ASSESSMENT AND PLAN:    Ann Bishop was seen today for check-up. Diagnoses and all orders for this visit:    Coronary artery disease involving native coronary artery of native heart with unstable angina pectoris ([address]  -     Lipid Panel; Future    Anxiety  -     FLUoxetine (PROZAC) 20 MG capsule;  Take 1 capsule by mouth daily    Primary insomnia  -     Doxepin HCl 6 MG TABS; Take 6 mg by mouth nightly    Impetigo  -     mupirocin (BACTROBAN) 2 % ointment; Apply topically 3 times daily. - continue current meds, if chest pain continues call her cardiologist.   - if no change in skin then derm referral needed. - reviewed recent cardiac hospitalization results. Return in about 6 months (around 5/3/2021). Tia Weems DO  I reviewed the students documentation ( Hx, exam, MDM ), examined the patient and performed the A&P.

## 2020-11-05 RX ORDER — DOXEPIN HYDROCHLORIDE 6 MG/1
6 TABLET ORAL NIGHTLY
Qty: 30 TABLET | Refills: 2 | Status: SHIPPED
Start: 2020-11-05 | End: 2020-11-17

## 2020-11-11 ENCOUNTER — OFFICE VISIT (OUTPATIENT)
Dept: ORTHOPEDIC SURGERY | Age: 65
End: 2020-11-11
Payer: COMMERCIAL

## 2020-11-11 ENCOUNTER — HOSPITAL ENCOUNTER (OUTPATIENT)
Dept: CARDIAC REHAB | Age: 65
Setting detail: THERAPIES SERIES
Discharge: HOME | End: 2020-11-11
Payer: COMMERCIAL

## 2020-11-11 VITALS — DIASTOLIC BLOOD PRESSURE: 94 MMHG | HEART RATE: 75 BPM | SYSTOLIC BLOOD PRESSURE: 136 MMHG | TEMPERATURE: 97.2 F

## 2020-11-11 VITALS
HEART RATE: 64 BPM | OXYGEN SATURATION: 97 % | HEIGHT: 61 IN | WEIGHT: 267.4 LBS | DIASTOLIC BLOOD PRESSURE: 79 MMHG | BODY MASS INDEX: 50.48 KG/M2 | RESPIRATION RATE: 16 BRPM | SYSTOLIC BLOOD PRESSURE: 121 MMHG

## 2020-11-11 PROCEDURE — 99212 OFFICE O/P EST SF 10 MIN: CPT | Performed by: ORTHOPAEDIC SURGERY

## 2020-11-11 PROCEDURE — 6360000002 HC RX W HCPCS

## 2020-11-11 PROCEDURE — 99213 OFFICE O/P EST LOW 20 MIN: CPT | Performed by: PHYSICIAN ASSISTANT

## 2020-11-11 PROCEDURE — 20610 DRAIN/INJ JOINT/BURSA W/O US: CPT | Performed by: PHYSICIAN ASSISTANT

## 2020-11-11 PROCEDURE — 2500000003 HC RX 250 WO HCPCS

## 2020-11-11 RX ORDER — LIDOCAINE HYDROCHLORIDE 10 MG/ML
6 INJECTION, SOLUTION INFILTRATION; PERINEURAL ONCE
Status: COMPLETED | OUTPATIENT
Start: 2020-11-11 | End: 2020-11-11

## 2020-11-11 RX ORDER — BUPIVACAINE HYDROCHLORIDE 2.5 MG/ML
6 INJECTION, SOLUTION EPIDURAL; INFILTRATION; INTRACAUDAL ONCE
Status: COMPLETED | OUTPATIENT
Start: 2020-11-11 | End: 2020-11-11

## 2020-11-11 RX ORDER — TRIAMCINOLONE ACETONIDE 40 MG/ML
40 INJECTION, SUSPENSION INTRA-ARTICULAR; INTRAMUSCULAR ONCE
Status: COMPLETED | OUTPATIENT
Start: 2020-11-11 | End: 2020-11-11

## 2020-11-11 RX ADMIN — BUPIVACAINE HYDROCHLORIDE 15 MG: 2.5 INJECTION, SOLUTION EPIDURAL; INFILTRATION; INTRACAUDAL at 10:08

## 2020-11-11 RX ADMIN — TRIAMCINOLONE ACETONIDE 40 MG: 40 INJECTION, SUSPENSION INTRA-ARTICULAR; INTRAMUSCULAR at 10:10

## 2020-11-11 RX ADMIN — TRIAMCINOLONE ACETONIDE 40 MG: 40 INJECTION, SUSPENSION INTRA-ARTICULAR; INTRAMUSCULAR at 10:09

## 2020-11-11 RX ADMIN — LIDOCAINE HYDROCHLORIDE 6 ML: 10 INJECTION, SOLUTION INFILTRATION; PERINEURAL at 10:09

## 2020-11-11 ASSESSMENT — PATIENT HEALTH QUESTIONNAIRE - PHQ9
SUM OF ALL RESPONSES TO PHQ QUESTIONS 1-9: 12
SUM OF ALL RESPONSES TO PHQ QUESTIONS 1-9: 12

## 2020-11-11 NOTE — PROGRESS NOTES
Chief Complaint   Patient presents with    Injections     Patient states the last round of injections worked for 2 months. Patient states November 4th she fell and landed on the right knee. Left knee 10/10 for pain. Right knee 8.5/10.  Other     Patient states Sept 16th she had a heart attack. SUBJECTIVE: Patient is following up for bilateral knee osteoarthritis. She received a corticosteroid injection to bilateral knees approximately 3 months ago which did last approximately 2 months to give her significant relief. She would like another round of bilateral corticosteroid injections today. States that she does fall frequently due to instability of her knees. She is a  and states that she is not allowed any assistive devices. She is still seeing Dr. Jim Ray for weight loss and states this is going well. His fever, chills, rashes. Review of Systems -   General ROS: negative for - chills, fatigue, fever or night sweats  Respiratory ROS: no cough, shortness of breath, or wheezing  Cardiovascular ROS: no chest pain or dyspnea on exertion  Gastrointestinal ROS: no abdominal pain, change in bowel habits, or black or bloody stools  Genitourinary: no hematuria, dysuria, or incontinence   Musculoskeletal ROS:see above  Neurological ROS: no TIA or stroke symptoms       OBJECTIVE:   Alert and oriented X 3, no acute distress, respirations easy and unlabored with no audible wheezes, skin warm and dry, speech and dress appropriate for noted age, affect euthymic. Extremity:  Bilateral knees              Skin intact. Mild effusion noted bilaterally. Medial joint line TTP bilaterally. Stable to varus and valgus at 30 degrees of flexion. Stable with anterior and posterior drawer. Compartments soft and compressible. NVID. 2/4 DP and PT pulses.                 Significant crepitus on range of motion bilaterally Calves soft and NT.                5/5 EHL, TA, GS. Range of motion is 0 to 100 degrees in the right and 0 to 105 degrees in the left       BP (!) 136/94 (Site: Left Upper Arm, Position: Sitting)   Pulse 75   Temp 97.2 °F (36.2 °C) (Oral)     ASSESSMENT:     Diagnosis Orders   1. Primary osteoarthritis of both knees  HI ARTHROCENTESIS ASPIR&/INJ MAJOR JT/BURSA W/O US    triamcinolone acetonide (KENALOG-40) injection 40 mg    bupivacaine (PF) (MARCAINE) 0.25 % injection 15 mg    lidocaine 1 % injection 6 mL    triamcinolone acetonide (KENALOG-40) injection 40 mg       DISCUSSION:   I discussed the natural course and history of knee arthritis with the patient as well as treatment options including NSAIDs, weight loss, activity modification, and injections as well as surgery. The patient would like to proceed with bilateral knee corticosteroid injections today. Discussed risks and benefits of CSI including risk of infection at the joint, bleeding or bruising at the injection site and elevation of blood sugar levels and cartilage degradation with repetitive use. Patient expressed understanding of these risks and elected to move forward with corticosteroid injection today in the office. That she would eventually like a total knee arthroplasty but is still working on weight loss. She does understand that she cannot receive a total knee arthroplasty within 3 months of the last corticosteroid injection. PROCEDURE:  With the patient's written and verbal permission, Bilateral  knee was prepped in standard sterile fashion with betadine and alcohol. Skin of the knee was anesthetized with ethyl chloride spray prior to injection. The knee was then injected with 1 ml Kenalog (40mg), 3 ml PF 0.25% marcaine and 3 ml 1% PF Lidocaine were injected using the lateral inferior approach without difficulty. The patient tolerated this well. A band-aid was applied.  The patient ambulated out of clinic on their own accord without difficulty. PLAN:  Activity weightbearing as tolerated  Maintain healthy lifestyle and weight loss  Continue with over-the-counter analgesics as needed  Recommended to continue with ice and elevation as needed for increased inflammation particularly for the first few days after injection  Postinjection care as instructed  Advised that goal is for 6 to 8 weeks of moderate relief with steroid injections  Reminded patient that injections can be done every 3 months  Advised on signs and symptoms of infection to monitor for    Follow-up in 3 months or as needed for repeat injections    Electronically signed by Roro Louis PA-C on 11/11/2020 at 8:36 AM  Note: This report was completed using [employer] voiced recognition software. Every effort has been made to ensure accuracy; however, inadvertent computerized transcription errors may be present.

## 2020-11-17 ENCOUNTER — HOSPITAL ENCOUNTER (OUTPATIENT)
Age: 65
Discharge: HOME | End: 2020-11-17
Payer: COMMERCIAL

## 2020-11-17 ENCOUNTER — OFFICE VISIT (OUTPATIENT)
Dept: PRIMARY CARE CLINIC | Age: 65
End: 2020-11-17
Payer: COMMERCIAL

## 2020-11-17 VITALS
WEIGHT: 265 LBS | TEMPERATURE: 97.2 F | OXYGEN SATURATION: 97 % | BODY MASS INDEX: 50.07 KG/M2 | DIASTOLIC BLOOD PRESSURE: 80 MMHG | SYSTOLIC BLOOD PRESSURE: 138 MMHG | HEART RATE: 73 BPM

## 2020-11-17 PROBLEM — F41.1 GAD (GENERALIZED ANXIETY DISORDER): Status: ACTIVE | Noted: 2020-11-17

## 2020-11-17 LAB
BASOPHILS ABSOLUTE: 0.05 E9/L (ref 0–0.2)
BASOPHILS RELATIVE PERCENT: 0.6 % (ref 0–2)
CHOLESTEROL, TOTAL: 116 MG/DL (ref 0–199)
EOSINOPHILS ABSOLUTE: 0.09 E9/L (ref 0.05–0.5)
EOSINOPHILS RELATIVE PERCENT: 1.1 % (ref 0–6)
FERRITIN: 10 NG/ML
FOLATE: 11.9 NG/ML (ref 4.8–24.2)
HCT VFR BLD CALC: 36.8 % (ref 34–48)
HDLC SERPL-MCNC: 60 MG/DL
HEMOGLOBIN: 11.4 G/DL (ref 11.5–15.5)
IMMATURE GRANULOCYTES #: 0.04 E9/L
IMMATURE GRANULOCYTES %: 0.5 % (ref 0–5)
LDL CHOLESTEROL CALCULATED: 47 MG/DL (ref 0–99)
LYMPHOCYTES ABSOLUTE: 1.41 E9/L (ref 1.5–4)
LYMPHOCYTES RELATIVE PERCENT: 16.5 % (ref 20–42)
MCH RBC QN AUTO: 24.6 PG (ref 26–35)
MCHC RBC AUTO-ENTMCNC: 31 % (ref 32–34.5)
MCV RBC AUTO: 79.3 FL (ref 80–99.9)
MONOCYTES ABSOLUTE: 0.66 E9/L (ref 0.1–0.95)
MONOCYTES RELATIVE PERCENT: 7.7 % (ref 2–12)
NEUTROPHILS ABSOLUTE: 6.27 E9/L (ref 1.8–7.3)
NEUTROPHILS RELATIVE PERCENT: 73.6 % (ref 43–80)
PDW BLD-RTO: 17.2 FL (ref 11.5–15)
PHOSPHORUS: 3.8 MG/DL (ref 2.5–4.5)
PLATELET # BLD: 357 E9/L (ref 130–450)
PMV BLD AUTO: 10 FL (ref 7–12)
RBC # BLD: 4.64 E12/L (ref 3.5–5.5)
TRIGL SERPL-MCNC: 44 MG/DL (ref 0–149)
VITAMIN B-12: 506 PG/ML (ref 211–946)
VLDLC SERPL CALC-MCNC: 9 MG/DL
WBC # BLD: 8.5 E9/L (ref 4.5–11.5)

## 2020-11-17 PROCEDURE — 99213 OFFICE O/P EST LOW 20 MIN: CPT | Performed by: FAMILY MEDICINE

## 2020-11-17 PROCEDURE — 84425 ASSAY OF VITAMIN B-1: CPT

## 2020-11-17 PROCEDURE — 85025 COMPLETE CBC W/AUTO DIFF WBC: CPT

## 2020-11-17 PROCEDURE — 80061 LIPID PANEL: CPT

## 2020-11-17 PROCEDURE — 84100 ASSAY OF PHOSPHORUS: CPT

## 2020-11-17 PROCEDURE — 82746 ASSAY OF FOLIC ACID SERUM: CPT

## 2020-11-17 PROCEDURE — 84630 ASSAY OF ZINC: CPT

## 2020-11-17 PROCEDURE — 36415 COLL VENOUS BLD VENIPUNCTURE: CPT

## 2020-11-17 PROCEDURE — 82728 ASSAY OF FERRITIN: CPT

## 2020-11-17 PROCEDURE — 82607 VITAMIN B-12: CPT

## 2020-11-17 NOTE — PROGRESS NOTES
Gera Adam, a female of 63 y.o. came to the office 2020. Patient Active Problem List   Diagnosis    Hypothyroidism    Graves disease    Depression    GERD (gastroesophageal reflux disease)    Chronic pain of both knees    Non-ST elevation MI ([facility]STEMI) ([facility] [address]    Morbid obesity ([facility] [address]    Chest pain    Coronary artery disease involving native coronary artery of native heart with unstable angina pectoris ([facility] [address]    Obesity    LITO (generalized anxiety disorder)          HPI fell: hit left side of chest on  and now with some pain with breathing. Anxiety: crying, easily agitated recently over the past wk or so. People are getting on her nerves. Unable to talk to family members that she used to since they have . Allergies   Allergen Reactions    Mobic [Meloxicam] Other (See Comments)       Current Outpatient Medications on File Prior to Visit   Medication Sig Dispense Refill    FLUoxetine (PROZAC) 20 MG capsule Take 1 capsule by mouth daily 90 capsule 1    mupirocin (BACTROBAN) 2 % ointment Apply topically 3 times daily. 22 g 0    metFORMIN (GLUCOPHAGE) 500 MG tablet Take 2 tablets by mouth 2 times daily (with meals) 360 tablet 2    magnesium (MAGNESIUM-OXIDE) 250 MG TABS tablet Take 500 mg by mouth daily       aspirin 81 MG EC tablet Take 1 tablet by mouth daily 30 tablet 3    nitroGLYCERIN (NITROSTAT) 0.4 MG SL tablet up to max of 3 total doses. If no relief after 1 dose, call 911. 25 tablet 0    ticagrelor (BRILINTA) 90 MG TABS tablet Take 1 tablet by mouth 2 times daily 180 tablet 3    atorvastatin (LIPITOR) 40 MG tablet Take 1 tablet by mouth nightly 90 tablet 3    buPROPion (WELLBUTRIN XL) 300 MG extended release tablet TAKE ONE TABLET BY MOUTH EVERY MORNING 90 tablet 1    levothyroxine (SYNTHROID) 137 MCG tablet TAKE 1 TABLET BY MOUTH ONCE DAILY 90 tablet 1    traMADol (ULTRAM) 50 MG tablet Take 50 mg by mouth every 6 hours as needed.       loratadine (CLARITIN) 10 MG capsule Take 1 capsule by mouth daily 90 capsule 1    fluticasone (FLONASE) 50 MCG/ACT nasal spray 2 sprays by Nasal route daily 1 Bottle 0    Cyanocobalamin (VITAMIN B 12 PO) Take by mouth      Multiple Vitamins-Minerals (MULTIVITAMIN ADULT PO) Take by mouth       No current facility-administered medications on file prior to visit. Review of Systems   Constitutional: Positive for activity change. Negative for appetite change. Psychiatric/Behavioral: Positive for agitation, decreased concentration, dysphoric mood and sleep disturbance (doxepin not covered. ). The patient is nervous/anxious. other review of systems reviewed and are negative    OBJECTIVE:  /80   Pulse 73   Temp 97.2 °F (36.2 °C)   Wt 265 lb (120.2 kg)   SpO2 97%   BMI 50.07 kg/m²      Physical Exam  Constitutional:       General: She is not in acute distress. Eyes:      General: No scleral icterus. Conjunctiva/sclera: Conjunctivae normal.   Neck:      Musculoskeletal: Neck supple. Thyroid: No thyromegaly. Cardiovascular:      Rate and Rhythm: Normal rate and regular rhythm. Heart sounds: No murmur. Pulmonary:      Effort: Pulmonary effort is normal.      Breath sounds: Normal breath sounds. Chest:      Chest wall: Tenderness (left side) present. Lymphadenopathy:      Cervical: No cervical adenopathy. Skin:     General: Skin is warm and dry. Findings: Abrasion (inferior to left knee) present. Neurological:      Mental Status: She is alert and oriented to person, place, and time. ASSESSMENT AND PLAN:    Manny Arvizu was seen today for anxiety. Diagnoses and all orders for this visit:    Other depression    Chest pain, musculoskeletal    LITO (generalized anxiety disorder)    - increase Prozac to 40 mg a day and see how she feels after 2 wks on this. If no change in moods would then change to Cymbalta or Effexor XR.    - Warm moist compress to chest   - celebrex prn     Return if symptoms worsen or fail to improve. call with how she's doing in 2 wks.      Wilfred Dominguez, DO

## 2020-11-18 ENCOUNTER — HOSPITAL ENCOUNTER (OUTPATIENT)
Dept: CARDIAC REHAB | Age: 65
Setting detail: THERAPIES SERIES
Discharge: HOME | End: 2020-11-18
Payer: COMMERCIAL

## 2020-11-18 PROCEDURE — 93798 PHYS/QHP OP CAR RHAB W/ECG: CPT

## 2020-11-20 ENCOUNTER — HOSPITAL ENCOUNTER (OUTPATIENT)
Dept: CARDIAC REHAB | Age: 65
Setting detail: THERAPIES SERIES
Discharge: HOME | End: 2020-11-20
Payer: COMMERCIAL

## 2020-11-20 LAB — ZINC: 64.4 UG/DL (ref 60–120)

## 2020-11-20 PROCEDURE — 93798 PHYS/QHP OP CAR RHAB W/ECG: CPT

## 2020-11-21 LAB — VITAMIN B1 WHOLE BLOOD: 181 NMOL/L (ref 70–180)

## 2020-11-23 ENCOUNTER — HOSPITAL ENCOUNTER (OUTPATIENT)
Dept: CARDIAC REHAB | Age: 65
Setting detail: THERAPIES SERIES
Discharge: HOME | End: 2020-11-23
Payer: COMMERCIAL

## 2020-11-23 PROCEDURE — 93798 PHYS/QHP OP CAR RHAB W/ECG: CPT

## 2020-11-25 ENCOUNTER — HOSPITAL ENCOUNTER (OUTPATIENT)
Dept: CARDIAC REHAB | Age: 65
Setting detail: THERAPIES SERIES
Discharge: HOME | End: 2020-11-25
Payer: COMMERCIAL

## 2020-11-25 PROCEDURE — 93798 PHYS/QHP OP CAR RHAB W/ECG: CPT

## 2020-11-27 ENCOUNTER — HOSPITAL ENCOUNTER (OUTPATIENT)
Dept: CARDIAC REHAB | Age: 65
Setting detail: THERAPIES SERIES
Discharge: HOME | End: 2020-11-27
Payer: COMMERCIAL

## 2020-11-27 PROCEDURE — 93798 PHYS/QHP OP CAR RHAB W/ECG: CPT

## 2020-11-30 ENCOUNTER — HOSPITAL ENCOUNTER (OUTPATIENT)
Dept: CARDIAC REHAB | Age: 65
Setting detail: THERAPIES SERIES
Discharge: HOME | End: 2020-11-30
Payer: COMMERCIAL

## 2020-11-30 PROCEDURE — 93798 PHYS/QHP OP CAR RHAB W/ECG: CPT

## 2020-12-01 ENCOUNTER — OFFICE VISIT (OUTPATIENT)
Dept: PRIMARY CARE CLINIC | Age: 65
End: 2020-12-01
Payer: COMMERCIAL

## 2020-12-01 VITALS
BODY MASS INDEX: 49.5 KG/M2 | SYSTOLIC BLOOD PRESSURE: 136 MMHG | WEIGHT: 262 LBS | TEMPERATURE: 97.9 F | DIASTOLIC BLOOD PRESSURE: 84 MMHG | HEART RATE: 72 BPM | OXYGEN SATURATION: 98 %

## 2020-12-01 PROCEDURE — 99213 OFFICE O/P EST LOW 20 MIN: CPT | Performed by: FAMILY MEDICINE

## 2020-12-01 RX ORDER — MECLIZINE HCL 12.5 MG/1
12.5 TABLET ORAL 3 TIMES DAILY PRN
Qty: 30 TABLET | Refills: 2 | Status: SHIPPED | OUTPATIENT
Start: 2020-12-01 | End: 2020-12-11

## 2020-12-01 RX ORDER — HYDROXYZINE PAMOATE 25 MG/1
25 CAPSULE ORAL 3 TIMES DAILY PRN
Qty: 30 CAPSULE | Refills: 2 | Status: SHIPPED | OUTPATIENT
Start: 2020-12-01 | End: 2020-12-31

## 2020-12-01 RX ORDER — FLUOXETINE HYDROCHLORIDE 40 MG/1
40 CAPSULE ORAL DAILY
Qty: 90 CAPSULE | Refills: 0 | Status: SHIPPED
Start: 2020-12-01 | End: 2021-03-05 | Stop reason: SDUPTHER

## 2020-12-01 NOTE — PROGRESS NOTES
Yesy Castillo, a female of 63 y.o. came to the office 12/1/2020. Patient Active Problem List   Diagnosis    Hypothyroidism    Graves disease    Depression    GERD (gastroesophageal reflux disease)    Chronic pain of both knees    Non-ST elevation MI (NSTEMI) ([facility]    Morbid obesity ([facility]    Chest pain    Coronary artery disease involving native coronary artery of native heart with unstable angina pectoris ([facility]    Obesity    LITO (generalized anxiety disorder)          HPI LITO/Depression: improved with increase in Prozac to 40 mg. Has anxiety in am due to her boss, wants to take something as needed to deal with this. Dizziness: relieved with Mezciline and needs a rf. Chest pain: from fall still present and needs note stating no pushing or pulling down of things above her head for 1 month. She's in cardiac rehab 3 x a wk. .     Leg cramps: on and off. Mustard helps. Now on Mg 900 mg and doing better. Allergies   Allergen Reactions    Mobic [Meloxicam] Other (See Comments)       Current Outpatient Medications on File Prior to Visit   Medication Sig Dispense Refill    mupirocin (BACTROBAN) 2 % ointment Apply topically 3 times daily. 22 g 0    metFORMIN (GLUCOPHAGE) 500 MG tablet Take 2 tablets by mouth 2 times daily (with meals) 360 tablet 2    magnesium (MAGNESIUM-OXIDE) 250 MG TABS tablet Take 500 mg by mouth daily       aspirin 81 MG EC tablet Take 1 tablet by mouth daily 30 tablet 3    nitroGLYCERIN (NITROSTAT) 0.4 MG SL tablet up to max of 3 total doses.  If no relief after 1 dose, call 911. 25 tablet 0    ticagrelor (BRILINTA) 90 MG TABS tablet Take 1 tablet by mouth 2 times daily 180 tablet 3    atorvastatin (LIPITOR) 40 MG tablet Take 1 tablet by mouth nightly 90 tablet 3    buPROPion (WELLBUTRIN XL) 300 MG extended release tablet TAKE ONE TABLET BY MOUTH EVERY MORNING 90 tablet 1    levothyroxine (SYNTHROID) 137 MCG tablet TAKE 1 TABLET BY MOUTH ONCE DAILY 90 tablet 1    traMADol (ULTRAM) 50 MG tablet Take 50 mg by mouth every 6 hours as needed.  loratadine (CLARITIN) 10 MG capsule Take 1 capsule by mouth daily 90 capsule 1    Cyanocobalamin (VITAMIN B 12 PO) Take by mouth      Multiple Vitamins-Minerals (MULTIVITAMIN ADULT PO) Take by mouth      fluticasone (FLONASE) 50 MCG/ACT nasal spray 2 sprays by Nasal route daily (Patient not taking: Reported on 12/1/2020) 1 Bottle 0     No current facility-administered medications on file prior to visit. Review of Systems   Cardiovascular: Positive for chest pain. Negative for palpitations and leg swelling. Psychiatric/Behavioral: Negative for dysphoric mood. The patient is nervous/anxious. other review of systems reviewed and are negative    OBJECTIVE:  /84   Pulse 72   Temp 97.9 °F (36.6 °C)   Wt 262 lb (118.8 kg)   SpO2 98%   BMI 49.50 kg/m²      Physical Exam  Constitutional:       General: She is not in acute distress. Eyes:      General: No scleral icterus. Conjunctiva/sclera: Conjunctivae normal.   Neck:      Musculoskeletal: Neck supple. Thyroid: No thyromegaly. Cardiovascular:      Rate and Rhythm: Normal rate and regular rhythm. Heart sounds: No murmur. Pulmonary:      Effort: Pulmonary effort is normal.      Breath sounds: Normal breath sounds. Chest:       Lymphadenopathy:      Cervical: No cervical adenopathy. Skin:     General: Skin is warm and dry. Neurological:      Mental Status: She is alert and oriented to person, place, and time. ASSESSMENT AND PLAN:    Yevgeniy Palomino was seen today for leg problem and other. Diagnoses and all orders for this visit:    Intercostal pain    LITO (generalized anxiety disorder)    Other depression    Anxiety  -     FLUoxetine (PROZAC) 40 MG capsule; Take 1 capsule by mouth daily  -     hydrOXYzine (VISTARIL) 25 MG capsule;  Take 1 capsule by mouth 3 times daily as needed for Anxiety    Dizziness  -     meclizine (ANTIVERT) 12.5 MG tablet; Take 1 tablet by mouth 3 times daily as needed for Dizziness    - continue cardiac rehab  - letter for no overhead lifting or pulling for 1 month. - continue Prozac at 40 mg daily. Return in about 3 months (around 3/1/2021) for or for acute problem.     Lupillo Dominguez, DO

## 2020-12-02 ENCOUNTER — OFFICE VISIT (OUTPATIENT)
Dept: BARIATRICS/WEIGHT MGMT | Age: 65
End: 2020-12-02
Payer: COMMERCIAL

## 2020-12-02 ENCOUNTER — HOSPITAL ENCOUNTER (OUTPATIENT)
Dept: CARDIAC REHAB | Age: 65
Setting detail: THERAPIES SERIES
Discharge: HOME | End: 2020-12-02
Payer: COMMERCIAL

## 2020-12-02 VITALS
BODY MASS INDEX: 49.17 KG/M2 | HEIGHT: 61 IN | TEMPERATURE: 97 F | SYSTOLIC BLOOD PRESSURE: 138 MMHG | HEART RATE: 74 BPM | DIASTOLIC BLOOD PRESSURE: 84 MMHG | WEIGHT: 260.4 LBS

## 2020-12-02 PROCEDURE — 99211 OFF/OP EST MAY X REQ PHY/QHP: CPT | Performed by: INTERNAL MEDICINE

## 2020-12-02 PROCEDURE — 99215 OFFICE O/P EST HI 40 MIN: CPT | Performed by: INTERNAL MEDICINE

## 2020-12-02 PROCEDURE — 93798 PHYS/QHP OP CAR RHAB W/ECG: CPT

## 2020-12-02 RX ORDER — ASCORBIC ACID 250 MG
250 TABLET ORAL DAILY
Qty: 80 TABLET | Refills: 2 | Status: SHIPPED
Start: 2020-12-02 | End: 2022-10-25

## 2020-12-02 RX ORDER — FERROUS SULFATE 325(65) MG
TABLET ORAL
Qty: 180 TABLET | Refills: 2 | Status: SHIPPED
Start: 2020-12-02 | End: 2022-10-25

## 2020-12-02 NOTE — PROGRESS NOTES
CC -   Graves' disease, Hypothyroidism, Obesity    Background -   First visit: 10/28/20    · Graves' Disease  Diagnosed 2005  MULLER soon after diagnosis and was successful  Present regimen: LT4 137 mcg daily  Does not miss doses  Takes with other medications and immediately followed by coffee    · Obesity   Began in childhood  Initial BMI 50.9, Wt 269.4 lbs  HS Grad wt 220 lbs  Lowest   wt 220 lbs  Highest  wt 364 lbs  Pattern of wt gain: grad  RYGB 2008 (Dr Carla Peña; last seen >10 yrs ago)  Pre-proc wt 364 lbs  Post-proc ann 250 lbs (9mo)  Wt regain: began 18 mo post-op; grad  Wt change past yr: Gained 30 lbs, then lost 30 lbs  Most wt lost: 90 lbs (Fen-Phen)  Other diets attempted: Slimfast, Counting calories, Soup diet    Initial Diet:    Number of meals per day - 2-3    Number of snacks per day - 2-3    Meal volume - 9\" plate, sometimes seconds    Fast food/convenience store - 1-2x/week    Restaurants (not fast food) - 1-2x/week   Sweets - 3d/week   Chips - 0d/week   Crackers/pretzels - 0d/week   Nuts - 1d/week (1 oz)   Peanut Butter - 2d/week (on celery)   Popcorn - 2d/week (1/2 of large bag)   Dried fruit - 1d/week (as trailmix)   Whole fruit - 7d/week (2 pieces)   Breakfast cereal - 0d/week   Granola/Protein/Energy bar - 0d/week   Sugar sweetened beverages - 16 oz reg soda/wk, 8-12 oz skim milk/d in coffee, 20 oz sweet tea, one V8 energy drink/d (50 clinton)    Protein - No supplements   Fiber - No supplements     Exercise:    Gym membership - none    Walking - none    Running - none    Resistance - none    Aerobic class - none    ______________________    [facility] -  Past Medical History:   Diagnosis Date    Arthritis     CAD (coronary artery disease)     9-17-20 yisel 2.0x22 francisca om.      COVID-19 virus infection 07/2020    Depression     GERD (gastroesophageal reflux disease)     Graves disease     Hypertension     Hypothyroidism     SECONDARY TO GRAVES DISEASE    NSTEMI (non-ST elevated myocardial infarction) ([facility] 09/16/2020    Obesity      Current Outpatient Medications   Medication Sig Dispense Refill    FLUoxetine (PROZAC) 40 MG capsule Take 1 capsule by mouth daily 90 capsule 0    meclizine (ANTIVERT) 12.5 MG tablet Take 1 tablet by mouth 3 times daily as needed for Dizziness 30 tablet 2    hydrOXYzine (VISTARIL) 25 MG capsule Take 1 capsule by mouth 3 times daily as needed for Anxiety 30 capsule 2    mupirocin (BACTROBAN) 2 % ointment Apply topically 3 times daily. 22 g 0    metFORMIN (GLUCOPHAGE) 500 MG tablet Take 2 tablets by mouth 2 times daily (with meals) 360 tablet 2    magnesium (MAGNESIUM-OXIDE) 250 MG TABS tablet Take 500 mg by mouth daily       aspirin 81 MG EC tablet Take 1 tablet by mouth daily 30 tablet 3    nitroGLYCERIN (NITROSTAT) 0.4 MG SL tablet up to max of 3 total doses. If no relief after 1 dose, call 911. 25 tablet 0    ticagrelor (BRILINTA) 90 MG TABS tablet Take 1 tablet by mouth 2 times daily 180 tablet 3    atorvastatin (LIPITOR) 40 MG tablet Take 1 tablet by mouth nightly 90 tablet 3    buPROPion (WELLBUTRIN XL) 300 MG extended release tablet TAKE ONE TABLET BY MOUTH EVERY MORNING 90 tablet 1    levothyroxine (SYNTHROID) 137 MCG tablet TAKE 1 TABLET BY MOUTH ONCE DAILY 90 tablet 1    traMADol (ULTRAM) 50 MG tablet Take 50 mg by mouth every 6 hours as needed.  loratadine (CLARITIN) 10 MG capsule Take 1 capsule by mouth daily 90 capsule 1    fluticasone (FLONASE) 50 MCG/ACT nasal spray 2 sprays by Nasal route daily (Patient not taking: Reported on 12/1/2020) 1 Bottle 0    Cyanocobalamin (VITAMIN B 12 PO) Take by mouth      Multiple Vitamins-Minerals (MULTIVITAMIN ADULT PO) Take by mouth       No current facility-administered medications for this visit.         ROS -  Card - no CP  GI - no N/V/D    PE -  Gen : BP (!) 167/82 (Site: Left Upper Arm, Position: Sitting, Cuff Size: Large Adult)   Pulse 80   Temp 97 °F (36.1 °C) (Temporal)   Ht 5' 1\" (1.549 m) tablespoons of the original, plain Fiber One cereal every day or 4 tablespoons of wheat dextrin powder (Benefiber or a generic brand) every day. Work up to this amount slowly by starting with only one-fourth of the target amount and adding another one-fourth every one or two weeks until reaching the target. · Take one multivitamin every day    Targets:  · Limit calorie intake to 1300 calories/day  · Walk 30 minutes daily  · Avoid eating 2 hours within bedtime. ·   Tips:  · Do not eat outside of the dining room or the kitchen  · Do not eat while watching TV, videos, working on the computer or using a smart phone  · Do not eat food out of a multi-serving bag or container. Medications:  · Cont taking Metformin 500 mg, one tablet daily    Resume all bariatric supplements (two multivitamins, D3 5000 IU daily, 1500 mg calcium daily)    Problem 3 - Iron def in RYGB pt  HPI  - 11/17/20 Ferritin 10 ng/ml, Hgb 11.4 g/dl on no iron supplement  Assessment - Uncontrolled  Plan  -   Ferrous sulfate 325 mg, one tablet for breakfast and lunch on Monday, Wednesday and Friday; take each dose with 100-250 mg Vitamin C  Recheck Ferritin in two months (include a vitamin D, which I forgot to include in the last panel)      Follow up  Return to see me in 6 weeks    I spent 40 minutes in a face to face encounter with Haleigh hanks.    >30 minutes of this was in education and counseling regarding bariatric supplements, calorie counting, iron replacement therapy and metformin use    Romy Humphries MD  Endocrinology/Obesity  12/2/20

## 2020-12-04 ENCOUNTER — HOSPITAL ENCOUNTER (OUTPATIENT)
Dept: CARDIAC REHAB | Age: 65
Setting detail: THERAPIES SERIES
Discharge: HOME | End: 2020-12-04
Payer: COMMERCIAL

## 2020-12-04 PROCEDURE — 93798 PHYS/QHP OP CAR RHAB W/ECG: CPT

## 2020-12-07 ENCOUNTER — HOSPITAL ENCOUNTER (OUTPATIENT)
Dept: CARDIAC REHAB | Age: 65
Setting detail: THERAPIES SERIES
Discharge: HOME | End: 2020-12-07
Payer: COMMERCIAL

## 2020-12-07 PROCEDURE — 93798 PHYS/QHP OP CAR RHAB W/ECG: CPT

## 2020-12-09 ENCOUNTER — HOSPITAL ENCOUNTER (OUTPATIENT)
Dept: CARDIAC REHAB | Age: 65
Setting detail: THERAPIES SERIES
Discharge: HOME | End: 2020-12-09
Payer: COMMERCIAL

## 2020-12-09 PROCEDURE — 93798 PHYS/QHP OP CAR RHAB W/ECG: CPT

## 2020-12-11 ENCOUNTER — HOSPITAL ENCOUNTER (OUTPATIENT)
Dept: CARDIAC REHAB | Age: 65
Setting detail: THERAPIES SERIES
Discharge: HOME | End: 2020-12-11
Payer: COMMERCIAL

## 2020-12-11 PROCEDURE — 93798 PHYS/QHP OP CAR RHAB W/ECG: CPT

## 2020-12-14 ENCOUNTER — HOSPITAL ENCOUNTER (OUTPATIENT)
Dept: CARDIAC REHAB | Age: 65
Setting detail: THERAPIES SERIES
Discharge: HOME | End: 2020-12-14
Payer: COMMERCIAL

## 2020-12-14 PROCEDURE — 93798 PHYS/QHP OP CAR RHAB W/ECG: CPT

## 2020-12-16 ENCOUNTER — HOSPITAL ENCOUNTER (OUTPATIENT)
Dept: CARDIAC REHAB | Age: 65
Setting detail: THERAPIES SERIES
Discharge: HOME | End: 2020-12-16
Payer: COMMERCIAL

## 2020-12-16 PROCEDURE — 93798 PHYS/QHP OP CAR RHAB W/ECG: CPT

## 2020-12-18 ENCOUNTER — HOSPITAL ENCOUNTER (OUTPATIENT)
Dept: CARDIAC REHAB | Age: 65
Setting detail: THERAPIES SERIES
Discharge: HOME | End: 2020-12-18
Payer: COMMERCIAL

## 2020-12-18 PROCEDURE — 93798 PHYS/QHP OP CAR RHAB W/ECG: CPT

## 2020-12-21 ENCOUNTER — HOSPITAL ENCOUNTER (OUTPATIENT)
Dept: CARDIAC REHAB | Age: 65
Setting detail: THERAPIES SERIES
Discharge: HOME | End: 2020-12-21
Payer: COMMERCIAL

## 2020-12-21 PROCEDURE — 93798 PHYS/QHP OP CAR RHAB W/ECG: CPT

## 2020-12-23 ENCOUNTER — HOSPITAL ENCOUNTER (OUTPATIENT)
Dept: CARDIAC REHAB | Age: 65
Setting detail: THERAPIES SERIES
Discharge: HOME | End: 2020-12-23
Payer: COMMERCIAL

## 2020-12-23 PROCEDURE — 93798 PHYS/QHP OP CAR RHAB W/ECG: CPT

## 2020-12-24 ENCOUNTER — HOSPITAL ENCOUNTER (OUTPATIENT)
Dept: CARDIAC REHAB | Age: 65
Setting detail: THERAPIES SERIES
Discharge: HOME | End: 2020-12-24
Payer: COMMERCIAL

## 2020-12-24 PROCEDURE — 93798 PHYS/QHP OP CAR RHAB W/ECG: CPT

## 2020-12-28 ENCOUNTER — HOSPITAL ENCOUNTER (OUTPATIENT)
Dept: CARDIAC REHAB | Age: 65
Setting detail: THERAPIES SERIES
Discharge: HOME | End: 2020-12-28
Payer: COMMERCIAL

## 2020-12-28 PROCEDURE — 93798 PHYS/QHP OP CAR RHAB W/ECG: CPT

## 2020-12-30 ENCOUNTER — HOSPITAL ENCOUNTER (OUTPATIENT)
Dept: CARDIAC REHAB | Age: 65
Setting detail: THERAPIES SERIES
Discharge: HOME | End: 2020-12-30
Payer: COMMERCIAL

## 2020-12-30 PROCEDURE — 93798 PHYS/QHP OP CAR RHAB W/ECG: CPT

## 2020-12-31 ENCOUNTER — HOSPITAL ENCOUNTER (OUTPATIENT)
Dept: CARDIAC REHAB | Age: 65
Setting detail: THERAPIES SERIES
Discharge: HOME | End: 2020-12-31
Payer: COMMERCIAL

## 2020-12-31 PROCEDURE — 93798 PHYS/QHP OP CAR RHAB W/ECG: CPT

## 2021-01-05 ENCOUNTER — OFFICE VISIT (OUTPATIENT)
Dept: PRIMARY CARE CLINIC | Age: 66
End: 2021-01-05
Payer: COMMERCIAL

## 2021-01-05 VITALS
WEIGHT: 262 LBS | BODY MASS INDEX: 49.5 KG/M2 | OXYGEN SATURATION: 98 % | DIASTOLIC BLOOD PRESSURE: 86 MMHG | SYSTOLIC BLOOD PRESSURE: 138 MMHG | HEART RATE: 81 BPM | TEMPERATURE: 97.6 F

## 2021-01-05 DIAGNOSIS — I25.110 CORONARY ARTERY DISEASE INVOLVING NATIVE CORONARY ARTERY OF NATIVE HEART WITH UNSTABLE ANGINA PECTORIS (HCC): Primary | ICD-10-CM

## 2021-01-05 PROCEDURE — 99213 OFFICE O/P EST LOW 20 MIN: CPT | Performed by: FAMILY MEDICINE

## 2021-01-05 ASSESSMENT — ENCOUNTER SYMPTOMS
CHEST TIGHTNESS: 0
SHORTNESS OF BREATH: 0

## 2021-01-05 NOTE — PROGRESS NOTES
nitroGLYCERIN (NITROSTAT) 0.4 MG SL tablet up to max of 3 total doses. If no relief after 1 dose, call 911. 25 tablet 0    ticagrelor (BRILINTA) 90 MG TABS tablet Take 1 tablet by mouth 2 times daily 180 tablet 3    atorvastatin (LIPITOR) 40 MG tablet Take 1 tablet by mouth nightly 90 tablet 3    buPROPion (WELLBUTRIN XL) 300 MG extended release tablet TAKE ONE TABLET BY MOUTH EVERY MORNING 90 tablet 1    levothyroxine (SYNTHROID) 137 MCG tablet TAKE 1 TABLET BY MOUTH ONCE DAILY 90 tablet 1    traMADol (ULTRAM) 50 MG tablet Take 50 mg by mouth every 6 hours as needed.  loratadine (CLARITIN) 10 MG capsule Take 1 capsule by mouth daily 90 capsule 1    fluticasone (FLONASE) 50 MCG/ACT nasal spray 2 sprays by Nasal route daily (Patient not taking: Reported on 12/1/2020) 1 Bottle 0    Cyanocobalamin (VITAMIN B 12 PO) Take by mouth      Multiple Vitamins-Minerals (MULTIVITAMIN ADULT PO) Take by mouth       No current facility-administered medications on file prior to visit. Review of Systems   Constitutional: Negative for weight gain. Respiratory: Negative for chest tightness and shortness of breath. Cardiovascular: Negative for chest pain, palpitations and leg swelling. other review of systems reviewed and are negative    OBJECTIVE:  /86   Pulse 81   Temp 97.6 °F (36.4 °C)   Wt 262 lb (118.8 kg)   SpO2 98%   BMI 49.50 kg/m²      Physical Exam  Constitutional:       General: She is not in acute distress. Eyes:      General: No scleral icterus. Conjunctiva/sclera: Conjunctivae normal.   Neck:      Musculoskeletal: Neck supple. Thyroid: No thyromegaly. Cardiovascular:      Rate and Rhythm: Normal rate and regular rhythm. Heart sounds: No murmur. Pulmonary:      Effort: Pulmonary effort is normal.      Breath sounds: Normal breath sounds. Lymphadenopathy:      Cervical: No cervical adenopathy. Skin:     General: Skin is warm and dry.    Neurological:      Mental

## 2021-01-25 ENCOUNTER — HOSPITAL ENCOUNTER (OUTPATIENT)
Dept: CARDIAC REHAB | Age: 66
Discharge: HOME | End: 2021-01-25

## 2021-01-25 PROCEDURE — 9900000038 HC CARDIAC REHAB PHASE 3 - MONTHLY

## 2021-01-28 RX ORDER — CELECOXIB 200 MG/1
200 CAPSULE ORAL 2 TIMES DAILY
COMMUNITY
End: 2021-01-28 | Stop reason: SDUPTHER

## 2021-01-28 RX ORDER — MECLIZINE HCL 12.5 MG/1
12.5 TABLET ORAL 3 TIMES DAILY PRN
COMMUNITY
End: 2021-01-28 | Stop reason: SDUPTHER

## 2021-01-28 RX ORDER — MECLIZINE HCL 12.5 MG/1
12.5 TABLET ORAL 3 TIMES DAILY PRN
Qty: 60 TABLET | Refills: 1 | Status: SHIPPED
Start: 2021-01-28 | End: 2021-02-18 | Stop reason: SDUPTHER

## 2021-01-28 RX ORDER — CELECOXIB 200 MG/1
200 CAPSULE ORAL DAILY
Qty: 30 CAPSULE | Refills: 1 | Status: SHIPPED
Start: 2021-01-28 | End: 2021-02-01 | Stop reason: SDUPTHER

## 2021-01-29 ENCOUNTER — OFFICE VISIT (OUTPATIENT)
Dept: BARIATRICS/WEIGHT MGMT | Age: 66
End: 2021-01-29
Payer: COMMERCIAL

## 2021-01-29 VITALS
TEMPERATURE: 96.6 F | SYSTOLIC BLOOD PRESSURE: 149 MMHG | HEIGHT: 61 IN | BODY MASS INDEX: 47.5 KG/M2 | HEART RATE: 88 BPM | DIASTOLIC BLOOD PRESSURE: 72 MMHG | WEIGHT: 251.6 LBS

## 2021-01-29 DIAGNOSIS — E66.01 CLASS 3 SEVERE OBESITY DUE TO EXCESS CALORIES WITH SERIOUS COMORBIDITY AND BODY MASS INDEX (BMI) OF 50.0 TO 59.9 IN ADULT (HCC): ICD-10-CM

## 2021-01-29 DIAGNOSIS — E89.0 POSTABLATIVE HYPOTHYROIDISM: Primary | ICD-10-CM

## 2021-01-29 PROCEDURE — 99213 OFFICE O/P EST LOW 20 MIN: CPT | Performed by: INTERNAL MEDICINE

## 2021-01-29 NOTE — PATIENT INSTRUCTIONS
Rules:  · Count every calorie every day  · Limit sweets to one day per month  · Limit chips/crackers/pretzels/nuts to 100 clinton/day  · Eliminate all sugar sweetened beverages (including fruit juice)  · Limit restaurants (including fast food and food from a convenience store) to one time every two weeks    Requirements:  · Make sure protein intake is at least 60 grams per day (Consider using a protein shake - Nectar, Pure Protein, Premier, Fairlife (vanilla), Boost Max, Ensure Max, BeneProtein and [employer] lean (which is lactose-free) are milk-based options; Nectar, Premier Protein Clear, IsoPure Protein Drink, and Protein 2 O are water-based options; Quest (or Cosco, which is cheaper and is ordered on [employer]) and the [employer] 1 protein bars can also be used, but have less protein in them )  · Make sure that fiber intake is at least 22 grams per day. Do this by either eating 12 tablespoons of the original, plain Fiber One cereal every day or 4 tablespoons of wheat dextrin powder (Benefiber or a generic brand) every day. Work up to this amount slowly by starting with only one-fourth of the target amount and adding another one-fourth every one or two weeks until reaching the target. · Take one multivitamin every day    Targets:  · Limit calorie intake to 1100 calories/day  · Walk 30 minutes daily  · Avoid eating 2 hours within bedtime. ·   Tips:  · Do not eat outside of the dining room or the kitchen  · Do not eat while watching TV, videos, working on the computer or using a smart phone  · Do not eat food out of a multi-serving bag or container.     Medications:  · Cont taking Metformin 500 mg, one tablet with each meal  · Take all bariatric supplements (two multivitamins, D3 5000 IU daily, 1500 mg calcium daily)    See me in six weeks  Have the ordered blood tests performed

## 2021-01-29 NOTE — PROGRESS NOTES
(non-ST elevated [facility]) [facility] 09/16/2020    Obesity      Current Outpatient Medications   Medication Sig Dispense Refill    celecoxib (CELEBREX) 200 MG capsule Take 1 capsule by mouth daily 30 capsule 1    meclizine (ANTIVERT) 12.5 MG tablet Take 1 tablet by mouth 3 times daily as needed for Dizziness 60 tablet 1    ferrous sulfate (IRON 325) 325 (65 Fe) MG tablet Take one tablet for breakfast and lunch on Monday, Wednesday and Friday; take each dose with a vitamin C tablet 180 tablet 2    ascorbic acid (V-R VITAMIN C) 250 MG tablet Take 1 tablet by mouth daily Take one tablet for breakfast and lunch on Monday, Wednesday and Friday; take each dose with an iron tablet 80 tablet 2    Multiple Vitamin (MVI, CELEBRATE, CHEWABLE TABLET) Take two tablets each day 180 tablet 3    FLUoxetine (PROZAC) 40 MG capsule Take 1 capsule by mouth daily 90 capsule 0    mupirocin (BACTROBAN) 2 % ointment Apply topically 3 times daily. 22 g 0    metFORMIN (GLUCOPHAGE) 500 MG tablet Take 2 tablets by mouth 2 times daily (with meals) 360 tablet 2    magnesium (MAGNESIUM-OXIDE) 250 MG TABS tablet Take 500 mg by mouth daily       aspirin 81 MG EC tablet Take 1 tablet by mouth daily 30 tablet 3    nitroGLYCERIN (NITROSTAT) 0.4 MG SL tablet up to max of 3 total doses. If no relief after 1 dose, call 911. 25 tablet 0    ticagrelor (BRILINTA) 90 MG TABS tablet Take 1 tablet by mouth 2 times daily 180 tablet 3    atorvastatin (LIPITOR) 40 MG tablet Take 1 tablet by mouth nightly 90 tablet 3    buPROPion (WELLBUTRIN XL) 300 MG extended release tablet TAKE ONE TABLET BY MOUTH EVERY MORNING 90 tablet 1    levothyroxine (SYNTHROID) 137 MCG tablet TAKE 1 TABLET BY MOUTH ONCE DAILY 90 tablet 1    traMADol (ULTRAM) 50 MG tablet Take 50 mg by mouth every 6 hours as needed.       loratadine (CLARITIN) 10 MG capsule Take 1 capsule by mouth daily 90 capsule 1    fluticasone (FLONASE) 50 MCG/ACT nasal spray 2 sprays by Wednesday and Friday; take each dose with 100-250 mg Vitamin C      Has not rechecked her Ferritin yet  Assessment - Uncontrolled  Plan  -  Cont Ferrous sulfate 325 mg, one tablet for breakfast and lunch on Monday, Wednesday and Friday; take each dose with 100-250 mg Vitamin C      Recheck Ferritin (include Vit D which I missed on the last panel of surveillance labs)    Follow up  Return to see me in 6 weeks      Tammy Jaime MD  Endocrinology/Obesity  1/29/21

## 2021-02-01 PROCEDURE — 9900000038 HC CARDIAC REHAB PHASE 3 - MONTHLY

## 2021-02-01 RX ORDER — CELECOXIB 200 MG/1
200 CAPSULE ORAL 2 TIMES DAILY
Qty: 180 CAPSULE | Refills: 0 | Status: SHIPPED
Start: 2021-02-01 | End: 2021-05-11 | Stop reason: SDUPTHER

## 2021-02-17 ENCOUNTER — OFFICE VISIT (OUTPATIENT)
Dept: ORTHOPEDIC SURGERY | Age: 66
End: 2021-02-17
Payer: COMMERCIAL

## 2021-02-17 VITALS — TEMPERATURE: 98.8 F

## 2021-02-17 DIAGNOSIS — M17.0 PRIMARY OSTEOARTHRITIS OF BOTH KNEES: Primary | ICD-10-CM

## 2021-02-17 DIAGNOSIS — E05.00 GRAVES DISEASE: ICD-10-CM

## 2021-02-17 PROCEDURE — 99213 OFFICE O/P EST LOW 20 MIN: CPT | Performed by: PHYSICIAN ASSISTANT

## 2021-02-17 PROCEDURE — 2500000003 HC RX 250 WO HCPCS

## 2021-02-17 PROCEDURE — 20610 DRAIN/INJ JOINT/BURSA W/O US: CPT | Performed by: PHYSICIAN ASSISTANT

## 2021-02-17 PROCEDURE — 99212 OFFICE O/P EST SF 10 MIN: CPT

## 2021-02-17 PROCEDURE — 6360000002 HC RX W HCPCS

## 2021-02-17 RX ORDER — LIDOCAINE HYDROCHLORIDE 10 MG/ML
6 INJECTION, SOLUTION INFILTRATION; PERINEURAL ONCE
Status: COMPLETED | OUTPATIENT
Start: 2021-02-17 | End: 2021-02-17

## 2021-02-17 RX ORDER — TRIAMCINOLONE ACETONIDE 40 MG/ML
40 INJECTION, SUSPENSION INTRA-ARTICULAR; INTRAMUSCULAR ONCE
Status: COMPLETED | OUTPATIENT
Start: 2021-02-17 | End: 2021-02-17

## 2021-02-17 RX ORDER — BUPIVACAINE HYDROCHLORIDE 2.5 MG/ML
6 INJECTION, SOLUTION EPIDURAL; INFILTRATION; INTRACAUDAL ONCE
Status: COMPLETED | OUTPATIENT
Start: 2021-02-17 | End: 2021-02-17

## 2021-02-17 RX ORDER — LEVOTHYROXINE SODIUM 137 UG/1
TABLET ORAL
Qty: 90 TABLET | Refills: 1 | Status: SHIPPED
Start: 2021-02-17 | End: 2021-08-19 | Stop reason: SDUPTHER

## 2021-02-17 RX ADMIN — LIDOCAINE HYDROCHLORIDE 6 ML: 10 INJECTION, SOLUTION INFILTRATION; PERINEURAL at 13:20

## 2021-02-17 RX ADMIN — TRIAMCINOLONE ACETONIDE 40 MG: 40 INJECTION, SUSPENSION INTRA-ARTICULAR; INTRAMUSCULAR at 13:20

## 2021-02-17 RX ADMIN — BUPIVACAINE HYDROCHLORIDE 15 MG: 2.5 INJECTION, SOLUTION EPIDURAL; INFILTRATION; INTRACAUDAL at 13:19

## 2021-02-17 NOTE — PROGRESS NOTES
Chief Complaint   Patient presents with    Knee Pain     bilateral knee chronic, OA. CSI LOV 11-. SUBJECTIVE: Patient is a 70-year-old female here for bilateral knee pain due to bilateral knee osteoarthritis. She is weightbearing as tolerated on bilateral lower extremities without using any assistive devices. Seen Dr. Swathi Arredondo working on weight loss and lost about 30 pounds so far. States her last round of bilateral corticosteroid injections to her knees last approximately 2 months and is here today for repeat injections to both knees. No problems with her past injections. Denies fever, chills, rashes. Review of Systems -   General ROS: negative for - chills, fatigue, fever or night sweats  Respiratory ROS: no cough, shortness of breath, or wheezing  Cardiovascular ROS: no chest pain or dyspnea on exertion  Gastrointestinal ROS: no abdominal pain, change in bowel habits, or black or bloody stools  Genitourinary: no hematuria, dysuria, or incontinence   Musculoskeletal ROS:see above  Neurological ROS: no TIA or stroke symptoms       OBJECTIVE:   Alert and oriented X 3, no acute distress, respirations easy and unlabored with no audible wheezes, skin warm and dry, speech and dress appropriate for noted age, affect euthymic. Extremity:  Bilateral Knee exam   Skin intact. No erythema/induration/fluctuence at knee.  No effusion noted   Negative ballotment   Patella tracks normally   Negative patellar grind test and  Negative J sign.  Mild crepitus with flexion and extension of the knee   No TTP about the medial or lateral joint lines bilaterally    Stable to varus and valgus at 0 and 30 degrees of flexion.  Negative McMurrays, Apleys.  Negative Lachman's and posterior drawer.  Active range of motion 0-100 without pain.  Mild TTP about the anterior knees bilaterally    Compartments soft and compressible throughout leg.    Calf soft and nontender with palpation  Demonstrates active ankle plantar/dorsiflexion/great toe extension.  Sensation intact to light touch sural/deep peroneal/superficial peroneal/saphenous/posterior tibial nerve distributions to foot/ankle.  Palpable dorsalis pedis and posterior tibialis pulses. Temp 98.8 °F (37.1 °C) (Oral)     ASSESSMENT:     Diagnosis Orders   1. Primary osteoarthritis of both knees  RI ARTHROCENTESIS ASPIR&/INJ MAJOR JT/BURSA W/O US    triamcinolone acetonide (KENALOG-40) injection 40 mg    lidocaine 1 % injection 6 mL    bupivacaine (PF) (MARCAINE) 0.25 % injection 15 mg    triamcinolone acetonide (KENALOG-40) injection 40 mg       DISCUSSION:   I discussed the natural course and history of knee arthritis with the patient as well as treatment options including NSAIDs, weight loss, activity modification, and injections as well as surgery. The patient would like to proceed with bilateral knee CSI. Discussed risks and benefits of CSI including risk of infection at the joint, bleeding or bruising at the injection site and elevation of blood sugar levels and cartilage degradation with repetitive use. Patient expressed understanding of these risks and elected to move forward with corticosteroid injection today in the office. PROCEDURE:  With the patient's written and verbal permission, Bilateral  knee was prepped in standard sterile fashion with betadine and alcohol. Skin of the knee was anesthetized with ethyl chloride spray prior to injection. The knee was then injected with 1 ml Kenalog (40mg), 3 ml PF 0.25% marcaine and 3 ml 1% PF Lidocaine were injected using the lateral inferior approach without difficulty. The patient tolerated this well. A band-aid was applied. The patient ambulated out of clinic on their own accord without difficulty.       PLAN:  Activity weightbearing as tolerated  Maintain healthy lifestyle and weight loss  Continue with over-the-counter analgesics as needed  Recommended to continue with

## 2021-02-17 NOTE — PROGRESS NOTES
Patient f//u bilateral knee pain OA, FWB without assistive devices. Slow gait, slight limp. LOV 11- B/L CSI injections    Helped for almost 2 months. Denies new fall or injury. Left knee 9/10    Right knee 7/10, still pops    Denies new symptoms.     Patient requested to check weight

## 2021-02-18 ENCOUNTER — OFFICE VISIT (OUTPATIENT)
Dept: PRIMARY CARE CLINIC | Age: 66
End: 2021-02-18
Payer: COMMERCIAL

## 2021-02-18 VITALS
WEIGHT: 249 LBS | BODY MASS INDEX: 47.05 KG/M2 | OXYGEN SATURATION: 94 % | HEART RATE: 80 BPM | DIASTOLIC BLOOD PRESSURE: 82 MMHG | SYSTOLIC BLOOD PRESSURE: 128 MMHG | TEMPERATURE: 97.2 F

## 2021-02-18 DIAGNOSIS — J01.40 ACUTE NON-RECURRENT PANSINUSITIS: Primary | ICD-10-CM

## 2021-02-18 PROCEDURE — 99213 OFFICE O/P EST LOW 20 MIN: CPT | Performed by: FAMILY MEDICINE

## 2021-02-18 RX ORDER — MECLIZINE HCL 12.5 MG/1
12.5 TABLET ORAL 3 TIMES DAILY PRN
Qty: 60 TABLET | Refills: 0 | Status: SHIPPED
Start: 2021-02-18 | End: 2022-01-05 | Stop reason: SDUPTHER

## 2021-02-18 RX ORDER — AMOXICILLIN 500 MG/1
500 CAPSULE ORAL 3 TIMES DAILY
Qty: 30 CAPSULE | Refills: 0 | Status: SHIPPED | OUTPATIENT
Start: 2021-02-18 | End: 2021-02-28

## 2021-02-18 ASSESSMENT — ENCOUNTER SYMPTOMS
SINUS PRESSURE: 1
SORE THROAT: 0
SINUS PAIN: 1
COUGH: 1
RHINORRHEA: 1

## 2021-02-18 NOTE — PROGRESS NOTES
Edgar Sweet, a female of 63 y.o. came to the office 2/18/2021. Patient Active Problem List   Diagnosis    Hypothyroidism    Graves disease    Depression    GERD (gastroesophageal reflux disease)    Chronic pain of both knees    Non-ST elevation MI (NSTEMI) [facility]    Morbid obesity ([facility]    Chest pain    Coronary artery disease involving native coronary artery of native heart with unstable angina pectoris ([facility] [facility]    Obesity    LITO (generalized anxiety disorder)          Dizziness  This is a new problem. The problem has been waxing and waning. Associated symptoms include congestion, coughing and headaches. Pertinent negatives include no chest pain, chills, fever or sore throat. Treatments tried: meclizine tid. The treatment provided moderate relief. Allergies   Allergen Reactions    Mobic [Meloxicam] Other (See Comments)       Current Outpatient Medications on File Prior to Visit   Medication Sig Dispense Refill    levothyroxine (SYNTHROID) 137 MCG tablet TAKE 1 TABLET BY MOUTH ONCE DAILY 90 tablet 1    celecoxib (CELEBREX) 200 MG capsule Take 1 capsule by mouth 2 times daily 180 capsule 0    metFORMIN (GLUCOPHAGE) 500 MG tablet Take 1 tablet by mouth 3 times daily (with meals) 270 tablet 2    ferrous sulfate (IRON 325) 325 (65 Fe) MG tablet Take one tablet for breakfast and lunch on Monday, Wednesday and Friday; take each dose with a vitamin C tablet 180 tablet 2    ascorbic acid (V-R VITAMIN C) 250 MG tablet Take 1 tablet by mouth daily Take one tablet for breakfast and lunch on Monday, Wednesday and Friday; take each dose with an iron tablet 80 tablet 2    Multiple Vitamin (MVI, CELEBRATE, CHEWABLE TABLET) Take two tablets each day 180 tablet 3    FLUoxetine (PROZAC) 40 MG capsule Take 1 capsule by mouth daily 90 capsule 0    mupirocin (BACTROBAN) 2 % ointment Apply topically 3 times daily.  22 g 0    magnesium (MAGNESIUM-OXIDE) 250 MG TABS tablet Take 500 mg by mouth daily       aspirin 81 MG EC tablet Take 1 tablet by mouth daily 30 tablet 3    nitroGLYCERIN (NITROSTAT) 0.4 MG SL tablet up to max of 3 total doses. If no relief after 1 dose, call 911. 25 tablet 0    ticagrelor (BRILINTA) 90 MG TABS tablet Take 1 tablet by mouth 2 times daily 180 tablet 3    atorvastatin (LIPITOR) 40 MG tablet Take 1 tablet by mouth nightly 90 tablet 3    buPROPion (WELLBUTRIN XL) 300 MG extended release tablet TAKE ONE TABLET BY MOUTH EVERY MORNING 90 tablet 1    traMADol (ULTRAM) 50 MG tablet Take 50 mg by mouth every 6 hours as needed.  loratadine (CLARITIN) 10 MG capsule Take 1 capsule by mouth daily 90 capsule 1    fluticasone (FLONASE) 50 MCG/ACT nasal spray 2 sprays by Nasal route daily 1 Bottle 0    Cyanocobalamin (VITAMIN B 12 PO) Take by mouth      Multiple Vitamins-Minerals (MULTIVITAMIN ADULT PO) Take by mouth       No current facility-administered medications on file prior to visit. Review of Systems   Constitutional: Negative for chills and fever. HENT: Positive for congestion, postnasal drip, rhinorrhea (clear mucus), sinus pressure and sinus pain. Negative for ear pain and sore throat. Respiratory: Positive for cough. Cardiovascular: Positive for palpitations. Negative for chest pain. Neurological: Positive for dizziness and headaches. Psychiatric/Behavioral: Positive for confusion (recently with mind going blank. a few episodes. ). The patient is nervous/anxious. other review of systems reviewed and are negative    OBJECTIVE:  /82   Pulse 80   Temp 97.2 °F (36.2 °C)   Wt 249 lb (112.9 kg)   SpO2 94%   BMI 47.05 kg/m²      Physical Exam  Constitutional:       General: She is not in acute distress. HENT:      Right Ear: Tympanic membrane normal.      Left Ear: Tympanic membrane normal.      Nose: No mucosal edema or rhinorrhea. Right Sinus: Maxillary sinus tenderness and frontal sinus tenderness present.       Left Sinus: Maxillary sinus tenderness and frontal sinus tenderness present. Mouth/Throat:      Pharynx: Uvula midline. No oropharyngeal exudate or posterior oropharyngeal erythema. Neck:      Musculoskeletal: Neck supple. Cardiovascular:      Rate and Rhythm: Normal rate and regular rhythm. Pulmonary:      Effort: Pulmonary effort is normal.      Breath sounds: Normal breath sounds. Lymphadenopathy:      Cervical: No cervical adenopathy. ASSESSMENT AND PLAN:    Lidia Caldwell was seen today for dizziness. Diagnoses and all orders for this visit:    Acute non-recurrent pansinusitis  -     amoxicillin (AMOXIL) 500 MG capsule; Take 1 capsule by mouth 3 times daily for 10 days  -     meclizine (ANTIVERT) 12.5 MG tablet; Take 1 tablet by mouth 3 times daily as needed for Dizziness    - ok to continue Meclizine for dizziness which I believe is due to her sinus infection  - can take otc Coricidin HBP  - monitor thoughts and if worsen call. Return if symptoms worsen or fail to improve, for as scheduled.     Austin Dominguez,

## 2021-02-22 ENCOUNTER — HOSPITAL ENCOUNTER (OUTPATIENT)
Dept: CARDIAC REHAB | Age: 66
Discharge: HOME | End: 2021-02-22

## 2021-03-05 DIAGNOSIS — F41.9 ANXIETY: ICD-10-CM

## 2021-03-05 RX ORDER — FLUOXETINE HYDROCHLORIDE 40 MG/1
40 CAPSULE ORAL DAILY
Qty: 90 CAPSULE | Refills: 0 | Status: SHIPPED
Start: 2021-03-05 | End: 2021-03-05

## 2021-03-05 RX ORDER — FLUOXETINE HYDROCHLORIDE 40 MG/1
CAPSULE ORAL
Qty: 90 CAPSULE | Refills: 0 | Status: SHIPPED
Start: 2021-03-05 | End: 2021-06-07

## 2021-03-06 DIAGNOSIS — F32.89 OTHER DEPRESSION: ICD-10-CM

## 2021-03-07 RX ORDER — BUPROPION HYDROCHLORIDE 300 MG/1
TABLET ORAL
Qty: 90 TABLET | Refills: 0 | Status: SHIPPED
Start: 2021-03-07 | End: 2021-08-19 | Stop reason: SDUPTHER

## 2021-03-11 PROCEDURE — 9900000038 HC CARDIAC REHAB PHASE 3 - MONTHLY

## 2021-03-22 ENCOUNTER — OFFICE VISIT (OUTPATIENT)
Dept: PRIMARY CARE CLINIC | Age: 66
End: 2021-03-22
Payer: COMMERCIAL

## 2021-03-22 VITALS
OXYGEN SATURATION: 98 % | SYSTOLIC BLOOD PRESSURE: 118 MMHG | DIASTOLIC BLOOD PRESSURE: 64 MMHG | BODY MASS INDEX: 45.54 KG/M2 | HEART RATE: 77 BPM | TEMPERATURE: 97.6 F | WEIGHT: 241 LBS

## 2021-03-22 DIAGNOSIS — F41.1 GAD (GENERALIZED ANXIETY DISORDER): ICD-10-CM

## 2021-03-22 DIAGNOSIS — J01.00 ACUTE NON-RECURRENT MAXILLARY SINUSITIS: Primary | ICD-10-CM

## 2021-03-22 PROCEDURE — 99213 OFFICE O/P EST LOW 20 MIN: CPT | Performed by: FAMILY MEDICINE

## 2021-03-22 RX ORDER — HYDROXYZINE PAMOATE 25 MG/1
CAPSULE ORAL
COMMUNITY
Start: 2021-03-05 | End: 2022-02-14 | Stop reason: ALTCHOICE

## 2021-03-22 RX ORDER — FLUOXETINE HYDROCHLORIDE 20 MG/1
20 CAPSULE ORAL DAILY
Qty: 30 CAPSULE | Refills: 2 | Status: SHIPPED
Start: 2021-03-22 | End: 2021-07-19

## 2021-03-22 RX ORDER — FLUTICASONE PROPIONATE 50 MCG
2 SPRAY, SUSPENSION (ML) NASAL DAILY
Qty: 1 BOTTLE | Refills: 1 | Status: SHIPPED
Start: 2021-03-22 | End: 2021-08-23

## 2021-03-22 RX ORDER — AMOXICILLIN 500 MG/1
500 CAPSULE ORAL 3 TIMES DAILY
Qty: 30 CAPSULE | Refills: 0 | Status: SHIPPED | OUTPATIENT
Start: 2021-03-22 | End: 2021-04-01

## 2021-03-22 ASSESSMENT — ENCOUNTER SYMPTOMS
SWOLLEN GLANDS: 0
SINUS PAIN: 0
SHORTNESS OF BREATH: 0
COUGH: 1
RHINORRHEA: 1
SORE THROAT: 0
SINUS PRESSURE: 0

## 2021-03-22 NOTE — PROGRESS NOTES
Ken Mills, a female of 63 y.o. came to the office 3/22/2021. Patient Active Problem List   Diagnosis    Hypothyroidism    Graves disease    Depression    GERD (gastroesophageal reflux disease)    Chronic pain of both knees    Non-[facility] elevation [facility] ([facility]STEMI) [facility]    Morbid obesity ([facility] [facility]    Coronary artery disease involving native coronary artery of native heart with unstable angina pectoris ([facility] [address] [facility]    Obesity    LITO (generalized anxiety disorder)          Dizziness  This is a new problem. The current episode started in the past 7 days. The problem has been gradually worsening. Associated symptoms include chills, congestion (head) and coughing. Pertinent negatives include no fever, sore throat or swollen glands. She has tried NSAIDs for the symptoms. The treatment provided mild relief. anxiety: increased agitation. Vistaril not helping. 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      Allergies   Allergen Reactions    Mobic [Meloxicam] Other (See Comments)       Current Outpatient Medications on File Prior to Visit   Medication Sig Dispense Refill    hydrOXYzine (VISTARIL) 25 MG capsule TAKE 1 CAPSULE BY MOUTH THREE TIMES DAILY AS NEEDED FOR ANXIETY      buPROPion (WELLBUTRIN XL) 300 MG extended release tablet TAKE 1 TABLET BY MOUTH ONCE DAILY IN THE MORNING 90 tablet 0    FLUoxetine (PROZAC) 40 MG capsule Take 1 capsule by mouth once daily 90 capsule 0    meclizine (ANTIVERT) 12.5 MG tablet Take 1 tablet by mouth 3 times daily as needed for Dizziness 60 tablet 0    levothyroxine (SYNTHROID) 137 MCG tablet TAKE 1 TABLET BY MOUTH ONCE DAILY 90 tablet 1    celecoxib (CELEBREX) 200 MG capsule Take 1 capsule by mouth 2 times daily 180 capsule 0    metFORMIN (GLUCOPHAGE) 500 MG tablet Take 1 tablet by mouth 3 times daily (with meals) 270 tablet 2    ferrous sulfate (IRON 325) 325 (65 Fe) MG tablet Take one tablet for breakfast and lunch on Monday, Wednesday and Friday; take each dose with a vitamin C tablet 180 tablet 2    ascorbic acid (V-R VITAMIN C) 250 MG tablet Take 1 tablet by mouth daily Take one tablet for breakfast and lunch on Monday, Wednesday and Friday; take each dose with an iron tablet 80 tablet 2    Multiple Vitamin (MVI, CELEBRATE, CHEWABLE TABLET) Take two tablets each day 180 tablet 3    mupirocin (BACTROBAN) 2 % ointment Apply topically 3 times daily. 22 g 0    magnesium (MAGNESIUM-OXIDE) 250 MG TABS tablet Take 500 mg by mouth daily       aspirin 81 MG EC tablet Take 1 tablet by mouth daily 30 tablet 3    nitroGLYCERIN (NITROSTAT) 0.4 MG SL tablet up to max of 3 total doses. If no relief after 1 dose, call 911. 25 tablet 0    ticagrelor (BRILINTA) 90 MG TABS tablet Take 1 tablet by mouth 2 times daily 180 tablet 3    atorvastatin (LIPITOR) 40 MG tablet Take 1 tablet by mouth nightly 90 tablet 3    traMADol (ULTRAM) 50 MG tablet Take 50 mg by mouth every 6 hours as needed.  loratadine (CLARITIN) 10 MG capsule Take 1 capsule by mouth daily 90 capsule 1    fluticasone (FLONASE) 50 MCG/ACT nasal spray 2 sprays by Nasal route daily 1 Bottle 0    Cyanocobalamin (VITAMIN B 12 PO) Take by mouth      Multiple Vitamins-Minerals (MULTIVITAMIN ADULT PO) Take by mouth       No current facility-administered medications on file prior to visit. Review of Systems   Constitutional: Positive for chills. Negative for fever. HENT: Positive for congestion (head), postnasal drip and rhinorrhea (clear). Negative for sinus pressure, sinus pain and sore throat. Respiratory: Positive for cough. Negative for shortness of breath. Neurological: Positive for dizziness. other review of systems reviewed and are negative    OBJECTIVE:  /64   Pulse 77   Temp 97.6 °F (36.4 °C)   Wt 241 lb (109.3 kg)   SpO2 98%   BMI 45.54 kg/m²      Physical Exam  Constitutional:       General: She is not in acute distress.   HENT:      Right Ear: Tympanic membrane normal.      Left Ear: Tympanic membrane normal.      Nose: No mucosal edema or rhinorrhea. Right Sinus: Maxillary sinus tenderness present. No frontal sinus tenderness. Left Sinus: Maxillary sinus tenderness present. No frontal sinus tenderness. Mouth/Throat:      Pharynx: Uvula midline. No oropharyngeal exudate or posterior oropharyngeal erythema. Comments: Clear mucus drainage. Neck:      Musculoskeletal: Neck supple. Cardiovascular:      Rate and Rhythm: Normal rate and regular rhythm. Pulmonary:      Effort: Pulmonary effort is normal.      Breath sounds: Normal breath sounds. Lymphadenopathy:      Cervical: No cervical adenopathy. ASSESSMENT AND PLAN:    Yecenia Diaz was seen today for dizziness. Diagnoses and all orders for this visit:    Acute non-recurrent maxillary sinusitis  -     fluticasone (FLONASE) 50 MCG/ACT nasal spray; 2 sprays by Nasal route daily  -     amoxicillin (AMOXIL) 500 MG capsule; Take 1 capsule by mouth 3 times daily for 10 days    LITO (generalized anxiety disorder)  -     FLUoxetine (PROZAC) 20 MG capsule; Take 1 capsule by mouth daily    - increase Prozac to 60 mg daily  - hopefully new job will help anxiety and thus allow us to decrease her dose. In meantime use Vistaril prn. Return if symptoms worsen or fail to improve.     Ellen Dominguez, DO

## 2021-03-29 ENCOUNTER — HOSPITAL ENCOUNTER (OUTPATIENT)
Dept: CARDIAC REHAB | Age: 66
Discharge: HOME | End: 2021-03-29

## 2021-04-21 PROCEDURE — 9900000038 HC CARDIAC REHAB PHASE 3 - MONTHLY

## 2021-04-26 ENCOUNTER — HOSPITAL ENCOUNTER (OUTPATIENT)
Dept: CARDIAC REHAB | Age: 66
Discharge: HOME | End: 2021-04-26

## 2021-05-04 PROCEDURE — 9900000038 HC CARDIAC REHAB PHASE 3 - MONTHLY

## 2021-05-11 RX ORDER — CELECOXIB 200 MG/1
200 CAPSULE ORAL 2 TIMES DAILY
Qty: 180 CAPSULE | Refills: 0 | Status: SHIPPED
Start: 2021-05-11 | End: 2021-08-19 | Stop reason: SDUPTHER

## 2021-05-19 ENCOUNTER — OFFICE VISIT (OUTPATIENT)
Dept: BARIATRICS/WEIGHT MGMT | Age: 66
End: 2021-05-19
Payer: COMMERCIAL

## 2021-05-19 VITALS
HEART RATE: 73 BPM | WEIGHT: 234 LBS | TEMPERATURE: 97.3 F | BODY MASS INDEX: 44.18 KG/M2 | DIASTOLIC BLOOD PRESSURE: 55 MMHG | SYSTOLIC BLOOD PRESSURE: 117 MMHG | HEIGHT: 61 IN

## 2021-05-19 DIAGNOSIS — E61.1 IRON DEFICIENCY: ICD-10-CM

## 2021-05-19 DIAGNOSIS — E89.0 POSTABLATIVE HYPOTHYROIDISM: Primary | ICD-10-CM

## 2021-05-19 DIAGNOSIS — Z98.84 HISTORY OF GASTRIC BYPASS: ICD-10-CM

## 2021-05-19 DIAGNOSIS — E66.01 CLASS 3 SEVERE OBESITY DUE TO EXCESS CALORIES WITH SERIOUS COMORBIDITY AND BODY MASS INDEX (BMI) OF 50.0 TO 59.9 IN ADULT (HCC): ICD-10-CM

## 2021-05-19 PROCEDURE — 99213 OFFICE O/P EST LOW 20 MIN: CPT | Performed by: INTERNAL MEDICINE

## 2021-05-19 PROCEDURE — 99211 OFF/OP EST MAY X REQ PHY/QHP: CPT

## 2021-05-19 NOTE — PROGRESS NOTES
CC -   Graves' disease, Hypothyroidism, Obesity    Background -   Last visit: 01/29/21  First visit: 10/28/20    · Graves' Disease  Diagnosed 2005  MULLER soon after diagnosis and was successful  Regimen at initial visit: LT4 137 mcg daily  Does not miss doses and takes alone      · Obesity   Began in childhood  Initial BMI 50.9, Wt 269.4 lbs  HS Grad wt 220 lbs  Lowest   wt 220 lbs  Highest  wt 364 lbs  Pattern of wt gain: grad  RYGB 2008 (Dr Curry Reinoso; last seen >10 yrs ago)  Pre-proc wt 364 lbs  Post-proc ann 250 lbs (9mo)  Wt regain: began 18 mo post-op; grad  Wt change past yr: Gained 30 lbs, then lost 30 lbs  Most wt lost: 90 lbs (Fen-Phen)  Other diets attempted: Slimfast, Counting calories, Soup diet    Initial Diet:    Number of meals per day - 2-3    Number of snacks per day - 2-3    Meal volume - 9\" plate, sometimes seconds    Fast food/convenience store - 1-2x/week    Restaurants (not fast food) - 1-2x/week   Sweets - 3d/week   Chips - 0d/week   Crackers/pretzels - 0d/week   Nuts - 1d/week (1 oz)   Peanut Butter - 2d/week (on celery)   Popcorn - 2d/week (1/2 of large bag)   Dried fruit - 1d/week (as trailmix)   Whole fruit - 7d/week (2 pieces)   Breakfast cereal - 0d/week   Granola/Protein/Energy bar - 0d/week   Sugar sweetened beverages - 16 oz reg soda/wk, 8-12 oz skim milk/d in coffee, 20 oz sweet tea, one V8 energy drink/d (50 clinton)    Protein - No supplements   Fiber - No supplements     Exercise:    Gym membership - none    Walking - none    Running - none    Resistance - none    Aerobic class - none    ______________________    [facility]  Past Medical History:   Diagnosis Date    Arthritis     CAD (coronary artery disease)     9-17-20 yisel 2.0x22 francisca om.      COVID-19 virus infection 07/2020    Depression     GERD (gastroesophageal reflux disease)     Graves disease     Hypertension     Hypothyroidism     SECONDARY TO GRAVES DISEASE    NSTEMI (non-ST elevated myocardial infarction) ([facility] 09/16/2020    Obesity      Current Outpatient Medications   Medication Sig Dispense Refill    celecoxib (CELEBREX) 200 MG capsule Take 1 capsule by mouth 2 times daily 180 capsule 0    hydrOXYzine (VISTARIL) 25 MG capsule TAKE 1 CAPSULE BY MOUTH THREE TIMES DAILY AS NEEDED FOR ANXIETY      fluticasone (FLONASE) 50 MCG/ACT nasal spray 2 sprays by Nasal route daily 1 Bottle 1    FLUoxetine (PROZAC) 20 MG capsule Take 1 capsule by mouth daily 30 capsule 2    buPROPion (WELLBUTRIN XL) 300 MG extended release tablet TAKE 1 TABLET BY MOUTH ONCE DAILY IN THE MORNING 90 tablet 0    FLUoxetine (PROZAC) 40 MG capsule Take 1 capsule by mouth once daily 90 capsule 0    meclizine (ANTIVERT) 12.5 MG tablet Take 1 tablet by mouth 3 times daily as needed for Dizziness 60 tablet 0    levothyroxine (SYNTHROID) 137 MCG tablet TAKE 1 TABLET BY MOUTH ONCE DAILY 90 tablet 1    metFORMIN (GLUCOPHAGE) 500 MG tablet Take 1 tablet by mouth 3 times daily (with meals) 270 tablet 2    ferrous sulfate (IRON 325) 325 (65 Fe) MG tablet Take one tablet for breakfast and lunch on Monday, Wednesday and Friday; take each dose with a vitamin C tablet 180 tablet 2    ascorbic acid (V-R VITAMIN C) 250 MG tablet Take 1 tablet by mouth daily Take one tablet for breakfast and lunch on Monday, Wednesday and Friday; take each dose with an iron tablet 80 tablet 2    Multiple Vitamin (MVI, CELEBRATE, CHEWABLE TABLET) Take two tablets each day 180 tablet 3    mupirocin (BACTROBAN) 2 % ointment Apply topically 3 times daily. 22 g 0    magnesium (MAGNESIUM-OXIDE) 250 MG TABS tablet Take 500 mg by mouth daily       aspirin 81 MG EC tablet Take 1 tablet by mouth daily 30 tablet 3    nitroGLYCERIN (NITROSTAT) 0.4 MG SL tablet up to max of 3 total doses.  If no relief after 1 dose, call 911. 25 tablet 0    ticagrelor (BRILINTA) 90 MG TABS tablet Take 1 tablet by mouth 2 times daily 180 tablet 3    atorvastatin (LIPITOR) 40 MG tablet Take 1 tablet by counting calories    Eating sweets 2-3d/month    Eating chips, crackers and pretzels 1x/month    Eating restaurant food 2x/week (due to upheaval in life)    Not walking for exercise. Assessment  - Improving; needs to adopt rules  Plan   -   Rules:  · Count every calorie every day  · Limit sweets to one day per month  · Limit chips/crackers/pretzels/nuts to 100 clinton/day  · Eliminate all sugar sweetened beverages (including fruit juice)  · Limit restaurants (including fast food and food from a convenience store) to one time every two weeks    Requirements:  · Make sure protein intake is at least 60 grams per day (Consider using a protein shake - Nectar, Pure Protein, Premier, Fairlife (vanilla), Boost Max, Ensure Max, BeneProtein and GNC lean (which is lactose-free) are milk-based options; Nectar, Premier Protein Clear, IsoPure Protein Drink, and Protein 2 O are water-based options; Quest (or Cosco, which is cheaper and is ordered on [employer]) and the [employer] 1 protein bars can also be used, but have less protein in them )  · Make sure that fiber intake is at least 22 grams per day. Do this by either eating 12 tablespoons of the original, plain Fiber One cereal every day or 4 tablespoons of wheat dextrin powder (Benefiber or a generic brand) every day. Work up to this amount slowly by starting with only one-fourth of the target amount and adding another one-fourth every one or two weeks until reaching the target. · Take one multivitamin every day    Targets:  · Limit calorie intake to 1100 calories/day  · Walk 30 minutes daily  · Avoid eating 2 hours within bedtime. ·   Tips:  · Do not eat outside of the dining room or the kitchen  · Do not eat while watching TV, videos, working on the computer or using a smart phone  · Do not eat food out of a multi-serving bag or container.     Medications:  · Cont taking Metformin 500 mg, one tablet with each meal  · Take all bariatric supplements (two multivitamins, D3 5000 IU daily, 1500 mg calcium daily)    Problem 3 - Iron def in RYGB pt  HPI  - 11/17/20 Ferritin 10 ng/ml, Hgb 11.4 g/dl on no iron supplement      Taking Ferrous sulfate 325 mg, one tablet for breakfast and lunch on Monday, Wednesday and Friday; take each dose with 100-250 mg Vitamin C      Has not rechecked her Ferritin yet (did not know that a lab req was not needed)  Assessment - Uncontrolled  Plan  -  Cont Ferrous sulfate 325 mg, one tablet for breakfast and lunch on Monday, Wednesday and Friday; take each dose with 100-250 mg Vitamin C      Recheck Ferritin (include Vit D which I missed on the last panel of surveillance labs)    Follow up  Return to see me in 6 weeks      Deb Grimes MD, MD  Endocrinology/Obesity  5/19/21

## 2021-05-19 NOTE — PATIENT INSTRUCTIONS
Rules:  · Count every calorie every day  · Limit sweets to one day per month  · Limit chips/crackers/pretzels/nuts to 100 clinton/day  · Eliminate all sugar sweetened beverages (including fruit juice)  · Limit restaurants (including fast food and food from a convenience store) to one time every two weeks    Requirements:  · Make sure protein intake is at least 60 grams per day (Consider using a protein shake - Nectar, Pure Protein, Premier, Fairlife (vanilla), Boost Max, Ensure Max, BeneProtein and GNC lean (which is lactose-free) are milk-based options; Nectar, Premier Protein Clear, IsoPure Protein Drink, and Protein 2 O are water-based options; Quest (or Cosco, which is cheaper and is ordered on [address] [facility] [address]) and the [employer] 1 protein bars can also be used, but have less protein in them )  · Make sure that fiber intake is at least 22 grams per day. Do this by either eating 12 tablespoons of the original, plain Fiber One cereal every day or 4 tablespoons of wheat dextrin powder (Benefiber or a generic brand) every day. Work up to this amount slowly by starting with only one-fourth of the target amount and adding another one-fourth every one or two weeks until reaching the target. · Take one multivitamin every day    Targets:  · Limit calorie intake to 1100 calories/day  · Walk 30 minutes daily  · Avoid eating 2 hours within bedtime. ·   Tips:  · Do not eat outside of the dining room or the kitchen  · Do not eat while watching TV, videos, working on the computer or using a smart phone  · Do not eat food out of a multi-serving bag or container.     Medications:  · Cont taking Metformin 500 mg, one tablet with each meal  · Take all bariatric supplements (two multivitamins, D3 5000 IU daily, 1500 mg calcium daily)

## 2021-05-24 ENCOUNTER — HOSPITAL ENCOUNTER (OUTPATIENT)
Dept: CARDIAC REHAB | Age: 66
Discharge: HOME | End: 2021-05-24

## 2021-06-02 ENCOUNTER — OFFICE VISIT (OUTPATIENT)
Dept: ORTHOPEDIC SURGERY | Age: 66
End: 2021-06-02
Payer: COMMERCIAL

## 2021-06-02 VITALS — TEMPERATURE: 97.9 F

## 2021-06-02 DIAGNOSIS — M17.0 PRIMARY OSTEOARTHRITIS OF BOTH KNEES: Primary | ICD-10-CM

## 2021-06-02 PROCEDURE — 2500000003 HC RX 250 WO HCPCS

## 2021-06-02 PROCEDURE — 20610 DRAIN/INJ JOINT/BURSA W/O US: CPT | Performed by: ORTHOPAEDIC SURGERY

## 2021-06-02 PROCEDURE — 99212 OFFICE O/P EST SF 10 MIN: CPT

## 2021-06-02 PROCEDURE — 20610 DRAIN/INJ JOINT/BURSA W/O US: CPT | Performed by: PHYSICIAN ASSISTANT

## 2021-06-02 PROCEDURE — 6360000002 HC RX W HCPCS

## 2021-06-02 RX ORDER — BUPIVACAINE HYDROCHLORIDE 2.5 MG/ML
6 INJECTION, SOLUTION EPIDURAL; INFILTRATION; INTRACAUDAL ONCE
Status: COMPLETED | OUTPATIENT
Start: 2021-06-02 | End: 2021-06-02

## 2021-06-02 RX ORDER — TRIAMCINOLONE ACETONIDE 40 MG/ML
40 INJECTION, SUSPENSION INTRA-ARTICULAR; INTRAMUSCULAR ONCE
Status: COMPLETED | OUTPATIENT
Start: 2021-06-02 | End: 2021-06-02

## 2021-06-02 RX ORDER — LIDOCAINE HYDROCHLORIDE 10 MG/ML
6 INJECTION, SOLUTION INFILTRATION; PERINEURAL ONCE
Status: COMPLETED | OUTPATIENT
Start: 2021-06-02 | End: 2021-06-02

## 2021-06-02 RX ADMIN — TRIAMCINOLONE ACETONIDE 40 MG: 40 INJECTION, SUSPENSION INTRA-ARTICULAR; INTRAMUSCULAR at 12:07

## 2021-06-02 RX ADMIN — TRIAMCINOLONE ACETONIDE 40 MG: 40 INJECTION, SUSPENSION INTRA-ARTICULAR; INTRAMUSCULAR at 12:08

## 2021-06-02 RX ADMIN — BUPIVACAINE HYDROCHLORIDE 15 MG: 2.5 INJECTION, SOLUTION EPIDURAL; INFILTRATION; INTRACAUDAL at 12:06

## 2021-06-02 RX ADMIN — LIDOCAINE HYDROCHLORIDE 6 ML: 10 INJECTION, SOLUTION INFILTRATION; PERINEURAL at 12:07

## 2021-06-02 NOTE — PROGRESS NOTES
at today's visit    Temp 97.9 °F (36.6 °C) (Oral)     ASSESSMENT:     Diagnosis Orders   1. Primary osteoarthritis of both knees         DISCUSSION:   I discussed the natural course and history of knee arthritis with the patient as well as treatment options including NSAIDs, weight loss, activity modification, and injections as well as surgery. The patient would like to proceed. Discussed risks and benefits of CSI including risk of infection at the joint, bleeding or bruising at the injection site and elevation of blood sugar levels and cartilage degradation with repetitive use. Patient expressed understanding of these risks and elected to move forward with corticosteroid injection today in the office. Knee  Injection Procedure Note  With the patient's written and verbal permission, Bilateral  knee was prepped in standard sterile fashion with betadine and alcohol. Skin of the knee was anesthetized with ethyl chloride spray prior to injection. The knee was then injected with 1 ml Kenalog (40mg), 3 ml PF 0.25% marcaine and 3 ml 1% PF Lidocaine were injected using the lateral inferior approach without difficulty. The patient tolerated this well. A band-aid was applied. The patient ambulated out of clinic on their own accord without difficulty. PLAN:  CSI as above, post injection care instructed  Advised on S/S of when to seek follow up care  Patient to contact office if not achieving at least 6-8 weeks of moderate relief  Continue to stay as active as able, maintain healthy weight     Continue participating in weight loss program with Dr. Lorence Aase. Goal is to get down to an acceptable BMI so she can have her knees replaced particularly the left knee which is causing her the most pain. Follow-up in 3 months for reevaluation at which time we can give her another set of injections if she is having symptoms. Call with any questions or concerns.     Electronically signed by DULCE Martines on 6/2/2021 at 11:04 AM  Note: This report was completed using modu voiced recognition software. Every effort has been made to ensure accuracy; however, inadvertent computerized transcription errors may be present.

## 2021-06-02 NOTE — PROGRESS NOTES
Lj Newsome is a 63 y.o. female who presents for follow up of BL knee pain    SURGEON: Dr. Darcy Irvin MD  Date of Injury/Surgery:  Chronic pain  Date last seen in office: 2-17-21   Reports previous cortisone shot being effective for 2 months   Describes left knee pain being more intense. Requesting injections in BLK this visit    Weightbearing:   FWB      Assistive device none  Participating in therapy (location if yes)?  yes, cardio rehab at [facility] Needed: None  Order/Referral Needed: no

## 2021-06-02 NOTE — PROGRESS NOTES
(SYNTHROID) 137 MCG tablet TAKE 1 TABLET BY MOUTH ONCE DAILY 90 tablet 1    metFORMIN (GLUCOPHAGE) 500 MG tablet Take 1 tablet by mouth 3 times daily (with meals) 270 tablet 2    ferrous sulfate (IRON 325) 325 (65 Fe) MG tablet Take one tablet for breakfast and lunch on Monday, Wednesday and Friday; take each dose with a vitamin C tablet 180 tablet 2    ascorbic acid (V-R VITAMIN C) 250 MG tablet Take 1 tablet by mouth daily Take one tablet for breakfast and lunch on Monday, Wednesday and Friday; take each dose with an iron tablet 80 tablet 2    Multiple Vitamin (MVI, CELEBRATE, CHEWABLE TABLET) Take two tablets each day 180 tablet 3    magnesium (MAGNESIUM-OXIDE) 250 MG TABS tablet Take 500 mg by mouth 2 times daily       aspirin 81 MG EC tablet Take 1 tablet by mouth daily 30 tablet 3    nitroGLYCERIN (NITROSTAT) 0.4 MG SL tablet up to max of 3 total doses. If no relief after 1 dose, call 911. 25 tablet 0    ticagrelor (BRILINTA) 90 MG TABS tablet Take 1 tablet by mouth 2 times daily 180 tablet 3    atorvastatin (LIPITOR) 40 MG tablet Take 1 tablet by mouth nightly 90 tablet 3    traMADol (ULTRAM) 50 MG tablet Take 50 mg by mouth every 6 hours as needed.  loratadine (CLARITIN) 10 MG capsule Take 1 capsule by mouth daily 90 capsule 1    fluticasone (FLONASE) 50 MCG/ACT nasal spray 2 sprays by Nasal route daily 1 Bottle 0    Cyanocobalamin (VITAMIN B 12 PO) Take by mouth      fluticasone (FLONASE) 50 MCG/ACT nasal spray 2 sprays by Nasal route daily 1 Bottle 1     No current facility-administered medications for this visit.          Allergies as of 06/03/2021 - Fully Reviewed 06/03/2021   Allergen Reaction Noted    Mobic [meloxicam] Other (See Comments) 05/18/2017       Social History     Socioeconomic History    Marital status:      Spouse name: Not on file    Number of children: Not on file    Years of education: Not on file    Highest education level: Not on file   Occupational History    Not on file   Tobacco Use    Smoking status: Former Smoker     Packs/day: 1.00     Years: 3.00     Pack years: 3.00     Types: Cigarettes     Start date: 67     Quit date: 3/4/1977     Years since quittin.2    Smokeless tobacco: Never Used   Vaping Use    Vaping Use: Never used   Substance and Sexual Activity    Alcohol use: Yes     Alcohol/week: 0.0 standard drinks     Comment: rare    Drug use: Never    Sexual activity: Not on file   Other Topics Concern    Not on file   Social History Narrative    Not on file     Social Determinants of Health     Financial Resource Strain:     Difficulty of Paying Living Expenses:    Food Insecurity:     Worried About Running Out of Food in the Last Year:     [address] in the Last Year:    Transportation Needs:     Lack of Transportation (Medical):      Lack of Transportation (Non-Medical):    Physical Activity:     Days of Exercise per Week:     Minutes of Exercise per Session:    Stress:     Feeling of Stress :    Social Connections:     Frequency of Communication with Friends and Family:     Frequency of Social Gatherings with Friends and Family:     Attends [employer] Services:     Active Member of Clubs or Organizations:     Attends Club or Organization Meetings:     Marital Status:    Intimate Partner Violence:     Fear of Current or Ex-Partner:     Emotionally Abused:     Physically Abused:     Sexually Abused:        Family Hx of early CAD    REVIEW OF SYSTEMS:     CONSTITUTIONAL:  negative for  fevers, chills, sweats and fatigue  HEENT:  negative for  tinnitus, earaches, nasal congestion and epistaxis  RESPIRATORY:  negative for  dry cough, cough with sputum, dyspnea, wheezing and hemoptysis  GASTROINTESTINAL:  negative for nausea, vomiting, diarrhea, constipation, pruritus and jaundice  HEMATOLOGIC/LYMPHATIC:  negative for easy bruising, bleeding, lymphadenopathy and petechiae  ENDOCRINE:  negative for heat intolerance, 11/17/2020     No orders to display     I have personally reviewed the laboratory, cardiac diagnostic and radiographic testing as outlined above:      IMPRESSION:    1.  CAD: S/p non-ST elevation MI, had cardiac catheterization on 9/17/20 with the following findings:  # Totally occluded fourth OM branch with successful deployment of 2.0 x 22 mm Malcolm Resolute drug-eluting stent with 0% residual stenosis and RITA-3 flow distally, (pre-PCI RITA-0 flow, post-PCI RITA-3 flow). # No CAD noted in the rest of the coronary arteries. will continue current treatment  2. Aortic valve stenosis: Mild to moderate   3. Hyperlipidemia: On statin  4. Hypothyroidism: Secondary to Graves' disease, on Synthroid supplements  5. Type 2 diabetes mellitus    RECOMMENDATIONS:   1. Continue current treatment  2. Risk of instent thrombosis due to premature discontinuation of either ASA or Plavix discussed with patient at length  3. preventive cardiology: Low-salt, low-cholesterol diet, daily exercise, total cholesterol of less than 200, LDL of less than 70, adherence to diabetic diet and diabetic medications, smoking cessation were all advised. 4.  Follow-up with Dr. Luciano Mann as scheduled  5. Follow-up with Dr. Eloy Zabala in 6 months, sooner if symptomatic for any reason    I have reviewed my findings and recommendations with patient    Electronically signed by Sherryle Mountain, MD on 6/3/2021 at 10:24 AM    NOTE: This report was transcribed using voice recognition software.  Every effort was made to ensure accuracy; however, inadvertent computerized transcription errors may be present

## 2021-06-03 ENCOUNTER — OFFICE VISIT (OUTPATIENT)
Dept: CARDIOLOGY CLINIC | Age: 66
End: 2021-06-03
Payer: COMMERCIAL

## 2021-06-03 VITALS
DIASTOLIC BLOOD PRESSURE: 76 MMHG | SYSTOLIC BLOOD PRESSURE: 130 MMHG | HEIGHT: 61 IN | HEART RATE: 67 BPM | RESPIRATION RATE: 16 BRPM | BODY MASS INDEX: 44.18 KG/M2 | WEIGHT: 234 LBS

## 2021-06-03 DIAGNOSIS — I25.110 CORONARY ARTERY DISEASE INVOLVING NATIVE CORONARY ARTERY OF NATIVE HEART WITH UNSTABLE ANGINA PECTORIS (HCC): Primary | ICD-10-CM

## 2021-06-03 PROCEDURE — 93000 ELECTROCARDIOGRAM COMPLETE: CPT | Performed by: INTERNAL MEDICINE

## 2021-06-03 PROCEDURE — 99214 OFFICE O/P EST MOD 30 MIN: CPT | Performed by: INTERNAL MEDICINE

## 2021-06-05 DIAGNOSIS — F41.9 ANXIETY: ICD-10-CM

## 2021-06-07 RX ORDER — FLUOXETINE HYDROCHLORIDE 40 MG/1
CAPSULE ORAL
Qty: 90 CAPSULE | Refills: 0 | Status: SHIPPED
Start: 2021-06-07 | End: 2021-08-19 | Stop reason: SDUPTHER

## 2021-06-22 ENCOUNTER — HOSPITAL ENCOUNTER (OUTPATIENT)
Age: 66
Discharge: HOME | End: 2021-06-22
Payer: COMMERCIAL

## 2021-06-22 DIAGNOSIS — E66.01 CLASS 3 SEVERE OBESITY DUE TO EXCESS CALORIES WITH SERIOUS COMORBIDITY AND BODY MASS INDEX (BMI) OF 50.0 TO 59.9 IN ADULT (HCC): ICD-10-CM

## 2021-06-22 DIAGNOSIS — E61.1 IRON DEFICIENCY: ICD-10-CM

## 2021-06-22 DIAGNOSIS — Z98.84 HISTORY OF GASTRIC BYPASS: ICD-10-CM

## 2021-06-22 DIAGNOSIS — E89.0 POSTABLATIVE HYPOTHYROIDISM: ICD-10-CM

## 2021-06-22 LAB
FERRITIN: 16 NG/ML
HCT VFR BLD CALC: 37.4 % (ref 34–48)
HEMOGLOBIN: 11.9 G/DL (ref 11.5–15.5)
MCH RBC QN AUTO: 26.6 PG (ref 26–35)
MCHC RBC AUTO-ENTMCNC: 31.8 % (ref 32–34.5)
MCV RBC AUTO: 83.7 FL (ref 80–99.9)
PDW BLD-RTO: 16.6 FL (ref 11.5–15)
PLATELET # BLD: 284 E9/L (ref 130–450)
PMV BLD AUTO: 10 FL (ref 7–12)
RBC # BLD: 4.47 E12/L (ref 3.5–5.5)
TSH SERPL DL<=0.05 MIU/L-ACNC: 0.07 UIU/ML (ref 0.27–4.2)
VITAMIN D 25-HYDROXY: 38 NG/ML (ref 30–100)
WBC # BLD: 7.6 E9/L (ref 4.5–11.5)

## 2021-06-22 PROCEDURE — 82306 VITAMIN D 25 HYDROXY: CPT

## 2021-06-22 PROCEDURE — 82728 ASSAY OF FERRITIN: CPT

## 2021-06-22 PROCEDURE — 85027 COMPLETE CBC AUTOMATED: CPT

## 2021-06-22 PROCEDURE — 84443 ASSAY THYROID STIM HORMONE: CPT

## 2021-06-22 PROCEDURE — 36415 COLL VENOUS BLD VENIPUNCTURE: CPT

## 2021-06-28 ENCOUNTER — HOSPITAL ENCOUNTER (OUTPATIENT)
Dept: CARDIAC REHAB | Age: 66
Discharge: HOME | End: 2021-06-28

## 2021-06-29 ENCOUNTER — TELEPHONE (OUTPATIENT)
Dept: BARIATRICS/WEIGHT MGMT | Age: 66
End: 2021-06-29

## 2021-07-18 DIAGNOSIS — F41.1 GAD (GENERALIZED ANXIETY DISORDER): ICD-10-CM

## 2021-07-19 RX ORDER — FLUOXETINE HYDROCHLORIDE 20 MG/1
CAPSULE ORAL
Qty: 30 CAPSULE | Refills: 0 | Status: SHIPPED
Start: 2021-07-19 | End: 2021-08-19 | Stop reason: SDUPTHER

## 2021-08-16 ENCOUNTER — TELEPHONE (OUTPATIENT)
Dept: CARDIOLOGY CLINIC | Age: 66
End: 2021-08-16

## 2021-08-16 NOTE — TELEPHONE ENCOUNTER
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 Please advise and I can type the letter.

## 2021-08-18 DIAGNOSIS — F32.89 OTHER DEPRESSION: ICD-10-CM

## 2021-08-18 DIAGNOSIS — E66.01 CLASS 3 SEVERE OBESITY DUE TO EXCESS CALORIES WITH SERIOUS COMORBIDITY AND BODY MASS INDEX (BMI) OF 50.0 TO 59.9 IN ADULT (HCC): ICD-10-CM

## 2021-08-18 DIAGNOSIS — F41.1 GAD (GENERALIZED ANXIETY DISORDER): ICD-10-CM

## 2021-08-18 DIAGNOSIS — F41.9 ANXIETY: ICD-10-CM

## 2021-08-18 RX ORDER — CELECOXIB 200 MG/1
200 CAPSULE ORAL 2 TIMES DAILY
Qty: 180 CAPSULE | Refills: 0 | Status: CANCELLED | OUTPATIENT
Start: 2021-08-18

## 2021-08-18 RX ORDER — BUPROPION HYDROCHLORIDE 300 MG/1
TABLET ORAL
Qty: 90 TABLET | Refills: 0 | Status: CANCELLED | OUTPATIENT
Start: 2021-08-18

## 2021-08-18 RX ORDER — FLUOXETINE HYDROCHLORIDE 20 MG/1
CAPSULE ORAL
Qty: 30 CAPSULE | Refills: 0 | Status: CANCELLED | OUTPATIENT
Start: 2021-08-18

## 2021-08-18 RX ORDER — ATORVASTATIN CALCIUM 40 MG/1
40 TABLET, FILM COATED ORAL NIGHTLY
Qty: 90 TABLET | Refills: 3 | Status: SHIPPED
Start: 2021-08-18 | End: 2021-10-11 | Stop reason: SDUPTHER

## 2021-08-18 RX ORDER — FLUOXETINE HYDROCHLORIDE 40 MG/1
CAPSULE ORAL
Qty: 90 CAPSULE | Refills: 0 | Status: CANCELLED | OUTPATIENT
Start: 2021-08-18

## 2021-08-18 NOTE — TELEPHONE ENCOUNTER
Patient has appointment to see you tomorrow for check up. MIKKI also scheduled her  for medicare wellness next week.

## 2021-08-19 ENCOUNTER — OFFICE VISIT (OUTPATIENT)
Dept: PRIMARY CARE CLINIC | Age: 66
End: 2021-08-19
Payer: COMMERCIAL

## 2021-08-19 VITALS
BODY MASS INDEX: 43.46 KG/M2 | DIASTOLIC BLOOD PRESSURE: 64 MMHG | TEMPERATURE: 96.9 F | WEIGHT: 230 LBS | SYSTOLIC BLOOD PRESSURE: 122 MMHG | HEART RATE: 73 BPM | OXYGEN SATURATION: 98 %

## 2021-08-19 DIAGNOSIS — E05.00 GRAVES DISEASE: ICD-10-CM

## 2021-08-19 DIAGNOSIS — F41.1 GAD (GENERALIZED ANXIETY DISORDER): ICD-10-CM

## 2021-08-19 DIAGNOSIS — E66.01 CLASS 3 SEVERE OBESITY DUE TO EXCESS CALORIES WITH SERIOUS COMORBIDITY AND BODY MASS INDEX (BMI) OF 50.0 TO 59.9 IN ADULT (HCC): ICD-10-CM

## 2021-08-19 DIAGNOSIS — F32.89 OTHER DEPRESSION: Primary | ICD-10-CM

## 2021-08-19 DIAGNOSIS — I25.110 CORONARY ARTERY DISEASE INVOLVING NATIVE CORONARY ARTERY OF NATIVE HEART WITH UNSTABLE ANGINA PECTORIS (HCC): ICD-10-CM

## 2021-08-19 PROCEDURE — 99214 OFFICE O/P EST MOD 30 MIN: CPT | Performed by: FAMILY MEDICINE

## 2021-08-19 RX ORDER — BUPROPION HYDROCHLORIDE 300 MG/1
TABLET ORAL
Qty: 90 TABLET | Refills: 1 | Status: SHIPPED | OUTPATIENT
Start: 2021-08-19

## 2021-08-19 RX ORDER — CELECOXIB 200 MG/1
200 CAPSULE ORAL 2 TIMES DAILY
Qty: 180 CAPSULE | Refills: 1 | Status: CANCELLED | OUTPATIENT
Start: 2021-08-19

## 2021-08-19 RX ORDER — CELECOXIB 200 MG/1
200 CAPSULE ORAL 2 TIMES DAILY
Qty: 180 CAPSULE | Refills: 1 | Status: SHIPPED
Start: 2021-08-19 | End: 2022-01-18 | Stop reason: SDUPTHER

## 2021-08-19 RX ORDER — FLUOXETINE HYDROCHLORIDE 40 MG/1
CAPSULE ORAL
Qty: 90 CAPSULE | Refills: 1 | Status: SHIPPED
Start: 2021-08-19 | End: 2022-01-18 | Stop reason: SDUPTHER

## 2021-08-19 RX ORDER — FLUOXETINE HYDROCHLORIDE 20 MG/1
CAPSULE ORAL
Qty: 90 CAPSULE | Refills: 1 | Status: SHIPPED
Start: 2021-08-19 | End: 2022-01-18 | Stop reason: SDUPTHER

## 2021-08-19 RX ORDER — LEVOTHYROXINE SODIUM 137 UG/1
TABLET ORAL
Qty: 90 TABLET | Refills: 1 | Status: SHIPPED
Start: 2021-08-19 | End: 2021-10-18 | Stop reason: SDUPTHER

## 2021-08-19 NOTE — PROGRESS NOTES
Kenny Canavan, a female of 63 y.o. came to the office 8/19/2021. Patient Active Problem List   Diagnosis    Hypothyroidism    Graves disease    Depression    GERD (gastroesophageal reflux disease)    Chronic pain of both knees    [facility]) [facility]    Morbid obesity ([facility]    Coronary artery disease involving native coronary artery of native heart with unstable angina pectoris ([facility]    Obesity    LITO (generalized anxiety disorder)          HPI hypothyroidism: energy fair. tsh low but dose not changed by Dr. Balwinder Tyler her endo. 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 Tried to decrease Prozac and she got anxious. Happy with new job. Cad: on aspirin and Brilinta and lipitor. No cp or palp's.      Allergies   Allergen Reactions    Mobic [Meloxicam] Other (See Comments)       Current Outpatient Medications on File Prior to Visit   Medication Sig Dispense Refill    atorvastatin (LIPITOR) 40 MG tablet Take 1 tablet by mouth nightly 90 tablet 3    hydrOXYzine (VISTARIL) 25 MG capsule TAKE 1 CAPSULE BY MOUTH THREE TIMES DAILY AS NEEDED FOR ANXIETY      fluticasone (FLONASE) 50 MCG/ACT nasal spray 2 sprays by Nasal route daily 1 Bottle 1    meclizine (ANTIVERT) 12.5 MG tablet Take 1 tablet by mouth 3 times daily as needed for Dizziness 60 tablet 0    ferrous sulfate (IRON 325) 325 (65 Fe) MG tablet Take one tablet for breakfast and lunch on Monday, Wednesday and Friday; take each dose with a vitamin C tablet 180 tablet 2    ascorbic acid (V-R VITAMIN C) 250 MG tablet Take 1 tablet by mouth daily Take one tablet for breakfast and lunch on Monday, Wednesday and Friday; take each dose with an iron tablet 80 tablet 2    Multiple Vitamin (MVI, CELEBRATE, CHEWABLE TABLET) Take two tablets each day 180 tablet 3    magnesium (MAGNESIUM-OXIDE) 250 MG TABS tablet Take 500 mg by mouth 2 times daily       aspirin 81 MG EC tablet Take 1 tablet by mouth daily 30 tablet 3    nitroGLYCERIN (NITROSTAT) 0.4 MG SL tablet up to max of 3 total doses. If no relief after 1 dose, call 911. 25 tablet 0    ticagrelor (BRILINTA) 90 MG TABS tablet Take 1 tablet by mouth 2 times daily 180 tablet 3    traMADol (ULTRAM) 50 MG tablet Take 50 mg by mouth every 6 hours as needed.  loratadine (CLARITIN) 10 MG capsule Take 1 capsule by mouth daily 90 capsule 1    fluticasone (FLONASE) 50 MCG/ACT nasal spray 2 sprays by Nasal route daily 1 Bottle 0    Cyanocobalamin (VITAMIN B 12 PO) Take by mouth       No current facility-administered medications on file prior to visit. Review of Systems   Constitutional: Negative for activity change, appetite change and fatigue. Psychiatric/Behavioral: Positive for sleep disturbance (up q hr. ). Negative for agitation, decreased concentration and dysphoric mood. The patient is not nervous/anxious. other review of systems reviewed and are negative    OBJECTIVE:  /64   Pulse 73   Temp 96.9 °F (36.1 °C)   Wt 230 lb (104.3 kg)   SpO2 98%   BMI 43.46 kg/m²      Physical Exam  Vitals reviewed. Eyes:      General: No scleral icterus. Conjunctiva/sclera: Conjunctivae normal.   Neck:      Thyroid: No thyromegaly. Vascular: No carotid bruit. Cardiovascular:      Rate and Rhythm: Normal rate and regular rhythm. Heart sounds: No murmur heard. Pulmonary:      Effort: Pulmonary effort is normal.      Breath sounds: Normal breath sounds. No wheezing or rales. Musculoskeletal:         General: Normal range of motion. Cervical back: Neck supple. Lymphadenopathy:      Cervical: No cervical adenopathy. Skin:     General: Skin is warm and dry. Neurological:      Mental Status: She is alert and oriented to person, place, and time. ASSESSMENT AND PLAN:    Nicky Dhillon was seen today for hypothyroidism, depression, anxiety and coronary artery disease.     Diagnoses and all orders for this visit:    Other depression  -     buPROPion (WELLBUTRIN XL) 300 MG extended release tablet; TAKE 1 TABLET BY MOUTH ONCE DAILY IN THE MORNING    Graves disease  -     levothyroxine (SYNTHROID) 137 MCG tablet; TAKE 1 TABLET BY MOUTH ONCE DAILY    Class 3 severe obesity due to excess calories with serious comorbidity and body mass index (BMI) of 50.0 to 59.9 in adult (HCC)  -     metFORMIN (GLUCOPHAGE) 500 MG tablet; Take 1 tablet by mouth 3 times daily (with meals)    LITO (generalized anxiety disorder)  -     FLUoxetine (PROZAC) 40 MG capsule; Take 1 capsule by mouth once daily  -     FLUoxetine (PROZAC) 20 MG capsule; Take 1 capsule by mouth once daily    Coronary artery disease involving native coronary artery of native heart with unstable angina pectoris ([facility] [address]  -     Lipid Panel; Future  -     Comprehensive Metabolic Panel; Future    Other orders  -     celecoxib (CELEBREX) 200 MG capsule; Take 1 capsule by mouth 2 times daily    - continue current Prozac meds  - call cardio in 1 month and ask if she still needs Brilinta. Continue aspirin.   - follow up with endo as scheduled. - continue diet for wt loss and exercise. Return in about 6 months (around 2/19/2022) for or for acute problem.     Theron Dominguez, DO

## 2021-08-23 RX ORDER — BENZOYL PEROXIDE
KIT TOPICAL
Qty: 30 TABLET | Refills: 2 | Status: SHIPPED
Start: 2021-08-23 | End: 2022-04-29

## 2021-09-01 ENCOUNTER — TELEPHONE (OUTPATIENT)
Dept: ORTHOPEDIC SURGERY | Age: 66
End: 2021-09-01

## 2021-09-01 DIAGNOSIS — M17.0 PRIMARY OSTEOARTHRITIS OF BOTH KNEES: Primary | ICD-10-CM

## 2021-09-08 ENCOUNTER — OFFICE VISIT (OUTPATIENT)
Dept: ORTHOPEDIC SURGERY | Age: 66
End: 2021-09-08
Payer: COMMERCIAL

## 2021-09-08 ENCOUNTER — HOSPITAL ENCOUNTER (OUTPATIENT)
Dept: GENERAL RADIOLOGY | Age: 66
Discharge: HOME | End: 2021-09-10
Payer: COMMERCIAL

## 2021-09-08 DIAGNOSIS — M17.0 PRIMARY OSTEOARTHRITIS OF BOTH KNEES: Primary | ICD-10-CM

## 2021-09-08 DIAGNOSIS — M17.0 PRIMARY OSTEOARTHRITIS OF BOTH KNEES: ICD-10-CM

## 2021-09-08 PROCEDURE — 20610 DRAIN/INJ JOINT/BURSA W/O US: CPT | Performed by: PHYSICIAN ASSISTANT

## 2021-09-08 PROCEDURE — 73565 X-RAY EXAM OF KNEES: CPT

## 2021-09-08 PROCEDURE — 99213 OFFICE O/P EST LOW 20 MIN: CPT | Performed by: PHYSICIAN ASSISTANT

## 2021-09-08 PROCEDURE — 6360000002 HC RX W HCPCS

## 2021-09-08 PROCEDURE — 2500000003 HC RX 250 WO HCPCS

## 2021-09-08 PROCEDURE — 99212 OFFICE O/P EST SF 10 MIN: CPT

## 2021-09-08 RX ORDER — BUPIVACAINE HYDROCHLORIDE 2.5 MG/ML
6 INJECTION, SOLUTION EPIDURAL; INFILTRATION; INTRACAUDAL ONCE
Status: COMPLETED | OUTPATIENT
Start: 2021-09-08 | End: 2021-09-08

## 2021-09-08 RX ORDER — TRIAMCINOLONE ACETONIDE 40 MG/ML
40 INJECTION, SUSPENSION INTRA-ARTICULAR; INTRAMUSCULAR ONCE
Status: COMPLETED | OUTPATIENT
Start: 2021-09-08 | End: 2021-09-08

## 2021-09-08 RX ORDER — LIDOCAINE HYDROCHLORIDE 10 MG/ML
6 INJECTION, SOLUTION INFILTRATION; PERINEURAL ONCE
Status: COMPLETED | OUTPATIENT
Start: 2021-09-08 | End: 2021-09-08

## 2021-09-08 RX ADMIN — LIDOCAINE HYDROCHLORIDE 6 ML: 10 INJECTION, SOLUTION INFILTRATION; PERINEURAL at 16:05

## 2021-09-08 RX ADMIN — TRIAMCINOLONE ACETONIDE 40 MG: 40 INJECTION, SUSPENSION INTRA-ARTICULAR; INTRAMUSCULAR at 16:05

## 2021-09-08 RX ADMIN — TRIAMCINOLONE ACETONIDE 40 MG: 40 INJECTION, SUSPENSION INTRA-ARTICULAR; INTRAMUSCULAR at 16:06

## 2021-09-08 RX ADMIN — BUPIVACAINE HYDROCHLORIDE 15 MG: 2.5 INJECTION, SOLUTION EPIDURAL; INFILTRATION; INTRACAUDAL at 16:03

## 2021-09-08 NOTE — PROGRESS NOTES
Chief Complaint   Patient presents with    Knee Pain     Left knee 8/10 with WB. Pt expressed minimal discomfort with NWB. Pt expressed Rt knee is 6/10 pain. Pt expressed that Left knee pain overpowers right knee pain. Pt has difficulty with sleeping. Pt expressed feeling swelling in both knees. SUBJECTIVE: Meaghan Yang presents for follow-up visit. She is being followed for bilateral knee OA. States that the bilateral knee injections at her last visit did give her good relief and she would like another set of injections today. She states that she has lost some weight working on home exercise program and she will continue working on weight reduction. Review of Systems -   General ROS: negative for - chills, fatigue, fever or night sweats  Respiratory ROS: no cough, shortness of breath, or wheezing  Cardiovascular ROS: no chest pain or dyspnea on exertion  Gastrointestinal ROS: no abdominal pain, change in bowel habits, or black or bloody stools  Genitourinary: no hematuria, dysuria, or incontinence   Musculoskeletal ROS:see above  Neurological ROS: no TIA or stroke symptoms       OBJECTIVE:   Alert and oriented X 3, no acute distress, respirations easy and unlabored with no audible wheezes, skin warm and dry, speech and dress appropriate for noted age, affect euthymic. Extremity:  Bilateral Knee exam   Skin intact. No erythema/induration/fluctuence at knee.  No effusion noted   Negative ballotment   Patella tracks normally   Negative patellar grind test and  Negative J sign.  Mild crepitus with flexion and extension of the knee   Medial and lateral joint line pain with palpation    Stable to varus and valgus at 0 and 30 degrees of flexion.  Negative McMurrays, Apleys.  Negative Lachman's and posterior drawer.  Active range of motion 0-95 with pain.  Passive range on motion 0-105 with pain   Compartments soft and compressible throughout leg.    Calf soft and nontender with palpation     Demonstrates active ankle plantar/dorsiflexion/great toe extension.  Sensation intact to light touch sural/deep peroneal/superficial peroneal/saphenous/posterior tibial nerve distributions to foot/ankle.  Palpable dorsalis pedis and posterior tibialis pulses. There were no vitals taken for this visit. ASSESSMENT:   Diagnosis Orders   1. Primary osteoarthritis of both knees       DISCUSSION:   I discussed the natural course and history of knee arthritis with the patient as well as treatment options including NSAIDs, weight loss, activity modification, and injections as well as surgery. The patient would like to proceed with the injections. Discussed risks and benefits of CSI including risk of infection at the joint, bleeding or bruising at the injection site and elevation of blood sugar levels and cartilage degradation with repetitive use. Patient expressed understanding of these risks and elected to move forward with corticosteroid injection today in the office. PROCEDURE:  With the patient's written and verbal permission, Bilateral  knee was prepped in standard sterile fashion with betadine and alcohol. Skin of the knee was anesthetized with ethyl chloride spray prior to injection. The knee was then injected with 1 ml Kenalog (40mg), 3 ml PF 0.25% marcaine and 3 ml 1% PF Lidocaine were injected using the lateral inferior approach without difficulty. The patient tolerated this well. A band-aid was applied. The patient ambulated out of clinic on their own accord without difficulty.       PLAN:  Activity weightbearing as tolerated  Maintain healthy lifestyle and weight loss  Continue with over-the-counter analgesics as needed  Recommended to continue with ice and elevation as needed for increased inflammation particularly for the first few days after injection  Postinjection care as instructed  Advised that goal is for 6 to 8 weeks of moderate relief with steroid injections  Reminded patient that injections can be done every 3 months  Advised on signs and symptoms of infection to monitor for  Follow-up in 3 months or as needed for repeat injections  Briefly discussed viscosupplementation injections if the CSI injections are less effective however she states that she is still getting fairly good temporary relief with the CSI injections. Electronically signed by DULCE Luna on 9/8/2021 at 11:27 AM  Note: This report was completed using computerEyefreight voiced recognition software. Every effort has been made to ensure accuracy; however, inadvertent computerized transcription errors may be present.

## 2021-09-08 NOTE — PROGRESS NOTES
Meaghan Yang is a 63 y.o. female who presents for follow up of Bilateral Knees    SURGEON: Dr. Calli Delgado MD  Date of Injury/Surgery: 3/4/2019  Date last seen in office: 6/2/2021    Symptoms: worse  New complaints: Rt knee is started to get worst. Pt expressed that injection gives symptom relief for about 8 weeks followed by a gradual onset of pain. \    Weightbearing: right lower and left lower Full weight bearing      Assistive device No Device  Participating in therapy (location if yes)?  no    Refills Needed: None  Order/Referral Needed: N/A

## 2021-09-09 ENCOUNTER — TELEPHONE (OUTPATIENT)
Dept: CARDIOLOGY CLINIC | Age: 66
End: 2021-09-09

## 2021-09-09 RX ORDER — CLOPIDOGREL BISULFATE 75 MG/1
75 TABLET ORAL DAILY
Qty: 90 TABLET | Refills: 3 | Status: SHIPPED
Start: 2021-09-09 | End: 2022-01-06 | Stop reason: SDUPTHER

## 2021-09-29 ENCOUNTER — OFFICE VISIT (OUTPATIENT)
Dept: PRIMARY CARE CLINIC | Age: 66
End: 2021-09-29
Payer: COMMERCIAL

## 2021-09-29 VITALS
HEART RATE: 76 BPM | SYSTOLIC BLOOD PRESSURE: 126 MMHG | WEIGHT: 229 LBS | TEMPERATURE: 98 F | HEIGHT: 63 IN | BODY MASS INDEX: 40.57 KG/M2 | OXYGEN SATURATION: 97 % | DIASTOLIC BLOOD PRESSURE: 80 MMHG

## 2021-09-29 DIAGNOSIS — Z12.31 BREAST CANCER SCREENING BY MAMMOGRAM: ICD-10-CM

## 2021-09-29 DIAGNOSIS — F51.01 PRIMARY INSOMNIA: ICD-10-CM

## 2021-09-29 DIAGNOSIS — H69.81 DYSFUNCTION OF RIGHT EUSTACHIAN TUBE: ICD-10-CM

## 2021-09-29 DIAGNOSIS — J01.40 ACUTE NON-RECURRENT PANSINUSITIS: Primary | ICD-10-CM

## 2021-09-29 PROCEDURE — 99213 OFFICE O/P EST LOW 20 MIN: CPT | Performed by: FAMILY MEDICINE

## 2021-09-29 RX ORDER — TRAZODONE HYDROCHLORIDE 50 MG/1
50 TABLET ORAL NIGHTLY
Qty: 30 TABLET | Refills: 2 | Status: SHIPPED
Start: 2021-09-29 | End: 2021-10-18 | Stop reason: SDUPTHER

## 2021-09-29 RX ORDER — AMOXICILLIN 500 MG/1
500 CAPSULE ORAL 3 TIMES DAILY
Qty: 30 CAPSULE | Refills: 0 | Status: SHIPPED | OUTPATIENT
Start: 2021-09-29 | End: 2021-10-09

## 2021-09-29 SDOH — ECONOMIC STABILITY: FOOD INSECURITY: WITHIN THE PAST 12 MONTHS, YOU WORRIED THAT YOUR FOOD WOULD RUN OUT BEFORE YOU GOT MONEY TO BUY MORE.: NEVER TRUE

## 2021-09-29 SDOH — ECONOMIC STABILITY: FOOD INSECURITY: WITHIN THE PAST 12 MONTHS, THE FOOD YOU BOUGHT JUST DIDN'T LAST AND YOU DIDN'T HAVE MONEY TO GET MORE.: NEVER TRUE

## 2021-09-29 ASSESSMENT — SOCIAL DETERMINANTS OF HEALTH (SDOH): HOW HARD IS IT FOR YOU TO PAY FOR THE VERY BASICS LIKE FOOD, HOUSING, MEDICAL CARE, AND HEATING?: NOT HARD AT ALL

## 2021-09-29 ASSESSMENT — ENCOUNTER SYMPTOMS
COUGH: 0
SORE THROAT: 1

## 2021-09-29 NOTE — PROGRESS NOTES
Jluis Jara, a female of 61 y.o. came to the office 9/29/2021. Patient Active Problem List   Diagnosis    Hypothyroidism    Graves disease    Depression    GERD (gastroesophageal reflux disease)    Chronic pain of both knees    Non-[facility] elevation [facility] ([facility]) [facility]    Morbid obesity ([facility] [facility]    Coronary artery disease involving native coronary artery of native heart with unstable angina pectoris ([facility] [address]    LITO (generalized anxiety disorder)          Dizziness  This is a new problem. Associated symptoms include congestion, headaches (sinus) and a sore throat. Pertinent negatives include no chills, coughing or fever. Treatments tried: meclizine, sudafed. The treatment provided mild relief. Ear Fullness   There is pain in both ears. The current episode started more than 1 month ago (few). The problem has been unchanged. There has been no fever. Associated symptoms include headaches (sinus) and a sore throat. Pertinent negatives include no coughing, ear discharge or hearing loss. Associated symptoms comments: Throbbing sens dinging for 3 times and then gets dizzy. Ears won't pop. . She has tried nothing for the symptoms. Insomnia: has tried melanin which relaxes her but doesn't long.      Allergies   Allergen Reactions    Mobic [Meloxicam] Other (See Comments)       Current Outpatient Medications on File Prior to Visit   Medication Sig Dispense Refill    clopidogrel (PLAVIX) 75 MG tablet Take 1 tablet by mouth daily 90 tablet 3    fluticasone (FLONASE) 50 MCG/ACT nasal spray USE 2 SPRAY(S) IN EACH NOSTRIL DAILY 16 g 2    EQ ALLERGY RELIEF 10 MG tablet Take 1 tablet by mouth once daily 30 tablet 2    levothyroxine (SYNTHROID) 137 MCG tablet TAKE 1 TABLET BY MOUTH ONCE DAILY 90 tablet 1    metFORMIN (GLUCOPHAGE) 500 MG tablet Take 1 tablet by mouth 3 times daily (with meals) 270 tablet 2    FLUoxetine (PROZAC) 40 MG capsule Take 1 capsule by mouth once daily 90 capsule 1    FLUoxetine (PROZAC) 20 MG capsule Take 1 capsule by mouth once daily 90 capsule 1    buPROPion (WELLBUTRIN XL) 300 MG extended release tablet TAKE 1 TABLET BY MOUTH ONCE DAILY IN THE MORNING 90 tablet 1    celecoxib (CELEBREX) 200 MG capsule Take 1 capsule by mouth 2 times daily 180 capsule 1    atorvastatin (LIPITOR) 40 MG tablet Take 1 tablet by mouth nightly 90 tablet 3    hydrOXYzine (VISTARIL) 25 MG capsule TAKE 1 CAPSULE BY MOUTH THREE TIMES DAILY AS NEEDED FOR ANXIETY      meclizine (ANTIVERT) 12.5 MG tablet Take 1 tablet by mouth 3 times daily as needed for Dizziness 60 tablet 0    ferrous sulfate (IRON 325) 325 (65 Fe) MG tablet Take one tablet for breakfast and lunch on Monday, Wednesday and Friday; take each dose with a vitamin C tablet 180 tablet 2    ascorbic acid (V-R VITAMIN C) 250 MG tablet Take 1 tablet by mouth daily Take one tablet for breakfast and lunch on Monday, Wednesday and Friday; take each dose with an iron tablet 80 tablet 2    Multiple Vitamin (MVI, CELEBRATE, CHEWABLE TABLET) Take two tablets each day 180 tablet 3    magnesium (MAGNESIUM-OXIDE) 250 MG TABS tablet Take 500 mg by mouth 2 times daily       aspirin 81 MG EC tablet Take 1 tablet by mouth daily 30 tablet 3    nitroGLYCERIN (NITROSTAT) 0.4 MG SL tablet up to max of 3 total doses. If no relief after 1 dose, call 911. 25 tablet 0    Cyanocobalamin (VITAMIN B 12 PO) Take by mouth      traMADol (ULTRAM) 50 MG tablet Take 50 mg by mouth every 6 hours as needed. (Patient not taking: Reported on 9/29/2021)       No current facility-administered medications on file prior to visit. Review of Systems   Constitutional: Negative for chills and fever. HENT: Positive for congestion and sore throat. Negative for ear discharge and hearing loss. Respiratory: Negative for cough. Neurological: Positive for dizziness and headaches (sinus).    other review of systems reviewed and are negative    OBJECTIVE:  /80   Pulse 76   Temp 98 °F (36.7 °C)   Ht 5' 3\" (1.6 m)   Wt 229 lb (103.9 kg)   SpO2 97%   BMI 40.57 kg/m²      Physical Exam  Constitutional:       General: She is not in acute distress. HENT:      Right Ear: Tympanic membrane normal.      Left Ear: Tympanic membrane normal.      Nose: No mucosal edema or rhinorrhea. Right Sinus: No maxillary sinus tenderness or frontal sinus tenderness. Left Sinus: No maxillary sinus tenderness or frontal sinus tenderness. Mouth/Throat:      Pharynx: Uvula midline. No oropharyngeal exudate or posterior oropharyngeal erythema. Cardiovascular:      Rate and Rhythm: Normal rate and regular rhythm. Pulmonary:      Effort: Pulmonary effort is normal.      Breath sounds: Normal breath sounds. Musculoskeletal:      Cervical back: Neck supple. Lymphadenopathy:      Cervical: No cervical adenopathy. ASSESSMENT AND PLAN:    Heather Cook was seen today for dizziness, drainage and ear fullness. Diagnoses and all orders for this visit:    Acute non-recurrent pansinusitis  -     amoxicillin (AMOXIL) 500 MG capsule; Take 1 capsule by mouth 3 times daily for 10 days    Dysfunction of right eustachian tube    Breast cancer screening by mammogram  -     [facility] DIGITAL SCREEN W OR WO CAD BILATERAL; Future    Primary insomnia  -     traZODone (DESYREL) 50 MG tablet; Take 1 tablet by mouth nightly    - continue to try and pop ears  - sudafed prn and continue Flonase NS  - antivert prn. Return if symptoms worsen or fail to improve, for as scheduled.     Tonya Dominguez,

## 2021-09-30 DIAGNOSIS — M17.0 BILATERAL PRIMARY OSTEOARTHRITIS OF KNEE: Primary | ICD-10-CM

## 2021-09-30 RX ORDER — HYALURONATE SODIUM, STABILIZED 60 MG/3 ML
3 SYRINGE (ML) INTRAARTICULAR ONCE
Qty: 6 ML | Refills: 0 | Status: SHIPPED | OUTPATIENT
Start: 2021-09-30 | End: 2021-09-30

## 2021-10-11 RX ORDER — ATORVASTATIN CALCIUM 40 MG/1
40 TABLET, FILM COATED ORAL NIGHTLY
Qty: 90 TABLET | Refills: 3 | Status: SHIPPED
Start: 2021-10-11 | End: 2022-10-14 | Stop reason: SDUPTHER

## 2021-10-18 DIAGNOSIS — F51.01 PRIMARY INSOMNIA: ICD-10-CM

## 2021-10-18 DIAGNOSIS — E05.00 GRAVES DISEASE: ICD-10-CM

## 2021-10-18 RX ORDER — LEVOTHYROXINE SODIUM 137 UG/1
TABLET ORAL
Qty: 90 TABLET | Refills: 1 | Status: SHIPPED
Start: 2021-10-18 | End: 2021-10-26 | Stop reason: SDUPTHER

## 2021-10-18 RX ORDER — TRAZODONE HYDROCHLORIDE 100 MG/1
100 TABLET ORAL NIGHTLY
Qty: 90 TABLET | Refills: 0 | Status: SHIPPED
Start: 2021-10-18 | End: 2022-01-18 | Stop reason: SDUPTHER

## 2021-10-18 NOTE — TELEPHONE ENCOUNTER
Refill of trazodone 50mg. Pt would like this increased to 100mg because she is unable to sleep with just the 50mg tablet. Pt has been taking 2 tablets qhs. Also refills of Levothyroxine 137mcg.     Tre George    Last visit- 9/29/21

## 2021-10-22 ENCOUNTER — OFFICE VISIT (OUTPATIENT)
Dept: PRIMARY CARE CLINIC | Age: 66
End: 2021-10-22
Payer: COMMERCIAL

## 2021-10-22 VITALS
WEIGHT: 236.5 LBS | BODY MASS INDEX: 41.89 KG/M2 | TEMPERATURE: 97.2 F | DIASTOLIC BLOOD PRESSURE: 84 MMHG | OXYGEN SATURATION: 98 % | HEART RATE: 69 BPM | SYSTOLIC BLOOD PRESSURE: 138 MMHG

## 2021-10-22 DIAGNOSIS — E05.00 GRAVES DISEASE: ICD-10-CM

## 2021-10-22 DIAGNOSIS — I25.110 CORONARY ARTERY DISEASE INVOLVING NATIVE CORONARY ARTERY OF NATIVE HEART WITH UNSTABLE ANGINA PECTORIS (HCC): ICD-10-CM

## 2021-10-22 DIAGNOSIS — R06.02 SOB (SHORTNESS OF BREATH): Primary | ICD-10-CM

## 2021-10-22 LAB
ALBUMIN SERPL-MCNC: 3.6 G/DL (ref 3.5–5.2)
ALP BLD-CCNC: 75 U/L (ref 35–104)
ALT SERPL-CCNC: 15 U/L (ref 0–32)
ANION GAP SERPL CALCULATED.3IONS-SCNC: 7 MMOL/L (ref 7–16)
AST SERPL-CCNC: 30 U/L (ref 0–31)
BILIRUB SERPL-MCNC: 0.4 MG/DL (ref 0–1.2)
BUN BLDV-MCNC: 13 MG/DL (ref 6–23)
CALCIUM SERPL-MCNC: 9.5 MG/DL (ref 8.6–10.2)
CHLORIDE BLD-SCNC: 104 MMOL/L (ref 98–107)
CHOLESTEROL, TOTAL: 112 MG/DL (ref 0–199)
CO2: 29 MMOL/L (ref 22–29)
CREAT SERPL-MCNC: 0.8 MG/DL (ref 0.5–1)
GFR AFRICAN AMERICAN: >60
GFR NON-AFRICAN AMERICAN: >60 ML/MIN/1.73
GLUCOSE BLD-MCNC: 87 MG/DL (ref 74–99)
HDLC SERPL-MCNC: 53 MG/DL
LDL CHOLESTEROL CALCULATED: 49 MG/DL (ref 0–99)
POTASSIUM SERPL-SCNC: 4.5 MMOL/L (ref 3.5–5)
SODIUM BLD-SCNC: 140 MMOL/L (ref 132–146)
T4 FREE: 1.49 NG/DL (ref 0.93–1.7)
TOTAL PROTEIN: 6.8 G/DL (ref 6.4–8.3)
TRIGL SERPL-MCNC: 48 MG/DL (ref 0–149)
TSH SERPL DL<=0.05 MIU/L-ACNC: 0.15 UIU/ML (ref 0.27–4.2)
VLDLC SERPL CALC-MCNC: 10 MG/DL

## 2021-10-22 PROCEDURE — 99214 OFFICE O/P EST MOD 30 MIN: CPT | Performed by: FAMILY MEDICINE

## 2021-10-22 RX ORDER — FUROSEMIDE 20 MG/1
20 TABLET ORAL DAILY PRN
Qty: 30 TABLET | Refills: 0 | Status: SHIPPED
Start: 2021-10-22 | End: 2022-02-14 | Stop reason: ALTCHOICE

## 2021-10-22 ASSESSMENT — ENCOUNTER SYMPTOMS
DIARRHEA: 0
SHORTNESS OF BREATH: 1
ORTHOPNEA: 0
ROS SKIN COMMENTS: NO HAIR LOSS  NO DRY SKIN  NO BRITTLE NAILS
CONSTIPATION: 0
COUGH: 1

## 2021-10-22 NOTE — PROGRESS NOTES
Thao Cordova, a female of 61 y.o. came to the office 10/22/2021. Patient Active Problem List   Diagnosis    Hypothyroidism    Graves disease    Depression    GERD (gastroesophageal reflux disease)    Chronic pain of both knees    Non-[facility] elevation [facility]) [facility]    Morbid obesity ([facility]    Coronary artery disease involving native coronary artery of native heart with unstable angina pectoris ([facility]    LITO (generalized anxiety disorder)          Shortness of Breath  This is a recurrent problem. Episode onset: 1 wk ago. The problem has been gradually worsening. Associated symptoms include leg swelling. Pertinent negatives include no chest pain, orthopnea or PND. Exacerbated by: was on Philip Kaitlin until a couple days ago due to her CAD. Her past medical history is significant for CAD. Coronary Artery Disease  Presents for follow-up visit. Symptoms include leg swelling and shortness of breath. Pertinent negatives include no chest pain or palpitations. The symptoms have been stable. Compliance with diet is variable. Compliance with exercise is variable. Compliance with medications is good. hypothyroidism: hasn't been to Endo.       Allergies   Allergen Reactions    Mobic [Meloxicam] Other (See Comments)       Current Outpatient Medications on File Prior to Visit   Medication Sig Dispense Refill    levothyroxine (SYNTHROID) 137 MCG tablet TAKE 1 TABLET BY MOUTH ONCE DAILY 90 tablet 1    traZODone (DESYREL) 100 MG tablet Take 1 tablet by mouth nightly 90 tablet 0    atorvastatin (LIPITOR) 40 MG tablet Take 1 tablet by mouth nightly 90 tablet 3    clopidogrel (PLAVIX) 75 MG tablet Take 1 tablet by mouth daily 90 tablet 3    fluticasone (FLONASE) 50 MCG/ACT nasal spray USE 2 SPRAY(S) IN EACH NOSTRIL DAILY 16 g 2    EQ ALLERGY RELIEF 10 MG tablet Take 1 tablet by mouth once daily 30 tablet 2    metFORMIN (GLUCOPHAGE) 500 MG tablet Take 1 tablet by mouth 3 times daily (with meals) 270 tablet 2    FLUoxetine (PROZAC) 40 MG capsule Take 1 capsule by mouth once daily 90 capsule 1    FLUoxetine (PROZAC) 20 MG capsule Take 1 capsule by mouth once daily 90 capsule 1    buPROPion (WELLBUTRIN XL) 300 MG extended release tablet TAKE 1 TABLET BY MOUTH ONCE DAILY IN THE MORNING 90 tablet 1    celecoxib (CELEBREX) 200 MG capsule Take 1 capsule by mouth 2 times daily 180 capsule 1    hydrOXYzine (VISTARIL) 25 MG capsule TAKE 1 CAPSULE BY MOUTH THREE TIMES DAILY AS NEEDED FOR ANXIETY      meclizine (ANTIVERT) 12.5 MG tablet Take 1 tablet by mouth 3 times daily as needed for Dizziness 60 tablet 0    ferrous sulfate (IRON 325) 325 (65 Fe) MG tablet Take one tablet for breakfast and lunch on Monday, Wednesday and Friday; take each dose with a vitamin C tablet 180 tablet 2    ascorbic acid (V-R VITAMIN C) 250 MG tablet Take 1 tablet by mouth daily Take one tablet for breakfast and lunch on Monday, Wednesday and Friday; take each dose with an iron tablet 80 tablet 2    Multiple Vitamin (MVI, CELEBRATE, CHEWABLE TABLET) Take two tablets each day 180 tablet 3    magnesium (MAGNESIUM-OXIDE) 250 MG TABS tablet Take 500 mg by mouth 2 times daily       aspirin 81 MG EC tablet Take 1 tablet by mouth daily 30 tablet 3    nitroGLYCERIN (NITROSTAT) 0.4 MG SL tablet up to max of 3 total doses. If no relief after 1 dose, call 911. 25 tablet 0    Cyanocobalamin (VITAMIN B 12 PO) Take by mouth       Current Facility-Administered Medications on File Prior to Visit   Medication Dose Route Frequency Provider Last Rate Last Admin    sodium hyaluronate (DUROLANE) injection PRSY 60 mg  60 mg Intra-artICUlar Once Bailey Later, PA-C        sodium hyaluronate (DUROLANE) injection PRSY 60 mg  60 mg Intra-artICUlar Once Bailey Later, PA-C           Review of Systems   Constitutional: Positive for fatigue. HENT:        C/o dry mouth. Respiratory: Positive for cough (dry) and shortness of breath. Cardiovascular: Positive for leg swelling. Negative for chest pain, palpitations, orthopnea and PND. Gastrointestinal: Negative for constipation and diarrhea. Endocrine: Positive for polydipsia and polyuria. Negative for cold intolerance and heat intolerance. Skin:        No hair loss  No dry skin  No brittle nails   Neurological: Negative for tremors. Psychiatric/Behavioral: Negative for dysphoric mood and sleep disturbance. The patient is not nervous/anxious. other review of systems reviewed and are negative    OBJECTIVE:  /84   Pulse 69   Temp 97.2 °F (36.2 °C)   Wt 236 lb 8 oz (107.3 kg)   SpO2 98%   BMI 41.89 kg/m²      Physical Exam  Vitals reviewed. HENT:      Mouth/Throat:      Mouth: Mucous membranes are moist.      Pharynx: Oropharynx is clear. Eyes:      General: No scleral icterus. Conjunctiva/sclera: Conjunctivae normal.   Neck:      Thyroid: No thyromegaly. Vascular: No carotid bruit. Cardiovascular:      Rate and Rhythm: Normal rate and regular rhythm. Heart sounds: No murmur heard. Pulmonary:      Effort: Pulmonary effort is normal.      Breath sounds: Normal breath sounds. No wheezing or rales. Abdominal:      General: Bowel sounds are normal.      Palpations: Abdomen is soft. There is no mass. Tenderness: There is no abdominal tenderness. There is no guarding or rebound. Musculoskeletal:         General: Normal range of motion. Cervical back: Neck supple. Lymphadenopathy:      Cervical: No cervical adenopathy. Skin:     General: Skin is warm and dry. Neurological:      Mental Status: She is alert and oriented to person, place, and time. Psychiatric:         Mood and Affect: Mood normal.       Wt up 7 lbs. ASSESSMENT AND PLAN:    Simon Valdes was seen today for shortness of breath and blood work. Diagnoses and all orders for this visit:    SOB (shortness of breath)    Graves disease  -     TSH without Reflex;  Future  -     T4,

## 2021-10-26 ENCOUNTER — TELEPHONE (OUTPATIENT)
Dept: PRIMARY CARE CLINIC | Age: 66
End: 2021-10-26

## 2021-10-26 DIAGNOSIS — E05.00 GRAVES DISEASE: ICD-10-CM

## 2021-10-26 RX ORDER — LEVOTHYROXINE SODIUM 0.12 MG/1
TABLET ORAL
Qty: 90 TABLET | Refills: 1 | Status: SHIPPED
Start: 2021-10-26 | End: 2022-01-18 | Stop reason: SDUPTHER

## 2021-10-26 NOTE — TELEPHONE ENCOUNTER
Called patient discussed labs we will decrease her Synthroid to 125 mcg from 137 mcg due to low TSH. She states she is feeling better.

## 2021-11-02 DIAGNOSIS — M25.562 CHRONIC PAIN OF BOTH KNEES: ICD-10-CM

## 2021-11-02 DIAGNOSIS — M25.561 CHRONIC PAIN OF BOTH KNEES: ICD-10-CM

## 2021-11-02 DIAGNOSIS — G89.29 CHRONIC PAIN OF BOTH KNEES: ICD-10-CM

## 2021-11-02 RX ORDER — TRAMADOL HYDROCHLORIDE 50 MG/1
50 TABLET ORAL EVERY 6 HOURS PRN
Qty: 28 TABLET | Refills: 0 | Status: SHIPPED
Start: 2021-11-02 | End: 2021-11-21

## 2021-11-09 ENCOUNTER — HOSPITAL ENCOUNTER (OUTPATIENT)
Dept: MAMMOGRAPHY | Age: 66
Discharge: HOME | End: 2021-11-11
Payer: COMMERCIAL

## 2021-11-09 DIAGNOSIS — Z12.31 BREAST CANCER SCREENING BY MAMMOGRAM: ICD-10-CM

## 2021-11-09 PROCEDURE — 77063 BREAST TOMOSYNTHESIS BI: CPT

## 2021-11-20 DIAGNOSIS — M25.561 CHRONIC PAIN OF BOTH KNEES: ICD-10-CM

## 2021-11-20 DIAGNOSIS — G89.29 CHRONIC PAIN OF BOTH KNEES: ICD-10-CM

## 2021-11-20 DIAGNOSIS — M25.562 CHRONIC PAIN OF BOTH KNEES: ICD-10-CM

## 2021-11-21 RX ORDER — CELECOXIB 200 MG/1
CAPSULE ORAL
Qty: 180 CAPSULE | Refills: 0 | OUTPATIENT
Start: 2021-11-21

## 2021-11-21 RX ORDER — TRAMADOL HYDROCHLORIDE 50 MG/1
50 TABLET ORAL DAILY PRN
Qty: 30 TABLET | Refills: 0 | Status: SHIPPED | OUTPATIENT
Start: 2021-11-21 | End: 2021-12-21

## 2021-11-23 DIAGNOSIS — G89.29 CHRONIC PAIN OF BOTH KNEES: ICD-10-CM

## 2021-11-23 DIAGNOSIS — M25.561 CHRONIC PAIN OF BOTH KNEES: ICD-10-CM

## 2021-11-23 DIAGNOSIS — M25.562 CHRONIC PAIN OF BOTH KNEES: ICD-10-CM

## 2021-11-23 RX ORDER — TRAMADOL HYDROCHLORIDE 50 MG/1
50 TABLET ORAL EVERY 6 HOURS PRN
Qty: 28 TABLET | Refills: 0 | OUTPATIENT
Start: 2021-11-23 | End: 2021-11-30

## 2021-11-24 NOTE — TELEPHONE ENCOUNTER
Patient states that she always has extreme pain in the few weeks leading up to her 3 month injection. She is scheduled for the injection 12/8. She states that she only takes it more than once daily is during this time.

## 2021-12-08 ENCOUNTER — OFFICE VISIT (OUTPATIENT)
Dept: ORTHOPEDIC SURGERY | Age: 66
End: 2021-12-08
Payer: COMMERCIAL

## 2021-12-08 VITALS — TEMPERATURE: 97.7 F

## 2021-12-08 DIAGNOSIS — M17.0 BILATERAL PRIMARY OSTEOARTHRITIS OF KNEE: Primary | ICD-10-CM

## 2021-12-08 PROCEDURE — 99212 OFFICE O/P EST SF 10 MIN: CPT | Performed by: ORTHOPAEDIC SURGERY

## 2021-12-08 PROCEDURE — 99213 OFFICE O/P EST LOW 20 MIN: CPT | Performed by: PHYSICIAN ASSISTANT

## 2021-12-08 PROCEDURE — 2500000003 HC RX 250 WO HCPCS

## 2021-12-08 PROCEDURE — 20610 DRAIN/INJ JOINT/BURSA W/O US: CPT | Performed by: PHYSICIAN ASSISTANT

## 2021-12-08 PROCEDURE — 6360000002 HC RX W HCPCS

## 2021-12-08 RX ORDER — LIDOCAINE HYDROCHLORIDE 10 MG/ML
6 INJECTION, SOLUTION INFILTRATION; PERINEURAL ONCE
Status: COMPLETED | OUTPATIENT
Start: 2021-12-08 | End: 2021-12-08

## 2021-12-08 RX ORDER — TRIAMCINOLONE ACETONIDE 40 MG/ML
40 INJECTION, SUSPENSION INTRA-ARTICULAR; INTRAMUSCULAR ONCE
Status: COMPLETED | OUTPATIENT
Start: 2021-12-08 | End: 2021-12-08

## 2021-12-08 RX ORDER — BUPIVACAINE HYDROCHLORIDE 2.5 MG/ML
6 INJECTION, SOLUTION EPIDURAL; INFILTRATION; INTRACAUDAL ONCE
Status: COMPLETED | OUTPATIENT
Start: 2021-12-08 | End: 2021-12-08

## 2021-12-08 RX ADMIN — TRIAMCINOLONE ACETONIDE 40 MG: 40 INJECTION, SUSPENSION INTRA-ARTICULAR; INTRAMUSCULAR at 13:06

## 2021-12-08 RX ADMIN — LIDOCAINE HYDROCHLORIDE 6 ML: 10 INJECTION, SOLUTION INFILTRATION; PERINEURAL at 13:06

## 2021-12-08 RX ADMIN — BUPIVACAINE HYDROCHLORIDE 15 MG: 2.5 INJECTION, SOLUTION EPIDURAL; INFILTRATION; INTRACAUDAL at 13:05

## 2021-12-08 NOTE — PROGRESS NOTES
Soni Raya is a 61 y.o. female who presents for follow up of 3 Month Bilat Knee CSI    SURGEON: Dr. Levar Hitchcock MD  Date of Injury/Surgery: 2017  Date last seen in office: 9/8/2021    Symptoms: worse  New complaints: Pt initially feels better after the injection, following one month Pt feels symptoms again that constantly worsen. Pt expressed toward then end of 3 Months Pt symptoms are worse. Pt requested Pain Management     Weightbearing: right lower and left lower Full weight bearing      Assistive device No Device  Participating in therapy (location if yes)?  No    Refills Needed: None  Order/Referral Needed: N/A

## 2021-12-08 NOTE — PROGRESS NOTES
Chief Complaint   Patient presents with    Knee Pain     Pt expressed having consistent 8/10 pain with Bilateral knees. Pt has crepitus in bilateral knees. Pt expressed having painful crepitus from time to time. Pt expressed having sharp shooting pain. Pt expressed having increased pain after being seated for a long duration of time. SUBJECTIVE: Trice Arreola presents today as a follow-up for bilateral knee pain. She does have known bilateral knee OA and has been getting CSI injections with good relief. She states the last set of bilateral knee injections did give her good relief but are wearing off and she would like another set of shots today. She states that her knee pain is worse with prolonged standing, walking or going up and down steps. She is interested in getting set up with pain management regarding her medications. Review of Systems -   General ROS: negative for - chills, fatigue, fever or night sweats  Respiratory ROS: no cough, shortness of breath, or wheezing  Cardiovascular ROS: no chest pain or dyspnea on exertion  Gastrointestinal ROS: no abdominal pain, change in bowel habits, or black or bloody stools  Genitourinary: no hematuria, dysuria, or incontinence   Musculoskeletal ROS:see above  Neurological ROS: no TIA or stroke symptoms       OBJECTIVE:   Alert and oriented X 3, no acute distress, respirations easy and unlabored with no audible wheezes, skin warm and dry, speech and dress appropriate for noted age, affect euthymic.    bilateral Knee Exam   Skin intact. No erythema/induration/fluctuence at knee.  no effusion noted   Patella tracks normally   positive patellar grind test and negative J sign.  mild crepitus with flexion and extension of the knee   both Medial and Lateral joint line pain with palpation   1+ instability to varus and valgus at 0 and 30 degrees of flexion.  positive McMurrays, Apleys.  negative Lachman's and posterior drawer.     Active range of motion 0-100 ° with pain    Passive range on motion 0-110 ° with pain    Compartments soft and compressible throughout leg   Calf soft and nontender with palpation     Sensation intact to light touch sural, deep peroneal, superficial peroneal, saphenous, posterior tibial  nerve distributions to foot/ankle. XR: 12/8/21   Not taken today. Temp 97.7 °F (36.5 °C) (Oral)     ASSESSMENT:   Diagnosis Orders   1. Bilateral primary osteoarthritis of knee       DISCUSSION:   I discussed the natural course and history of knee arthritis with the patient as well as treatment options including NSAIDs, weight loss, activity modification, and injections as well as surgery. The patient would like to proceed with the injections. Discussed risks and benefits of CSI including risk of infection at the joint, bleeding or bruising at the injection site and elevation of blood sugar levels and cartilage degradation with repetitive use. Patient expressed understanding of these risks and elected to move forward with corticosteroid injection today in the office. Knee  Injection Procedure Note  With the patient's written and verbal permission, Bilateral  knee was prepped in standard sterile fashion with betadine and alcohol. Skin of the knee was anesthetized with ethyl chloride spray prior to injection. The knee was then injected with 1 ml Kenalog (40mg), 3 ml PF 0.25% marcaine and 3 ml 1% PF Lidocaine were injected using the lateral inferior approach without difficulty. The patient tolerated this well. A band-aid was applied. The patient ambulated out of clinic on their own accord without difficulty. PLAN:  CSI as above, post injection care instructed  Advised on S/S of when to seek follow up care  Patient to contact office if not achieving at least 6-8 weeks of moderate relief  Continue to stay as active as able, maintain healthy weight   Follow-up in 3 months for reevaluation and possibly another set of injections.   Per patient's request, we are going to set her up for pain management regarding her pain meds. Electronically signed by DULCE Crane on 12/8/2021 at 12:07 PM  Note: This report was completed using [email or web address] voiced recognition software. Every effort has been made to ensure accuracy; however, inadvertent computerized transcription errors may be present.

## 2022-01-04 NOTE — PROGRESS NOTES
[id number] [facility] Cardiology Progress Note  Dr. Nessa Hawk      Referring Physician: Lj Gonzalez DO  CHIEF COMPLAINT:   Chief Complaint   Patient presents with    Coronary Artery Disease     6 mo-Pt c/o having dizziness and her PCP states that her ears are fine. She also states that she still feels the GERD feeling. Her PCP also lowered her thyroid medication due to getting palpitations. HISTORY OF PRESENT ILLNESS:   Patient is 61years old female with a family history of CAD s/p PCI to , is here for follow up visit. Chronic dizziness, patient denies any chest pain, no shortness of breath, no palpitations, no pedal edema, no PND, no orthopnea, no syncope, no presyncopal episodes. Dizziness with spinning sensation, better with meclizine prescribed by PCP      Past Medical History:   Diagnosis Date    Arthritis     CAD (coronary artery disease)     9-17-20 yisel 2.0x22 francisca om.      COVID-19 virus infection 07/2020    Depression     GERD (gastroesophageal reflux disease)     Graves disease     Hypertension     Hypothyroidism     SECONDARY TO GRAVES DISEASE    NSTEMI (non-ST elevated myocardial infarction) ([facility] [address] 09/16/2020    Obesity          Past Surgical History:   Procedure Laterality Date    CARDIAC CATHETERIZATION  09/17/2020    stent x 1    COLONOSCOPY  11/10/10    DR GUILLERMO    CORONARY ANGIOPLAST[facility] WITH STENT PLACEMENT  09[facility]17/[facility]    [facility]     GASTRIC BYPASS SURGERY  5/2/06    DR CLARKE         Current Outpatient Medications   Medication Sig Dispense Refill    clopidogrel (PLAVIX) 75 MG tablet Take 1 tablet by mouth daily 90 tablet 3    meclizine (ANTIVERT) 12.5 MG tablet Take 1 tablet by mouth 3 times daily as needed for Dizziness 90 tablet 1    levothyroxine (SYNTHROID) 125 MCG tablet TAKE 1 TABLET BY MOUTH ONCE DAILY 90 tablet 1    furosemide (LASIX) 20 MG tablet Take 1 tablet by mouth daily as needed (swelling) 30 tablet 0    traZODone (DESYREL) 100 MG tablet Take 1 tablet by mouth nightly 90 tablet 0    atorvastatin (LIPITOR) 40 MG tablet Take 1 tablet by mouth nightly 90 tablet 3    fluticasone (FLONASE) 50 MCG/ACT nasal spray USE 2 SPRAY(S) IN EACH NOSTRIL DAILY 16 g 2    EQ ALLERGY RELIEF 10 MG tablet Take 1 tablet by mouth once daily 30 tablet 2    FLUoxetine (PROZAC) 40 MG capsule Take 1 capsule by mouth once daily 90 capsule 1    FLUoxetine (PROZAC) 20 MG capsule Take 1 capsule by mouth once daily 90 capsule 1    buPROPion (WELLBUTRIN XL) 300 MG extended release tablet TAKE 1 TABLET BY MOUTH ONCE DAILY IN THE MORNING 90 tablet 1    celecoxib (CELEBREX) 200 MG capsule Take 1 capsule by mouth 2 times daily 180 capsule 1    hydrOXYzine (VISTARIL) 25 MG capsule TAKE 1 CAPSULE BY MOUTH THREE TIMES DAILY AS NEEDED FOR ANXIETY      ferrous sulfate (IRON 325) 325 (65 Fe) MG tablet Take one tablet for breakfast and lunch on Monday, Wednesday and Friday; take each dose with a vitamin C tablet 180 tablet 2    ascorbic acid (V-R VITAMIN C) 250 MG tablet Take 1 tablet by mouth daily Take one tablet for breakfast and lunch on Monday, Wednesday and Friday; take each dose with an iron tablet 80 tablet 2    Multiple Vitamin (MVI, CELEBRATE, CHEWABLE TABLET) Take two tablets each day 180 tablet 3    magnesium (MAGNESIUM-OXIDE) 250 MG TABS tablet Take 500 mg by mouth 2 times daily       aspirin 81 MG EC tablet Take 1 tablet by mouth daily 30 tablet 3    nitroGLYCERIN (NITROSTAT) 0.4 MG SL tablet up to max of 3 total doses.  If no relief after 1 dose, call 911. 25 tablet 0    Cyanocobalamin (VITAMIN B 12 PO) Take by mouth      metFORMIN (GLUCOPHAGE) 500 MG tablet Take 1 tablet by mouth 3 times daily (with meals) (Patient not taking: Reported on 1/6/2022) 270 tablet 2     Current Facility-Administered Medications   Medication Dose Route Frequency Provider Last Rate Last Admin    sodium hyaluronate (DUROLANE) injection PRSY 60 mg  60 mg Intra-artICUlar Once Kandace Crockett PA-C        sodium hyaluronate (DUROLANE) injection PRSY 60 mg  60 mg Intra-artICUlar Once Luis Lorenzo PA-C             Allergies as of 2022 - Fully Reviewed 2022   Allergen Reaction Noted    Mobic [meloxicam] Other (See Comments) 2017       Social History     Socioeconomic History    Marital status:      Spouse name: Not on file    Number of children: Not on file    Years of education: Not on file    Highest education level: Not on file   Occupational History    Not on file   Tobacco Use    Smoking status: Former Smoker     Packs/day: 1.00     Years: 3.00     Pack years: 3.00     Types: Cigarettes     Start date:      Quit date: 3/4/1977     Years since quittin.7    Smokeless tobacco: Never Used   Vaping Use    Vaping Use: Never used   Substance and Sexual Activity    Alcohol use: Yes     Alcohol/week: 0.0 standard drinks     Comment: rare    Drug use: Never    Sexual activity: Not on file   Other Topics Concern    Not on file   Social History Narrative    Not on file     Social Determinants of Health     Financial Resource Strain: Low Risk     Difficulty of Paying Living Expenses: Not hard at all   Food Insecurity: No Food Insecurity    Worried About [address] in the Last Year: Never true    [facility] in the Last Year: Never true   Transportation Needs:     Lack of Transportation (Medical): Not on file    Lack of Transportation (Non-Medical):  Not on file   Physical Activity:     Days of Exercise per Week: Not on file    Minutes of Exercise per Session: Not on file   Stress:     Feeling of Stress : Not on file   Social Connections:     Frequency of Communication with Friends and Family: Not on file    Frequency of Social Gatherings with Friends and Family: Not on file    Attends Gnosticism Services: Not on file    Active Member of Clubs or Organizations: Not on file    Attends Club or Organization Meetings: Not on file   [address] [place] [address] Marital Status: Not on file   Intimate Partner Violence:     Fear of Current or Ex-Partner: Not on file    Emotionally Abused: Not on file    Physically Abused: Not on file    Sexually Abused: Not on file   Housing Stability:     Unable to Pay for Housing in the Last Year: Not on file    Number of [place] in the Last Year: Not on file    Unstable Housing in the Last Year: Not on file       Family Hx of early CAD    REVIEW OF SYSTEMS:     CONSTITUTIONAL:  negative for  fevers, chills, sweats and fatigue  HEENT:  negative for  tinnitus, earaches, nasal congestion and epistaxis  RESPIRATORY:  negative for  dry cough, cough with sputum, dyspnea, wheezing and hemoptysis  GASTROINTESTINAL:  negative for nausea, vomiting, diarrhea, constipation, pruritus and jaundice  HEMATOLOGIC/LYMPHATIC:  negative for easy bruising, bleeding, lymphadenopathy and petechiae  ENDOCRINE:  negative for heat intolerance, cold intolerance, tremor, hair loss and diabetic symptoms including neither polyuria nor polydipsia nor blurred vision  MUSCULOSKELETAL:  negative for  myalgias, arthralgias, joint swelling, stiff joints and decreased range of motion  NEUROLOGICAL:  negative for memory problems, speech problems, visual disturbance, dysphagia, weakness and numbness      PHYSICAL EXAM:   CONSTITUTIONAL:  awake, alert, cooperative, no apparent distress, and appears stated age  HEAD:  normocepalic, without obvious abnormality, atraumatic  NECK:  Supple, symmetrical, trachea midline, no adenopathy, thyroid symmetric, not enlarged and no tenderness, skin normal  LUNGS:  No increased work of breathing, No accessory muscle use or intercostal retractions, good air exchange, clear to auscultation bilaterally, no crackles or wheezing  CARDIOVASCULAR:  Normal apical impulse, regular rate and rhythm, normal S1 and S2, no S3 or S4, and no murmur noted, no edema, no JVD, no carotid bruit.   ABDOMEN:  Soft, nontender, no masses, no hepatomegaly, no splenomegaly, BS+  MUSCULOSKELETAL:  No clubbing no cyanosis. there is no redness, warmth, or swelling of the joints  full range of motion noted  NEUROLOGIC:  Alert, awake,oriented x3  SKIN:  no bruising or bleeding, normal skin color, texture, turgor and no redness, warmth, or swelling    /64   Pulse 65   Resp 16   Ht 5' 3\" (1.6 m)   Wt 238 lb 9.6 oz (108.2 kg)   SpO2 99%   BMI 42.27 kg/m²     DATA:   I personally reviewed the visit EKG with the following interpretation: Sinus rhythm, baseline artifacts, no significant change when compared to previous    EKG 6/3/21  Sinus rhythm, normal axis, no significant changes when compared to previous    ECHO: 10/1/20 Summary   Normal left ventricle size and systolic function. Ejection fraction is visually estimated at 55-60%. Mild concentric left ventricular hypertrophy. No regional wall motion abnormalities seen. Indeterminate diastolic function. The left atrium is mildly dilated. Normal right ventricular size and function. TAPSE 19 mm. No systolic mitral regurgitation noted. There is mild-to-moderate aortic stenosis with valve area of 1.4 sq cm. Aortic valve peak velocity 2.3 m/s and mean gradient 10 mmHg. Physiologic and/or trace tricuspid regurgitation. RVSP is 25 mmHg. Normal estimated PA systolic pressure. No evidence for hemodynamically significant pericardial effusion. No previous echo for comparison    Stress Test:     Angiography: 9/17/20 1. Totally occluded fourth OM branch with successful deployment of 2.0  x 22 mm [place] Resolute drug-eluting stent with 0% residual stenosis and  RITA-3 flow distally, (pre-PCI RITA-0 flow, post-PCI RITA-3 flow).   2.  No CAD noted in the rest of the coronary arteries.     Cardiology Labs: BMP:    Lab Results   Component Value Date     10/22/2021    K 4.5 10/22/2021     10/22/2021    CO2 29 10/22/2021    BUN 13 10/22/2021    CREATININE 0.8 10/22/2021     CMP:    Lab Results Component Value Date     10/22/2021    K 4.5 10/22/2021     10/22/2021    CO2 29 10/22/2021    BUN 13 10/22/2021    CREATININE 0.8 10/22/2021    PROT 6.8 10/22/2021     CBC:    Lab Results   Component Value Date    WBC 7.6 06/22/2021    RBC 4.47 06/22/2021    HGB 11.9 06/22/2021    HCT 37.4 06/22/2021    MCV 83.7 06/22/2021    RDW 16.6 06/22/2021     06/22/2021     PT/INR:  No results found for: PTINR  PT/INR Warfarin:  No components found for: PTPATWAR, PTINRWAR  PTT:    Lab Results   Component Value Date    APTT 67.1 09/17/2020     PTT Heparin:  No components found for: APTTHEP  Magnesium:    Lab Results   Component Value Date    MG 2.5 09/16/2020     TSH:    Lab Results   Component Value Date    TSH 0.149 10/22/2021     TROPONIN:  No components found for: TROP  BNP:  No results found for: BNP  FASTING LIPID PANEL:    Lab Results   Component Value Date    CHOL 112 10/22/2021    HDL 53 10/22/2021    TRIG 48 10/22/2021     No orders to display     I have personally reviewed the laboratory, cardiac diagnostic and radiographic testing as outlined above:      IMPRESSION:  1.  CAD: S/p non-ST elevation MI, had cardiac catheterization on 9/17/20 with the following findings:  # Totally occluded fourth OM branch with successful deployment of 2.0 x 22 mm [place] Resolute drug-eluting stent with 0% residual stenosis and RITA-3 flow distally, (pre-PCI RITA-0 flow, post-PCI RITA-3 flow). # No CAD noted in the rest of the coronary arteries. will continue current treatment  2. Aortic valve stenosis: Mild to moderate   3. Hyperlipidemia: On statin  4. Hypothyroidism: Secondary to Graves' disease, on Synthroid supplements  5. Type 2 diabetes mellitus  6. Dizziness: Noncardiac    RECOMMENDATIONS:   1. Continue current treatment  2.   Risk of instent thrombosis due to premature discontinuation of either ASA or Plavix discussed with patient at length  3. preventive cardiology: Low-salt, low-cholesterol diet, daily exercise, total cholesterol of less than 200, LDL of less than 70, adherence to diabetic diet and diabetic medications, smoking cessation were all advised. 4.  Follow-up with Dr. Luciano Mann as scheduled  5. Follow-up with Dr. Eloy Zabala in 6 months, sooner if symptomatic for any reason    I have reviewed my findings and recommendations with patient    Electronically signed by Sherryle Mountain, MD on 1/6/2022 at 7:20 PM    NOTE: This report was transcribed using voice recognition software.  Every effort was made to ensure accuracy; however, inadvertent computerized transcription errors may be present

## 2022-01-05 ENCOUNTER — OFFICE VISIT (OUTPATIENT)
Dept: PRIMARY CARE CLINIC | Age: 67
End: 2022-01-05
Payer: COMMERCIAL

## 2022-01-05 VITALS
OXYGEN SATURATION: 98 % | SYSTOLIC BLOOD PRESSURE: 124 MMHG | DIASTOLIC BLOOD PRESSURE: 78 MMHG | WEIGHT: 234 LBS | TEMPERATURE: 97.5 F | BODY MASS INDEX: 41.45 KG/M2 | HEART RATE: 69 BPM

## 2022-01-05 DIAGNOSIS — R42 DIZZINESS: Primary | ICD-10-CM

## 2022-01-05 PROCEDURE — 99213 OFFICE O/P EST LOW 20 MIN: CPT | Performed by: FAMILY MEDICINE

## 2022-01-05 RX ORDER — MECLIZINE HCL 12.5 MG/1
12.5 TABLET ORAL 3 TIMES DAILY PRN
Qty: 90 TABLET | Refills: 1 | Status: SHIPPED
Start: 2022-01-05 | End: 2022-10-12

## 2022-01-05 ASSESSMENT — PATIENT HEALTH QUESTIONNAIRE - PHQ9
6. FEELING BAD ABOUT YOURSELF - OR THAT YOU ARE A FAILURE OR HAVE LET YOURSELF OR YOUR FAMILY DOWN: 0
SUM OF ALL RESPONSES TO PHQ QUESTIONS 1-9: 0
SUM OF ALL RESPONSES TO PHQ QUESTIONS 1-9: 0
1. LITTLE INTEREST OR PLEASURE IN DOING THINGS: 0
7. TROUBLE CONCENTRATING ON THINGS, SUCH AS READING THE NEWSPAPER OR WATCHING TELEVISION: 0
5. POOR APPETITE OR OVEREATING: 0
SUM OF ALL RESPONSES TO PHQ QUESTIONS 1-9: 0
SUM OF ALL RESPONSES TO PHQ9 QUESTIONS 1 & 2: 0
9. THOUGHTS THAT YOU WOULD BE BETTER OFF DEAD, OR OF HURTING YOURSELF: 0
3. TROUBLE FALLING OR STAYING ASLEEP: 0
8. MOVING OR SPEAKING SO SLOWLY THAT OTHER PEOPLE COULD HAVE NOTICED. OR THE OPPOSITE, BEING SO FIGETY OR RESTLESS THAT YOU HAVE BEEN MOVING AROUND A LOT MORE THAN USUAL: 0
4. FEELING TIRED OR HAVING LITTLE ENERGY: 0
2. FEELING DOWN, DEPRESSED OR HOPELESS: 0
SUM OF ALL RESPONSES TO PHQ QUESTIONS 1-9: 0

## 2022-01-05 NOTE — PROGRESS NOTES
Glen Richardson, a female of 61 y.o. came to the office 1/5/2022. Patient Active Problem List   Diagnosis    Hypothyroidism    Graves disease    Depression    GERD (gastroesophageal reflux disease)    Chronic pain of both knees    Non-[facility]) [facility]    Morbid obesity ([facility]    Coronary artery disease involving native coronary artery of native heart with unstable angina pectoris ([facility]    LITO (generalized anxiety disorder)          Dizziness  This is a recurrent problem. The problem has been waxing and waning. Associated symptoms include vertigo. Pertinent negatives include no chills, fever or headaches. Associated symptoms comments: Ears feel hollow, like she's in a dome. . Exacerbated by: weather changes. Treatments tried: antivert and Coricidin. The treatment provided moderate relief.         Allergies   Allergen Reactions    Mobic [Meloxicam] Other (See Comments)       Current Outpatient Medications on File Prior to Visit   Medication Sig Dispense Refill    levothyroxine (SYNTHROID) 125 MCG tablet TAKE 1 TABLET BY MOUTH ONCE DAILY 90 tablet 1    furosemide (LASIX) 20 MG tablet Take 1 tablet by mouth daily as needed (swelling) 30 tablet 0    traZODone (DESYREL) 100 MG tablet Take 1 tablet by mouth nightly 90 tablet 0    atorvastatin (LIPITOR) 40 MG tablet Take 1 tablet by mouth nightly 90 tablet 3    clopidogrel (PLAVIX) 75 MG tablet Take 1 tablet by mouth daily 90 tablet 3    fluticasone (FLONASE) 50 MCG/ACT nasal spray USE 2 SPRAY(S) IN EACH NOSTRIL DAILY 16 g 2    EQ ALLERGY RELIEF 10 MG tablet Take 1 tablet by mouth once daily 30 tablet 2    metFORMIN (GLUCOPHAGE) 500 MG tablet Take 1 tablet by mouth 3 times daily (with meals) 270 tablet 2    FLUoxetine (PROZAC) 40 MG capsule Take 1 capsule by mouth once daily 90 capsule 1    FLUoxetine (PROZAC) 20 MG capsule Take 1 capsule by mouth once daily 90 capsule 1    buPROPion (WELLBUTRIN XL) 300 MG extended release tablet TAKE 1 TABLET BY MOUTH ONCE DAILY IN THE MORNING 90 tablet 1    celecoxib (CELEBREX) 200 MG capsule Take 1 capsule by mouth 2 times daily 180 capsule 1    hydrOXYzine (VISTARIL) 25 MG capsule TAKE 1 CAPSULE BY MOUTH THREE TIMES DAILY AS NEEDED FOR ANXIETY      ferrous sulfate (IRON 325) 325 (65 Fe) MG tablet Take one tablet for breakfast and lunch on Monday, Wednesday and Friday; take each dose with a vitamin C tablet 180 tablet 2    ascorbic acid (V-R VITAMIN C) 250 MG tablet Take 1 tablet by mouth daily Take one tablet for breakfast and lunch on Monday, Wednesday and Friday; take each dose with an iron tablet 80 tablet 2    Multiple Vitamin (MVI, CELEBRATE, CHEWABLE TABLET) Take two tablets each day 180 tablet 3    magnesium (MAGNESIUM-OXIDE) 250 MG TABS tablet Take 500 mg by mouth 2 times daily       aspirin 81 MG EC tablet Take 1 tablet by mouth daily 30 tablet 3    nitroGLYCERIN (NITROSTAT) 0.4 MG SL tablet up to max of 3 total doses. If no relief after 1 dose, call 911. 25 tablet 0    Cyanocobalamin (VITAMIN B 12 PO) Take by mouth       Current Facility-Administered Medications on File Prior to Visit   Medication Dose Route Frequency Provider Last Rate Last Admin    sodium hyaluronate (DUROLANE) injection PRSY 60 mg  60 mg Intra-artICUlar Once Judy Painter PA-C        sodium hyaluronate (DUROLANE) injection PRSY 60 mg  60 mg Intra-artICUlar Once Judy Painter PA-C           Review of Systems   Constitutional: Negative for chills and fever. Neurological: Positive for dizziness and vertigo. Negative for headaches. other review of systems reviewed and are negative    OBJECTIVE:  /78   Pulse 69   Temp 97.5 °F (36.4 °C)   Wt 234 lb (106.1 kg)   SpO2 98%   BMI 41.45 kg/m²      Physical Exam  Constitutional:       General: She is not in acute distress.   HENT:      Right Ear: Tympanic membrane normal.      Left Ear: Tympanic membrane normal.      Ears:      Comments: - yvette maneuver. Nose: No mucosal edema or rhinorrhea. Right Sinus: No maxillary sinus tenderness or frontal sinus tenderness. Left Sinus: No maxillary sinus tenderness or frontal sinus tenderness. Mouth/Throat:      Pharynx: Uvula midline. No oropharyngeal exudate or posterior oropharyngeal erythema. Cardiovascular:      Rate and Rhythm: Normal rate and regular rhythm. Pulmonary:      Effort: Pulmonary effort is normal.      Breath sounds: Normal breath sounds. Musculoskeletal:      Cervical back: Neck supple. Lymphadenopathy:      Cervical: No cervical adenopathy. ASSESSMENT AND PLAN:    Isabella Tony was seen today for dizziness. Diagnoses and all orders for this visit:    Dizziness  -     meclizine (ANTIVERT) 12.5 MG tablet; Take 1 tablet by mouth 3 times daily as needed for Dizziness        Return if symptoms worsen or fail to improve, for as scheduled.     Reebcca Dominguez,

## 2022-01-06 ENCOUNTER — OFFICE VISIT (OUTPATIENT)
Dept: CARDIOLOGY CLINIC | Age: 67
End: 2022-01-06
Payer: COMMERCIAL

## 2022-01-06 VITALS
HEIGHT: 63 IN | DIASTOLIC BLOOD PRESSURE: 64 MMHG | WEIGHT: 238.6 LBS | SYSTOLIC BLOOD PRESSURE: 122 MMHG | OXYGEN SATURATION: 99 % | HEART RATE: 65 BPM | BODY MASS INDEX: 42.28 KG/M2 | RESPIRATION RATE: 16 BRPM

## 2022-01-06 DIAGNOSIS — I21.4 NON-ST ELEVATION MI (NSTEMI) (HCC): ICD-10-CM

## 2022-01-06 DIAGNOSIS — I25.110 CORONARY ARTERY DISEASE INVOLVING NATIVE CORONARY ARTERY OF NATIVE HEART WITH UNSTABLE ANGINA PECTORIS (HCC): Primary | ICD-10-CM

## 2022-01-06 PROCEDURE — 99214 OFFICE O/P EST MOD 30 MIN: CPT | Performed by: INTERNAL MEDICINE

## 2022-01-06 PROCEDURE — 93000 ELECTROCARDIOGRAM COMPLETE: CPT | Performed by: INTERNAL MEDICINE

## 2022-01-06 RX ORDER — CLOPIDOGREL BISULFATE 75 MG/1
75 TABLET ORAL DAILY
Qty: 90 TABLET | Refills: 3 | Status: SHIPPED
Start: 2022-01-06 | End: 2022-10-31 | Stop reason: SDUPTHER

## 2022-01-18 DIAGNOSIS — F51.01 PRIMARY INSOMNIA: ICD-10-CM

## 2022-01-18 DIAGNOSIS — F41.1 GAD (GENERALIZED ANXIETY DISORDER): ICD-10-CM

## 2022-01-18 DIAGNOSIS — E05.00 GRAVES DISEASE: ICD-10-CM

## 2022-01-18 RX ORDER — FLUOXETINE HYDROCHLORIDE 20 MG/1
CAPSULE ORAL
Qty: 90 CAPSULE | Refills: 1 | Status: SHIPPED
Start: 2022-01-18 | End: 2022-08-15 | Stop reason: SDUPTHER

## 2022-01-18 RX ORDER — LEVOTHYROXINE SODIUM 0.12 MG/1
TABLET ORAL
Qty: 90 TABLET | Refills: 1 | Status: SHIPPED
Start: 2022-01-18 | End: 2022-07-05

## 2022-01-18 RX ORDER — TRAZODONE HYDROCHLORIDE 100 MG/1
100 TABLET ORAL NIGHTLY
Qty: 90 TABLET | Refills: 1 | Status: SHIPPED
Start: 2022-01-18 | End: 2022-07-05

## 2022-01-18 RX ORDER — CELECOXIB 200 MG/1
200 CAPSULE ORAL DAILY
Qty: 90 CAPSULE | Refills: 1 | Status: SHIPPED
Start: 2022-01-18 | End: 2022-07-05

## 2022-01-18 RX ORDER — FLUOXETINE HYDROCHLORIDE 40 MG/1
CAPSULE ORAL
Qty: 90 CAPSULE | Refills: 1 | Status: SHIPPED
Start: 2022-01-18 | End: 2022-08-15 | Stop reason: SDUPTHER

## 2022-01-18 NOTE — TELEPHONE ENCOUNTER
----- Message from Carter Stoll sent at 1/17/2022 12:29 PM EST -----  Subject: Refill Request    QUESTIONS  Name of Medication? levothyroxine (SYNTHROID) 125 MCG tablet  Patient-reported dosage and instructions? 125 mcg tab; TAKE 1 TABLET BY   MOUTH ONCE DAILY  How many days do you have left? 0  Preferred Pharmacy? [address]  Pharmacy phone number (if available)? [phone]  ---------------------------------------------------------------------------  --------------,  Name of Medication? FLUoxetine (PROZAC) 40 MG capsule  Patient-reported dosage and instructions? 40 mg cap; Take 1 capsule by   mouth once daily  How many days do you have left? 0  Preferred Pharmacy? [address]  Pharmacy phone number (if available)? [phone]  ---------------------------------------------------------------------------  --------------,  Name of Medication? FLUoxetine (PROZAC) 20 MG capsule  Patient-reported dosage and instructions? 20 mg cap; Take 1 capsule by   mouth once daily  How many days do you have left? 0  Preferred Pharmacy? [address]  Pharmacy phone number (if available)? [phone]  ---------------------------------------------------------------------------  --------------,  Name of Medication? traZODone (DESYREL) 100 MG tablet  Patient-reported dosage and instructions? 100 mg tab; Take 1 tablet by   mouth nightly  How many days do you have left? 0  Preferred Pharmacy? [address]  Pharmacy phone number (if available)? [phone]  ---------------------------------------------------------------------------  --------------,  Name of Medication? celecoxib (CELEBREX) 200 MG capsule  Patient-reported dosage and instructions? 200 mg cap; Take 1 capsule by   mouth 2 times daily  How many days do you have left? 0  Preferred Pharmacy?  [address]  Pharmacy phone number (if available)? [phone]  Additional Information for Provider? patient of Dr. Miguel Frausto Angelica   is calling requesting refill on all meds stated above, please advise once   meds sent to Pharmacy.   ---------------------------------------------------------------------------  --------------  [address]  What is the best way for the office to contact you? OK to leave message on   voicemail  Preferred Call Back Phone Number?  [phone]

## 2022-02-14 ENCOUNTER — OFFICE VISIT (OUTPATIENT)
Dept: PAIN MANAGEMENT | Age: 67
End: 2022-02-14
Payer: COMMERCIAL

## 2022-02-14 VITALS
WEIGHT: 238 LBS | TEMPERATURE: 97.6 F | RESPIRATION RATE: 16 BRPM | HEART RATE: 66 BPM | BODY MASS INDEX: 42.17 KG/M2 | SYSTOLIC BLOOD PRESSURE: 128 MMHG | HEIGHT: 63 IN | OXYGEN SATURATION: 97 % | DIASTOLIC BLOOD PRESSURE: 78 MMHG

## 2022-02-14 DIAGNOSIS — M25.562 CHRONIC PAIN OF BOTH KNEES: ICD-10-CM

## 2022-02-14 DIAGNOSIS — M25.561 CHRONIC PAIN OF BOTH KNEES: ICD-10-CM

## 2022-02-14 DIAGNOSIS — E66.9 OBESITY, UNSPECIFIED CLASSIFICATION, UNSPECIFIED OBESITY TYPE, UNSPECIFIED WHETHER SERIOUS COMORBIDITY PRESENT: ICD-10-CM

## 2022-02-14 DIAGNOSIS — M17.0 OSTEOARTHRITIS OF BOTH KNEES, UNSPECIFIED OSTEOARTHRITIS TYPE: Primary | ICD-10-CM

## 2022-02-14 DIAGNOSIS — G89.29 CHRONIC PAIN OF BOTH KNEES: ICD-10-CM

## 2022-02-14 PROCEDURE — 99203 OFFICE O/P NEW LOW 30 MIN: CPT | Performed by: ANESTHESIOLOGY

## 2022-02-14 PROCEDURE — 99204 OFFICE O/P NEW MOD 45 MIN: CPT | Performed by: ANESTHESIOLOGY

## 2022-02-14 RX ORDER — TRAMADOL HYDROCHLORIDE 50 MG/1
50 TABLET ORAL EVERY 6 HOURS PRN
COMMUNITY
End: 2022-04-29

## 2022-02-14 NOTE — PROGRESS NOTES
[facility] Pain Management        [address] [place] [address]  Dept: [phone]          Consult Note      Patient:  AURA Ayala 1955    Date of Service:  22     Requesting Physician:  Felipe Padron DO    Reason for Consult:      Patient presents with complaints of b/l  Knee pain    HISTORY OF PRESENT ILLNESS:      Ms. Devin Eldridge is a 61 y.o. female presented today to [facility] for evaluation of  B/l Knee pain from OA. Has been followed by ortho- gets knee CSI which apparently lasts for few months. Had repeated knee injections. Apparently cant get knee replacement surgery until she looses significant weight. Follows with Dr. Katherin Mora. She has also tried low dose tramadol for short duration which had helped her. Pain is constant and is described as aching and throbbing. Pain does not radiate. Alleviating factors include: rest.  Aggravating factors include: movement, walking, bending. Pain causes functional limitations/ limits Adl's : Yes     Nursing notes and details of the pain history reviewed. Please see intake notes for details. Previous treatments:   Physical Therapy : yes,      Medications: - NSAID's : yes -            - Membrane stabilizers : no            - Opioids : short course of tramadol            - Adjuvants or Others : yes,    She has been on anticoagulation medications yes,  and include ASA-81 mg (for CAD). She is not diabetic. H/O Smoking: no  H/O alcohol abuse : no  H/O Illicit drug use : no    Employment: employed- school, . Imaging:     X-ray Knee bilateral: 2021:  Impression   Severe degenerative changes in the bilateral medial tibiofemoral   compartments, with bone-on-bone contact.           Past Medical History:   Diagnosis Date    Arthritis     CAD (coronary artery disease)     20 yisel 2.0x22 francisca om.      COVID-19 virus infection 2020    Depression     GERD (gastroesophageal reflux disease)     Graves disease     Hypertension     Hypothyroidism     SECONDARY TO GRAVES DISEASE    NSTEMI (non-ST elevated myocardial infarction) ([facility] 09/16/2020    Obesity        Past Surgical History:   Procedure Laterality Date    CARDIAC CATHETERIZATION  09/17/2020    stent x 1    COLONOSCOPY  11/10/10    DR GUILLERMO    CORONARY ANGIOPLASTY WITH STENT PLACEMENT  09/17/2020    Dhruv      GASTRIC BYPASS SURGERY  5/2/06    DR Jeffrey Obando       Prior to Admission medications    Medication Sig Start Date End Date Taking? Authorizing Provider   traMADol (ULTRAM) 50 MG tablet Take 50 mg by mouth every 6 hours as needed for Pain.  Takes 3 daily   Yes Historical Provider, MD   levothyroxine (SYNTHROID) 125 MCG tablet TAKE 1 TABLET BY MOUTH ONCE DAILY 1/18/22  Yes Brody Dominguez DO   FLUoxetine (PROZAC) 40 MG capsule Take 1 capsule by mouth once daily 1/18/22  Yes Feliciano Dominguez DO   traZODone (DESYREL) 100 MG tablet Take 1 tablet by mouth nightly 1/18/22  Yes Feliciano Dominguez DO   celecoxib (CELEBREX) 200 MG capsule Take 1 capsule by mouth daily 1/18/22  Yes Feliciano Dominguez DO   clopidogrel (PLAVIX) 75 MG tablet Take 1 tablet by mouth daily 1/6/22  Yes Yumiko Araya MD   meclizine (ANTIVERT) 12.5 MG tablet Take 1 tablet by mouth 3 times daily as needed for Dizziness 1/5/22  Yes Feliciano Dominguez DO   atorvastatin (LIPITOR) 40 MG tablet Take 1 tablet by mouth nightly 10/11/21  Yes Yumiko Araya MD   fluticasone (FLONASE) 50 MCG/ACT nasal spray USE 2 SPRAY(S) IN EACH NOSTRIL DAILY 8/23/21  Yes Feliciano Dominguez DO   EQ ALLERGY RELIEF 10 MG tablet Take 1 tablet by mouth once daily 8/23/21  Yes Feliciano Dominguez DO   buPROPion (WELLBUTRIN XL) 300 MG extended release tablet TAKE 1 TABLET BY MOUTH ONCE DAILY IN THE MORNING 8/19/21  Yes Feliciano Dominguez DO   ascorbic acid (V-R VITAMIN C) 250 MG tablet Take 1 tablet by mouth daily Take one tablet for breakfast and lunch on Monday, Wednesday and Friday; take each dose with an iron tablet 20  Yes Parish Garner MD   aspirin 81 MG EC tablet Take 1 tablet by mouth daily 20  Yes Shazia Montalvo DO   Cyanocobalamin (VITAMIN B 12 PO) Take by mouth   Yes Historical Provider, MD   FLUoxetine (PROZAC) 20 MG capsule Take 1 capsule by mouth once daily 22   Italia Dominguez DO   metFORMIN (GLUCOPHAGE) 500 MG tablet Take 1 tablet by mouth 3 times daily (with meals)  Patient not taking: Reported on 2022   Italia Dominguez DO   ferrous sulfate (IRON 325) 325 (65 Fe) MG tablet Take one tablet for breakfast and lunch on Monday, Wednesday and Friday; take each dose with a vitamin C tablet  Patient not taking: Reported on 2022   Parish Garner MD   Multiple Vitamin (MVI, CELEBRATE, CHEWABLE TABLET) Take two tablets each day  Patient not taking: Reported on 2022   Parish Garner MD   magnesium (MAGNESIUM-OXIDE) 250 MG TABS tablet Take 500 mg by mouth 2 times daily   Patient not taking: Reported on 2022    Historical Provider, MD   nitroGLYCERIN (NITROSTAT) 0.4 MG SL tablet up to max of 3 total doses. If no relief after 1 dose, call 911. 20   Shazia Montalvo DO       Allergies   Allergen Reactions    Mobic [Meloxicam] Other (See Comments)       Social History     Socioeconomic History    Marital status:      Spouse name: Not on file    Number of children: Not on file    Years of education: Not on file    Highest education level: Not on file   Occupational History    Not on file   Tobacco Use    Smoking status: Former Smoker     Packs/day: 1.00     Years: 3.00     Pack years: 3.00     Types: Cigarettes     Start date:      Quit date: 3/4/1977     Years since quittin.9    Smokeless tobacco: Never Used   Vaping Use    Vaping Use: Never used   Substance and Sexual Activity    Alcohol use:  Yes     Alcohol/week: 0.0 standard drinks     Comment: rare    Drug use: Never    Sexual activity: Not on file   Other Topics Concern    Not on file   Social History Narrative    Not on file     Social Determinants of Health     Financial Resource Strain: Low Risk     Difficulty of Paying Living Expenses: Not hard at all   Food Insecurity: No Food Insecurity    Worried About Running Out of Food in the Last Year: Never true    [address] in the Last Year: Never true   Transportation Needs:     Lack of Transportation (Medical): Not on file    Lack of Transportation (Non-Medical): Not on file   Physical Activity:     Days of Exercise per Week: Not on file    Minutes of Exercise per Session: Not on file   Stress:     Feeling of Stress : Not on file   Social Connections:     Frequency of Communication with Friends and Family: Not on file    Frequency of Social Gatherings with Friends and Family: Not on file    Attends [employer] Services: Not on file    Active Member of [address] [place] [address] or Organizations: Not on file    Attends Club or Organization Meetings: Not on file    Marital Status: Not on file   Intimate Partner Violence:     Fear of Current or Ex-Partner: Not on file    Emotionally Abused: Not on file    Physically Abused: Not on file    Sexually Abused: Not on file   Housing Stability:     Unable to Pay for Housing in the Last Year: Not on file    Number of [place] in the Last Year: Not on file    Unstable Housing in the Last Year: Not on file       Family History   Problem Relation Age of Onset    Diabetes Mother     Heart Attack Father        REVIEW OF SYSTEMS:     Patient specifically denies fever/chills, chest pain, shortness of breath, new bowel or bladder complaints. All other review of systems was negative. Review of Systems - documented reviewed.     PHYSICAL EXAMINATION:      /78   Pulse 66   Temp 97.6 °F (36.4 °C) (Infrared)   Resp 16   Ht 5' 3\" (1.6 m)   Wt 238 lb (108 kg)   SpO2 97%   BMI 42.16 kg/m²     General:      General appearance:  Pleasant and well-hydrated, in no distress and A & O x 3  Build:Obese  Function: Rises from seated position easily    HEENT:    Head:normocephalic, atraumatic    Lungs:    Breathing:normal breathing pattern     CVS:     RRR    Abdomen:    Shape:obese, non-distended and normal    Cervical spine:    Inspection:normal  Palpation:tenderness paravertebral muscles, tenderness trapezium, left, right and positive. Range of motion:Normal    Thoracic spine:     Spine inspection:normal   Palpation:No tenderness over the midline and paraspinal area, bilaterally  Range of motion:normal in flexion, extension rotation bilateral and is not painful. Lumbar spine:    Spine inspection: Normal   Palpation: Tenderness paravertebral muscles Yes bilaterally  Range of motion: Decreased,  Sacroiliac joint tenderness No   SLR : negative bilaterally    Musculoskeletal:    Trigger points no    Extremities:    Tremors:None bilaterally upper and lower  Edema:no    Knee: Inspection:symmetric, swelling minimal bilaterally  Tenderness of Bony Landmarks:Lateral and Medial, bilateral  Effusion:absent bilaterally  ROM:Left pain with terminal flexion and pain with terminal extension  Right pain with terminal flexion and pain with terminal extension   Crepitus + right knee    Neurological:  No focal deficits    Dermatology:    Skin:no rashes or lesions noted    Assessment/Plan:     Diagnosis Orders   1. Osteoarthritis of both knees, unspecified osteoarthritis type     2. Chronic pain of both knees     3. Obesity, unspecified classification, unspecified obesity type, unspecified whether serious comorbidity present         61 y.o. female with h/o b/l knee pain from OA  X- ray of knees reviewed  Follows with ortho- gets regular CSI injection - helps for few months.   TKR on hold due to obesity    Obesity + follows with Dr. Evangelista :  Physical therapy    Will prescribe compound cream. Diclofenac, Gabapentin, Baclofen, Lidocaine, Doxepin. Apply 1-2 grams TID over the painful area. Dispense #240 gram with X 2 refills. Use instructions and side effects explained. NSAID's for prn use. Discussed about Visco injection for the knee. Also can consider Genicular nerve block/ RFA in future if pain is significant. Can consider low dose tramadol in future for prn use (apparently she was taking couple of times a week in the past). Weight loss    F/u in 2 months. Urine/ Buccal screen today 2/14/2022: yes Addendum: Result reviewed. Consistent. Counseling :    Patient encouraged to stay active and to watch/lose weight    Encouraged to continue Regular home exercise program as tolerated - stretching / strengthening. Treatment plan discussed with the patient including medication and procedure side effects. Controlled Substances Monitoring:   [place]AR[place] reviewed. Damián Ruiz MD    Dear Harper Guerra,  Thank you for referring Ms. Remberto Barrett and allowing us to participate in her care. Please do not hesitate to contact me if you have any questions regarding her care.     Sepideh Chopra MD    CC:    Robel Dominguez [facility] President [phone]

## 2022-02-14 NOTE — PROGRESS NOTES
Patient:  Paty Mccarty,  1955  Date of Service:  22      Do you currently have any of the following:    Fever: No  Headache:  No  Cough: No  Shortness of breath: No  Exposed to anyone with these symptoms: No       Patient presents with complaints of knee  pain that started 7 years ago and has been getting worse. She states the pain began following No specific cause    Pain is constant and is described as aching, throbbing, shooting and stabbing. She rates the pain as a 10/10 on her worst day , 1/10 on her best day, and a 7/10 on average on the VAS scale. Pain does radiate to both lower extremities. She  has numbness, weakness of the both lower extremities. Alleviating factors include: nothing. Aggravating factors include:  movement, standing, sitting. She states that the pain does keep her from sleeping at night. She took her last dose of Tramadol 1 month ago     She is not on NSAIDS and  is  on anticoagulation medications to include ASA/plavix  and is managed by PCP     Previous treatments:      Personal Expectations from this treatment: decrease pain     /78   Pulse 66   Temp 97.6 °F (36.4 °C) (Infrared)   Resp 16   Ht 5' 3\" (1.6 m)   Wt 238 lb (108 kg)   SpO2 97%   BMI 42.16 kg/m²     No LMP recorded.  Patient is postmenopausal.

## 2022-03-09 ENCOUNTER — OFFICE VISIT (OUTPATIENT)
Dept: ORTHOPEDIC SURGERY | Age: 67
End: 2022-03-09
Payer: COMMERCIAL

## 2022-03-09 DIAGNOSIS — M17.0 BILATERAL PRIMARY OSTEOARTHRITIS OF KNEE: Primary | ICD-10-CM

## 2022-03-09 PROCEDURE — 6360000002 HC RX W HCPCS

## 2022-03-09 PROCEDURE — 20610 DRAIN/INJ JOINT/BURSA W/O US: CPT | Performed by: PHYSICIAN ASSISTANT

## 2022-03-09 PROCEDURE — 99212 OFFICE O/P EST SF 10 MIN: CPT | Performed by: PHYSICIAN ASSISTANT

## 2022-03-09 PROCEDURE — 2500000003 HC RX 250 WO HCPCS

## 2022-03-09 RX ORDER — LIDOCAINE HYDROCHLORIDE 10 MG/ML
6 INJECTION, SOLUTION INFILTRATION; PERINEURAL ONCE
Status: COMPLETED | OUTPATIENT
Start: 2022-03-09 | End: 2022-03-09

## 2022-03-09 RX ORDER — TRIAMCINOLONE ACETONIDE 40 MG/ML
40 INJECTION, SUSPENSION INTRA-ARTICULAR; INTRAMUSCULAR ONCE
Status: COMPLETED | OUTPATIENT
Start: 2022-03-09 | End: 2022-03-09

## 2022-03-09 RX ORDER — BUPIVACAINE HYDROCHLORIDE 2.5 MG/ML
6 INJECTION, SOLUTION EPIDURAL; INFILTRATION; INTRACAUDAL ONCE
Status: COMPLETED | OUTPATIENT
Start: 2022-03-09 | End: 2022-03-09

## 2022-03-09 RX ADMIN — TRIAMCINOLONE ACETONIDE 40 MG: 40 INJECTION, SUSPENSION INTRA-ARTICULAR; INTRAMUSCULAR at 14:00

## 2022-03-09 RX ADMIN — BUPIVACAINE HYDROCHLORIDE 15 MG: 2.5 INJECTION, SOLUTION EPIDURAL; INFILTRATION; INTRACAUDAL at 13:59

## 2022-03-09 RX ADMIN — LIDOCAINE HYDROCHLORIDE 6 ML: 10 INJECTION, SOLUTION INFILTRATION; PERINEURAL at 13:59

## 2022-03-09 NOTE — PATIENT INSTRUCTIONS
We will submit for visco injections (Euflexxa preferred with your insurance)  If you do not hear from our office in 2-3 weeks, call our office to check on status of approval.     Continue to maintain healthy lifestyle and weight  Activity as tolerated. You may be sore at the injection site for several days. OK to use over the counter Acetaminophen or Ibuprofen as needed for pain  You may apply ice to your injection site. Call the office if any unusual new swelling, pain or redness develops around your knee. Steroid injections can be repeated every 3 months if needed  Goal for steroid injection is at least 8 weeks of relief    Please call the office at [phone] or send [email or web address] message to providers sooner with any questions or concerns  Strongly recommend all of our patients sign up for [email or web address] in order to have direct communication VIA [email or web address] [email or web address] with our clinic staff.

## 2022-03-09 NOTE — PROGRESS NOTES
Chief Complaint   Patient presents with    Knee Pain     Pt has bilat knee pain of 9/10. Pt requesting injections for bilat knee. SUBJECTIVE: Terrence Dixon presents today for follow-up for bilateral knee pain. She states that the last set of bilateral knee CSI did not give her relief and she would like another set of injections today. She states that the injections worked until a few weeks ago when it wore off. She does have known bilateral knee OA. She states that the pain is worse with prolonged standing, walking or going up and down steps. She states that she is interested in possibly getting set up for gel injections for her knees in the future. Denies numbness, tingling or paresthesias. No other orthopedic complaints at this time. Review of Systems -   General ROS: negative for - chills, fatigue, fever or night sweats  Respiratory ROS: no cough, shortness of breath, or wheezing  Cardiovascular ROS: no chest pain or dyspnea on exertion  Gastrointestinal ROS: no abdominal pain, change in bowel habits, or black or bloody stools  Genitourinary: no hematuria, dysuria, or incontinence   Musculoskeletal ROS:see above  Neurological ROS: no TIA or stroke symptoms     OBJECTIVE:   Alert and oriented X 3, no acute distress, respirations easy and unlabored with no audible wheezes, skin warm and dry, speech and dress appropriate for noted age, affect euthymic.    bilateral Knee Exam   Skin intact. No erythema/induration/fluctuence at knee.  mild effusion noted   Patella tracks normally   negative patellar grind test and negative J sign.  mild crepitus with flexion and extension of the knee   both Medial and Lateral joint line pain with palpation   Stable to varus and valgus at 0 and 30 degrees of flexion.  negative McMurrays, Apleys.  negative Lachman's and posterior drawer.     active range of motion 0-105 ° with pain    Passive range on motion 0-115 ° with pain    Compartments soft and compressible throughout leg   Calf soft and nontender with palpation     Sensation intact to light touch sural, deep peroneal, superficial peroneal, saphenous, posterior tibial  nerve distributions to foot/ankle. XR: 3/9/22   Not taken today. There were no vitals taken for this visit. ASSESSMENT:   Diagnosis Orders   1. Bilateral primary osteoarthritis of knee       DISCUSSION:   I discussed the natural course and history of knee arthritis with the patient as well as treatment options including NSAIDs, weight loss, activity modification, and injections as well as surgery. The patient would like to proceed with the injections. Discussed risks and benefits of CSI including risk of infection at the joint, bleeding or bruising at the injection site and elevation of blood sugar levels and cartilage degradation with repetitive use. Patient expressed understanding of these risks and elected to move forward with corticosteroid injection today in the office. Knee  Injection Procedure Note  With the patient's written and verbal permission, Bilateral  knee was prepped in standard sterile fashion with betadine and alcohol. Skin of the knee was anesthetized with ethyl chloride spray prior to injection. The knee was then injected with 1 ml Kenalog (40mg), 3 ml PF 0.25% marcaine and 3 ml 1% PF Lidocaine were injected using the lateral inferior approach without difficulty. The patient tolerated this well. A band-aid was applied. The patient ambulated out of clinic on their own accord without difficulty. PLAN:  CSI as above, post injection care instructed  Advised on S/S of when to seek follow up care  Patient to contact office if not achieving at least 6-8 weeks of moderate relief  Continue to stay as active as able, maintain healthy weight   We are going to submit authorization for her to get viscosupplementation injections. Once the Visco injections are approved she will be contacted.   Discussed that when her CSI injections from today start to wear off we can set her up for Visco injections. She is in agreement with this plan. Electronically signed by DULCE Montoya on 3/9/2022 at 11:08 AM  Note: This report was completed using [employer] voiced recognition software. Every effort has been made to ensure accuracy; however, inadvertent computerized transcription errors may be present.

## 2022-03-09 NOTE — PROGRESS NOTES
Eddie Olivier is a 61 y.o. female who presents for follow up of 3 Month Bilat Knee. Request CSI. SURGEON: Dr. Sanjiv Dolan MD  Date of Injury/Surgery: 2018  Date last seen in office: 12-8-2021    Symptoms: worse  New complaints: Pt expressed having 9/10 with FWB activities. Pt expressed having numbness in bilateral feet. Pt has crepitus in bilat joint line causing increased pain. Pt has decreased activities of daily living due to increase difficulty with FWB. Weightbearing: right lower and left lower Full weight bearing      Assistive device No Device  Participating in therapy (location if yes)?  no    Refills Needed: None  Order/Referral Needed: N/A

## 2022-03-17 ENCOUNTER — TELEPHONE (OUTPATIENT)
Dept: ORTHOPEDIC SURGERY | Age: 67
End: 2022-03-17

## 2022-03-17 DIAGNOSIS — M17.0 BILATERAL PRIMARY OSTEOARTHRITIS OF KNEE: Primary | ICD-10-CM

## 2022-03-17 NOTE — TELEPHONE ENCOUNTER
Requesting script for Bilateral Knee Visco injections so I can submit for prior authorization. Dr. Nessa Shaw patient  LOV: 3-9-2022  NOV: not scheduled yet  Last Visco Injection: N/A    DX: Bilateral Knee OA M17.0  CPT:  (x 2)  Medication: Durolane 60 mg/ 3 ml per dose/syringe  Quantity: 120 mg/ 6 ml total (2 syringes)  Instructions: Inject 1 full syringe contents (60 mg/ 3 ml) into each affected knee joint, one time every 180 days.

## 2022-03-31 NOTE — TELEPHONE ENCOUNTER
Spoke with [employer] today. Phone # [phone]. Spoke with Berna Mccabe. Today call reference # Berna Mccabe., 3- @ 2:16 pm.    Visco injections are not a covered benefit under Pharmacy Benefits. Approval must be submitted through Medical Benefits for prior authorization. Per Franny Quinn does not have a preferred brand for Visco Injections    Provided CPT codes for several different visco brands. Each one requires prior authorization. Instructed to fill out [facility] Outpatient Authorization Form, attach clinicals, attach script for visco injections and fax to # [phone]. Need printed RX Script for Euflexxa [id number] before submitting for prior authorization.  Requesting script for 6 syringes total (3 per each knee)

## 2022-04-01 RX ORDER — HYALURONATE SODIUM 10 MG/ML
SYRINGE (ML) INTRAARTICULAR
Qty: 8.12 ML | Refills: 2 | Status: SHIPPED | OUTPATIENT
Start: 2022-04-01 | End: 2022-10-25

## 2022-04-05 ENCOUNTER — TELEPHONE (OUTPATIENT)
Dept: ORTHOPEDIC SURGERY | Age: 67
End: 2022-04-05

## 2022-04-05 DIAGNOSIS — M17.0 BILATERAL PRIMARY OSTEOARTHRITIS OF KNEE: Primary | ICD-10-CM

## 2022-04-05 RX ORDER — HYALURONATE SODIUM 10 MG/ML
20 SYRINGE (ML) INTRAARTICULAR WEEKLY
Qty: 8.12 ML | Refills: 0 | Status: SHIPPED | OUTPATIENT
Start: 2022-04-05 | End: 2022-04-29

## 2022-04-05 NOTE — TELEPHONE ENCOUNTER
----- Message from Jazmin Sam MA sent at 3/31/2022  2:54 PM EDT -----  Regarding: Need new Script for Visco Injections- Dr. Reza Dickerson patient  Pharmacy Benefits will not approve Visco Injections. I have to submit it under her Medical Benefits to get it approved under [employer]. Need a new RX for a Different Brand (previous one was never filled)  Need RX for CPT:  Euflexxa 20 mg/ 2 ml per dose/syringe  Requesting total of 6 units/ 6 syringes (need 3 per each knee)  DX: Bilateral Knee OA M17.0    Inject contents of 1 syringe (20 mg/2 ml) Intra-Articularly once per each affected knee, 1 x a week for 3 weeks. Please order, print,sign, date script so I can fax it along with clinicals.

## 2022-04-12 ENCOUNTER — TELEPHONE (OUTPATIENT)
Dept: ORTHOPEDIC SURGERY | Age: 67
End: 2022-04-12

## 2022-04-12 NOTE — TELEPHONE ENCOUNTER
Received voicemail from [facility] asking for a return call back by 2 pm today re: Euflexxa injections for Bilateral Knee OA. [address] [place] [address] is asking if patient has tried physical therapy prior to requesting Visco Injections.      [facility] call back # [phone]

## 2022-04-12 NOTE — TELEPHONE ENCOUNTER
Reviewed chart and returned the call. Prior Auth form along with script for Euflexxa injections was faxed on 4-1-2022 requesting approval under Medical Benefits. Physical Therapy order was placed for patient on 2- by her PCP. [facility]) OP Physical Therapy tried reaching patient several times to schedule PT appointments. Per note in patient chart dated 2-:   Patient called asking \"what can PT do for her\" stating she is bone on bone and hurting all the time. Does not want to schedule. Relayed information to Jd at [facility] - . She is going to note patients chart and send for final determination. Visco injections will most likely be denied but I will be notified of final decision.

## 2022-04-13 NOTE — TELEPHONE ENCOUNTER
Received denial letter dated 4- from [facility]. Euflexxa injections have been denied. Reason: patient does not meet medical criteria since she has not tried Physical Therapy or had a poor response after trying Physical Therapy. LOV: 3-9-2022  NOV: not scheduled     Tried contacting patient regarding Visco Injection denied. No answer. LMAM to return my call.

## 2022-04-14 NOTE — TELEPHONE ENCOUNTER
Spoke with patient yesterday afternoon @ 2:52 pm. Advised her of Euflexxa injections being denied. Patient states she was recently referred to OP Physical Therapy by her pain management doctor, Dr. Tano Daniel. Stated she did go to the Physical Therapy Evaluation appointment as well as 1 additional Physical therapy visit. States both of her knees hurt too bad after physical therapy to continue treatment so she stopped going. Prior to having Bilateral Knee CSI done on 12-8-2021, patient said both of her knees hurt severely, 10/10 on pain scale. CSI injections did help, but relief only lasted a few weeks. Currently rates bilateral knee pain at level 7 or 8 out of 10 on pain scale. Painful Crepitus in both knees. Sharp shooting pain in both knees. Pain is worse after she tries to stand up after being seated for long periods of time. Employed as . Patient has tried and failed the following treatments for her bilateral knee pain:     - Prescription Tramadol 50 mg, 1 tablet by mouth Q6H as needed for pain   - CSI injections to both knees  - Activity modification including weight loss  - ADL modification  - Elevating knees in recliner chair while at home every day for several months now   - Heat packs/ Ice packs to her knees  - Compounding cream prescribed by Dr. Marie Mirza.  Applying cream TID as instructed for the past 7 to 8 weeks with no relief   - OTC Tylenol and NSAID'S for several months with no relief  - Tried Voltaren Gel previously with no relief    Patient would like the above information submitted to her insurance to try to obtain Euflexxa Injections approval.

## 2022-04-15 NOTE — TELEPHONE ENCOUNTER
Letter listing previous treatments has been completed.  Will submit to her insurance company to try to appeal.

## 2022-04-28 ENCOUNTER — TELEPHONE (OUTPATIENT)
Dept: PRIMARY CARE CLINIC | Age: 67
End: 2022-04-28

## 2022-04-29 ENCOUNTER — OFFICE VISIT (OUTPATIENT)
Dept: PRIMARY CARE CLINIC | Age: 67
End: 2022-04-29
Payer: COMMERCIAL

## 2022-04-29 VITALS
WEIGHT: 240.5 LBS | DIASTOLIC BLOOD PRESSURE: 78 MMHG | BODY MASS INDEX: 42.61 KG/M2 | HEART RATE: 58 BPM | HEIGHT: 63 IN | RESPIRATION RATE: 16 BRPM | SYSTOLIC BLOOD PRESSURE: 130 MMHG | TEMPERATURE: 97.3 F | OXYGEN SATURATION: 95 %

## 2022-04-29 DIAGNOSIS — E05.00 GRAVES DISEASE: ICD-10-CM

## 2022-04-29 DIAGNOSIS — I25.110 CORONARY ARTERY DISEASE INVOLVING NATIVE CORONARY ARTERY OF NATIVE HEART WITH UNSTABLE ANGINA PECTORIS (HCC): ICD-10-CM

## 2022-04-29 DIAGNOSIS — R00.2 HEART PALPITATIONS: Primary | ICD-10-CM

## 2022-04-29 DIAGNOSIS — F41.1 GAD (GENERALIZED ANXIETY DISORDER): ICD-10-CM

## 2022-04-29 DIAGNOSIS — R42 DIZZINESS: ICD-10-CM

## 2022-04-29 LAB
ALBUMIN SERPL-MCNC: 4.3 G/DL (ref 3.5–5.2)
ALP BLD-CCNC: 77 U/L (ref 35–104)
ALT SERPL-CCNC: 21 U/L (ref 0–32)
ANION GAP SERPL CALCULATED.3IONS-SCNC: 15 MMOL/L (ref 7–16)
AST SERPL-CCNC: 41 U/L (ref 0–31)
BILIRUB SERPL-MCNC: 0.5 MG/DL (ref 0–1.2)
BUN BLDV-MCNC: 13 MG/DL (ref 6–23)
CALCIUM SERPL-MCNC: 9.3 MG/DL (ref 8.6–10.2)
CHLORIDE BLD-SCNC: 102 MMOL/L (ref 98–107)
CHOLESTEROL, TOTAL: 130 MG/DL (ref 0–199)
CO2: 25 MMOL/L (ref 22–29)
CREAT SERPL-MCNC: 0.9 MG/DL (ref 0.5–1)
GFR AFRICAN AMERICAN: >60
GFR NON-AFRICAN AMERICAN: >60 ML/MIN/1.73
GLUCOSE BLD-MCNC: 90 MG/DL (ref 74–99)
HDLC SERPL-MCNC: 65 MG/DL
LDL CHOLESTEROL CALCULATED: 56 MG/DL (ref 0–99)
POTASSIUM SERPL-SCNC: 4.5 MMOL/L (ref 3.5–5)
SODIUM BLD-SCNC: 142 MMOL/L (ref 132–146)
TOTAL PROTEIN: 7.3 G/DL (ref 6.4–8.3)
TRIGL SERPL-MCNC: 43 MG/DL (ref 0–149)
TSH SERPL DL<=0.05 MIU/L-ACNC: 1.67 UIU/ML (ref 0.27–4.2)
VLDLC SERPL CALC-MCNC: 9 MG/DL

## 2022-04-29 PROCEDURE — 93000 ELECTROCARDIOGRAM COMPLETE: CPT | Performed by: FAMILY MEDICINE

## 2022-04-29 PROCEDURE — 99214 OFFICE O/P EST MOD 30 MIN: CPT | Performed by: FAMILY MEDICINE

## 2022-04-29 RX ORDER — LORATADINE 10 MG/1
10 TABLET ORAL DAILY
COMMUNITY
End: 2022-10-25

## 2022-04-29 RX ORDER — HYDROXYZINE PAMOATE 25 MG/1
CAPSULE ORAL
Qty: 90 CAPSULE | Refills: 1 | Status: SHIPPED | OUTPATIENT
Start: 2022-04-29

## 2022-04-29 ASSESSMENT — ENCOUNTER SYMPTOMS
VOMITING: 0
NAUSEA: 0
SHORTNESS OF BREATH: 0
SORE THROAT: 0
COUGH: 0

## 2022-04-29 NOTE — PROGRESS NOTES
Rosemary Marks, a female of 61 y.o. came to the office 4/29/2022. Patient Active Problem List   Diagnosis    Hypothyroidism    Graves disease    Depression    GERD (gastroesophageal reflux disease)    Chronic pain of both knees    Non-[facility] elevation [facility]STEMI) [facility]    Morbid obesity ([facility]    Coronary artery disease involving native coronary artery of native heart with unstable angina pectoris ([facility] [facility]    LITO (generalized anxiety disorder)          Palpitations   This is a new problem. Associated symptoms include dizziness and malaise/fatigue. Pertinent negatives include no chest pain, coughing, irregular heartbeat, nausea, near-syncope, shortness of breath, syncope or vomiting. Risk factors include stress (taking of sister with dementia ). Dizziness  This is a new problem. The current episode started more than 1 month ago. The problem has been gradually worsening. Associated symptoms include fatigue. Pertinent negatives include no chest pain, coughing, nausea, sore throat or vomiting. The symptoms are aggravated by stress. Treatments tried: antivert. Fatigue  Associated symptoms include fatigue. Pertinent negatives include no chest pain, coughing, nausea, sore throat or vomiting. hypothyroidism: fatigue.      Allergies   Allergen Reactions    Mobic [Meloxicam] Other (See Comments)       Current Outpatient Medications on File Prior to Visit   Medication Sig Dispense Refill    loratadine (CLARITIN) 10 MG tablet Take 10 mg by mouth daily      sodium hyaluronate (EUFLEXXA) 20 MG/2ML SOSY injection Inject 1 prefilled syringe into bilateral knees once a week for 3 weeks (total of 6 prefilled syringes) (Patient taking differently: Inject 1 prefilled syringe into bilateral knees Q 3 months) 8.12 mL 2    levothyroxine (SYNTHROID) 125 MCG tablet TAKE 1 TABLET BY MOUTH ONCE DAILY 90 tablet 1    FLUoxetine (PROZAC) 40 MG capsule Take 1 capsule by mouth once daily 90 capsule 1    FLUoxetine (PROZAC) 20 MG capsule Take 1 capsule by mouth once daily 90 capsule 1    traZODone (DESYREL) 100 MG tablet Take 1 tablet by mouth nightly 90 tablet 1    celecoxib (CELEBREX) 200 MG capsule Take 1 capsule by mouth daily 90 capsule 1    clopidogrel (PLAVIX) 75 MG tablet Take 1 tablet by mouth daily 90 tablet 3    meclizine (ANTIVERT) 12.5 MG tablet Take 1 tablet by mouth 3 times daily as needed for Dizziness 90 tablet 1    atorvastatin (LIPITOR) 40 MG tablet Take 1 tablet by mouth nightly 90 tablet 3    fluticasone (FLONASE) 50 MCG/ACT nasal spray USE 2 SPRAY(S) IN EACH NOSTRIL DAILY 16 g 2    buPROPion (WELLBUTRIN XL) 300 MG extended release tablet TAKE 1 TABLET BY MOUTH ONCE DAILY IN THE MORNING 90 tablet 1    ferrous sulfate (IRON 325) 325 (65 Fe) MG tablet Take one tablet for breakfast and lunch on Monday, Wednesday and Friday; take each dose with a vitamin C tablet (Patient taking differently: Take one tablet for breakfast and lunch on Monday, Wednesday and Friday; take each dose with a vitamin C tablet ( pt takes when remembers)) 180 tablet 2    ascorbic acid (V-R VITAMIN C) 250 MG tablet Take 1 tablet by mouth daily Take one tablet for breakfast and lunch on Monday, Wednesday and Friday; take each dose with an iron tablet 80 tablet 2    Multiple Vitamin (MVI, CELEBRATE, CHEWABLE TABLET) Take two tablets each day (Patient taking differently: Take two tablets each day. (when remembers)) 180 tablet 3    magnesium (MAGNESIUM-OXIDE) 250 MG TABS tablet Take 500 mg by mouth daily (when remembers)      aspirin 81 MG EC tablet Take 1 tablet by mouth daily 30 tablet 3    nitroGLYCERIN (NITROSTAT) 0.4 MG SL tablet up to max of 3 total doses.  If no relief after 1 dose, call 911. 25 tablet 0    Cyanocobalamin (VITAMIN B 12 PO) Take by mouth       Current Facility-Administered Medications on File Prior to Visit   Medication Dose Route Frequency Provider Last Rate Last Admin    sodium hyaluronate (DUROLANE) injection PRSY 60 mg  60 mg Intra-artICUlar Once Maude Merlin, PA-C        sodium hyaluronate (DUROLANE) injection PRSY 60 mg  60 mg Intra-artICUlar Once Maude Merlin, PA-C           Review of Systems   Constitutional: Positive for fatigue and malaise/fatigue. HENT: Negative for sore throat. Respiratory: Negative for cough and shortness of breath. Cardiovascular: Positive for palpitations. Negative for chest pain, syncope and near-syncope. Gastrointestinal: Negative for nausea and vomiting. Neurological: Positive for dizziness. other review of systems reviewed and are negative    OBJECTIVE:  /78   Pulse 58   Temp 97.3 °F (36.3 °C)   Resp 16   Ht 5' 3\" (1.6 m)   Wt 240 lb 8 oz (109.1 kg)   SpO2 95%   BMI 42.60 kg/m²      Physical Exam  Vitals reviewed. HENT:      Mouth/Throat:      Tongue: No lesions. Pharynx: Oropharynx is clear. Tonsils: No tonsillar exudate. Eyes:      General: No scleral icterus. Conjunctiva/sclera: Conjunctivae normal.   Neck:      Thyroid: No thyromegaly. Vascular: No carotid bruit. Cardiovascular:      Rate and Rhythm: Normal rate and regular rhythm. Occasional extrasystoles are present. Heart sounds: No murmur heard. Pulmonary:      Effort: Pulmonary effort is normal.      Breath sounds: Normal breath sounds. No wheezing or rales. Abdominal:      General: Bowel sounds are normal.      Palpations: Abdomen is soft. There is no mass. Tenderness: There is no abdominal tenderness. There is no guarding or rebound. Musculoskeletal:         General: Normal range of motion. Cervical back: Neck supple. Lymphadenopathy:      Cervical: No cervical adenopathy. Skin:     General: Skin is warm and dry. Neurological:      Mental Status: She is alert and oriented to person, place, and time.    Psychiatric:         Mood and Affect: Mood normal.         ASSESSMENT AND PLAN:    Jacki Kuhn was seen today for palpitations, dizziness and fatigue. Diagnoses and all orders for this visit:    Heart palpitations  -     EKG 12 Lead; Future  -     EKG 12 Lead    LITO (generalized anxiety disorder)  -     hydrOXYzine (VISTARIL) 25 MG capsule; TAKE 1 CAPSULE BY MOUTH THREE TIMES DAILY AS NEEDED FOR ANXIETY    Graves disease  -     TSH; Future    Dizziness    Coronary artery disease involving native coronary artery of native heart with unstable angina pectoris ([facility] [address]  -     Lipid Panel; Future  -     Comprehensive Metabolic Panel; Future        Return in about 6 months (around 10/29/2022), or if symptoms worsen or fail to improve.     Clem Dominguez, DO

## 2022-05-11 ENCOUNTER — NURSE ONLY (OUTPATIENT)
Dept: CARDIOLOGY CLINIC | Age: 67
End: 2022-05-11

## 2022-05-11 ENCOUNTER — OFFICE VISIT (OUTPATIENT)
Dept: CARDIOLOGY CLINIC | Age: 67
End: 2022-05-11
Payer: COMMERCIAL

## 2022-05-11 VITALS
DIASTOLIC BLOOD PRESSURE: 68 MMHG | HEART RATE: 62 BPM | RESPIRATION RATE: 16 BRPM | HEIGHT: 63 IN | SYSTOLIC BLOOD PRESSURE: 122 MMHG | WEIGHT: 246 LBS | BODY MASS INDEX: 43.59 KG/M2

## 2022-05-11 DIAGNOSIS — I25.110 CORONARY ARTERY DISEASE INVOLVING NATIVE CORONARY ARTERY OF NATIVE HEART WITH UNSTABLE ANGINA PECTORIS (HCC): Primary | ICD-10-CM

## 2022-05-11 DIAGNOSIS — R00.2 PALPITATIONS: ICD-10-CM

## 2022-05-11 PROCEDURE — 93000 ELECTROCARDIOGRAM COMPLETE: CPT | Performed by: INTERNAL MEDICINE

## 2022-05-11 PROCEDURE — 99214 OFFICE O/P EST MOD 30 MIN: CPT | Performed by: INTERNAL MEDICINE

## 2022-05-11 NOTE — PROGRESS NOTES
Patient was seen in office today for the placement of a 48 hour holter per . Patient tolerated well & understood instructions.  Device # [id number]

## 2022-05-11 NOTE — PROGRESS NOTES
[facility] Cardiology Progress Note  Dr. Silvio Monte      Referring Physician: Tia Weems DO  CHIEF COMPLAINT:   Chief Complaint   Patient presents with    Coronary Artery Disease     palpitations, patient has no complaints       HISTORY OF PRESENT ILLNESS:   Patient is 61years old female with a family history of CAD s/p PCI to , is here for follow up visit. Complaining of palpitations, patient denies any chest pain, no shortness of breath, no pedal edema, no PND, no orthopnea, no syncope, no presyncopal episodes. Past Medical History:   Diagnosis Date    Arthritis     CAD (coronary artery disease)     9-17-20 yisel 2.0x22 francisca om.      COVID-19 virus infection 07/2020    Depression     GERD (gastroesophageal reflux disease)     Graves disease     Hypertension     Hypothyroidism     SECONDARY TO GRAVES DISEASE    NSTEMI (non-ST elevated myocardial infarction) ([facility] [address] 09/16/2020    Obesity          Past Surgical History:   Procedure Laterality Date    CARDIAC CATHETERIZATION  09/17/2020    stent x 1    COLONOSCOPY  11/10/10    DR GUILLERMO    CORONARY ANGIOPLASTY WITH STENT PLACEMENT  09/17/2020    Dhruv      GASTRIC BYPASS SURGERY  5/2/06     [id number]         Current Outpatient Medications   Medication Sig Dispense Refill    loratadine (CLARITIN) 10 MG tablet Take 10 mg by mouth daily      hydrOXYzine (VISTARIL) 25 MG capsule TAKE 1 CAPSULE BY MOUTH THREE TIMES DAILY AS NEEDED FOR ANXIETY 90 capsule 1    sodium hyaluronate (EUFLEXXA) 20 MG/2ML SOSY injection Inject 1 prefilled syringe into bilateral knees once a week for 3 weeks (total of 6 prefilled syringes) 8.12 mL 2    levothyroxine (SYNTHROID) 125 MCG tablet TAKE 1 TABLET BY MOUTH ONCE DAILY 90 tablet 1    FLUoxetine (PROZAC) 40 MG capsule Take 1 capsule by mouth once daily 90 capsule 1    FLUoxetine (PROZAC) 20 MG capsule Take 1 capsule by mouth once daily 90 capsule 1    traZODone (DESYREL) 100 MG tablet Take 1 tablet by mouth nightly 90 tablet 1    celecoxib (CELEBREX) 200 MG capsule Take 1 capsule by mouth daily 90 capsule 1    clopidogrel (PLAVIX) 75 MG tablet Take 1 tablet by mouth daily 90 tablet 3    meclizine (ANTIVERT) 12.5 MG tablet Take 1 tablet by mouth 3 times daily as needed for Dizziness 90 tablet 1    atorvastatin (LIPITOR) 40 MG tablet Take 1 tablet by mouth nightly 90 tablet 3    fluticasone (FLONASE) 50 MCG/ACT nasal spray USE 2 SPRAY(S) IN EACH NOSTRIL DAILY 16 g 2    buPROPion (WELLBUTRIN XL) 300 MG extended release tablet TAKE 1 TABLET BY MOUTH ONCE DAILY IN THE MORNING 90 tablet 1    ferrous sulfate (IRON 325) 325 (65 Fe) MG tablet Take one tablet for breakfast and lunch on Monday, Wednesday and Friday; take each dose with a vitamin C tablet (Patient taking differently: Take one tablet for breakfast and lunch on Monday, Wednesday and Friday; take each dose with a vitamin C tablet ( pt takes when remembers)) 180 tablet 2    ascorbic acid (V-R VITAMIN C) 250 MG tablet Take 1 tablet by mouth daily Take one tablet for breakfast and lunch on Monday, Wednesday and Friday; take each dose with an iron tablet 80 tablet 2    Multiple Vitamin (MVI, CELEBRATE, CHEWABLE TABLET) Take two tablets each day (Patient taking differently: Take two tablets each day. (when remembers)) 180 tablet 3    magnesium (MAGNESIUM-OXIDE) 250 MG TABS tablet Take 500 mg by mouth daily (when remembers)      aspirin 81 MG EC tablet Take 1 tablet by mouth daily 30 tablet 3    nitroGLYCERIN (NITROSTAT) 0.4 MG SL tablet up to max of 3 total doses. If no relief after 1 dose, call 911. 25 tablet 0    Cyanocobalamin (VITAMIN B 12 PO) Take by mouth       No current facility-administered medications for this visit.          Allergies as of 05/11/2022 - Fully Reviewed 05/11/2022   Allergen Reaction Noted    Mobic [meloxicam] Other (See Comments) 05/18/2017       Social History     Socioeconomic History    Marital status:      Spouse name: Not on file    Number of children: Not on file    Years of education: Not on file    Highest education level: Not on file   Occupational History    Not on file   Tobacco Use    Smoking status: Former Smoker     Packs/day: 1.00     Years: 3.00     Pack years: 3.00     Types: Cigarettes     Start date: 67     Quit date: 3/4/1977     Years since quittin.3    Smokeless tobacco: Never Used   Vaping Use    Vaping Use: Never used   Substance and Sexual Activity    Alcohol use: Yes     Alcohol/week: 0.0 standard drinks     Comment: rare    Drug use: Never    Sexual activity: Not on file   Other Topics Concern    Not on file   Social History Narrative    Not on file     Social Determinants of Health     Financial Resource Strain: Low Risk     Difficulty of Paying Living Expenses: Not hard at all   Food Insecurity: No Food Insecurity    Worried About [address] in the Last Year: Never true    [facility] in the Last Year: Never true   Transportation Needs:     Lack of Transportation (Medical): Not on file    Lack of Transportation (Non-Medical):  Not on file   Physical Activity:     Days of Exercise per Week: Not on file    Minutes of Exercise per Session: Not on file   Stress:     Feeling of Stress : Not on file   Social Connections:     Frequency of Communication with Friends and Family: Not on file    Frequency of Social Gatherings with Friends and Family: Not on file    Attends Alevism Services: Not on file    Active Member of Clubs or Organizations: Not on file    Attends Club or Organization Meetings: Not on file    Marital Status: Not on file   Intimate Partner Violence:     Fear of Current or Ex-Partner: Not on file    Emotionally Abused: Not on file    Physically Abused: Not on file    Sexually Abused: Not on file   Housing Stability:     Unable to Pay for Housing in the Last Year: Not on file    Number of [place] in the Last Year: Not on file    Unstable Housing in the Last Year: Not on file       Family Hx of early CAD    REVIEW OF SYSTEMS:     CONSTITUTIONAL:  negative for  fevers, chills, sweats and fatigue  HEENT:  negative for  tinnitus, earaches, nasal congestion and epistaxis  RESPIRATORY:  negative for  dry cough, cough with sputum, dyspnea, wheezing and hemoptysis  GASTROINTESTINAL:  negative for nausea, vomiting, diarrhea, constipation, pruritus and jaundice  HEMATOLOGIC/LYMPHATIC:  negative for easy bruising, bleeding, lymphadenopathy and petechiae  ENDOCRINE:  negative for heat intolerance, cold intolerance, tremor, hair loss and diabetic symptoms including neither polyuria nor polydipsia nor blurred vision  MUSCULOSKELETAL:  negative for  myalgias, arthralgias, joint swelling, stiff joints and decreased range of motion  NEUROLOGICAL:  negative for memory problems, speech problems, visual disturbance, dysphagia, weakness and numbness      PHYSICAL EXAM:   CONSTITUTIONAL:  awake, alert, cooperative, no apparent distress, and appears stated age  HEAD:  normocepalic, without obvious abnormality, atraumatic  NECK:  Supple, symmetrical, trachea midline, no adenopathy, thyroid symmetric, not enlarged and no tenderness, skin normal  LUNGS:  No increased work of breathing, No accessory muscle use or intercostal retractions, good air exchange, clear to auscultation bilaterally, no crackles or wheezing  CARDIOVASCULAR:  Normal apical impulse, regular rate and rhythm, normal S1 and S2, no S3 or S4, and no murmur noted, no edema, no JVD, no carotid bruit. ABDOMEN:  Soft, nontender, no masses, no hepatomegaly, no splenomegaly, BS+  MUSCULOSKELETAL:  No clubbing no cyanosis. there is no redness, warmth, or swelling of the joints  full range of motion noted  NEUROLOGIC:  Alert, awake,oriented x3  SKIN:  no bruising or bleeding, normal skin color, texture, turgor and no redness, warmth, or swelling    /68   Pulse 62   Resp 16   Ht 5' 3\" (1.6 m)   Wt 246 lb (111.6 kg)   BMI 43.58 kg/m²     DATA:   I personally reviewed the visit EKG with the following interpretation: Sinus rhythm, low voltage QRS, normal axis    EKG 4/29/22 Sinus  Bradycardia   WITHIN NORMAL LIMITS    ECHO: 10/1/20 Summary   Normal left ventricle size and systolic function. Ejection fraction is visually estimated at 55-60%. Mild concentric left ventricular hypertrophy. No regional wall motion abnormalities seen. Indeterminate diastolic function. The left atrium is mildly dilated. Normal right ventricular size and function. TAPSE 19 mm. No systolic mitral regurgitation noted. There is mild-to-moderate aortic stenosis with valve area of 1.4 sq cm. Aortic valve peak velocity 2.3 m/s and mean gradient 10 mmHg. Physiologic and/or trace tricuspid regurgitation. RVSP is 25 mmHg. Normal estimated PA systolic pressure. No evidence for hemodynamically significant pericardial effusion. No previous echo for comparison    Stress Test:     Angiography: 9/17/20 1. Totally occluded fourth OM branch with successful deployment of 2.0  x 22 mm [place] Resolute drug-eluting stent with 0% residual stenosis and  RITA-3 flow distally, (pre-PCI RITA-0 flow, post-PCI RITA-3 flow).   2.  No CAD noted in the rest of the coronary arteries.     Cardiology Labs: BMP:    Lab Results   Component Value Date     04/29/2022    K 4.5 04/29/2022     04/29/2022    CO2 25 04/29/2022    BUN 13 04/29/2022    CREATININE 0.9 04/29/2022     CMP:    Lab Results   Component Value Date     04/29/2022    K 4.5 04/29/2022     04/29/2022    CO2 25 04/29/2022    BUN 13 04/29/2022    CREATININE 0.9 04/29/2022    PROT 7.3 04/29/2022     CBC:    Lab Results   Component Value Date    WBC 7.6 06/22/2021    RBC 4.47 06/22/2021    HGB 11.9 06/22/2021    HCT 37.4 06/22/2021    MCV 83.7 06/22/2021    RDW 16.6 06/22/2021     06/22/2021     PT/INR:  No results found for: PTINR  PT/INR Warfarin:  No components found for: Dank Yuliana  PTT:    Lab Results   Component Value Date    APTT 67.1 09/17/2020     PTT Heparin:  No components found for: APTTHEP  Magnesium:    Lab Results   Component Value Date    MG 2.5 09/16/2020     TSH:    Lab Results   Component Value Date    TSH 1.670 04/29/2022     TROPONIN:  No components found for: TROP  BNP:  No results found for: BNP  FASTING LIPID PANEL:    Lab Results   Component Value Date    CHOL 130 04/29/2022    HDL 65 04/29/2022    TRIG 43 04/29/2022     No orders to display     I have personally reviewed the laboratory, cardiac diagnostic and radiographic testing as outlined above:      IMPRESSION:  1. Palpitations: Etiology?,  Will obtain 48 hours Holter monitor for further evaluation  2. CAD: S/p non-ST elevation MI, had cardiac catheterization on 9/17/20 with the following findings:  # Totally occluded fourth OM branch with successful deployment of 2.0 x 22 mm [place] Resolute drug-eluting stent with 0% residual stenosis and RITA-3 flow distally, (pre-PCI RITA-0 flow, post-PCI RITA-3 flow). # No CAD noted in the rest of the coronary arteries. will continue current treatment  3. Aortic valve stenosis: Mild to moderate   4. Hyperlipidemia: On statin  5. Hypothyroidism: Secondary to Graves' disease, on Synthroid supplements  6. Type 2 diabetes mellitus      RECOMMENDATIONS:   1.  48 hours Holter monitor  2. Continue current treatment  3. preventive cardiology: Low-salt, low-cholesterol diet, daily exercise, total cholesterol of less than 200, LDL of less than 70, adherence to diabetic diet and diabetic medications, smoking cessation were all advised. 4.  Follow-up with Dr. Kizzy Pierson as scheduled  5.   Follow-up with Dr. Anita Cardoza in 6 months, sooner if symptomatic for any reason    I have reviewed my findings and recommendations with patient    Electronically signed by Candida Freitas MD on 5/11/2022 at 9:03 AM    NOTE: This report was transcribed using voice recognition software.  Every effort was made to ensure accuracy; however, inadvertent computerized transcription errors may be present

## 2022-06-06 ENCOUNTER — TELEPHONE (OUTPATIENT)
Dept: CARDIOLOGY CLINIC | Age: 67
End: 2022-06-06

## 2022-06-06 DIAGNOSIS — R00.2 PALPITATIONS: ICD-10-CM

## 2022-06-13 ENCOUNTER — TELEPHONE (OUTPATIENT)
Dept: ADMINISTRATIVE | Age: 67
End: 2022-06-13

## 2022-06-13 NOTE — TELEPHONE ENCOUNTER
Pt called to see if you have received any information on the new injection recently discussed on 03/06/22. She stated there was a new authorization for a Longer lasting injection. She would like to schedule this injection and if it has been denied she would like to get the original injection as before. Please contact pt.

## 2022-06-14 NOTE — TELEPHONE ENCOUNTER
Spoke with patient. I informed her she was denied for Visco injections. Advised her to make appointment to be seen in the office and we will submit new clinicals and new xray reports to her insurance company. Patient stated she is in [place] right now so she will call us back tomorrow to schedule appointment for a office visit.

## 2022-06-14 NOTE — TELEPHONE ENCOUNTER
She does not have approval for any of the visco injections for her knees. She was denied previously due to not meeting medical criteria. We tried to obtain approval even after the denial by writing a letter. However, we did not hear back with a decision so I am guessing the original denial is still valid. If she wants to have injections done, she needs to come into our office for a appointment and we can submit updated clinicals and xray reports from that visit to her insurance to try to obtain approval. However, if she has not tried physical therapy or weight loss since we last saw her, the injections will be denied again due to not meeting medical necessity criteria.

## 2022-07-02 DIAGNOSIS — E05.00 GRAVES DISEASE: ICD-10-CM

## 2022-07-02 DIAGNOSIS — F51.01 PRIMARY INSOMNIA: ICD-10-CM

## 2022-07-05 RX ORDER — LEVOTHYROXINE SODIUM 0.12 MG/1
TABLET ORAL
Qty: 90 TABLET | Refills: 0 | Status: SHIPPED | OUTPATIENT
Start: 2022-07-05

## 2022-07-05 RX ORDER — TRAZODONE HYDROCHLORIDE 100 MG/1
100 TABLET ORAL NIGHTLY
Qty: 90 TABLET | Refills: 0 | Status: SHIPPED
Start: 2022-07-05 | End: 2022-08-15 | Stop reason: SDUPTHER

## 2022-07-05 RX ORDER — CELECOXIB 200 MG/1
CAPSULE ORAL
Qty: 90 CAPSULE | Refills: 0 | Status: SHIPPED | OUTPATIENT
Start: 2022-07-05

## 2022-07-15 ENCOUNTER — OFFICE VISIT (OUTPATIENT)
Dept: PRIMARY CARE CLINIC | Age: 67
End: 2022-07-15
Payer: COMMERCIAL

## 2022-07-15 VITALS
HEART RATE: 71 BPM | TEMPERATURE: 97.1 F | RESPIRATION RATE: 16 BRPM | OXYGEN SATURATION: 98 % | BODY MASS INDEX: 43.05 KG/M2 | SYSTOLIC BLOOD PRESSURE: 110 MMHG | WEIGHT: 243 LBS | DIASTOLIC BLOOD PRESSURE: 78 MMHG | HEIGHT: 63 IN

## 2022-07-15 DIAGNOSIS — M17.0 PRIMARY OSTEOARTHRITIS OF BOTH KNEES: Primary | ICD-10-CM

## 2022-07-15 PROCEDURE — 99212 OFFICE O/P EST SF 10 MIN: CPT | Performed by: FAMILY MEDICINE

## 2022-07-15 RX ORDER — TRAMADOL HYDROCHLORIDE 50 MG/1
50 TABLET ORAL EVERY 6 HOURS PRN
Qty: 28 TABLET | Refills: 0 | Status: SHIPPED
Start: 2022-07-15 | End: 2022-08-15 | Stop reason: SDUPTHER

## 2022-07-15 NOTE — PROGRESS NOTES
Christine Perez, a female of 61 y.o. came to the office 7/15/2022. Patient Active Problem List   Diagnosis    Hypothyroidism    Graves disease    Depression    GERD (gastroesophageal reflux disease)    Chronic pain of both knees    Non-ST elevation MI (NSTEMI) ([facility] [address]    Morbid obesity ([facility] [address]    Coronary artery disease involving native coronary artery of native heart with unstable angina pectoris (HCC)    LITO (generalized anxiety disorder)          Knee Pain    knee pain b/l: left worse than right. Needs PT before Visco injections covered. Needing cane.      Allergies   Allergen Reactions    Mobic [Meloxicam] Other (See Comments)       Current Outpatient Medications on File Prior to Visit   Medication Sig Dispense Refill    levothyroxine (EUTHYROX) 125 MCG tablet Take 1 tablet by mouth once daily 90 tablet 0    celecoxib (CELEBREX) 200 MG capsule Take 1 capsule by mouth once daily 90 capsule 0    traZODone (DESYREL) 100 MG tablet Take 1 tablet by mouth nightly 90 tablet 0    loratadine (CLARITIN) 10 MG tablet Take 10 mg by mouth daily      hydrOXYzine (VISTARIL) 25 MG capsule TAKE 1 CAPSULE BY MOUTH THREE TIMES DAILY AS NEEDED FOR ANXIETY 90 capsule 1    sodium hyaluronate (EUFLEXXA) 20 MG/2ML SOSY injection Inject 1 prefilled syringe into bilateral knees once a week for 3 weeks (total of 6 prefilled syringes) 8.12 mL 2    FLUoxetine (PROZAC) 40 MG capsule Take 1 capsule by mouth once daily 90 capsule 1    FLUoxetine (PROZAC) 20 MG capsule Take 1 capsule by mouth once daily 90 capsule 1    clopidogrel (PLAVIX) 75 MG tablet Take 1 tablet by mouth daily 90 tablet 3    meclizine (ANTIVERT) 12.5 MG tablet Take 1 tablet by mouth 3 times daily as needed for Dizziness 90 tablet 1    atorvastatin (LIPITOR) 40 MG tablet Take 1 tablet by mouth nightly 90 tablet 3    fluticasone (FLONASE) 50 MCG/ACT nasal spray USE 2 SPRAY(S) IN EACH NOSTRIL DAILY 16 g 2    buPROPion (WELLBUTRIN XL) 300 MG extended release tablet TAKE 1 TABLET Swelling present. Decreased range of motion. Neurological:      Mental Status: She is alert and oriented to person, place, and time. Psychiatric:         Mood and Affect: Mood normal.       ASSESSMENT AND PLAN:    Hyun Rodriguez was seen today for knee pain and gait problem. Diagnoses and all orders for this visit:    Primary osteoarthritis of both knees  -     [facility]  -     traMADol (ULTRAM) 50 MG tablet; Take 1 tablet by mouth every 6 hours as needed for Pain for up to 7 days. Controlled substances monitoring: no signs of potential drug abuse or diversion identified. Must do PT before Visceo injection allowed. Ultimately needs tka. - take Ultram nightly only. Return if symptoms worsen or fail to improve.     Mark Dominguez, DO

## 2022-07-29 ENCOUNTER — EVALUATION (OUTPATIENT)
Dept: PHYSICAL THERAPY | Age: 67
End: 2022-07-29
Payer: COMMERCIAL

## 2022-07-29 DIAGNOSIS — M17.0 PRIMARY OSTEOARTHRITIS OF BOTH KNEES: Primary | ICD-10-CM

## 2022-07-29 PROCEDURE — 97161 PT EVAL LOW COMPLEX 20 MIN: CPT | Performed by: PHYSICAL THERAPIST

## 2022-07-29 PROCEDURE — 97110 THERAPEUTIC EXERCISES: CPT | Performed by: PHYSICAL THERAPIST

## 2022-07-29 NOTE — PROGRESS NOTES
[facility]   Phone: [phone]   Fax: [phone]         Date:  2022   Patient: Salma Barron  : 1955  MRN: [id number]  Referring Provider: Shelby Malin DO  [address]     Medical Diagnosis:      Diagnosis Orders   1. Primary osteoarthritis of both knees             SUBJECTIVE:     Onset date: 8 years ago     Onset: Insidious onset    Mechanism of Injury: B Knee OA     Previous PT: none     Medical Management for Current Problem:  Sodium Hyaluronate injection in bilateral knees . Pt stated that insurance is \"refusing to give her knee injections until she goes to therapy\". Pt mentioned that she received her last knee injection in December. She stated that the knee injection had relived her pain for one month. Chief complaint: pain, edema, decreased motion, decreased mobility, weakness, inability / limited ability to use leg, difficulty walking, unable to run / difficulty running , difficulty with stairs, limited ability to complete home/outdoor chores/tasks, decreased endurance, decreased balance, falls. Pt stated that she \"cannot move her knees because the pain is so bad and that it is difficult for her to sit to stand when getting up from her chair and getting out of the car\" Pt mentioned that she had a recent fall a few weeks ago in the bathroom and reported that she falls often. Behavior: condition is getting worse    Pain: constant  Current: 8/10     Best: 3/10     Worst:10/10    Symptom Type/Quality: sharp, aching, throbbing, radiating to ankle(s) bilaterally  Location[de-identified] Knee: anterior     Aggravated by: walking, standing, stairs, inclines, declines, getting in/out of car, pivoting    Relieved by: Pt stated she was taking Tramadol for her knee pain. medication would ease the pain. Imaging results: No results found.     Past Medical History:  Past Medical History:   Diagnosis Date    Arthritis     CAD (coronary artery disease)     9-17-20 yisel 2.0x22 francisca om.      COVID-19 virus infection 07/2020    Depression     GERD (gastroesophageal reflux disease)     Graves disease     Hypertension     Hypothyroidism     SECONDARY TO GRAVES DISEASE    [facility]    Obesity      Past Surgical History:   Procedure Laterality Date    CARDIAC CATHETERIZATION  09/17/2020    stent x 1    COLONOSCOPY  11/10/10    DR GUILLERMO    CORONARY ANGIOPLASTY WITH STENT PLACEMENT  09/17/2020    Dhruv OM     GASTRIC BYPASS SURGERY  5/2/06    DR HAIRSTON       Medications:   Current Outpatient Medications   Medication Sig Dispense Refill    levothyroxine (EUTHYROX) 125 MCG tablet Take 1 tablet by mouth once daily 90 tablet 0    celecoxib (CELEBREX) 200 MG capsule Take 1 capsule by mouth once daily 90 capsule 0    traZODone (DESYREL) 100 MG tablet Take 1 tablet by mouth nightly 90 tablet 0    loratadine (CLARITIN) 10 MG tablet Take 10 mg by mouth daily      hydrOXYzine (VISTARIL) 25 MG capsule TAKE 1 CAPSULE BY MOUTH THREE TIMES DAILY AS NEEDED FOR ANXIETY 90 capsule 1    sodium hyaluronate (EUFLEXXA) 20 MG/2ML SOSY injection Inject 1 prefilled syringe into bilateral knees once a week for 3 weeks (total of 6 prefilled syringes) 8.12 mL 2    FLUoxetine (PROZAC) 40 MG capsule Take 1 capsule by mouth once daily 90 capsule 1    FLUoxetine (PROZAC) 20 MG capsule Take 1 capsule by mouth once daily 90 capsule 1    clopidogrel (PLAVIX) 75 MG tablet Take 1 tablet by mouth daily 90 tablet 3    meclizine (ANTIVERT) 12.5 MG tablet Take 1 tablet by mouth 3 times daily as needed for Dizziness 90 tablet 1    atorvastatin (LIPITOR) 40 MG tablet Take 1 tablet by mouth nightly 90 tablet 3    fluticasone (FLONASE) 50 MCG/ACT nasal spray USE 2 SPRAY(S) IN EACH NOSTRIL DAILY 16 g 2    buPROPion (WELLBUTRIN XL) 300 MG extended release tablet TAKE 1 TABLET BY MOUTH ONCE DAILY IN THE MORNING 90 tablet 1    ferrous sulfate (IRON 325) 325 (65 Fe) MG tablet Take one tablet for breakfast and lunch on Monday, Wednesday and Friday; take each dose with a vitamin C tablet (Patient taking differently: Take one tablet for breakfast and lunch on Monday, Wednesday and Friday; take each dose with a vitamin C tablet ( pt takes when remembers)) 180 tablet 2    ascorbic acid (V-R VITAMIN C) 250 MG tablet Take 1 tablet by mouth daily Take one tablet for breakfast and lunch on Monday, Wednesday and Friday; take each dose with an iron tablet 80 tablet 2    Multiple Vitamin (MVI, CELEBRATE, CHEWABLE TABLET) Take two tablets each day (Patient taking differently: Take two tablets each day. (when remembers)) 180 tablet 3    magnesium (MAGNESIUM-OXIDE) 250 MG TABS tablet Take 500 mg by mouth daily (when remembers)      aspirin 81 MG EC tablet Take 1 tablet by mouth daily 30 tablet 3    nitroGLYCERIN (NITROSTAT) 0.4 MG SL tablet up to max of 3 total doses. If no relief after 1 dose, call 911. 25 tablet 0    Cyanocobalamin (VITAMIN B 12 PO) Take by mouth       No current facility-administered medications for this visit. Occupation:   . Physical demands include: walking, standing, sitting, operating foot controls. Status: part time. Exercise regimen: none    Hobbies: reading walking, hangout with her dogs    Patient Goals: pain relief, return to prior activity, full use of leg, get back to normal, return to work, walk normally, improved balance      Precautions/Contraindications: falls risk    OBJECTIVE:     Observations: well nourished female    Inspection: forward posture     Edema: mild edema in the medial left knee.      Gait: antalgic gait, ambulates with cane    Joint/Motion:    Knee:  Right:   AROM: 110° Flexion,  5° Extension    Left:   AROM: 65° Flexion,  15° Extension  AAROM with band: 100° (pt controlling degree of motion with flex band and disc under heel)    Strength:    Knee:   Right: Hip: 3/5, Knee: Flexion 3/5,  Extension 3/5  Left: Hip: 3/5, Knee: Flexion 3/5,  Extension 3/5    Palpation: Palpation of the L medial knee reported with pain and tenderness  that radiated down to L foot. Pt stated that her L knee is the worse than the R. Pt reported that her knee imaging showed her L knee \"bone on bone\". Special Tests/Functional Screens:    [] Lachman's []+ / [] -    [] Anterior Drawer []+ / [] -   [] Valgus Stress []+ / [] -  [] Thessaly Test []+ / [] -   [] Marco's Sign []+ / [] -   [] Apley compression: []+ / [] - [] Bounce Home []+ / [] -   [] Mil []+ / [] -   [] Pivot Shift []+ / [] -   [] Posterior Drawer []+ / [] -   [] Varus Stress []+ / [] -   [] Patellar Tracking: []+ / [] -     Special test comments: n/a      ASSESSMENT     Outcome Measure:   Lower Extremity Functional Scale (LEFS) 13/80    Problems:   Pain reported 8/10  B Knee flexion and extension ROM decreased   B knee Strength decreased   Decreased functional ability with walking, stairs, standing, sitting, ADLs , use of right lower extremity, use of left lower extremity, limited tolerance to weightbearing tasks and weightbearing duration, bending, reaching, driving, sit to stand. Reason for Skilled Care: Pt presents with bilateral knee OA. Pt will benefit for skilled therapy to increase knee ROM, strength, and decrease pain to preform functional tasks of daily living. [x] There are no barriers affecting plan of care or recovery    [] Barriers to this patient's plan of care or recovery include. Domestic Concerns:  [x] No  [] Yes:      Long Term goals (4-6 weeks)  Decrease reported pain to 3-6/10  Increase L Knee Extension AROM to 5* and L knee flexion AROM to 100*  Increase Strength to 4/5 in B knee flexion and extension. Able to perform/complete the following functions/tasks: Patient will demonstrate decreased pain to 3-6/10 pain when ascending/descending stairs to preform her work duties.  Pt will demonstrate the ability to amb with least restrictive AD for 500 ft with decreased pain to 3-6/10. LEFS 21/80  Independent with Home Exercise Programs    Rehab Potential: [x] Good  [] Fair  [] Poor    PLAN       Treatment Plan:   [x] Therapeutic Exercise  [x] Therapeutic Activity  [x] Neuromuscular Re-education   [x] Gait Training  [x] Balance Training  [] Aerobic conditioning  [x] Manual Therapy  [] Massage/Fascial release   [] Work/Sport specific activities    [] Pain Neuroscience [x] Cold/hotpack  [] Vasocompression  [x] Electrical Stimulation  [] Lumbar/Cervical Traction  [x] Ultrasound   [] Iontophoresis: 4 mg/mL Dexamethasone Sodium Phosphate 40-80 mAmin  [] Dry Needling      [x] Instruction in HEP      []  Medication allergies reviewed for use of Dexamethasone Sodium Phosphate 4mg/ml  with iontophoresis treatments. Patient is not allergic. The following CPT codes are likely to be used in the care of this patient: 50050 PT Evaluation: Low Complexity, 09302 PT Re-Evaluation, 01000 Therapeutic Exercise, 78827 Neuromuscular Re-Education, 68498 Therapeutic Activities, 99197 Manual Therapy, 60832 Gait Training, 47149 Electric Stimulation, and 81915 Ultrasound      Suggested Professional Referral: [x] No  [] Yes:     Patient Education:  [x] Plans/Goals, Risks/Benefits discussed  [x] Home exercise program  Method of Education: [x] Verbal  [x] Demo  [x] Written  Comprehension of Education:  [x] Verbalizes understanding. [x] Demonstrates understanding. [] Needs Review. [] Demonstrates/verbalizes understanding of HEP/Ed previously given. Frequency: 1-2 days per week for 4-6 weeks    Patient understands diagnosis/prognosis and consents to treatment, plan and goals: [x] Yes    [] No     Thank you for the opportunity to work with your patient. If you have questions or comments, please contact me at numbers listed above.     Electronically signed by: Shanna Lara PT, DPT  [id number]    Medicare Patients Only     Please sign Physician's Certification and return to: [address]  [facility]  [address] [facility] [id number]  Dept: [phone]  Dept Fax: [phone]  Loc: [phone] Certification / Comments     Frequency/Duration 2-3 days per week for 4-6 weeks. Certification period from 7/29/2022  to 10/14/2022. I have reviewed the Plan of Care established for skilled therapy services and certify that the services are required and that they will be provided while the patient is under my care.     Physician's Comments/Revisions:               Physician's Printed Name:                                           [de-identified] Signature:                                                               Date:

## 2022-07-29 NOTE — PROGRESS NOTES
[facility]          Phone: [phone] Fax: [phone]    Physical Therapy Daily Treatment Note  Date:  2022    Patient Name:  Deborah Iniguez    :  1955  MRN: [id number]    Evaluating therapist: Sumit Qureshi PT, DPT  [id number]    Restrictions/Precautions:  fall risk    Diagnosis:     Diagnosis Orders   1. Primary osteoarthritis of both knees          Treatment Diagnosis:    Insurance/Certification information: Arcenio Razo ACA  Referring Physician: Murray Loco DO  Plan of care signed (Y/N):    Visit# / total visits:    Pain level: 8/10   Time In:  8:08  Time Out:  8:45    Subjective:  see eval     Exercises:  Exercise/Equipment Resistance/Repetitions Other comments     Heel slides 10x ea LE, 5 sec hold      LAQ 10x ea LE, 2 sec hold                                                                                                                                Other Therapeutic Activities:  Pt presented with pain in B knees. Pt reported that her L knee hurt more than her R d/t her L knee being \"bone on bone\". Pt presented with signs of dyspnea d/t her knee pain during TE and amb. Pt demonstrated to have increased pain on the L knee when preforming heel slide. Pt also reported with increased signs of pain and discomfort in her L knee when she was supine with her L knee extended. Pt was gait trained with a walker and presented with decreased pain and discomfort in her knees when amb.      Home Exercise Program:  Heel slides     Manual Treatments:  n/a    Modalities:  n/a     Time-in Time-out Total Time   66637  Evaluation Low Complexity 0808 0828 20   66532  Evaluation Med Complexity      69131  Evaluation High Complexity      41359  Ther Ex 0828 0845 17   18806  Neuro Re-ed        73554  Ther Activities        61091  Manual Therapy       47118  E-stim       90708  Ultrasound            Session 0808 0845 37       Treatment/Activity Tolerance:  [x] Patient tolerated treatment well [] Patient limited by fatigue  [] Patient limited by pain  [] Patient limited by other medical complications  [] Other:     Prognosis: [x] Good [] Fair  [] Poor    Patient Requires Follow-up: [x] Yes  [] No    Plan:   [x] Continue per plan of care [] Alter current plan (see comments)  [] Plan of care initiated [] Hold pending MD visit [] Discharge    Plan for Next Session:  increase B knee ROM and strength as tolerated      Electronically signed by:    Cintia Vivar, MISSAEL Rosenberg, PT, DPT  [id number]

## 2022-08-02 ENCOUNTER — TREATMENT (OUTPATIENT)
Dept: PHYSICAL THERAPY | Age: 67
End: 2022-08-02
Payer: COMMERCIAL

## 2022-08-02 DIAGNOSIS — M17.0 PRIMARY OSTEOARTHRITIS OF BOTH KNEES: Primary | ICD-10-CM

## 2022-08-02 PROCEDURE — 97110 THERAPEUTIC EXERCISES: CPT

## 2022-08-09 ENCOUNTER — TREATMENT (OUTPATIENT)
Dept: PHYSICAL THERAPY | Age: 67
End: 2022-08-09
Payer: COMMERCIAL

## 2022-08-09 DIAGNOSIS — M17.0 PRIMARY OSTEOARTHRITIS OF BOTH KNEES: Primary | ICD-10-CM

## 2022-08-09 PROCEDURE — 97110 THERAPEUTIC EXERCISES: CPT

## 2022-08-09 PROCEDURE — 97014 ELECTRIC STIMULATION THERAPY: CPT

## 2022-08-09 NOTE — PROGRESS NOTES
[facility]          Phone: [phone] Fax: [phone]    Physical Therapy Daily Treatment Note  Date:  2022    Patient Name:  Santos Seals    :  1955  MRN: [id number]    Evaluating therapist: Eddy Gonsalves PT, DPT  [id number]    Restrictions/Precautions:  fall risk    Diagnosis:     Diagnosis Orders   1. Primary osteoarthritis of both knees          Treatment Diagnosis:    Insurance/Certification information: Vern Berrios ACA  Referring Physician: Jose Almaraz DO  Plan of care signed (Y/N):    Visit# / total visits:  3/8  ( 12 visits  through  )  Pain level: 9-10/10 B knees   Time In:  1300  Time Out:  1354    Subjective:   Pt arrived without SPC, furniture walking , with significant antalgia B LE . Pt reported increased pain and incidence of \" knees locking up\"      Exercises:  Exercise/Equipment Resistance/Repetitions Other comments     Heel slides 10x ea LE, 5 sec hold L knee 42° flex  R knee 65° flex     LAQ NT d/t pain       see below L knee 9/10 sharp pain     QS 5 sec x 4 reps R   5 sec x 10 reps L    L knee Limited by pain/burning     SAQ's 3 sec x 8 reps R   3 sec x 5 reps L                                                                                                       Other Therapeutic Activities:  Pt presented with pain in B knees. Pt limited by pain,burning in B knees, and \"locking up\" L knee > R knee. Pt reported minimal relief from IFC, and CP. Therapist educated pt that it can take multiple sessions of IFC to note significant improvement.      Home Exercise Program:  Heel slides     Manual Treatments:  n/a    Modalities:  IFC to B knees x 15 mins w/ CP     Time-in Time-out Total Time   08344  Evaluation Low Complexity      65554  Evaluation Med Complexity      54643  Evaluation High Complexity      61258  Ther Ex 1300 1339 39   66925  Neuro Re-ed        88010  Ther Activities        98785  Manual Therapy       45632  E-stim  1339 1354 15   20572  Ultrasound            Session 1384 0957 79       Treatment/Activity Tolerance:  [x] Patient tolerated treatment well [] Patient limited by fatigue  [] Patient limited by pain  [] Patient limited by other medical complications  [] Other:     Prognosis: [x] Good [] Fair  [] Poor    Patient Requires Follow-up: [x] Yes  [] No    Plan:   [x] Continue per plan of care [] Alter current plan (see comments)  [] Plan of care initiated [] Hold pending MD visit [] Discharge    Plan for Next Session:  increase B knee ROM and strength as tolerated      Electronically signed by:  Kami Sports PTA [id number]

## 2022-08-11 ENCOUNTER — TREATMENT (OUTPATIENT)
Dept: PHYSICAL THERAPY | Age: 67
End: 2022-08-11
Payer: COMMERCIAL

## 2022-08-11 DIAGNOSIS — M17.0 PRIMARY OSTEOARTHRITIS OF BOTH KNEES: Primary | ICD-10-CM

## 2022-08-11 PROCEDURE — 97014 ELECTRIC STIMULATION THERAPY: CPT | Performed by: PHYSICAL THERAPIST

## 2022-08-11 PROCEDURE — 97110 THERAPEUTIC EXERCISES: CPT | Performed by: PHYSICAL THERAPIST

## 2022-08-11 NOTE — PROGRESS NOTES
[facility]          Phone: [phone] Fax: [phone]    Physical Therapy Daily Treatment Note  Date:  2022    Patient Name:  Rosio Leyva    :  1955  MRN: [id number]    Evaluating therapist: Felix Amor PT, DPT  [id number]    Restrictions/Precautions:  fall risk    Diagnosis:     Diagnosis Orders   1. Primary osteoarthritis of both knees          Treatment Diagnosis:    Insurance/Certification information: Angie Morris ACA  Referring Physician: Dickson Salcido DO  Plan of care signed (Y/N):    Visit# / total visits:    ( 12 visits  through  )  Pain level: 6/10 L knees, mild discomfort (not quantified) of R knee   Time In:  1540  Time Out:  1625    Subjective:   Pt reports stiffness in L knee this date. 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 Pt feels e-stim was very helpful last time and is considering buying a home TENs unit. Exercises:  Exercise/Equipment Resistance/Repetitions Other comments     Heel slides 10x ea LE, 5 sec hold      LAQ 10x ea LE, 2 sec hold      Stepper / Bike X 8 mins   see below            Seated hamstring curl X 10 ea LE Red TB     SLR X 10 ea LE                                                                                      Other Therapeutic Activities:    Pt presented with pain in B knee. Pt tolerated all TE's in today's session. Pt stated that her L knee is worse than her R knee. Pt stated she felt a \"click\" in the medial L knee during LAQ, but reports of no pain or discomfort during \"click\". Pt reported of minimal relief from pre mod.      Home Exercise Program:  Heel slides     Manual Treatments:  n/a    Modalities:   Premod e-stim to B knee x 10 mins      Time-in Time-out Total Time   17491  Evaluation Low Complexity      27794  Evaluation Med Complexity      29001  Evaluation High Complexity      36939  Ther Ex 1540 1615 35   62760  Neuro Re-ed [address]  Manual Therapy       28908  E-stim  1615 1625 10   60834  Ultrasound            Session 4826 1633 23       Treatment/Activity Tolerance:  [x] Patient tolerated treatment well [] Patient limited by fatigue  [] Patient limited by pain  [] Patient limited by other medical complications  [] Other:     Prognosis: [x] Good [] Fair  [] Poor    Patient Requires Follow-up: [x] Yes  [] No    Plan:   [x] Continue per plan of care [] Alter current plan (see comments)  [] Plan of care initiated [] Hold pending MD visit [] Discharge    Plan for Next Session:  increase B knee ROM and strength as tolerated      Electronically signed by:  Pietro Cabrera PTA [id number]

## 2022-08-15 DIAGNOSIS — F41.1 GAD (GENERALIZED ANXIETY DISORDER): ICD-10-CM

## 2022-08-15 DIAGNOSIS — M17.0 PRIMARY OSTEOARTHRITIS OF BOTH KNEES: ICD-10-CM

## 2022-08-15 DIAGNOSIS — F51.01 PRIMARY INSOMNIA: ICD-10-CM

## 2022-08-15 RX ORDER — FLUOXETINE HYDROCHLORIDE 40 MG/1
CAPSULE ORAL
Qty: 90 CAPSULE | Refills: 1 | Status: SHIPPED | OUTPATIENT
Start: 2022-08-15

## 2022-08-15 RX ORDER — TRAZODONE HYDROCHLORIDE 100 MG/1
100 TABLET ORAL NIGHTLY
Qty: 90 TABLET | Refills: 0 | Status: SHIPPED | OUTPATIENT
Start: 2022-08-15

## 2022-08-15 RX ORDER — TRAMADOL HYDROCHLORIDE 50 MG/1
50 TABLET ORAL EVERY 12 HOURS PRN
Qty: 60 TABLET | Refills: 0 | Status: SHIPPED | OUTPATIENT
Start: 2022-08-15 | End: 2022-09-14

## 2022-08-15 RX ORDER — FLUOXETINE HYDROCHLORIDE 20 MG/1
CAPSULE ORAL
Qty: 90 CAPSULE | Refills: 1 | Status: SHIPPED | OUTPATIENT
Start: 2022-08-15

## 2022-08-16 ENCOUNTER — TREATMENT (OUTPATIENT)
Dept: PHYSICAL THERAPY | Age: 67
End: 2022-08-16
Payer: COMMERCIAL

## 2022-08-16 DIAGNOSIS — F51.01 PRIMARY INSOMNIA: ICD-10-CM

## 2022-08-16 DIAGNOSIS — M17.0 PRIMARY OSTEOARTHRITIS OF BOTH KNEES: Primary | ICD-10-CM

## 2022-08-16 PROCEDURE — 97110 THERAPEUTIC EXERCISES: CPT

## 2022-08-16 RX ORDER — TRAZODONE HYDROCHLORIDE 100 MG/1
100 TABLET ORAL NIGHTLY
Qty: 90 TABLET | Refills: 0 | OUTPATIENT
Start: 2022-08-16

## 2022-08-16 NOTE — PROGRESS NOTES
[facility]          Phone: [phone] Fax: [phone]    Physical Therapy Daily Treatment Note  Date:  2022    Patient Name:  Trice Arreola    :  1955  MRN: [id number]    Evaluating therapist: Kamryn Stephen, PT, DPT  [id number]    Restrictions/Precautions:  fall risk    Diagnosis:     Diagnosis Orders   1. Primary osteoarthritis of both knees          Treatment Diagnosis:    Insurance/Certification information: Shayy Murders ENRIKE  Referring Physician: Arik Diaz DO  Plan of care signed (Y/N):    Visit# / total visits:    ( 12 visits  through  )  Pain level: 8-10/10 L knees, mild discomfort 5/10 R knee   Time In:  844  Time Out:  910    Subjective:   Pt reports stiffness in L knee this date. 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 Pt feels e-stim was very helpful last time and is considering buying a home TENs unit. Exercises:  Exercise/Equipment Resistance/Repetitions Other comments     Heel slides  seated  10x ea LE, 5 sec hold      LAQ 10x ea LE, 2 sec hold      Stepper / X 8 mins   see below            Seated hamstring curl X 10 ea LE Red TB                                                                                    Other Therapeutic Activities:    Pt presented with pain in B knee. Pt tolerated all TE's in today's session. Pt did not report \" clicking \" in her knee this session. Pt tolerated seated knee flex stretch without c/o anterior knee pain. Attempted IFC this session but electrodes would not adhere to pt, pt reported had applied a \" Hemp cream \" to B knees , IFC was deferred and educated pt to not apply Hemp cream before therapy session .     Home Exercise Program:  Heel slides     Manual Treatments:  n/a    Modalities:      Time-in Time-out Total Time   95792  Evaluation Low Complexity      30796  Evaluation Med Complexity      26955  Evaluation High Complexity      59302 Ther Ex 541 272 88   09412  Neuro Re-ed        09332  Ther Activities        86126  Manual Therapy       29839  E-stim       45546  Ultrasound            Session 273 925 94       Treatment/Activity Tolerance:  [x] Patient tolerated treatment well [] Patient limited by fatigue  [] Patient limited by pain  [] Patient limited by other medical complications  [] Other:     Prognosis: [x] Good [] Fair  [] Poor    Patient Requires Follow-up: [x] Yes  [] No    Plan:   [x] Continue per plan of care [] Alter current plan (see comments)  [] Plan of care initiated [] Hold pending MD visit [] Discharge    Plan for Next Session:  increase B knee ROM and strength as tolerated      Electronically signed by:  Isak Rahman PTA [id number]

## 2022-08-19 ENCOUNTER — TREATMENT (OUTPATIENT)
Dept: PHYSICAL THERAPY | Age: 67
End: 2022-08-19
Payer: COMMERCIAL

## 2022-08-19 DIAGNOSIS — M17.0 PRIMARY OSTEOARTHRITIS OF BOTH KNEES: Primary | ICD-10-CM

## 2022-08-19 PROCEDURE — 97110 THERAPEUTIC EXERCISES: CPT | Performed by: PHYSICAL THERAPIST

## 2022-08-19 PROCEDURE — 97014 ELECTRIC STIMULATION THERAPY: CPT | Performed by: PHYSICAL THERAPIST

## 2022-08-19 NOTE — PROGRESS NOTES
[facility]          Phone: [phone] Fax: [phone]    Physical Therapy Daily Treatment Note  Date:  2022    Patient Name:  Ami Marin    :  1955  MRN: [id number]    Evaluating therapist: Shaniqua Jane PT, DPT  [id number]    Restrictions/Precautions:  fall risk    Diagnosis:     Diagnosis Orders   1. Primary osteoarthritis of both knees          Treatment Diagnosis:    Insurance/Certification information: Deepa Lobe ENRIKE  Referring Physician: Huan Castro DO  Plan of care signed (Y/N):    Visit# / total visits:   ( 12 visits  through  )  Pain level: 8/10 L knee, 2/10 R knee   Time In:  1340  Time Out:  1428    Subjective:   Pt reports increased lateral pain in L knee with all movement this date. She states she was required to have increased standing and walking d/t needs at home since last session. Exercises:  Exercise/Equipment Resistance/Repetitions Other comments     Heel slides  seated  10x ea LE, 5 sec hold      LAQ 10x ea LE, 2 sec hold      Stepper / X 8 mins   see below            Seated hamstring curl 10 x2 ea LE Red TB     SLR X 10 ea LE      SLR for VMO X 10 ea LE                                                                               Other Therapeutic Activities:    Pt presented with B knee pain this date. She tolerated introduction of SLR for VMO with only slight discomfort. Pt reports she feels no functional difference since starting therapy. Pt had decreasedpain following IFC and ice packs to B knees at end of session.     Home Exercise Program:  Heel slides, SLR, hamstring curls    Manual Treatments:  n/a    Modalities:  IFC to B knees x 15 mins w/ CP     Time-in Time-out Total Time   66180  Evaluation Low Complexity      10557  Evaluation Med Complexity      94638  Evaluation High Complexity      47391  Ther Ex 8728 8204 32   87366  Neuro Re-ed        84291  Ther Activities        [id number] Manual Therapy       23064  E-stim  1415 1425 10   03823  Ultrasound            Session 6368  8456 92       Treatment/Activity Tolerance:  [x] Patient tolerated treatment well [] Patient limited by fatigue  [] Patient limited by pain  [] Patient limited by other medical complications  [] Other:     Prognosis: [x] Good [] Fair  [] Poor    Patient Requires Follow-up: [x] Yes  [] No    Plan:   [x] Continue per plan of care [] Alter current plan (see comments)  [] Plan of care initiated [] Hold pending MD visit [] Discharge    Plan for Next Session:  increase B knee ROM and strength as tolerated      Electronically signed by:    Slime Vinson, MISSAEL Devries, PT, DPT  [id number]

## 2022-08-23 ENCOUNTER — TREATMENT (OUTPATIENT)
Dept: PHYSICAL THERAPY | Age: 67
End: 2022-08-23
Payer: COMMERCIAL

## 2022-08-23 DIAGNOSIS — M17.0 PRIMARY OSTEOARTHRITIS OF BOTH KNEES: Primary | ICD-10-CM

## 2022-08-23 PROCEDURE — 97014 ELECTRIC STIMULATION THERAPY: CPT

## 2022-08-23 PROCEDURE — 97110 THERAPEUTIC EXERCISES: CPT

## 2022-08-23 NOTE — PROGRESS NOTES
[facility]                Phone: [phone] Fax: [phone]    Physical Therapy  Outpatient Discharge Summary     Date:  2022    Patient Name:  Jacey Amaay    :  1955  MRN: [id number]    DIAGNOSIS:     Diagnosis Orders   1. Primary osteoarthritis of both knees          REFERRING PHYSICIAN:  Kelsey Weaver DO    ATTENDANCE:  Pt has attended 8 of 8 scheduled treatments from 2022 to 2022. TREATMENTS RECEIVED:  Pt received skilled treatment including therapeutic exercises, stretching, gait training, education regarding most appropriate assistive device, and modalities included IFC e-stim with ice. INITIAL STATUS:  Pain reported 8/10  B Knee flexion and extension ROM decreased   B knee Strength decreased   Decreased functional ability with walking, stairs, standing, sitting, ADLs , use of right lower extremity, use of left lower extremity, limited tolerance to weightbearing tasks and weightbearing duration, bending, reaching, driving, sit to stand. LEFS 13/80    FINAL STATUS:  Pain reported 8/10 L knee, 2/10 R knee  L Knee flexion and extension ROM increased to: 5 -75*  B knee Strength maintained at 3+/5  Pt remains only able to amb household distances with and without SPC and nonreciprocal stair negotiation with great deal of pain  LEFS 18/80    GOALS:  1 out of 6 Long Term Goals were obtained. LONG TERM GOALS NOT OBTAINED/REASON:  Pt pain limited progression of ambulation distance and WB tolerance limiting goals in all areas. While improvement was made in L knee ROM further progression d/t pain and sensation of the joint \"locking up. \"    PATIENT GOALS:  pain relief, return to prior activity, full use of leg, get back to normal, return to work, walk normally, improved balance. Not met    REASON FOR DISCHARGE:  Max potential achieved at this time for skilled therapy interventions.     PATIENT EDUCATION/INSTRUCTIONS:  Educated in recommendation for FWW to offload during mobility, pt provided with HEP to continue mobility and strength improvements. Education and information provided to pt to request IFC compatible e-stim unit from PCP. RECOMMENDATIONS:  follow up with PCP         Thank you for the opportunity to work with your patient. If you have questions or comments, please feel free to contact me by phone or fax.       Electronically Signed by: Maira Chandler PT, DPT  [id number] 8/23/2022

## 2022-08-23 NOTE — PROGRESS NOTES
[facility]          Phone: [phone] Fax: [phone]    Physical Therapy Daily Treatment Note  Date:  2022    Patient Name:  Geovanni Salinas    :  1955  MRN: [id number]    Evaluating therapist: Tessa Rosenberg PT, DPT  [id number]    Restrictions/Precautions:  fall risk    Diagnosis:     Diagnosis Orders   1. Primary osteoarthritis of both knees          Treatment Diagnosis:    Insurance/Certification information: Deitra Going [employer]RI[employer]  Referring Physician: Lisha Levy DO  Plan of care signed (Y/N):    Visit# / total visits:   ( 12 visits  through  )  Pain level: 8-9/10 L knee, 2/10 R knee \" locks up\"  Time In:  1037  Time Out:  1120    Subjective:   Pt reports no improvement in her symptoms or mobility since beginning therapy. Her pain remains consistent and L knee \" locks up\"    Exercises:  Exercise/Equipment Resistance/Repetitions Other comments     Heel slides  seated  10x ea LE, 5 sec hold      LAQ 10x ea LE, 2 sec hold      Stepper / X 8 mins   see below            Seated hamstring curl 10 x2 ea LE Red TB     SLR X 10 ea LE      SLR for VMO X 10 ea LE                LEFS  18/80              L knee rom  5°-75°              B knees 3+/5                                   Other Therapeutic Activities:    Pt presented with B knee pain this date. Pt is unable to amb anything greater than household distance , and it is painful. Pt can navigate steps non reciprocal , but with significant pain per pt. Pt is limited w/ HEP d/t pain. Pt had minimal relief in pain after B knee IFC w/ CP's.     Home Exercise Program:  Heel slides, SLR, hamstring curls    Manual Treatments:  n/a    Modalities:  IFC to B knees x 15 mins w/ CP     Time-in Time-out Total Time   08466  Evaluation Low Complexity      60459  Evaluation Med Complexity      35301  Evaluation High Complexity      51715  Ther Ex 1037 1110 33   99211  Neuro Re-ed        83076  Ther Activities        28000  Manual Therapy       12925  E-stim  1110 1120 10   93953  Ultrasound            Session 2220 [id number]       Treatment/Activity Tolerance:  [x] Patient tolerated treatment well [] Patient limited by fatigue  [] Patient limited by pain  [] Patient limited by other medical complications  [] Other:     Prognosis: [x] Good [] Fair  [] Poor    Patient Requires Follow-up: [x] Yes  [] No    Plan:   [] Continue per plan of care [] Alter current plan (see comments)  [] Plan of care initiated [] Hold pending MD visit [x] Discharge    Plan for Next Session:      Electronically signed by:    Katlyn Coy PTA [id number]

## 2022-08-24 ENCOUNTER — TELEPHONE (OUTPATIENT)
Dept: ADMINISTRATIVE | Age: 67
End: 2022-08-24

## 2022-08-25 ENCOUNTER — OFFICE VISIT (OUTPATIENT)
Dept: PRIMARY CARE CLINIC | Age: 67
End: 2022-08-25
Payer: COMMERCIAL

## 2022-08-25 VITALS
WEIGHT: 245 LBS | TEMPERATURE: 97.2 F | BODY MASS INDEX: 43.4 KG/M2 | HEART RATE: 69 BPM | DIASTOLIC BLOOD PRESSURE: 82 MMHG | SYSTOLIC BLOOD PRESSURE: 132 MMHG | OXYGEN SATURATION: 99 %

## 2022-08-25 DIAGNOSIS — Z12.11 COLON CANCER SCREENING: ICD-10-CM

## 2022-08-25 DIAGNOSIS — N39.41 URGE INCONTINENCE: ICD-10-CM

## 2022-08-25 DIAGNOSIS — M17.0 PRIMARY OSTEOARTHRITIS OF BOTH KNEES: ICD-10-CM

## 2022-08-25 DIAGNOSIS — N23 KIDNEY PAIN: Primary | ICD-10-CM

## 2022-08-25 LAB
APPEARANCE FLUID: NORMAL
BILIRUBIN, POC: NORMAL
BLOOD URINE, POC: NORMAL
CLARITY, POC: NORMAL
COLOR, POC: YELLOW
GLUCOSE URINE, POC: NORMAL
KETONES, POC: NORMAL
LEUKOCYTE EST, POC: NORMAL
NITRITE, POC: NORMAL
PH, POC: 6.5
PROTEIN, POC: NORMAL
SPECIFIC GRAVITY, POC: 1.02
UROBILINOGEN, POC: 1

## 2022-08-25 PROCEDURE — 81002 URINALYSIS NONAUTO W/O SCOPE: CPT | Performed by: FAMILY MEDICINE

## 2022-08-25 PROCEDURE — 99213 OFFICE O/P EST LOW 20 MIN: CPT | Performed by: FAMILY MEDICINE

## 2022-08-25 RX ORDER — OXYBUTYNIN CHLORIDE 5 MG/1
5 TABLET, EXTENDED RELEASE ORAL DAILY
Qty: 30 TABLET | Refills: 3 | Status: SHIPPED
Start: 2022-08-25 | End: 2022-10-14 | Stop reason: DRUGHIGH

## 2022-08-25 NOTE — PROGRESS NOTES
Nikki Dewitt, a female of 61 y.o. came to the office 8/25/2022. Patient Active Problem List   Diagnosis    Hypothyroidism    Graves disease    Depression    GERD (gastroesophageal reflux disease)    Chronic pain of both knees    Non-ST elevation MI (NSTEMI) ([facility] [address]    Morbid obesity ([facility] [address]    Coronary artery disease involving native coronary artery of native heart with unstable angina pectoris (HCC)    LITO (generalized anxiety disorder)          Flank Pain  This is a new problem. The current episode started in the past 7 days. The problem has been gradually worsening since onset. Pertinent negatives include no dysuria. Treatments tried: inc water intake and eating cranberries. Other  Pertinent negatives include no chills. Urinary Frequency   This is a chronic problem. There has been no fever. Associated symptoms include flank pain, frequency and urgency. Pertinent negatives include no chills or hematuria. Associated symptoms comments: Nocturia  . She has tried increased fluids for the symptoms. Knee pain: did PT with no relief. Will hopefully be able to get knee injections now with Dr Conception [place]. Electric stim machine helped. Insomnia: Trazodone helps put her to sleep but then wakes up to urinate often. Allergies   Allergen Reactions    Mobic [Meloxicam] Other (See Comments)       Current Outpatient Medications on File Prior to Visit   Medication Sig Dispense Refill    FLUoxetine (PROZAC) 20 MG capsule Take 1 capsule by mouth once daily 90 capsule 1    FLUoxetine (PROZAC) 40 MG capsule Take 1 capsule by mouth once daily 90 capsule 1    traZODone (DESYREL) 100 MG tablet Take 1 tablet by mouth nightly 90 tablet 0    traMADol (ULTRAM) 50 MG tablet Take 1 tablet by mouth every 12 hours as needed for Pain for up to 30 days.  60 tablet 0    levothyroxine (EUTHYROX) 125 MCG tablet Take 1 tablet by mouth once daily 90 tablet 0    celecoxib (CELEBREX) 200 MG capsule Take 1 capsule by mouth once daily 90 capsule 0    loratadine (CLARITIN) 10 MG tablet Take 10 mg by mouth daily      hydrOXYzine (VISTARIL) 25 MG capsule TAKE 1 CAPSULE BY MOUTH THREE TIMES DAILY AS NEEDED FOR ANXIETY 90 capsule 1    sodium hyaluronate (EUFLEXXA) 20 MG/2ML SOSY injection Inject 1 prefilled syringe into bilateral knees once a week for 3 weeks (total of 6 prefilled syringes) 8.12 mL 2    clopidogrel (PLAVIX) 75 MG tablet Take 1 tablet by mouth daily 90 tablet 3    meclizine (ANTIVERT) 12.5 MG tablet Take 1 tablet by mouth 3 times daily as needed for Dizziness 90 tablet 1    atorvastatin (LIPITOR) 40 MG tablet Take 1 tablet by mouth nightly 90 tablet 3    fluticasone (FLONASE) 50 MCG/ACT nasal spray USE 2 SPRAY(S) IN EACH NOSTRIL DAILY 16 g 2    buPROPion (WELLBUTRIN XL) 300 MG extended release tablet TAKE 1 TABLET BY MOUTH ONCE DAILY IN THE MORNING 90 tablet 1    ferrous sulfate (IRON 325) 325 (65 Fe) MG tablet Take one tablet for breakfast and lunch on Monday, Wednesday and Friday; take each dose with a vitamin C tablet (Patient taking differently: Take one tablet for breakfast and lunch on Monday, Wednesday and Friday; take each dose with a vitamin C tablet ( pt takes when remembers)) 180 tablet 2    ascorbic acid (V-R VITAMIN C) 250 MG tablet Take 1 tablet by mouth daily Take one tablet for breakfast and lunch on Monday, Wednesday and Friday; take each dose with an iron tablet 80 tablet 2    Multiple Vitamin (MVI, CELEBRATE, CHEWABLE TABLET) Take two tablets each day (Patient taking differently: Take two tablets each day. (when remembers)) 180 tablet 3    magnesium (MAGNESIUM-OXIDE) 250 MG TABS tablet Take 500 mg by mouth daily (when remembers)      aspirin 81 MG EC tablet Take 1 tablet by mouth daily 30 tablet 3    nitroGLYCERIN (NITROSTAT) 0.4 MG SL tablet up to max of 3 total doses.  If no relief after 1 dose, call 911. 25 tablet 0    Cyanocobalamin (VITAMIN B 12 PO) Take by mouth       No current facility-administered medications on

## 2022-08-26 ENCOUNTER — TELEPHONE (OUTPATIENT)
Dept: PRIMARY CARE CLINIC | Age: 67
End: 2022-08-26

## 2022-08-26 RX ORDER — SULFAMETHOXAZOLE AND TRIMETHOPRIM 800; 160 MG/1; MG/1
1 TABLET ORAL 2 TIMES DAILY
Qty: 10 TABLET | Refills: 0 | Status: SHIPPED | OUTPATIENT
Start: 2022-08-26 | End: 2022-08-31

## 2022-08-26 NOTE — TELEPHONE ENCOUNTER
Called patient discussed urinalysis which does show positive nitrates. She could have a UTI here someone to put her on Bactrim DS twice a day for 5 days to see if this helps her flank pain.

## 2022-09-07 LAB — NONINV COLON CA DNA+OCC BLD SCRN STL QL: NORMAL

## 2022-09-12 ENCOUNTER — TELEPHONE (OUTPATIENT)
Dept: ORTHOPEDIC SURGERY | Age: 67
End: 2022-09-12

## 2022-09-12 DIAGNOSIS — M17.0 BILATERAL PRIMARY OSTEOARTHRITIS OF KNEE: Primary | ICD-10-CM

## 2022-09-12 NOTE — TELEPHONE ENCOUNTER
VM rec'd from pt requesting appt for Injection and to discuss surgery. Routing to provider for scheduling rec.

## 2022-09-13 NOTE — TELEPHONE ENCOUNTER
Patient is referring to viscosupplementation injection. Mely Pelletier please verify prior auth needed or not?   Electronically signed by Isaac Nicholson PA-C on 9/13/2022 at 11:00 AM

## 2022-09-13 NOTE — TELEPHONE ENCOUNTER
Call to pt scheduled appt   Future Appointments   Date Time Provider Artemio Suzanne   10/12/2022 11:45 AM Ronald Montenegro MD [facility]     Advised pt Cannot have surgery within 3 months of injection. She verbalized understanding and is in agreement with the plan. Pt does not want CSI but as she described it the \"chicken shot where the inject cartilage. Routing to provider for guidance re: injection type and whether the pt needs an evaluation prior to type of inj and if authorization may be required. She has new insurance.  [employer].

## 2022-09-15 NOTE — TELEPHONE ENCOUNTER
Call to pt advised [place] knee xrays ordered and can have completed at any [place] [id number] op facility. Then we can submit medical documentation to insurance for authorization. She verbalized understanding and is in agreement with the plan.

## 2022-09-15 NOTE — TELEPHONE ENCOUNTER
There needs to be x-ray within the past 6 months showing OA of bilateral knees-last one on file is a year old. I do see documentation she has been attending Physical Therapy. I would advise the patient to have x-ray taken of both knees before her appointment on 10-. If the x-ray shows OA, and we have documentation she has been attending Physical Therapy, I should be able to obtain authorization over the phone prior to her appointment. If insurance denies the injections, patient should still keep her appointment with us on 10- so I can submit updated office visit notes.

## 2022-09-16 ENCOUNTER — HOSPITAL ENCOUNTER (OUTPATIENT)
Age: 67
Discharge: HOME | End: 2022-09-18
Payer: MEDICARE

## 2022-09-16 ENCOUNTER — HOSPITAL ENCOUNTER (OUTPATIENT)
Dept: GENERAL RADIOLOGY | Age: 67
Discharge: HOME | End: 2022-09-18
Payer: MEDICARE

## 2022-09-16 DIAGNOSIS — M17.0 BILATERAL PRIMARY OSTEOARTHRITIS OF KNEE: ICD-10-CM

## 2022-09-16 PROCEDURE — 73565 X-RAY EXAM OF KNEES: CPT

## 2022-09-21 NOTE — TELEPHONE ENCOUNTER
Pt had x-ray completed 9/16/22  Routing to provider for review./ scheduling rec appropriate  Future Appointments   Date Time Provider Artemio Suzanne   10/12/2022 11:45 AM Joaquin Power MD  [facility]

## 2022-09-22 NOTE — TELEPHONE ENCOUNTER
Updated knee x-rays demonstrate severe medial compartment OA in both knees. Please submit for authorization for injections.

## 2022-09-28 LAB — NONINV COLON CA DNA+OCC BLD SCRN STL QL: NEGATIVE

## 2022-10-04 ENCOUNTER — TELEPHONE (OUTPATIENT)
Dept: ORTHOPEDIC SURGERY | Age: 67
End: 2022-10-04

## 2022-10-04 NOTE — TELEPHONE ENCOUNTER
Patient called and states that she is having severe bilateral knee pain. She states she has an upcoming appointment but wants to know if there anything she can do or be given for pain before her appointment.      Future Appointments   Date Time Provider Artemio Palacios   10/12/2022 11:45 AM Gaudencio Selby MD  [facility]       Routed

## 2022-10-06 NOTE — TELEPHONE ENCOUNTER
Called and advised patient of information from previous encounter. States that she has already tired this an would like to discuss surgery options at her next appointment.        Future Appointments   Date Time Provider Artemio Palacios   10/12/2022 11:45 AM [facility]

## 2022-10-11 ENCOUNTER — TELEPHONE (OUTPATIENT)
Dept: ORTHOPEDIC SURGERY | Age: 67
End: 2022-10-11

## 2022-10-11 NOTE — TELEPHONE ENCOUNTER
Called patient. No answer. Left voicemail stating patient can have EUFLEXXA injections series started tomorrow during her appointment. Injections are for BOTH KNEES. Euflexxa Injections are a series a 3 injections. Administer 1 injection to each affected knee 1 time a week for 3 weeks. Euflexxa is a brand we have in stock in our [facility]. Patient would have to wait 3 months after having the series completed before she could have knee surgery done.

## 2022-10-11 NOTE — TELEPHONE ENCOUNTER
Patient has new insurance effective 9-1-2022: [employer], Medicare Advantage-HMO. ID # [id number]  [facility] [employer] [facility] to obtain prior approval for Bilateral Knee Visco Injections. Phone # [phone] with Sondra Thomas. Per Rodrigo Anderson does not handle prior authorization for Visco Injections. If prior authorization is needed, [facility] would need contacted directly. Sondra Thomas spoke with her supervisor during our call. As long as patient meets medical criteria guidelines, prior authorization is NOT REQUIRED for Visco Injections from [facility]. Visco Injections would be approved under the Medical Benefits under Buy and Bill.    - How long has patient experienced knee pain?  - Did patient try OTC Pain Medications and/or NSAID'S previously?  - Has the patient tried prescription pain medication?  - Did patient try Steroid Injections for knee pain?  - Did the OTC Pain Medication, NSAID'S, Prescription Pain Medication provided adequate pain relief? - Has the patient tried weight loss?  - Has the patient tried a period of adjustment of ADLS?  - Has the patient tried Physical Therapy?  - Has patient tried Visco Injections in the past for knee pain?   - Is there recent x-ray findings showing OA? Clinical questions answered. Patient DOES QUALIFY for VISCO INJECTIONS for both knees. Preferred brand by her Insurance is : Euflexxa.

## 2022-10-12 ENCOUNTER — PREP FOR PROCEDURE (OUTPATIENT)
Dept: ORTHOPEDIC SURGERY | Age: 67
End: 2022-10-12

## 2022-10-12 ENCOUNTER — TELEPHONE (OUTPATIENT)
Dept: ORTHOPEDIC SURGERY | Age: 67
End: 2022-10-12

## 2022-10-12 ENCOUNTER — OFFICE VISIT (OUTPATIENT)
Dept: ORTHOPEDIC SURGERY | Age: 67
End: 2022-10-12
Payer: MEDICARE

## 2022-10-12 VITALS — WEIGHT: 237 LBS | HEIGHT: 63 IN | BODY MASS INDEX: 41.99 KG/M2

## 2022-10-12 DIAGNOSIS — M17.0 BILATERAL PRIMARY OSTEOARTHRITIS OF KNEE: Primary | ICD-10-CM

## 2022-10-12 PROBLEM — M17.12 OSTEOARTHRITIS OF LEFT KNEE: Status: ACTIVE | Noted: 2022-10-12

## 2022-10-12 PROCEDURE — 2500000003 HC RX 250 WO HCPCS

## 2022-10-12 PROCEDURE — 99214 OFFICE O/P EST MOD 30 MIN: CPT | Performed by: PHYSICIAN ASSISTANT

## 2022-10-12 PROCEDURE — 1123F ACP DISCUSS/DSCN MKR DOCD: CPT | Performed by: PHYSICIAN ASSISTANT

## 2022-10-12 PROCEDURE — 99212 OFFICE O/P EST SF 10 MIN: CPT

## 2022-10-12 PROCEDURE — 6360000002 HC RX W HCPCS

## 2022-10-12 NOTE — PROGRESS NOTES
Orthopaedic H&P Note    Rocio Lion is a 74 y.o. female, her YOB: 1955 with the following history as recorded in [facility]:      Patient Active Problem List    Diagnosis Date Noted    LITO (generalized anxiety disorder) 11/17/2020    Coronary artery disease involving native coronary artery of native heart with unstable angina pectoris ([facility]     Non-ST elevation MI (NSTEMI) ([facility] 09/16/2020    Morbid obesity ([facility] 09/16/2020    Chronic pain of both knees 07/12/2017    Hypothyroidism     Graves disease     Depression     GERD (gastroesophageal reflux disease)      Current Outpatient Medications   Medication Sig Dispense Refill    Tens Unit MISC by Does not apply route 1 each 0    oxybutynin (DITROPAN XL) 5 MG extended release tablet Take 1 tablet by mouth daily 30 tablet 3    Tens Unit MISC by Does not apply route 1 each 0    FLUoxetine (PROZAC) 20 MG capsule Take 1 capsule by mouth once daily 90 capsule 1    FLUoxetine (PROZAC) 40 MG capsule Take 1 capsule by mouth once daily 90 capsule 1    levothyroxine (EUTHYROX) 125 MCG tablet Take 1 tablet by mouth once daily 90 tablet 0    celecoxib (CELEBREX) 200 MG capsule Take 1 capsule by mouth once daily 90 capsule 0    hydrOXYzine (VISTARIL) 25 MG capsule TAKE 1 CAPSULE BY MOUTH THREE TIMES DAILY AS NEEDED FOR ANXIETY 90 capsule 1    sodium hyaluronate (EUFLEXXA) 20 MG/2ML SOSY injection Inject 1 prefilled syringe into bilateral knees once a week for 3 weeks (total of 6 prefilled syringes) 8.12 mL 2    clopidogrel (PLAVIX) 75 MG tablet Take 1 tablet by mouth daily 90 tablet 3    atorvastatin (LIPITOR) 40 MG tablet Take 1 tablet by mouth nightly 90 tablet 3    fluticasone (FLONASE) 50 MCG/ACT nasal spray USE 2 SPRAY(S) IN EACH NOSTRIL DAILY 16 g 2    buPROPion (WELLBUTRIN XL) 300 MG extended release tablet TAKE 1 TABLET BY MOUTH ONCE DAILY IN THE MORNING 90 tablet 1    aspirin 81 MG EC tablet Take 1 tablet by mouth daily 30 tablet 3    nitroGLYCERIN (NITROSTAT) 0.4 MG SL tablet up to max of 3 total doses. If no relief after 1 dose, call 911. 25 tablet 0    traZODone (DESYREL) 100 MG tablet Take 1 tablet by mouth nightly (Patient not taking: Reported on 10/12/2022) 90 tablet 0    loratadine (CLARITIN) 10 MG tablet Take 10 mg by mouth daily (Patient not taking: Reported on 10/12/2022)      ferrous sulfate (IRON 325) 325 (65 Fe) MG tablet Take one tablet for breakfast and lunch on Monday, Wednesday and Friday; take each dose with a vitamin C tablet (Patient not taking: Reported on 10/12/2022) 180 tablet 2    ascorbic acid (V-R VITAMIN C) 250 MG tablet Take 1 tablet by mouth daily Take one tablet for breakfast and lunch on Monday, Wednesday and Friday; take each dose with an iron tablet (Patient not taking: Reported on 10/12/2022) 80 tablet 2    Multiple Vitamin (MVI, CELEBRATE, CHEWABLE TABLET) Take two tablets each day (Patient not taking: Reported on 10/12/2022) 180 tablet 3    magnesium (MAGNESIUM-OXIDE) 250 MG TABS tablet Take 500 mg by mouth daily (when remembers) (Patient not taking: Reported on 10/12/2022)      Cyanocobalamin (VITAMIN B 12 PO) Take by mouth (Patient not taking: Reported on 10/12/2022)       No current facility-administered medications for this visit. Allergies: Mobic [meloxicam]  Past Medical History:   Diagnosis Date    Arthritis     CAD (coronary artery disease)     9-17-20 yisel 2.0x22 francisca om.      COVID-19 virus infection 07/2020    Depression     GERD (gastroesophageal reflux disease)     Graves disease     Hypertension     Hypothyroidism     SECONDARY TO GRAVES DISEASE    NSTEMI (non-ST elevated myocardial infarction) ([facility] 09/16/2020    Obesity      Past Surgical History:   Procedure Laterality Date    CARDIAC CATHETERIZATION  09/17/2020    stent x 1    COLONOSCOPY  11/10/10    DR GUILLERMO    CORONARY ANGIOPLASTY WITH STENT PLACEMENT  09/17/2020    Dhruv PEREYRA     GASTRIC BYPASS SURGERY  5/2/06     [address]     Family History Problem Relation Age of Onset    Diabetes Mother     Heart Attack Father     Cancer Sister     Heart Attack Brother      Social History     Tobacco Use    Smoking status: Former     Packs/day: 1.00     Years: 3.00     Pack years: 3.00     Types: Cigarettes     Start date: 67     Quit date: 3/4/1977     Years since quittin.6    Smokeless tobacco: Never   Substance Use Topics    Alcohol use: Yes     Alcohol/week: 0.0 standard drinks     Comment: rare                             Chief Complaint   Patient presents with    Follow-up     Bilateral knee pain. Both knees are extremely painful with shooting pain. Wants to discuss knee surgery. SUBJECTIVE: Mariposa Rodrigez is here today for their bilateral knees. The patient has had pain for sometime, but last 6 months or so it has become more severe. She  was diagnosed with OA in the past. There had been no injury to the bilateral knee that they can remember. Mariposa Rodrigez has tried multiple conservative treatment. Has had therapy in the past, that worked at first, but the pain persisted. She did have steroid injections which did not help much for prolonged relief. Patient has also had viscosupplementation injections without improvement. Patient also had used a bilateral knee bracing without relief of symptoms. They have been working on weight loss. The pain is described as typical arthritic pain, achy in the joint, becoming more severe with stairs and any twisting motion of the bilateral knee. Rest makes the bilateral knee better along with NSAIDs and Rx Tramadol but states they are now less effective. She does use an occasional straight cane, patient can ambulate 1 to 2 blocks prior to pain limiting their function. Mariposa Rodrigez is having difficulty with ADLs, and states this pain is limiting here activity decreasing their quality of life. She would like to discuss TKA. Patient recently has been in physical therapy which has made her symptoms worse.  Patient has exhausted conservative treatments. Review of Systems   Constitutional: Negative for fever, chills, diaphoresis, appetite change and fatigue. HENT: Negative for dental issues, hearing loss and tinnitus. Negative for congestion, sinus pressure, sneezing, sore throat. Negative for headache. Eyes: Negative for visual disturbance, blurred and double vision. Negative for pain, discharge, redness and itching  Respiratory: Negative for cough, shortness of breath and wheezing. Cardiovascular: Negative for chest pain, palpitations and leg swelling. No dyspnea on exertion   Gastrointestinal:   Negative for nausea, vomiting, abdominal pain, diarrhea, constipation  or black or bloody. Hematologic\Lymphatic:  negative for bleeding, petechiae,   Genitourinary: Negative for hematuria and difficulty urinating. Musculoskeletal: Negative for neck pain and stiffness. Mild for back pain, negative joint swelling and gait problem. Skin: Negative for pallor, rash and wound. Neurological: Negative for dizziness, tremors, seizures, weakness, light-headedness, no TIA or stroke symptoms. No numbness and headaches. Psychiatric/Behavioral: Negative.      Physical Examination:   General appearance: alert, well appearing, and in no distress,  normal appearing weight  Mental status: alert, oriented to person, place, and time, normal mood, behavior, speech, dress, motor activity, and thought processes  Abdomen: soft, nondistended   Resp:   resp easy and unlabored, no audible wheezes note  Cardiac: distal pulses palpable, skin well perfused  Neurological: alert, oriented X3, normal speech, no focal findings or movement disorder noted, motor and sensory grossly normal bilaterally, normal muscle tone, no tremors, strength 5/5, normal gait and station  HEENT: normochephalic atraumatic, external ears and eyes normal, sclera normal, neck supple  Extremities:   peripheral pulses normal, no edema, redness or tenderness in the calves   Skin: normal coloration, no rashes or open wounds, no suspicious skin lesions noted  Psych: Affect euthymic   Musculoskeletal:    Extremity:  bilateral Knee Exam  Skin intact. No erythema/induration/fluctuence at knee. mild effusion noted  Patella tracks normally  Bilateral Varus resting stance  + patellar grind test and negative J sign. Moderate crepitus with flexion and extension of the knee  Moderate medial joint line pain  Stable to varus and valgus at 0 and 30 degrees of flexion. negative Lachman's and posterior drawer. active range of motion 0-105 ° with pain   Compartments soft and compressible throughout leg  Calf soft and nontender with palpation    Sensation intact to light touch sural, deep peroneal, superficial peroneal, saphenous, posterior tibial  nerve distributions to foot/ankle. Ht 5' 3\" (1.6 m)   Wt 237 lb (107.5 kg)   BMI 41.98 kg/m²      Reviewed Xrs from 9/16/22- patient has bilateral tricompartmental osteoarthritis with severe end stage changes to bilateral medial joint compartments. Varus alignment noted. No acute fracture noted    ASSESSMENT:     Diagnosis Orders   1. Bilateral primary osteoarthritis of knee  Tens Unit MISC          Discussion:  The patient would like to proceed with total left knee arthroplasty when cleared by their PCP. Ascencion Huizar understands the risks include but are not limited to infection, injuries to the blood vessel and nerves, bleeding, continued pain and non relief of pain, aseptic loosening dislocation, limb length discrepancy, arthrofibrosis of the joint, further operation, deep venous thrombosis, pulmonary embolism and fracture, heart attack and death. Rhea operative plan discussed, need for anticoagulation for DVT/PE prophylaxis, need for physical therapy, office follow up visits, and possible rehab stay.  It was also explained patient will need to take a prophylactic dose of ATB prior to any bowel, dental or mouth procedures, including dental cleaning, for length of the implant. Did review surgery prosthesis with model with patient as well. Pain control was also discussed and the expectation is to have patient off narcotic pain meds around 3 weeks post operatively for a total joint arthroplasty    Spent significant amount of time reviewing risks and benefits, specifically risks of complication with bilateral TKA. We will plan for staggered surgery dates starting with the left knee then proceeding with the right about 6 weeks later if doing well. Elective Orthopedic Surgery Checklist:  [x] Established with a PCP  [] BMI to be ? 40 kg/m2 if pursing total joint. -- Patient has had significant improvement in her BMI since starting conservative treatments from 47.53 kg/m2 to 41.98 kg/m2 today  [x] No pending dental treatments prior to total joint  [x] Joint Education Class Needed       Knee  Injection Procedure Note  With the patient's written and verbal permission, Right  knee was prepped in standard sterile fashion with betadine and alcohol. Skin of the knee was anesthetized with ethyl chloride spray prior to injection. The knee was then injected with 1 ml Kenalog (40mg), 3 ml PF 0.25% marcaine and 3 ml 1% PF Lidocaine were injected using the lateral inferior approach without difficulty. The patient tolerated this well. A band-aid was applied. The patient ambulated out of clinic on their own accord without difficulty.     PLAN:  Knee CSI as above today to the right knee  TENS unit for non-pharmacologic pain control  Plan for OR on 11/4/22 with Dr. Terra Pritchard MD for L TKA  Pre-surgery medical clearance- requested  PAT  Joint education class is  indicated  NPO after midnight the night before surgery  WBAT on bilaterally lower extremity  Hold NSAIDS 7 days prior to surgery  Hold aspirin the morning of surgery  Make sure to have all dental care completed by 1 week before surgery      Electronically signed by DR AYAN PARKER [facility], PA-C on 10/12/2022 at 1:05 PM  Note: This report was completed using GPN[employer] voiced recognition software. Every effort has been made to ensure accuracy; however, inadvertent computerized transcription errors may be present.

## 2022-10-12 NOTE — Clinical Note
Procedure: Left Total Knee Arthroplasty, Code: 07810, Dr. Nohemy Dodge MD, Date: 11/7/22, Length (with 30 min clean up); 90. Anesthesia: general, Peripheral Nerve Block yes. Vendor [employer] Requesting- Observation Clearance needed?  Yes: Comment: PCP and Cardiology requested today

## 2022-10-12 NOTE — PATIENT INSTRUCTIONS
You will need to be medically cleared before surgery by your family doctor. Please call your doctor to set up an appointment for medical clearance as soon as possible and have the office fax your medical clearance to :   (FAX) [phone]   ATTN:  MARICEL    1. Your surgery is scheduled for Left Total Knee Arthroplasty on Monday 11/7/22 at 8:30 AM  with Dr. Ayesha Urena MD at the [facility] in [facility] . You will need to report to Preop area  that morning at 6:30 AM      2. You are having Observation surgery so you will not be returning home the same day, plan to stay overnight    3. Preadmission Testing (PAT) department at [facility] will contact you with all the details prior to surgery. 4. Nothing to eat or drink after midnight the night before surgery. You may take a pain pill and any other medicine PAT instructs you to take with small sip of water if needed. 5. You will need to attend Joint Education Class prior to surgery- Tuesday AM from 10-11 am    6. Continue with ice and elevation to reduce swelling    7. Weightbearing as tolerated right lower and left lower, use assistive devices as needed    8. Take pain medicine as instructed    9. Call office with any question or concerns: [phone]. Hold Lovenox/Aspirin the day of surgery. Hold all oral NSAIDs 7 days prior to surgery    ALL TOTAL JOINT PATIENTS WILL BE WEANED OFF ANY NARCOTIC MEDICATION GIVEN POST OPERATIVELY BY AT THE LATEST 3 WEEKS FROM DATE OF SURGERY    Due to increased risk of infections, it is important to plan for getting treatment for significant dental diseases (including tooth extractions, root canal and periodontal work) before your total joint surgery.   Routine teeth cleaning should be delayed until you are 3 months post op

## 2022-10-12 NOTE — TELEPHONE ENCOUNTER
Prior Authorization Form:    DEMOGRAPHICS:                     Patient Name:  Nga Kelly  Patient :  1955            Insurance:  Payor: [place] / Plan: MEDICAL [facility] HMO / Product Type: *No Product type* /   Insurance ID Number:    Payer/Plan Subscr  Sex Relation Sub. Ins. ID Effective Group Num   1. [id number] [facility] 1955 Female Self [id number]                                   [address]       [] Prior authorization obtained    DIAGNOSIS & PROCEDURE:                       Procedure/Operation: Left Total Knee Arthroplasty           CPT Code: 25452    Diagnosis:  Left Knee OA    ICD10 Code: M17.12    Location:  [facility]    Surgeon:  Dr. Rajwinder Geiger INFORMATION:                          Date: 2022   Time: 9 am              Anesthesia:  General and Regional                                                       Status:  Outpatient with OBS.      Special Comments:         Vendor: [employer]  []   Notified     Length of Surgery (with 30 min clean up time): 1.5 hours    Medical clearance: Medical Clearance Requested and Cardiology Clearance Requested    Pre-Op Labs:       []  Orders Placed    Electronically signed by Gracy Dangelo MA on 10/12/2022 at 1:12 PM

## 2022-10-13 ENCOUNTER — TELEPHONE (OUTPATIENT)
Dept: CARDIOLOGY CLINIC | Age: 67
End: 2022-10-13

## 2022-10-13 NOTE — TELEPHONE ENCOUNTER
Dr. Villa Breeding office is asking for cardiac clearance and medication instructions for Left Total Knee Arthroplasty scheduled on 11/7/2022. Last seen in office by you on 5/11/2022. Please advise.

## 2022-10-14 ENCOUNTER — PREP FOR PROCEDURE (OUTPATIENT)
Dept: ORTHOPEDIC SURGERY | Age: 67
End: 2022-10-14

## 2022-10-14 DIAGNOSIS — M17.0 BILATERAL PRIMARY OSTEOARTHRITIS OF KNEE: Primary | ICD-10-CM

## 2022-10-14 DIAGNOSIS — I25.110 CORONARY ARTERY DISEASE INVOLVING NATIVE CORONARY ARTERY OF NATIVE HEART WITH UNSTABLE ANGINA PECTORIS (HCC): ICD-10-CM

## 2022-10-14 DIAGNOSIS — N39.41 URGE INCONTINENCE: ICD-10-CM

## 2022-10-14 DIAGNOSIS — I21.4 NON-ST ELEVATION MI (NSTEMI) (HCC): ICD-10-CM

## 2022-10-14 DIAGNOSIS — E66.01 MORBID OBESITY (HCC): ICD-10-CM

## 2022-10-14 RX ORDER — SODIUM CHLORIDE 9 MG/ML
INJECTION, SOLUTION INTRAVENOUS PRN
OUTPATIENT
Start: 2022-10-14

## 2022-10-14 RX ORDER — SODIUM CHLORIDE 0.9 % (FLUSH) 0.9 %
5-40 SYRINGE (ML) INJECTION PRN
OUTPATIENT
Start: 2022-10-14

## 2022-10-14 RX ORDER — OXYBUTYNIN CHLORIDE 10 MG/1
10 TABLET, EXTENDED RELEASE ORAL DAILY
Qty: 90 TABLET | Refills: 1 | Status: SHIPPED | OUTPATIENT
Start: 2022-10-14

## 2022-10-14 RX ORDER — ATORVASTATIN CALCIUM 40 MG/1
40 TABLET, FILM COATED ORAL NIGHTLY
Qty: 90 TABLET | Refills: 1 | Status: SHIPPED | OUTPATIENT
Start: 2022-10-14

## 2022-10-14 RX ORDER — SODIUM CHLORIDE 0.9 % (FLUSH) 0.9 %
5-40 SYRINGE (ML) INJECTION EVERY 12 HOURS SCHEDULED
OUTPATIENT
Start: 2022-10-14

## 2022-10-14 NOTE — TELEPHONE ENCOUNTER
Patient is at acceptable risk for perioperative cardiovascular event for the planned procedure (left total knee arthroplasty), patient may proceed without any further cardiac testing.   Can hold the Plavix 5 days prior to the procedure, recommend continuing aspirin perioperatively, if that is not possible, patient may hold aspirin for 7-10 days prior to the procedure, to be resumed postoperatively when deemed safe from orthopedic standpoint  Please feel free to call for any further information

## 2022-10-14 NOTE — TELEPHONE ENCOUNTER
Please place pre-op orders. Request form for medical clearance from PCP and Cardiology was faxed on 10-. Signed Medical clearance from PCP has been received on 10-.

## 2022-10-14 NOTE — TELEPHONE ENCOUNTER
Patient was seen in our office on 10- for her bilateral knee appt. During the visit, CSI  was administered to her RIGHT KNEE only. Left Knee is now scheduled for surgery for 11-7-2022 with Dr. Leopoldo Melody for Left TKA.

## 2022-10-14 NOTE — H&P (VIEW-ONLY)
Orthopaedic H&P Note     Elliot Turner is a 74 y.o. female, her YOB: 1955 with the following history as recorded in [facility]:             Patient Active Problem List     Diagnosis Date Noted    LITO (generalized anxiety disorder) 11/17/2020    Coronary artery disease involving native coronary artery of native heart with unstable angina pectoris ([facility]      Non-ST elevation MI (NSTEMI) ([facility] 09/16/2020    Morbid obesity ([facility] 09/16/2020    Chronic pain of both knees 07/12/2017    Hypothyroidism      Graves disease      Depression      GERD (gastroesophageal reflux disease)        Current Facility-Administered Medications          Current Outpatient Medications   Medication Sig Dispense Refill    Tens Unit MISC by Does not apply route 1 each 0    oxybutynin (DITROPAN XL) 5 MG extended release tablet Take 1 tablet by mouth daily 30 tablet 3    Tens Unit MISC by Does not apply route 1 each 0    FLUoxetine (PROZAC) 20 MG capsule Take 1 capsule by mouth once daily 90 capsule 1    FLUoxetine (PROZAC) 40 MG capsule Take 1 capsule by mouth once daily 90 capsule 1    levothyroxine (EUTHYROX) 125 MCG tablet Take 1 tablet by mouth once daily 90 tablet 0    celecoxib (CELEBREX) 200 MG capsule Take 1 capsule by mouth once daily 90 capsule 0    hydrOXYzine (VISTARIL) 25 MG capsule TAKE 1 CAPSULE BY MOUTH THREE TIMES DAILY AS NEEDED FOR ANXIETY 90 capsule 1    sodium hyaluronate (EUFLEXXA) 20 MG/2ML SOSY injection Inject 1 prefilled syringe into bilateral knees once a week for 3 weeks (total of 6 prefilled syringes) 8.12 mL 2    clopidogrel (PLAVIX) 75 MG tablet Take 1 tablet by mouth daily 90 tablet 3    atorvastatin (LIPITOR) 40 MG tablet Take 1 tablet by mouth nightly 90 tablet 3    fluticasone (FLONASE) 50 MCG/ACT nasal spray USE 2 SPRAY(S) IN EACH NOSTRIL DAILY 16 g 2    buPROPion (WELLBUTRIN XL) 300 MG extended release tablet TAKE 1 TABLET BY MOUTH ONCE DAILY IN THE MORNING 90 tablet 1    aspirin 81 MG EC tablet Take 1 tablet by mouth daily 30 tablet 3    nitroGLYCERIN (NITROSTAT) 0.4 MG SL tablet up to max of 3 total doses. If no relief after 1 dose, call 911. 25 tablet 0    traZODone (DESYREL) 100 MG tablet Take 1 tablet by mouth nightly (Patient not taking: Reported on 10/12/2022) 90 tablet 0    loratadine (CLARITIN) 10 MG tablet Take 10 mg by mouth daily (Patient not taking: Reported on 10/12/2022)        ferrous sulfate (IRON 325) 325 (65 Fe) MG tablet Take one tablet for breakfast and lunch on Monday, Wednesday and Friday; take each dose with a vitamin C tablet (Patient not taking: Reported on 10/12/2022) 180 tablet 2    ascorbic acid (V-R VITAMIN C) 250 MG tablet Take 1 tablet by mouth daily Take one tablet for breakfast and lunch on Monday, Wednesday and Friday; take each dose with an iron tablet (Patient not taking: Reported on 10/12/2022) 80 tablet 2    Multiple Vitamin (MVI, CELEBRATE, CHEWABLE TABLET) Take two tablets each day (Patient not taking: Reported on 10/12/2022) 180 tablet 3    magnesium (MAGNESIUM-OXIDE) 250 MG TABS tablet Take 500 mg by mouth daily (when remembers) (Patient not taking: Reported on 10/12/2022)        Cyanocobalamin (VITAMIN B 12 PO) Take by mouth (Patient not taking: Reported on 10/12/2022)          No current facility-administered medications for this visit. Allergies: Mobic [meloxicam]  Past Medical History        Past Medical History:   Diagnosis Date    Arthritis      CAD (coronary artery disease)       9-17-20 yisel 2.0x22 francisca om.      COVID-19 virus infection 07/2020    Depression      GERD (gastroesophageal reflux disease)      Graves disease      Hypertension      Hypothyroidism       SECONDARY TO GRAVES DISEASE    NSTEMI (non-ST elevated myocardial infarction) ([facility] 09/16/2020    Obesity           Past Surgical History         Past Surgical History:   Procedure Laterality Date    CARDIAC CATHETERIZATION   09/17/2020     stent x 1    COLONOSCOPY   11/10/10     DR [facility] CORONARY ANGIOPLASTY WITH STENT PLACEMENT   2020     Dhruv RODDY     GASTRIC BYPASS SURGERY   06     DR HAIRSTON         Family History         Family History   Problem Relation Age of Onset    Diabetes Mother      Heart Attack Father      Cancer Sister      Heart Attack Brother           Social History            Tobacco Use    Smoking status: Former       Packs/day: 1.00       Years: 3.00       Pack years: 3.00       Types: Cigarettes       Start date: 67       Quit date: 3/4/1977       Years since quittin.6    Smokeless tobacco: Never   Substance Use Topics    Alcohol use: Yes       Alcohol/week: 0.0 standard drinks       Comment: rare                                    Chief Complaint   Patient presents with    Follow-up       Bilateral knee pain. Both knees are extremely painful with shooting pain. Wants to discuss knee surgery. SUBJECTIVE: Rocio Lion is here today for their bilateral knees. The patient has had pain for sometime, but last 6 months or so it has become more severe. She  was diagnosed with OA in the past. There had been no injury to the bilateral knee that they can remember. Rocio Lion has tried multiple conservative treatment. Has had therapy in the past, that worked at first, but the pain persisted. She did have steroid injections which did not help much for prolonged relief. Patient has also had viscosupplementation injections without improvement. Patient also had used a bilateral knee bracing without relief of symptoms. They have been working on weight loss. The pain is described as typical arthritic pain, achy in the joint, becoming more severe with stairs and any twisting motion of the bilateral knee. Rest makes the bilateral knee better along with NSAIDs and Rx Tramadol but states they are now less effective. She does use an occasional straight cane, patient can ambulate 1 to 2 blocks prior to pain limiting their function.  Rocio Lion is having difficulty 5/5, normal gait and station  HEENT: normochephalic atraumatic, external ears and eyes normal, sclera normal, neck supple  Extremities:   peripheral pulses normal, no edema, redness or tenderness in the calves   Skin: normal coloration, no rashes or open wounds, no suspicious skin lesions noted  Psych: Affect euthymic   Musculoskeletal:    Extremity:  bilateral Knee Exam  Skin intact. No erythema/induration/fluctuence at knee. mild effusion noted  Patella tracks normally  Bilateral Varus resting stance  + patellar grind test and negative J sign. Moderate crepitus with flexion and extension of the knee  Moderate medial joint line pain  Stable to varus and valgus at 0 and 30 degrees of flexion. negative Lachman's and posterior drawer. active range of motion 0-105 ° with pain   Compartments soft and compressible throughout leg  Calf soft and nontender with palpation    Sensation intact to light touch sural, deep peroneal, superficial peroneal, saphenous, posterior tibial  nerve distributions to foot/ankle. Ht 5' 3\" (1.6 m)   Wt 237 lb (107.5 kg)   BMI 41.98 kg/m²      Reviewed Xrs from 9/16/22- patient has bilateral tricompartmental osteoarthritis with severe end stage changes to bilateral medial joint compartments. Varus alignment noted. No acute fracture noted     ASSESSMENT:       Diagnosis Orders   1. Bilateral primary osteoarthritis of knee  Tens Unit MISC             Discussion:  The patient would like to proceed with total left knee arthroplasty when cleared by their PCP. Linus Garcia understands the risks include but are not limited to infection, injuries to the blood vessel and nerves, bleeding, continued pain and non relief of pain, aseptic loosening dislocation, limb length discrepancy, arthrofibrosis of the joint, further operation, deep venous thrombosis, pulmonary embolism and fracture, heart attack and death.    Rhea operative plan discussed, need for anticoagulation for DVT/PE prophylaxis, need for physical therapy, office follow up visits, and possible rehab stay. It was also explained patient will need to take a prophylactic dose of ATB prior to any bowel, dental or mouth procedures, including dental cleaning, for length of the implant. Did review surgery prosthesis with model with patient as well. Pain control was also discussed and the expectation is to have patient off narcotic pain meds around 3 weeks post operatively for a total joint arthroplasty     Spent significant amount of time reviewing risks and benefits, specifically risks of complication with bilateral TKA. We will plan for staggered surgery dates starting with the left knee then proceeding with the right about 6 weeks later if doing well. Elective Orthopedic Surgery Checklist:  [][][x] Established with a PCP  [] BMI to be ? 40 kg/m2 if pursing total joint. -- Patient has had significant improvement in her BMI since starting conservative treatments from 47.53 kg/m2 to 41.98 kg/m2 today  [][x] No pending dental treatments prior to total joint  [x] Joint Education Class Needed         Knee  Injection Procedure Note  With the patient's written and verbal permission, Right  knee was prepped in standard sterile fashion with betadine and alcohol. Skin of the knee was anesthetized with ethyl chloride spray prior to injection. The knee was then injected with 1 ml Kenalog (40mg), 3 ml PF 0.25% marcaine and 3 ml 1% PF Lidocaine were injected using the lateral inferior approach without difficulty. The patient tolerated this well. A band-aid was applied. The patient ambulated out of clinic on their own accord without difficulty.      PLAN:  Knee CSI as above today to the right knee  TENS unit for non-pharmacologic pain control  Plan for OR on 11/4/22 with Dr. Kyra Reza MD for L TKA  Pre-surgery medical clearance- requested  PAT  Joint education class is  indicated  NPO after midnight the night before surgery  WBAT on bilaterally lower extremity  Hold NSAIDS 7 days prior to surgery  Hold aspirin the morning of surgery  Make sure to have all dental care completed by 1 week before surgery

## 2022-10-14 NOTE — H&P
Orthopaedic H&P Note     Ramon Reddy is a 74 y.o. female, her YOB: 1955 with the following history as recorded in [facility]:             Patient Active Problem List     Diagnosis Date Noted    LITO (generalized anxiety disorder) 11/17/2020    Coronary artery disease involving native coronary artery of native heart with unstable angina pectoris ([facility]      Non-ST elevation MI (NSTEMI) ([facility] 09/16/2020    Morbid obesity ([facility] 09/16/2020    Chronic pain of both knees 07/12/2017    Hypothyroidism      Graves disease      Depression      GERD (gastroesophageal reflux disease)        Current Facility-Administered Medications          Current Outpatient Medications   Medication Sig Dispense Refill    Tens Unit MISC by Does not apply route 1 each 0    oxybutynin (DITROPAN XL) 5 MG extended release tablet Take 1 tablet by mouth daily 30 tablet 3    Tens Unit MISC by Does not apply route 1 each 0    FLUoxetine (PROZAC) 20 MG capsule Take 1 capsule by mouth once daily 90 capsule 1    FLUoxetine (PROZAC) 40 MG capsule Take 1 capsule by mouth once daily 90 capsule 1    levothyroxine (EUTHYROX) 125 MCG tablet Take 1 tablet by mouth once daily 90 tablet 0    celecoxib (CELEBREX) 200 MG capsule Take 1 capsule by mouth once daily 90 capsule 0    hydrOXYzine (VISTARIL) 25 MG capsule TAKE 1 CAPSULE BY MOUTH THREE TIMES DAILY AS NEEDED FOR ANXIETY 90 capsule 1    sodium hyaluronate (EUFLEXXA) 20 MG/2ML SOSY injection Inject 1 prefilled syringe into bilateral knees once a week for 3 weeks (total of 6 prefilled syringes) 8.12 mL 2    clopidogrel (PLAVIX) 75 MG tablet Take 1 tablet by mouth daily 90 tablet 3    atorvastatin (LIPITOR) 40 MG tablet Take 1 tablet by mouth nightly 90 tablet 3    fluticasone (FLONASE) 50 MCG/ACT nasal spray USE 2 SPRAY(S) IN EACH NOSTRIL DAILY 16 g 2    buPROPion (WELLBUTRIN XL) 300 MG extended release tablet TAKE 1 TABLET BY MOUTH ONCE DAILY IN THE MORNING 90 tablet 1    aspirin 81 MG EC tablet Take 1 tablet by mouth daily 30 tablet 3    nitroGLYCERIN (NITROSTAT) 0.4 MG SL tablet up to max of 3 total doses. If no relief after 1 dose, call 911. 25 tablet 0    traZODone (DESYREL) 100 MG tablet Take 1 tablet by mouth nightly (Patient not taking: Reported on 10/12/2022) 90 tablet 0    loratadine (CLARITIN) 10 MG tablet Take 10 mg by mouth daily (Patient not taking: Reported on 10/12/2022)        ferrous sulfate (IRON 325) 325 (65 Fe) MG tablet Take one tablet for breakfast and lunch on Monday, Wednesday and Friday; take each dose with a vitamin C tablet (Patient not taking: Reported on 10/12/2022) 180 tablet 2    ascorbic acid (V-R VITAMIN C) 250 MG tablet Take 1 tablet by mouth daily Take one tablet for breakfast and lunch on Monday, Wednesday and Friday; take each dose with an iron tablet (Patient not taking: Reported on 10/12/2022) 80 tablet 2    Multiple Vitamin (MVI, CELEBRATE, CHEWABLE TABLET) Take two tablets each day (Patient not taking: Reported on 10/12/2022) 180 tablet 3    magnesium (MAGNESIUM-OXIDE) 250 MG TABS tablet Take 500 mg by mouth daily (when remembers) (Patient not taking: Reported on 10/12/2022)        Cyanocobalamin (VITAMIN B 12 PO) Take by mouth (Patient not taking: Reported on 10/12/2022)          No current facility-administered medications for this visit. Allergies: Mobic [meloxicam]  Past Medical History        Past Medical History:   Diagnosis Date    Arthritis      CAD (coronary artery disease)       9-17-20 yisel 2.0x22 francisca om.      COVID-19 virus infection 07/2020    Depression      GERD (gastroesophageal reflux disease)      Graves disease      Hypertension      Hypothyroidism       SECONDARY TO GRAVES DISEASE    NSTEMI (non-ST elevated myocardial infarction) ([facility] 09/16/2020    Obesity           Past Surgical History         Past Surgical History:   Procedure Laterality Date    CARDIAC CATHETERIZATION   09/17/2020     stent x 1    COLONOSCOPY   11/10/10     [facility] CORONARY ANGIOPLASTY WITH STENT PLACEMENT   2020     Dhruv RODDY     GASTRIC BYPASS SURGERY   06     DR HAIRSTON         Family History         Family History   Problem Relation Age of Onset    Diabetes Mother      Heart Attack Father      Cancer Sister      Heart Attack Brother           Social History            Tobacco Use    Smoking status: Former       Packs/day: 1.00       Years: 3.00       Pack years: 3.00       Types: Cigarettes       Start date: 67       Quit date: 3/4/1977       Years since quittin.6    Smokeless tobacco: Never   Substance Use Topics    Alcohol use: Yes       Alcohol/week: 0.0 standard drinks       Comment: rare                                    Chief Complaint   Patient presents with    Follow-up       Bilateral knee pain. Both knees are extremely painful with shooting pain. Wants to discuss knee surgery. SUBJECTIVE: Teena Wolfe is here today for their bilateral knees. The patient has had pain for sometime, but last 6 months or so it has become more severe. She  was diagnosed with OA in the past. There had been no injury to the bilateral knee that they can remember. Teena Wolfe has tried multiple conservative treatment. Has had therapy in the past, that worked at first, but the pain persisted. She did have steroid injections which did not help much for prolonged relief. Patient has also had viscosupplementation injections without improvement. Patient also had used a bilateral knee bracing without relief of symptoms. They have been working on weight loss. The pain is described as typical arthritic pain, achy in the joint, becoming more severe with stairs and any twisting motion of the bilateral knee. Rest makes the bilateral knee better along with NSAIDs and Rx Tramadol but states they are now less effective. She does use an occasional straight cane, patient can ambulate 1 to 2 blocks prior to pain limiting their function.  Teena Wolfe is having difficulty with ADLs, and states this pain is limiting here activity decreasing their quality of life. She would like to discuss TKA. Patient recently has been in physical therapy which has made her symptoms worse. Patient has exhausted conservative treatments. Review of Systems   Constitutional: Negative for fever, chills, diaphoresis, appetite change and fatigue. HENT: Negative for dental issues, hearing loss and tinnitus. Negative for congestion, sinus pressure, sneezing, sore throat. Negative for headache. Eyes: Negative for visual disturbance, blurred and double vision. Negative for pain, discharge, redness and itching  Respiratory: Negative for cough, shortness of breath and wheezing. Cardiovascular: Negative for chest pain, palpitations and leg swelling. No dyspnea on exertion   Gastrointestinal:   Negative for nausea, vomiting, abdominal pain, diarrhea, constipation  or black or bloody. Hematologic\Lymphatic:  negative for bleeding, petechiae,   Genitourinary: Negative for hematuria and difficulty urinating. Musculoskeletal: Negative for neck pain and stiffness. Mild for back pain, negative joint swelling and gait problem. Skin: Negative for pallor, rash and wound. Neurological: Negative for dizziness, tremors, seizures, weakness, light-headedness, no TIA or stroke symptoms. No numbness and headaches. Psychiatric/Behavioral: Negative.       Physical Examination:   General appearance: alert, well appearing, and in no distress,  normal appearing weight  Mental status: alert, oriented to person, place, and time, normal mood, behavior, speech, dress, motor activity, and thought processes  Abdomen: soft, nondistended   Resp:   resp easy and unlabored, no audible wheezes note  Cardiac: distal pulses palpable, skin well perfused  Neurological: alert, oriented X3, normal speech, no focal findings or movement disorder noted, motor and sensory grossly normal bilaterally, normal muscle tone, no tremors, strength 5/5, normal gait and station  HEENT: normochephalic atraumatic, external ears and eyes normal, sclera normal, neck supple  Extremities:   peripheral pulses normal, no edema, redness or tenderness in the calves   Skin: normal coloration, no rashes or open wounds, no suspicious skin lesions noted  Psych: Affect euthymic   Musculoskeletal:    Extremity:  bilateral Knee Exam  Skin intact. No erythema/induration/fluctuence at knee. mild effusion noted  Patella tracks normally  Bilateral Varus resting stance  + patellar grind test and negative J sign. Moderate crepitus with flexion and extension of the knee  Moderate medial joint line pain  Stable to varus and valgus at 0 and 30 degrees of flexion. negative Lachman's and posterior drawer. active range of motion 0-105 ° with pain   Compartments soft and compressible throughout leg  Calf soft and nontender with palpation    Sensation intact to light touch sural, deep peroneal, superficial peroneal, saphenous, posterior tibial  nerve distributions to foot/ankle. Ht 5' 3\" (1.6 m)   Wt 237 lb (107.5 kg)   BMI 41.98 kg/m²      Reviewed Xrs from 9/16/22- patient has bilateral tricompartmental osteoarthritis with severe end stage changes to bilateral medial joint compartments. Varus alignment noted. No acute fracture noted     ASSESSMENT:       Diagnosis Orders   1. Bilateral primary osteoarthritis of knee  Tens Unit MISC             Discussion:  The patient would like to proceed with total left knee arthroplasty when cleared by their PCP. Darletta Record understands the risks include but are not limited to infection, injuries to the blood vessel and nerves, bleeding, continued pain and non relief of pain, aseptic loosening dislocation, limb length discrepancy, arthrofibrosis of the joint, further operation, deep venous thrombosis, pulmonary embolism and fracture, heart attack and death.    Rhea operative plan discussed, need for anticoagulation for DVT/PE prophylaxis, need for physical therapy, office follow up visits, and possible rehab stay. It was also explained patient will need to take a prophylactic dose of ATB prior to any bowel, dental or mouth procedures, including dental cleaning, for length of the implant. Did review surgery prosthesis with model with patient as well. Pain control was also discussed and the expectation is to have patient off narcotic pain meds around 3 weeks post operatively for a total joint arthroplasty     Spent significant amount of time reviewing risks and benefits, specifically risks of complication with bilateral TKA. We will plan for staggered surgery dates starting with the left knee then proceeding with the right about 6 weeks later if doing well. Elective Orthopedic Surgery Checklist:  [][][x] Established with a PCP  [] BMI to be ? 40 kg/m2 if pursing total joint. -- Patient has had significant improvement in her BMI since starting conservative treatments from 47.53 kg/m2 to 41.98 kg/m2 today  [][x] No pending dental treatments prior to total joint  [x] Joint Education Class Needed         Knee  Injection Procedure Note  With the patient's written and verbal permission, Right  knee was prepped in standard sterile fashion with betadine and alcohol. Skin of the knee was anesthetized with ethyl chloride spray prior to injection. The knee was then injected with 1 ml Kenalog (40mg), 3 ml PF 0.25% marcaine and 3 ml 1% PF Lidocaine were injected using the lateral inferior approach without difficulty. The patient tolerated this well. A band-aid was applied. The patient ambulated out of clinic on their own accord without difficulty.      PLAN:  Knee CSI as above today to the right knee  TENS unit for non-pharmacologic pain control  Plan for OR on 11/4/22 with Dr. Miki Christine MD for L TKA  Pre-surgery medical clearance- requested  PAT  Joint education class is  indicated  NPO after midnight the night before surgery  WBAT on bilaterally lower extremity  Hold NSAIDS 7 days prior to surgery  Hold aspirin the morning of surgery  Make sure to have all dental care completed by 1 week before surgery

## 2022-10-25 NOTE — PROGRESS NOTES
[address]   PRE-ADMISSION TESTING GENERAL INSTRUCTIONS  PAT Phone Number: [phone]      GENERAL INSTRUCTIONS:    [x] Antibacterial Soap Shower Night before surgery. [x] CHG Wipes instruction sheet and wipes given. .   [x] Do not wear makeup, lotions, powders, deodorant. [x] Nothing by mouth after midnight; including gum, candy, mints, or water. [x] Follow  provided Joint Replacement instruction sheet regarding clear liquids and when to stop taking the clear liquids. [x] You may brush your teeth, gargle, but do NOT swallow water. [x] No tobacco products, illegal drugs, or alcohol within 24 hours of your surgery. [x] Jewelry or valuables should not be brought to the hospital. All body and/or tongue piercing's must be removed prior to arriving to hospital. No contact lens or hair pins. [x] Arrange transportation with a responsible adult  to and from the hospital. Arrange for someone to be with you for the remainder of the day and for 24 hours after your procedure due to having had anesthesia. -Who will be your  for transportation?____Robert______   [x] Bring insurance card and photo ID. [x] Transfusion Bracelet: Please bring with you to hospital, day of surgery. PARKING INSTRUCTIONS:     [x] ARRIVAL DATE & TIME: 11/7/22 @ 0700am  [x] Enter into the [employer]. Two people may accompany you. Masks are not required but are recommended. [x] Parking Lot \"I\" is where you will park. It is located on the corner of [facility]. The entrance is on [facility]. Upon entering the parking lot, a voucher ticket will print    EDUCATION INSTRUCTIONS:            [x] Pre-admission Testing educational folder given  [x] Incentive Spirometry,coughing & deep breathing exercises reviewed. [x] Medication information sheet(s)   [x] Pain: Post-op pain is normal and to be expected. You will be asked to rate your pain from 0-10.   [x] Joint camp cancelled by Vincent Clay. Vincent Clay states she will call you tomorrow, 11/2. MEDICATION INSTRUCTIONS:    [x] Bring a complete list of your medications, please write the last time you took the medicine, give this list to the nurse in Pre-Op. [x] Take only the following medications the morning of surgery with 1-2 ounces of water: hydroxyzine (if needed), bupropion, fluoxetine  [x] Stop all herbal supplements and vitamins 5 days before surgery. Stop NSAIDS 7 days before surgery. [x] Follow physician instructions regarding any blood thinners you may be taking. WHAT TO EXPECT:    [x] The day of surgery you will be greeted and checked in by the [employer].  In addition, you will be registered in the [place] by a Patient Access Representative. Please bring your photo ID and insurance card. A nurse will greet you in accordance to the time you are needed in the pre-op area to prepare you for surgery. Please do not be discouraged if you are not greeted in the order you arrive as there are many variables that are involved in patient preparation. Your patience is greatly appreciated as you wait for your nurse. Please bring in items such as: books, magazines, newspapers, electronics, or any other items  to occupy your time in the waiting area. [x]  Delays may occur with surgery and staff will make a sincere effort to keep you informed of delays. If any delays occur with your procedure, we apologize ahead of time for your inconvenience as we recognize the value of your time.

## 2022-10-28 ENCOUNTER — OFFICE VISIT (OUTPATIENT)
Dept: PRIMARY CARE CLINIC | Age: 67
End: 2022-10-28
Payer: MEDICARE

## 2022-10-28 VITALS
WEIGHT: 240 LBS | SYSTOLIC BLOOD PRESSURE: 120 MMHG | BODY MASS INDEX: 42.51 KG/M2 | OXYGEN SATURATION: 97 % | DIASTOLIC BLOOD PRESSURE: 72 MMHG | HEART RATE: 78 BPM | TEMPERATURE: 97.2 F

## 2022-10-28 DIAGNOSIS — I25.110 CORONARY ARTERY DISEASE INVOLVING NATIVE CORONARY ARTERY OF NATIVE HEART WITH UNSTABLE ANGINA PECTORIS (HCC): ICD-10-CM

## 2022-10-28 DIAGNOSIS — M17.0 PRIMARY OSTEOARTHRITIS OF BOTH KNEES: ICD-10-CM

## 2022-10-28 DIAGNOSIS — E05.00 GRAVES DISEASE: Primary | ICD-10-CM

## 2022-10-28 PROCEDURE — 1123F ACP DISCUSS/DSCN MKR DOCD: CPT | Performed by: FAMILY MEDICINE

## 2022-10-28 PROCEDURE — 99213 OFFICE O/P EST LOW 20 MIN: CPT | Performed by: FAMILY MEDICINE

## 2022-10-28 SDOH — ECONOMIC STABILITY: FOOD INSECURITY: WITHIN THE PAST 12 MONTHS, THE FOOD YOU BOUGHT JUST DIDN'T LAST AND YOU DIDN'T HAVE MONEY TO GET MORE.: NEVER TRUE

## 2022-10-28 SDOH — ECONOMIC STABILITY: FOOD INSECURITY: WITHIN THE PAST 12 MONTHS, YOU WORRIED THAT YOUR FOOD WOULD RUN OUT BEFORE YOU GOT MONEY TO BUY MORE.: NEVER TRUE

## 2022-10-28 ASSESSMENT — SOCIAL DETERMINANTS OF HEALTH (SDOH): HOW HARD IS IT FOR YOU TO PAY FOR THE VERY BASICS LIKE FOOD, HOUSING, MEDICAL CARE, AND HEATING?: NOT HARD AT ALL

## 2022-10-28 ASSESSMENT — ENCOUNTER SYMPTOMS
SHORTNESS OF BREATH: 0
DIARRHEA: 0
CONSTIPATION: 0
CHEST TIGHTNESS: 0

## 2022-10-28 NOTE — PROGRESS NOTES
Humberto Berg, a female of 74 y.o. came to the office 10/28/2022. Patient Active Problem List   Diagnosis    Hypothyroidism    Graves disease    Depression    GERD (gastroesophageal reflux disease)    Chronic pain of both knees    Non-ST elevation MI (NSTEMI) ([facility] [address]    Morbid obesity ([facility] [facility]    Coronary artery disease involving native coronary artery of native heart with unstable angina pectoris (HCC)    LITO (generalized anxiety disorder)    Osteoarthritis of left knee          Hyperlipidemia  This is a chronic problem. The current episode started more than 1 year ago. The problem is controlled. Pertinent negatives include no chest pain, leg pain, myalgias or shortness of breath. Current antihyperlipidemic treatment includes statins. There are no compliance problems. Coronary Artery Disease  Presents for follow-up visit. Pertinent negatives include no chest pain, chest pressure, chest tightness, leg swelling, palpitations, shortness of breath or weight gain. Risk factors include hyperlipidemia. The symptoms have been stable. Compliance with diet is variable. Compliance with exercise is variable. Compliance with medications is good. OA Knee: going for left tka on 11/7. Has appt 11/1 for class and pre-testing. Has to stop blood thinning products prior to surgery. Hypothyroidism: energy fair. Nails splitting and breaking.      Allergies   Allergen Reactions    Mobic [Meloxicam] Other (See Comments)       Current Outpatient Medications on File Prior to Visit   Medication Sig Dispense Refill    atorvastatin (LIPITOR) 40 MG tablet Take 1 tablet by mouth nightly 90 tablet 1    oxybutynin (DITROPAN XL) 10 MG extended release tablet Take 1 tablet by mouth daily 90 tablet 1    Tens Unit MISC by Does not apply route 1 each 0    FLUoxetine (PROZAC) 20 MG capsule Take 1 capsule by mouth once daily 90 capsule 1    FLUoxetine (PROZAC) 40 MG capsule Take 1 capsule by mouth once daily 90 capsule 1    traZODone (DESYREL) 100 MG tablet Take 1 tablet by mouth nightly 90 tablet 0    levothyroxine (EUTHYROX) 125 MCG tablet Take 1 tablet by mouth once daily 90 tablet 0    celecoxib (CELEBREX) 200 MG capsule Take 1 capsule by mouth once daily 90 capsule 0    hydrOXYzine (VISTARIL) 25 MG capsule TAKE 1 CAPSULE BY MOUTH THREE TIMES DAILY AS NEEDED FOR ANXIETY 90 capsule 1    clopidogrel (PLAVIX) 75 MG tablet Take 1 tablet by mouth daily 90 tablet 3    fluticasone (FLONASE) 50 MCG/ACT nasal spray USE 2 SPRAY(S) IN EACH NOSTRIL DAILY 16 g 2    buPROPion (WELLBUTRIN XL) 300 MG extended release tablet TAKE 1 TABLET BY MOUTH ONCE DAILY IN THE MORNING 90 tablet 1    aspirin 81 MG EC tablet Take 1 tablet by mouth daily 30 tablet 3    nitroGLYCERIN (NITROSTAT) 0.4 MG SL tablet up to max of 3 total doses. If no relief after 1 dose, call 911. 25 tablet 0     No current facility-administered medications on file prior to visit. Review of Systems   Constitutional:  Negative for fatigue and weight gain. Respiratory:  Negative for chest tightness and shortness of breath. Cardiovascular:  Negative for chest pain, palpitations and leg swelling. Gastrointestinal:  Negative for constipation and diarrhea. Endocrine: Negative for cold intolerance, heat intolerance, polydipsia and polyuria. Musculoskeletal:  Negative for myalgias. Skin:         No hair loss  + dry skin  + brittle nails   Neurological:  Negative for tremors. Psychiatric/Behavioral:  Negative for dysphoric mood. The patient is not nervous/anxious. other review of systems reviewed and are negative    OBJECTIVE:  /72   Pulse 78   Temp 97.2 °F (36.2 °C)   Wt 240 lb (108.9 kg)   SpO2 97%   BMI 42.51 kg/m²      Physical Exam  Vitals reviewed. Eyes:      General: No scleral icterus. Conjunctiva/sclera: Conjunctivae normal.   Neck:      Thyroid: No thyromegaly. Vascular: No carotid bruit. Cardiovascular:      Rate and Rhythm: Normal rate and regular rhythm. Heart sounds: Murmur (precordial) heard. Systolic murmur is present with a grade of 1/6. Pulmonary:      Effort: Pulmonary effort is normal.      Breath sounds: Normal breath sounds. No wheezing or rales. Musculoskeletal:         General: Normal range of motion. Cervical back: Neck supple. Lymphadenopathy:      Cervical: No cervical adenopathy. Skin:     General: Skin is warm and dry. Neurological:      Mental Status: She is alert and oriented to person, place, and time. ASSESSMENT AND PLAN:    Jessica Chery was seen today for discuss medications. Diagnoses and all orders for this visit:    Graves disease  -     TSH; Future    Coronary artery disease involving native coronary artery of native heart with unstable angina pectoris ([facility] [address]  -     Lipid Panel; Future    Primary osteoarthritis of both knees    - if labs ok then ok for surgery. - stop blood thinners and nsaid prior to surgery. Return in about 6 months (around 4/28/2023) for welcome to medicare pe, or for acute problem.     Shamar Huus, DO

## 2022-10-30 ASSESSMENT — PROMIS GLOBAL HEALTH SCALE
HOW IS THE PROMIS V1.1 BEING ADMINISTERED?: 2
SUM OF RESPONSES TO QUESTIONS 2, 4, 5, & 10: 10
IN GENERAL, HOW WOULD YOU RATE YOUR SATISFACTION WITH YOUR SOCIAL ACTIVITIES AND RELATIONSHIPS [ON A SCALE OF 1 (POOR) TO 5 (EXCELLENT)]?: 2
IN GENERAL, PLEASE RATE HOW WELL YOU CARRY OUT YOUR USUAL SOCIAL ACTIVITIES (INCLUDES ACTIVITIES AT HOME, AT WORK, AND IN YOUR COMMUNITY, AND RESPONSIBILITIES AS A PARENT, CHILD, SPOUSE, EMPLOYEE, FRIEND, ETC) [ON A SCALE OF 1 (POOR) TO 5 (EXCELLENT)]?: 2
TO WHAT EXTENT ARE YOU ABLE TO CARRY OUT YOUR EVERYDAY PHYSICAL ACTIVITIES SUCH AS WALKING, CLIMBING STAIRS, CARRYING GROCERIES, OR MOVING A CHAIR [ON A SCALE OF 1 (NOT AT ALL) TO 5 (COMPLETELY)]?: 1
IN THE PAST 7 DAYS, HOW OFTEN HAVE YOU BEEN BOTHERED BY EMOTIONAL PROBLEMS, SUCH AS FEELING ANXIOUS, DEPRESSED, OR IRRITABLE [ON A SCALE FROM 1 (NEVER) TO 5 (ALWAYS)]?: 3
IN THE PAST 7 DAYS, HOW WOULD YOU RATE YOUR FATIGUE ON AVERAGE [ON A SCALE FROM 1 (NONE) TO 5 (VERY SEVERE)]?: 2
WHO IS THE PERSON COMPLETING THE PROMIS V1.1 SURVEY?: 0
IN GENERAL, WOULD YOU SAY YOUR HEALTH IS...[ON A SCALE OF 1 (POOR) TO 5 (EXCELLENT)]: 3
IN GENERAL, WOULD YOU SAY YOUR QUALITY OF LIFE IS...[ON A SCALE OF 1 (POOR) TO 5 (EXCELLENT)]: 3
IN GENERAL, HOW WOULD YOU RATE YOUR PHYSICAL HEALTH [ON A SCALE OF 1 (POOR) TO 5 (EXCELLENT)]?: 3
IN THE PAST 7 DAYS, HOW WOULD YOU RATE YOUR PAIN ON AVERAGE [ON A SCALE FROM 0 (NO PAIN) TO 10 (WORST IMAGINABLE PAIN)]?: 9
IN GENERAL, HOW WOULD YOU RATE YOUR MENTAL HEALTH, INCLUDING YOUR MOOD AND YOUR ABILITY TO THINK [ON A SCALE OF 1 (POOR) TO 5 (EXCELLENT)]?: 2
SUM OF RESPONSES TO QUESTIONS 3, 6, 7, & 8: 15

## 2022-10-30 ASSESSMENT — KOOS JR
HOW SEVERE IS YOUR KNEE STIFFNESS AFTER FIRST WAKING IN MORNING: 3
BENDING TO THE FLOOR TO PICK UP OBJECT: 3
RISING FROM SITTING: 3
GOING UP OR DOWN STAIRS: 4
TWISING OR PIVOTING ON KNEE: 4
KOOS JR TOTAL INTERVAL SCORE: 28.251
STANDING UPRIGHT: 3
STRAIGHTENING KNEE FULLY: 3

## 2022-11-01 ENCOUNTER — HOSPITAL ENCOUNTER (OUTPATIENT)
Dept: PREADMISSION TESTING | Age: 67
Discharge: HOME | End: 2022-11-01
Payer: MEDICARE

## 2022-11-01 VITALS
TEMPERATURE: 98.4 F | WEIGHT: 236 LBS | HEART RATE: 63 BPM | DIASTOLIC BLOOD PRESSURE: 77 MMHG | SYSTOLIC BLOOD PRESSURE: 132 MMHG | RESPIRATION RATE: 20 BRPM | BODY MASS INDEX: 41.82 KG/M2 | OXYGEN SATURATION: 96 % | HEIGHT: 63 IN

## 2022-11-01 DIAGNOSIS — I25.110 CORONARY ARTERY DISEASE INVOLVING NATIVE CORONARY ARTERY OF NATIVE HEART WITH UNSTABLE ANGINA PECTORIS (HCC): ICD-10-CM

## 2022-11-01 DIAGNOSIS — I21.4 NON-ST ELEVATION MI (NSTEMI) (HCC): ICD-10-CM

## 2022-11-01 DIAGNOSIS — E05.00 GRAVES DISEASE: ICD-10-CM

## 2022-11-01 DIAGNOSIS — E66.01 MORBID OBESITY (HCC): ICD-10-CM

## 2022-11-01 DIAGNOSIS — M17.0 BILATERAL PRIMARY OSTEOARTHRITIS OF KNEE: ICD-10-CM

## 2022-11-01 LAB
ABO/RH: NORMAL
ALBUMIN SERPL-MCNC: 4.1 G/DL (ref 3.5–5.2)
ALP BLD-CCNC: 83 U/L (ref 35–104)
ALT SERPL-CCNC: 16 U/L (ref 0–32)
ANION GAP SERPL CALCULATED.3IONS-SCNC: 10 MMOL/L (ref 7–16)
ANTIBODY SCREEN: NORMAL
APTT: 29.9 SEC (ref 24.5–35.1)
AST SERPL-CCNC: 26 U/L (ref 0–31)
BASOPHILS ABSOLUTE: 0.05 E9/L (ref 0–0.2)
BASOPHILS RELATIVE PERCENT: 0.7 % (ref 0–2)
BILIRUB SERPL-MCNC: 0.3 MG/DL (ref 0–1.2)
BUN BLDV-MCNC: 15 MG/DL (ref 6–23)
CALCIUM SERPL-MCNC: 9.7 MG/DL (ref 8.6–10.2)
CHLORIDE BLD-SCNC: 103 MMOL/L (ref 98–107)
CHOLESTEROL, TOTAL: 123 MG/DL (ref 0–199)
CO2: 27 MMOL/L (ref 22–29)
CREAT SERPL-MCNC: 0.9 MG/DL (ref 0.5–1)
EOSINOPHILS ABSOLUTE: 0.17 E9/L (ref 0.05–0.5)
EOSINOPHILS RELATIVE PERCENT: 2.5 % (ref 0–6)
GFR SERPL CREATININE-BSD FRML MDRD: >60 ML/MIN/1.73
GLUCOSE BLD-MCNC: 89 MG/DL (ref 74–99)
HCT VFR BLD CALC: 36.5 % (ref 34–48)
HDLC SERPL-MCNC: 64 MG/DL
HEMOGLOBIN: 11.5 G/DL (ref 11.5–15.5)
IMMATURE GRANULOCYTES #: 0.02 E9/L
IMMATURE GRANULOCYTES %: 0.3 % (ref 0–5)
INR BLD: 0.9
LDL CHOLESTEROL CALCULATED: 51 MG/DL (ref 0–99)
LYMPHOCYTES ABSOLUTE: 1.55 E9/L (ref 1.5–4)
LYMPHOCYTES RELATIVE PERCENT: 23 % (ref 20–42)
MCH RBC QN AUTO: 27.1 PG (ref 26–35)
MCHC RBC AUTO-ENTMCNC: 31.5 % (ref 32–34.5)
MCV RBC AUTO: 85.9 FL (ref 80–99.9)
MONOCYTES ABSOLUTE: 0.53 E9/L (ref 0.1–0.95)
MONOCYTES RELATIVE PERCENT: 7.9 % (ref 2–12)
NEUTROPHILS ABSOLUTE: 4.43 E9/L (ref 1.8–7.3)
NEUTROPHILS RELATIVE PERCENT: 65.6 % (ref 43–80)
PDW BLD-RTO: 14.9 FL (ref 11.5–15)
PLATELET # BLD: 267 E9/L (ref 130–450)
PMV BLD AUTO: 10 FL (ref 7–12)
POTASSIUM REFLEX MAGNESIUM: 4.4 MMOL/L (ref 3.5–5)
PROTHROMBIN TIME: 10.4 SEC (ref 9.3–12.4)
RBC # BLD: 4.25 E12/L (ref 3.5–5.5)
SODIUM BLD-SCNC: 140 MMOL/L (ref 132–146)
TOTAL PROTEIN: 7.6 G/DL (ref 6.4–8.3)
TRIGL SERPL-MCNC: 42 MG/DL (ref 0–149)
TSH SERPL DL<=0.05 MIU/L-ACNC: 0.87 UIU/ML (ref 0.27–4.2)
VLDLC SERPL CALC-MCNC: 8 MG/DL
WBC # BLD: 6.8 E9/L (ref 4.5–11.5)

## 2022-11-01 PROCEDURE — 36415 COLL VENOUS BLD VENIPUNCTURE: CPT

## 2022-11-01 PROCEDURE — 85730 THROMBOPLASTIN TIME PARTIAL: CPT

## 2022-11-01 PROCEDURE — 85610 PROTHROMBIN TIME: CPT

## 2022-11-01 PROCEDURE — 86900 BLOOD TYPING SEROLOGIC ABO: CPT

## 2022-11-01 PROCEDURE — 86901 BLOOD TYPING SEROLOGIC RH(D): CPT

## 2022-11-01 PROCEDURE — 86850 RBC ANTIBODY SCREEN: CPT

## 2022-11-01 PROCEDURE — 85025 COMPLETE CBC W/AUTO DIFF WBC: CPT

## 2022-11-01 PROCEDURE — 87081 CULTURE SCREEN ONLY: CPT

## 2022-11-01 PROCEDURE — 80053 COMPREHEN METABOLIC PANEL: CPT

## 2022-11-01 RX ORDER — CLOPIDOGREL BISULFATE 75 MG/1
75 TABLET ORAL DAILY
Qty: 90 TABLET | Refills: 3 | Status: SHIPPED | OUTPATIENT
Start: 2022-11-01

## 2022-11-01 ASSESSMENT — PAIN DESCRIPTION - ORIENTATION: ORIENTATION: RIGHT;LEFT

## 2022-11-01 ASSESSMENT — PAIN DESCRIPTION - DESCRIPTORS: DESCRIPTORS: ACHING;THROBBING;OTHER (COMMENT)

## 2022-11-01 ASSESSMENT — PAIN DESCRIPTION - PAIN TYPE: TYPE: CHRONIC PAIN

## 2022-11-01 ASSESSMENT — PAIN DESCRIPTION - LOCATION: LOCATION: KNEE

## 2022-11-01 ASSESSMENT — PAIN SCALES - GENERAL: PAINLEVEL_OUTOF10: 9

## 2022-11-01 ASSESSMENT — PAIN DESCRIPTION - FREQUENCY: FREQUENCY: CONTINUOUS

## 2022-11-01 NOTE — PROGRESS NOTES
Left voice message for Amanda Brink at Dr. Fabiano Gamble patient states she has not been taking Plavix since April 2022 as she thought she was to no longer take, she had visit with PCP on 10/28 and was informed she should have been taking the Plavix. Also noted that patient had medical clearance dated 10/13 however the office visit with PCP on 10/28 states if labs ok then ok for surgery.

## 2022-11-01 NOTE — PROGRESS NOTES
Patient here for pre admission testing patient reports she has not been taking Plavix since April 2022. Patient states she had thought she was to no longer take, but found out from PCP this was incorrect. Has not notified the surgeon of this.

## 2022-11-01 NOTE — PROGRESS NOTES
Milla called stating she is going to cancel the joint class scheduled for today and she will call the patient tomorrow. Patient informed.

## 2022-11-02 ENCOUNTER — TELEPHONE (OUTPATIENT)
Dept: ORTHOPEDIC SURGERY | Age: 67
End: 2022-11-02

## 2022-11-02 ENCOUNTER — TELEPHONE (OUTPATIENT)
Dept: CARDIOLOGY CLINIC | Age: 67
End: 2022-11-02

## 2022-11-02 LAB — MRSA CULTURE ONLY: NORMAL

## 2022-11-02 NOTE — TELEPHONE ENCOUNTER
Contacted Dr. Paz Gutierrez office (Cardiology). Cardiac clearance was received on 10- via Epic message. Patient was advised to stop taking Plavix 5 days prior to her surgery date. Patient was seen by [facility] PAT Department yesterday. Patient informed the PAT Department she stopped taking Plavix in April 2022. Patient did not say why she stopped or if Dr. Paz Gutierrez is aware of her stopping Plavix in April. Voicemail was left on [facility] [employer] of Dr. Paz Gutierrez office with the above information. Requested a call back to advise our office if patient is still cleared for surgery or not from Dr. Paz Gutierrez.

## 2022-11-02 NOTE — TELEPHONE ENCOUNTER
Patient was cleared for knee replacement surgery 10/13, and was advised to stop her plavix 5 days before. When she went to pre testing, she informed them that she has not been taking plavix since April 2022. She is scheduled for surgery 11/07. Is she still cleared for surgery? Please advise.

## 2022-11-03 NOTE — TELEPHONE ENCOUNTER
Called  office again. Renee Cheema is gone for the day. Office does not have new information to provide at this time regarding cardiac clearance.

## 2022-11-03 NOTE — TELEPHONE ENCOUNTER
Spoke to patient. Informed patient we are waiting to hear back from her Cardiologist if she is cleared for surgery on 11-7-2022. If we do not hear back from her Cardiologist by early afternoon tomorrow, surgery will have to be cancelled. Patient verbalized understanding. She will call  office tomorrow morning.

## 2022-11-04 ENCOUNTER — ANESTHESIA EVENT (OUTPATIENT)
Dept: OPERATING ROOM | Age: 67
End: 2022-11-04
Payer: MEDICARE

## 2022-11-04 NOTE — TELEPHONE ENCOUNTER
Patient is at acceptable risk for perioperative cardiovascular event for the planned procedure (knee replacement surgery), patient may proceed without any further cardiac testing.   Please feel free to call for any further information

## 2022-11-04 NOTE — PROGRESS NOTES
Left voicemail message for patient regarding time change for surgery scheduled on 11/7.   Scheduled at 8:30 am.  Arrival time 6:30 am.

## 2022-11-07 ENCOUNTER — APPOINTMENT (OUTPATIENT)
Dept: GENERAL RADIOLOGY | Age: 67
End: 2022-11-07
Attending: ORTHOPAEDIC SURGERY
Payer: MEDICARE

## 2022-11-07 ENCOUNTER — HOSPITAL ENCOUNTER (OUTPATIENT)
Age: 67
Setting detail: OBSERVATION
Discharge: HOME | End: 2022-11-09
Attending: ORTHOPAEDIC SURGERY | Admitting: ORTHOPAEDIC SURGERY
Payer: MEDICARE

## 2022-11-07 ENCOUNTER — ANESTHESIA (OUTPATIENT)
Dept: OPERATING ROOM | Age: 67
End: 2022-11-07
Payer: MEDICARE

## 2022-11-07 DIAGNOSIS — E66.01 MORBID OBESITY (HCC): ICD-10-CM

## 2022-11-07 DIAGNOSIS — M17.0 BILATERAL PRIMARY OSTEOARTHRITIS OF KNEE: ICD-10-CM

## 2022-11-07 DIAGNOSIS — M17.12 OSTEOARTHRITIS OF LEFT KNEE: ICD-10-CM

## 2022-11-07 DIAGNOSIS — I21.4 NON-ST ELEVATION MI (NSTEMI) (HCC): ICD-10-CM

## 2022-11-07 DIAGNOSIS — I25.110 CORONARY ARTERY DISEASE INVOLVING NATIVE CORONARY ARTERY OF NATIVE HEART WITH UNSTABLE ANGINA PECTORIS (HCC): ICD-10-CM

## 2022-11-07 DIAGNOSIS — Z96.652 S/P TOTAL KNEE ARTHROPLASTY, LEFT: Primary | ICD-10-CM

## 2022-11-07 PROCEDURE — 88305 TISSUE EXAM BY PATHOLOGIST: CPT

## 2022-11-07 PROCEDURE — A4217 STERILE WATER/SALINE, 500 ML: HCPCS | Performed by: ORTHOPAEDIC SURGERY

## 2022-11-07 PROCEDURE — G0378 HOSPITAL OBSERVATION PER HR: HCPCS

## 2022-11-07 PROCEDURE — 2500000003 HC RX 250 WO HCPCS

## 2022-11-07 PROCEDURE — 2500000003 HC RX 250 WO HCPCS: Performed by: ORTHOPAEDIC SURGERY

## 2022-11-07 PROCEDURE — 6360000002 HC RX W HCPCS: Performed by: PHYSICIAN ASSISTANT

## 2022-11-07 PROCEDURE — 97161 PT EVAL LOW COMPLEX 20 MIN: CPT

## 2022-11-07 PROCEDURE — 6360000002 HC RX W HCPCS: Performed by: ORTHOPAEDIC SURGERY

## 2022-11-07 PROCEDURE — 2500000003 HC RX 250 WO HCPCS: Performed by: STUDENT IN AN ORGANIZED HEALTH CARE EDUCATION/TRAINING PROGRAM

## 2022-11-07 PROCEDURE — 6360000002 HC RX W HCPCS: Performed by: STUDENT IN AN ORGANIZED HEALTH CARE EDUCATION/TRAINING PROGRAM

## 2022-11-07 PROCEDURE — 64447 NJX AA&/STRD FEMORAL NRV IMG: CPT | Performed by: ANESTHESIOLOGY

## 2022-11-07 PROCEDURE — 6370000000 HC RX 637 (ALT 250 FOR IP): Performed by: INTERNAL MEDICINE

## 2022-11-07 PROCEDURE — 6360000002 HC RX W HCPCS: Performed by: ANESTHESIOLOGY

## 2022-11-07 PROCEDURE — 7100000001 HC PACU RECOVERY - ADDTL 15 MIN: Performed by: ORTHOPAEDIC SURGERY

## 2022-11-07 PROCEDURE — 2500000003 HC RX 250 WO HCPCS: Performed by: PHYSICIAN ASSISTANT

## 2022-11-07 PROCEDURE — 2580000003 HC RX 258: Performed by: STUDENT IN AN ORGANIZED HEALTH CARE EDUCATION/TRAINING PROGRAM

## 2022-11-07 PROCEDURE — 2580000003 HC RX 258: Performed by: ORTHOPAEDIC SURGERY

## 2022-11-07 PROCEDURE — 2720000010 HC SURG SUPPLY STERILE: Performed by: ORTHOPAEDIC SURGERY

## 2022-11-07 PROCEDURE — 73560 X-RAY EXAM OF KNEE 1 OR 2: CPT

## 2022-11-07 PROCEDURE — 3700000000 HC ANESTHESIA ATTENDED CARE: Performed by: ORTHOPAEDIC SURGERY

## 2022-11-07 PROCEDURE — 6360000002 HC RX W HCPCS

## 2022-11-07 PROCEDURE — 2580000003 HC RX 258

## 2022-11-07 PROCEDURE — C1776 JOINT DEVICE (IMPLANTABLE): HCPCS | Performed by: ORTHOPAEDIC SURGERY

## 2022-11-07 PROCEDURE — 2709999900 HC NON-CHARGEABLE SUPPLY: Performed by: ORTHOPAEDIC SURGERY

## 2022-11-07 PROCEDURE — 2580000003 HC RX 258: Performed by: PHYSICIAN ASSISTANT

## 2022-11-07 PROCEDURE — 3600000005 HC SURGERY LEVEL 5 BASE: Performed by: ORTHOPAEDIC SURGERY

## 2022-11-07 PROCEDURE — 6370000000 HC RX 637 (ALT 250 FOR IP): Performed by: STUDENT IN AN ORGANIZED HEALTH CARE EDUCATION/TRAINING PROGRAM

## 2022-11-07 PROCEDURE — 3700000001 HC ADD 15 MINUTES (ANESTHESIA): Performed by: ORTHOPAEDIC SURGERY

## 2022-11-07 PROCEDURE — 3600000015 HC SURGERY LEVEL 5 ADDTL 15MIN: Performed by: ORTHOPAEDIC SURGERY

## 2022-11-07 PROCEDURE — 97530 THERAPEUTIC ACTIVITIES: CPT

## 2022-11-07 PROCEDURE — 88311 DECALCIFY TISSUE: CPT

## 2022-11-07 PROCEDURE — 7100000000 HC PACU RECOVERY - FIRST 15 MIN: Performed by: ORTHOPAEDIC SURGERY

## 2022-11-07 PROCEDURE — C1713 ANCHOR/SCREW BN/BN,TIS/BN: HCPCS | Performed by: ORTHOPAEDIC SURGERY

## 2022-11-07 PROCEDURE — 2500000003 HC RX 250 WO HCPCS: Performed by: ANESTHESIOLOGY

## 2022-11-07 DEVICE — CEMENT BNE 40 GM RADIOPAQUE BA SIMPLEX P: Type: IMPLANTABLE DEVICE | Site: KNEE | Status: FUNCTIONAL

## 2022-11-07 DEVICE — IMPLANTABLE DEVICE: Type: IMPLANTABLE DEVICE | Site: KNEE | Status: FUNCTIONAL

## 2022-11-07 DEVICE — BASEPLATE TIB SZ 5 UNIV KNEE TRITANIUM TOT STBL CEM: Type: IMPLANTABLE DEVICE | Site: KNEE | Status: FUNCTIONAL

## 2022-11-07 DEVICE — STEM TIB L50MM DIA12MM KNEE TOT STBL CEM END CAP TRIATHLON: Type: IMPLANTABLE DEVICE | Site: KNEE | Status: FUNCTIONAL

## 2022-11-07 DEVICE — INSERT TIB CNDYL STBL 5 9 MM KNEE 5/PK X3 TRIATHLON: Type: IMPLANTABLE DEVICE | Site: KNEE | Status: FUNCTIONAL

## 2022-11-07 DEVICE — ANCHOR SUT 2 ORTHOCORD L36IN VLT BLU COMP BRAID SUT CP-2: Type: IMPLANTABLE DEVICE | Site: KNEE | Status: FUNCTIONAL

## 2022-11-07 DEVICE — IMPLANT PAT DIA32MM THK10MM X3 ASYM TRIATHLON: Type: IMPLANTABLE DEVICE | Site: KNEE | Status: FUNCTIONAL

## 2022-11-07 RX ORDER — TRAZODONE HYDROCHLORIDE 50 MG/1
100 TABLET ORAL NIGHTLY
Status: DISCONTINUED | OUTPATIENT
Start: 2022-11-07 | End: 2022-11-09 | Stop reason: HOSPADM

## 2022-11-07 RX ORDER — SODIUM CHLORIDE 9 MG/ML
INJECTION, SOLUTION INTRAVENOUS PRN
Status: DISCONTINUED | OUTPATIENT
Start: 2022-11-07 | End: 2022-11-09 | Stop reason: HOSPADM

## 2022-11-07 RX ORDER — SODIUM CHLORIDE 0.9 % (FLUSH) 0.9 %
5-40 SYRINGE (ML) INJECTION EVERY 12 HOURS SCHEDULED
Status: DISCONTINUED | OUTPATIENT
Start: 2022-11-07 | End: 2022-11-07 | Stop reason: HOSPADM

## 2022-11-07 RX ORDER — SODIUM CHLORIDE 0.9 % (FLUSH) 0.9 %
5-40 SYRINGE (ML) INJECTION PRN
Status: DISCONTINUED | OUTPATIENT
Start: 2022-11-07 | End: 2022-11-09 | Stop reason: HOSPADM

## 2022-11-07 RX ORDER — SODIUM CHLORIDE 9 MG/ML
INJECTION, SOLUTION INTRAVENOUS CONTINUOUS PRN
Status: DISCONTINUED | OUTPATIENT
Start: 2022-11-07 | End: 2022-11-07 | Stop reason: SDUPTHER

## 2022-11-07 RX ORDER — FLUOXETINE HYDROCHLORIDE 20 MG/1
20 CAPSULE ORAL DAILY
Status: DISCONTINUED | OUTPATIENT
Start: 2022-11-08 | End: 2022-11-08

## 2022-11-07 RX ORDER — BUPIVACAINE HYDROCHLORIDE 7.5 MG/ML
INJECTION, SOLUTION INTRASPINAL
Status: COMPLETED | OUTPATIENT
Start: 2022-11-07 | End: 2022-11-07

## 2022-11-07 RX ORDER — POLYETHYLENE GLYCOL 3350 17 G/17G
17 POWDER, FOR SOLUTION ORAL DAILY PRN
Status: DISCONTINUED | OUTPATIENT
Start: 2022-11-07 | End: 2022-11-09 | Stop reason: HOSPADM

## 2022-11-07 RX ORDER — ASPIRIN 81 MG/1
81 TABLET ORAL NIGHTLY
Status: DISCONTINUED | OUTPATIENT
Start: 2022-11-07 | End: 2022-11-09 | Stop reason: HOSPADM

## 2022-11-07 RX ORDER — FENTANYL CITRATE 50 UG/ML
INJECTION, SOLUTION INTRAMUSCULAR; INTRAVENOUS PRN
Status: DISCONTINUED | OUTPATIENT
Start: 2022-11-07 | End: 2022-11-07 | Stop reason: SDUPTHER

## 2022-11-07 RX ORDER — ONDANSETRON 2 MG/ML
4 INJECTION INTRAMUSCULAR; INTRAVENOUS EVERY 6 HOURS PRN
Status: DISCONTINUED | OUTPATIENT
Start: 2022-11-07 | End: 2022-11-09 | Stop reason: HOSPADM

## 2022-11-07 RX ORDER — BUPROPION HYDROCHLORIDE 300 MG/1
300 TABLET ORAL DAILY
Status: DISCONTINUED | OUTPATIENT
Start: 2022-11-08 | End: 2022-11-09 | Stop reason: HOSPADM

## 2022-11-07 RX ORDER — ATORVASTATIN CALCIUM 40 MG/1
40 TABLET, FILM COATED ORAL NIGHTLY
Status: DISCONTINUED | OUTPATIENT
Start: 2022-11-07 | End: 2022-11-09 | Stop reason: HOSPADM

## 2022-11-07 RX ORDER — SODIUM CHLORIDE, SODIUM LACTATE, POTASSIUM CHLORIDE, CALCIUM CHLORIDE 600; 310; 30; 20 MG/100ML; MG/100ML; MG/100ML; MG/100ML
INJECTION, SOLUTION INTRAVENOUS CONTINUOUS PRN
Status: DISCONTINUED | OUTPATIENT
Start: 2022-11-07 | End: 2022-11-07 | Stop reason: SDUPTHER

## 2022-11-07 RX ORDER — PROPOFOL 10 MG/ML
INJECTION, EMULSION INTRAVENOUS CONTINUOUS PRN
Status: DISCONTINUED | OUTPATIENT
Start: 2022-11-07 | End: 2022-11-07 | Stop reason: SDUPTHER

## 2022-11-07 RX ORDER — FLUTICASONE PROPIONATE 50 MCG
2 SPRAY, SUSPENSION (ML) NASAL DAILY
Status: DISCONTINUED | OUTPATIENT
Start: 2022-11-08 | End: 2022-11-09 | Stop reason: HOSPADM

## 2022-11-07 RX ORDER — OXYBUTYNIN CHLORIDE 10 MG/1
10 TABLET, EXTENDED RELEASE ORAL NIGHTLY
Status: DISCONTINUED | OUTPATIENT
Start: 2022-11-07 | End: 2022-11-09 | Stop reason: HOSPADM

## 2022-11-07 RX ORDER — OXYCODONE HYDROCHLORIDE 5 MG/1
5 TABLET ORAL EVERY 4 HOURS PRN
Status: DISCONTINUED | OUTPATIENT
Start: 2022-11-07 | End: 2022-11-09 | Stop reason: HOSPADM

## 2022-11-07 RX ORDER — EPHEDRINE SULFATE/0.9% NACL/PF 50 MG/5 ML
SYRINGE (ML) INTRAVENOUS PRN
Status: DISCONTINUED | OUTPATIENT
Start: 2022-11-07 | End: 2022-11-07 | Stop reason: SDUPTHER

## 2022-11-07 RX ORDER — DEXAMETHASONE SODIUM PHOSPHATE 10 MG/ML
INJECTION INTRAMUSCULAR; INTRAVENOUS PRN
Status: DISCONTINUED | OUTPATIENT
Start: 2022-11-07 | End: 2022-11-07 | Stop reason: SDUPTHER

## 2022-11-07 RX ORDER — ALBUTEROL SULFATE 90 UG/1
2 AEROSOL, METERED RESPIRATORY (INHALATION) EVERY 6 HOURS PRN
Status: DISCONTINUED | OUTPATIENT
Start: 2022-11-07 | End: 2022-11-07 | Stop reason: HOSPADM

## 2022-11-07 RX ORDER — LEVOTHYROXINE SODIUM 0.12 MG/1
125 TABLET ORAL DAILY
Status: DISCONTINUED | OUTPATIENT
Start: 2022-11-07 | End: 2022-11-09 | Stop reason: HOSPADM

## 2022-11-07 RX ORDER — ROPIVACAINE HYDROCHLORIDE 5 MG/ML
30 INJECTION, SOLUTION EPIDURAL; INFILTRATION; PERINEURAL ONCE
Status: COMPLETED | OUTPATIENT
Start: 2022-11-07 | End: 2022-11-07

## 2022-11-07 RX ORDER — MORPHINE SULFATE 2 MG/ML
2 INJECTION, SOLUTION INTRAMUSCULAR; INTRAVENOUS
Status: DISCONTINUED | OUTPATIENT
Start: 2022-11-07 | End: 2022-11-09 | Stop reason: HOSPADM

## 2022-11-07 RX ORDER — FENTANYL CITRATE 50 UG/ML
25 INJECTION, SOLUTION INTRAMUSCULAR; INTRAVENOUS ONCE
Status: COMPLETED | OUTPATIENT
Start: 2022-11-07 | End: 2022-11-07

## 2022-11-07 RX ORDER — TOBRAMYCIN 1.2 G/30ML
INJECTION, POWDER, LYOPHILIZED, FOR SOLUTION INTRAVENOUS PRN
Status: DISCONTINUED | OUTPATIENT
Start: 2022-11-07 | End: 2022-11-07 | Stop reason: ALTCHOICE

## 2022-11-07 RX ORDER — SODIUM CHLORIDE 9 MG/ML
INJECTION, SOLUTION INTRAVENOUS PRN
Status: DISCONTINUED | OUTPATIENT
Start: 2022-11-07 | End: 2022-11-07 | Stop reason: HOSPADM

## 2022-11-07 RX ORDER — SODIUM CHLORIDE, SODIUM LACTATE, POTASSIUM CHLORIDE, CALCIUM CHLORIDE 600; 310; 30; 20 MG/100ML; MG/100ML; MG/100ML; MG/100ML
INJECTION, SOLUTION INTRAVENOUS CONTINUOUS
Status: DISCONTINUED | OUTPATIENT
Start: 2022-11-07 | End: 2022-11-07 | Stop reason: HOSPADM

## 2022-11-07 RX ORDER — SODIUM CHLORIDE 0.9 % (FLUSH) 0.9 %
5-40 SYRINGE (ML) INJECTION EVERY 12 HOURS SCHEDULED
Status: DISCONTINUED | OUTPATIENT
Start: 2022-11-07 | End: 2022-11-09 | Stop reason: HOSPADM

## 2022-11-07 RX ORDER — ROPIVACAINE HYDROCHLORIDE 5 MG/ML
INJECTION, SOLUTION EPIDURAL; INFILTRATION; PERINEURAL
Status: COMPLETED | OUTPATIENT
Start: 2022-11-07 | End: 2022-11-07

## 2022-11-07 RX ORDER — FENTANYL CITRATE 50 UG/ML
INJECTION, SOLUTION INTRAMUSCULAR; INTRAVENOUS
Status: COMPLETED | OUTPATIENT
Start: 2022-11-07 | End: 2022-11-07

## 2022-11-07 RX ORDER — ONDANSETRON 2 MG/ML
INJECTION INTRAMUSCULAR; INTRAVENOUS PRN
Status: DISCONTINUED | OUTPATIENT
Start: 2022-11-07 | End: 2022-11-07 | Stop reason: SDUPTHER

## 2022-11-07 RX ORDER — ACETAMINOPHEN 325 MG/1
650 TABLET ORAL EVERY 4 HOURS PRN
Status: DISCONTINUED | OUTPATIENT
Start: 2022-11-07 | End: 2022-11-09 | Stop reason: HOSPADM

## 2022-11-07 RX ORDER — HYDROXYZINE PAMOATE 25 MG/1
25 CAPSULE ORAL 3 TIMES DAILY PRN
Status: DISCONTINUED | OUTPATIENT
Start: 2022-11-07 | End: 2022-11-09 | Stop reason: HOSPADM

## 2022-11-07 RX ORDER — SODIUM CHLORIDE 0.9 % (FLUSH) 0.9 %
5-40 SYRINGE (ML) INJECTION PRN
Status: DISCONTINUED | OUTPATIENT
Start: 2022-11-07 | End: 2022-11-07 | Stop reason: HOSPADM

## 2022-11-07 RX ORDER — OXYCODONE HYDROCHLORIDE AND ACETAMINOPHEN 5; 325 MG/1; MG/1
1 TABLET ORAL EVERY 6 HOURS PRN
Qty: 28 TABLET | Refills: 0 | Status: SHIPPED | OUTPATIENT
Start: 2022-11-07 | End: 2022-11-09 | Stop reason: SDUPTHER

## 2022-11-07 RX ORDER — MORPHINE SULFATE 4 MG/ML
4 INJECTION, SOLUTION INTRAMUSCULAR; INTRAVENOUS
Status: DISCONTINUED | OUTPATIENT
Start: 2022-11-07 | End: 2022-11-09 | Stop reason: HOSPADM

## 2022-11-07 RX ORDER — LIDOCAINE HYDROCHLORIDE 20 MG/ML
INJECTION, SOLUTION INTRAVENOUS PRN
Status: DISCONTINUED | OUTPATIENT
Start: 2022-11-07 | End: 2022-11-07 | Stop reason: SDUPTHER

## 2022-11-07 RX ORDER — FENTANYL CITRATE 50 UG/ML
100 INJECTION, SOLUTION INTRAMUSCULAR; INTRAVENOUS ONCE
Status: COMPLETED | OUTPATIENT
Start: 2022-11-07 | End: 2022-11-07

## 2022-11-07 RX ORDER — OXYCODONE HYDROCHLORIDE 10 MG/1
10 TABLET ORAL EVERY 4 HOURS PRN
Status: DISCONTINUED | OUTPATIENT
Start: 2022-11-07 | End: 2022-11-09 | Stop reason: HOSPADM

## 2022-11-07 RX ORDER — MIDAZOLAM HYDROCHLORIDE 2 MG/2ML
2 INJECTION, SOLUTION INTRAMUSCULAR; INTRAVENOUS EVERY 10 MIN PRN
Status: DISCONTINUED | OUTPATIENT
Start: 2022-11-07 | End: 2022-11-07 | Stop reason: HOSPADM

## 2022-11-07 RX ORDER — CALCIUM GLUCONATE 94 MG/ML
INJECTION, SOLUTION INTRAVENOUS PRN
Status: DISCONTINUED | OUTPATIENT
Start: 2022-11-07 | End: 2022-11-07 | Stop reason: ALTCHOICE

## 2022-11-07 RX ORDER — ONDANSETRON 4 MG/1
4 TABLET, ORALLY DISINTEGRATING ORAL EVERY 8 HOURS PRN
Status: DISCONTINUED | OUTPATIENT
Start: 2022-11-07 | End: 2022-11-09 | Stop reason: HOSPADM

## 2022-11-07 RX ADMIN — ATORVASTATIN CALCIUM 40 MG: 40 TABLET, FILM COATED ORAL at 20:22

## 2022-11-07 RX ADMIN — ASPIRIN 81 MG: 81 TABLET, COATED ORAL at 20:23

## 2022-11-07 RX ADMIN — FENTANYL CITRATE 20 MCG: 50 INJECTION, SOLUTION INTRAMUSCULAR; INTRAVENOUS at 09:17

## 2022-11-07 RX ADMIN — SODIUM CHLORIDE, PRESERVATIVE FREE 10 ML: 5 INJECTION INTRAVENOUS at 20:23

## 2022-11-07 RX ADMIN — MIDAZOLAM 2 MG: 1 INJECTION INTRAMUSCULAR; INTRAVENOUS at 08:12

## 2022-11-07 RX ADMIN — ONDANSETRON HYDROCHLORIDE 4 MG: 2 SOLUTION INTRAMUSCULAR; INTRAVENOUS at 09:54

## 2022-11-07 RX ADMIN — TRANEXAMIC ACID 1000 MG: 1 INJECTION, SOLUTION INTRAVENOUS at 12:55

## 2022-11-07 RX ADMIN — OXYCODONE HYDROCHLORIDE 10 MG: 10 TABLET ORAL at 21:48

## 2022-11-07 RX ADMIN — BUPIVACAINE HYDROCHLORIDE IN DEXTROSE 11.25 MG: 7.5 INJECTION, SOLUTION SUBARACHNOID at 09:17

## 2022-11-07 RX ADMIN — TRAZODONE HYDROCHLORIDE 100 MG: 50 TABLET ORAL at 20:23

## 2022-11-07 RX ADMIN — LIDOCAINE HYDROCHLORIDE 40 MG: 20 INJECTION, SOLUTION INTRAVENOUS at 09:23

## 2022-11-07 RX ADMIN — TRANEXAMIC ACID 1000 MG: 1 INJECTION, SOLUTION INTRAVENOUS at 09:40

## 2022-11-07 RX ADMIN — CEFAZOLIN 2000 MG: 2 INJECTION, POWDER, FOR SOLUTION INTRAMUSCULAR; INTRAVENOUS at 17:37

## 2022-11-07 RX ADMIN — ROPIVACAINE HYDROCHLORIDE 30 ML: 5 INJECTION EPIDURAL; INFILTRATION; PERINEURAL at 08:00

## 2022-11-07 RX ADMIN — FENTANYL CITRATE 80 MCG: 50 INJECTION, SOLUTION INTRAMUSCULAR; INTRAVENOUS at 09:23

## 2022-11-07 RX ADMIN — SODIUM CHLORIDE: 9 INJECTION, SOLUTION INTRAVENOUS at 09:00

## 2022-11-07 RX ADMIN — Medication 25 MG: at 09:51

## 2022-11-07 RX ADMIN — OXYCODONE 5 MG: 5 TABLET ORAL at 17:36

## 2022-11-07 RX ADMIN — FENTANYL CITRATE 50 MCG: 50 INJECTION, SOLUTION INTRAMUSCULAR; INTRAVENOUS at 08:12

## 2022-11-07 RX ADMIN — Medication 15 MG: at 10:21

## 2022-11-07 RX ADMIN — Medication 10 MG: at 09:43

## 2022-11-07 RX ADMIN — DEXAMETHASONE SODIUM PHOSPHATE 10 MG: 10 INJECTION INTRAMUSCULAR; INTRAVENOUS at 09:54

## 2022-11-07 RX ADMIN — SODIUM CHLORIDE, POTASSIUM CHLORIDE, SODIUM LACTATE AND CALCIUM CHLORIDE: 600; 310; 30; 20 INJECTION, SOLUTION INTRAVENOUS at 10:18

## 2022-11-07 RX ADMIN — OXYBUTYNIN CHLORIDE 10 MG: 10 TABLET, EXTENDED RELEASE ORAL at 20:22

## 2022-11-07 RX ADMIN — ROPIVACAINE HYDROCHLORIDE 30 ML: 5 INJECTION, SOLUTION EPIDURAL; INFILTRATION; PERINEURAL at 08:17

## 2022-11-07 RX ADMIN — CEFAZOLIN 2000 MG: 2 INJECTION, POWDER, FOR SOLUTION INTRAMUSCULAR; INTRAVENOUS at 09:25

## 2022-11-07 RX ADMIN — PROPOFOL 75 MCG/KG/MIN: 10 INJECTION, EMULSION INTRAVENOUS at 09:23

## 2022-11-07 ASSESSMENT — PAIN DESCRIPTION - DESCRIPTORS
DESCRIPTORS: ACHING

## 2022-11-07 ASSESSMENT — PAIN SCALES - GENERAL
PAINLEVEL_OUTOF10: 3
PAINLEVEL_OUTOF10: 0
PAINLEVEL_OUTOF10: 5
PAINLEVEL_OUTOF10: 8
PAINLEVEL_OUTOF10: 0
PAINLEVEL_OUTOF10: 0

## 2022-11-07 ASSESSMENT — PAIN DESCRIPTION - ORIENTATION
ORIENTATION: LEFT

## 2022-11-07 ASSESSMENT — PAIN DESCRIPTION - LOCATION
LOCATION: KNEE

## 2022-11-07 ASSESSMENT — PAIN SCALES - WONG BAKER: WONGBAKER_NUMERICALRESPONSE: 0;2

## 2022-11-07 ASSESSMENT — PAIN - FUNCTIONAL ASSESSMENT: PAIN_FUNCTIONAL_ASSESSMENT: 0-10

## 2022-11-07 ASSESSMENT — PAIN DESCRIPTION - PAIN TYPE: TYPE: CHRONIC PAIN;SURGICAL PAIN

## 2022-11-07 NOTE — OP NOTE
Operative Note      Patient: Rocio Lion  YOB: 1955  MRN: [id number]    Date of Procedure: 11/7/2022    Pre-Op Diagnosis: Primary arthritis left knee, severe    Post-Op Diagnosis: Same       Procedure(s):  LEFT KNEE TOTAL ARTHROPLASTY    Surgeon(s):  Mariluz Amaya MD    Assistant:   Resident: Tavo Fontanez DO; Carina Aggarwal DO    Anesthesia: General    Estimated Blood Loss (mL): Minimal    Complications: None    Specimens:   ID Type Source Tests Collected by Time Destination   A : LEFT KNEE BONE FRAGMENTS Bone Bone SURGICAL PATHOLOGY Mariluz Amaya MD 11/7/2022 1114        Implants:  Implant Name Type Inv.  Item Serial No.  Lot No. LRB No. Used Action   CEMENT BNE 40 GM RADIOPAQUE BA SIMPLEX P - [id number]  CEMENT BNE 40 GM RADIOPAQUE BA SIMPLEX P  [employer] [facility] [id number] Left 1 Implanted   CEMENT BNE 40 GM RADIOPAQUE BA SIMPLEX P - [id number]  CEMENT BNE 40 GM RADIOPAQUE BA SIMPLEX P  [employer] [facility] [id number] Left 1 Implanted   INSERT TIB CNDYL STBL 5 9 MM KNEE 5/PK X3 TRIATHLON - [id number]  INSERT TIB CNDYL STBL 5 9 MM KNEE 5/PK X3 TRIATHLON  [employer] [facility] [id number] Left 1 Implanted   STEM TIB L50MM [id number] KNEE TOT STBL BRUNO END CAP TRIATHLON - [id number]  STEM TIB L50MM [id number] KNEE TOT STBL BRUNO END CAP TRIATHLON  [employer] [facility] [id number] Left 1 Implanted   IMPLANT PAT [id number] X3 ASYM TRIATHLON - [id number]  IMPLANT PAT [id number] X3 ASYM TRIATHLON  [employer] [facility] [id number] Left 1 Implanted   COMPONENT FEM SZ 4 L ANT KNEE CRUCE RET BRUNO REFERENCING - [id number]  COMPONENT FEM SZ 4 L ANT KNEE CRUCE RET BRUNO REFERENCING  [employer] [facility] [id number] Left 1 Implanted   BASEPLATE TIB SZ 5 UNIV KNEE TRITANIUM TOT STBL BRUNO - [id number]  BASEPLATE TIB SZ 5 UNIV KNEE TRITANIUM TOT STBL BRUNO  [employer] [facility] [id number] Left 1 Implanted   ANCHOR SUT 2 ORTHOCORD L36IN VLT [place] COMP BRAID SUT CP-2 - [id number]  ANCHOR SUT 2 ORTHOCORD L36IN VLT [place] COMP BRAID SUT CP-2  [id number][id number] Left 1 Implanted         Drains: * No LDAs found *    Findings: Used a [place] triathlon size #4 cruciate retaining femur, size #5 universal baseplate with a 50 mm stem, size #9 PS polyethylene liner, and an A32 Patella    Detailed Description of Procedure: The patient was seen and identified outside the operative suite, in which the operative site was marked as appropriate by patient, surgeon, staff, and anesthesia. The patient was then taken into the operative suite, transferred to the operative table with all bony prominences and neurovascular structures well padded and protected. A tourniquet was placed high on the left thigh of the operative extremity. The patient was sedated under the care of the anesthesia team. The operative site was prepped and draped in standard sterile fashion. The tourniquet was inflated to 275 mmHg. Anterior midline incision was made centered about the patella, dissection carried down in the midline to the extensor mechanism where a medial  parapatellar arthrotomy was made. The patella was everted. The anterior fat pad and osteophytes were meticulously removed. After resection of all osteophytes, the intramedullary femoral cutting guide was slid into position. This was set appropriately with an 8 mm resection for the distal femur. T once in position and pinned this was checked with an jenny wing. The distal femoral cut was then made. The posterior referencing guide was then pinned in position after marking out the epicondylar axis. The appropriate size was obtained utilizing both the stylette and h an jenny wing. The 4-in-1 cutting guide was then put into position. It was also checked for appropriate sizing. It was pinned into position and then the anterior and posterior cuts were created as well as the anterior and posterior chamfers.   The posterior aspect of the femur was cleaned off with osteotomes and a pituitary rongeur. The menisci were then resected. The intramedullary guide was placed on the tibia. The stylette was used to obtain the appropriate depth of 2 mm off the more worn side. This was pinned into position then checked with a drop ildeofnso. Once in appropriate position the cross pin was placed. The proximal tibia cut was then created. Cutting guide was removed and the gaps were checked with a spacer. Once appropriate the trial baseplate was pinned in position. A trial polyethylene 9 mm liner was pinned in position. The femoral trial was pinned in position. The knee was taken through a range of motion checking patellar tracking, and medial and lateral gaps at both full extension and 30 degrees of flexion. Once appropriate size poly-was chosen the patella was then cut leaving 12 to 14 mm. The appropriate sized jig was placed for k an asymmetric patella. The cut was made the holes were drilled n and the trial was pinned in position. The entire knee was re-trialed which checked out very nicely. At this point time trial was removed, the femur was drilled and the tibia was punched. Copious irrigation was carried out along with a periarticular injection. The knee was then meticulously dried. The final tibial component was cemented in position with excess cement removed. It was impacted with a final impacted with further excess cement removed. The polyethylene liner was clipped and the position was checked for security. The final femur was impacted into position with excess cement removed and placed into extension with further excess cement removed. The final patellar component was cemented into position with all excess cement removed. The knee was then allowed to dry with a bolster underneath the heel. During this time it was copiously irrigated and a Betadine shock was performed.   The final components included a size number #4 femur, a size #5 universal tibial baseplate with a 9 millimeter polyethylene liner and a L12yirrdqau component. After the final irrigation the capsule was closed with Ethibond suture in a barbed suture for watertight closure. Subcutaneous tissues closed with 2-0 Monocryl and the skin was closed with 3-0 nylon. The patient was taken to the PACU in stable condition. PRP was injected prior to closure to help with the soft tissue healing. Disposition: The patient was taken to PACU in stable condition. Once stable, the patient will be transferred to the floor. Orders have been provided to begin physical therapy, weight bear as tolerated Left lower extremity. Patient received a dose of Ancef preoperatively. We will continue this for 24 hours postoperatively for infection prophylaxis. The patient will also be started on aspirin for DVT prophylaxis. We have consulted  and case management for discharge planning and consulted the PCP for medical management.

## 2022-11-07 NOTE — DISCHARGE INSTRUCTIONS
[address]  Department of Orthopedic Surgery  [address] Honor Cheadle, DO Dr. Gaye Newborn, MD Dr. Enrico Pipes, MD Fredericka Men, PA-C Rockey Lambert PA-C Venda Maffucci PA-C      Orthopaedics Discharge Instructions   Weight bearing Status - Weight bearing as tolerated - on left lower Extremity  Pain medication Per Prescriptions  Contact Office for Medication Refill- [phone]  Office can refill pain med every 7 days  If patient discharging to facility then pain control will be continued per facility physician  Ice to operative/injured site for 15-30 minutes of each hour for next 5 days    Recommend that you continue to ice the area 2-3 times per day after this   Elevate operative/injured limb on 2 pillows at home  Goal is to have limb above the heart if able  Continue DVT Prophylaxis (blood clot prevention) as Prescribed: aspirin and plavix   Wound care - leave dressing intact for 1 week. Dry clean dressing after as needed for drainage  Follow Up in Office in 2 weeks. Your first post op appointment is often with one of our PAs. Call the office at [phone] or directions or with any questions. Watch for these significant complications. Call physician if they or any other problems occur:  Fever over 101°, redness, swelling or warmth at the operative site  Unrelieved nausea    Foul smelling or cloudy drainage at the operative site   Unrelieved pain    Blood soaked dressing.  (Some oozing may be normal)     Numb, pale, blue, cold or tingling extremity    Future Appointments   Date Time Provider Artemio Palacios   2022 12:00 PM SCHEDULE, [facility]   2022 10:00 AM SCHEDULE, [facility]   2023  9:15 AM Poppy Whelan MD [facility]         It is the Department of Orthopaedic Trauma's standard of practice that providers will de-escalate(wean) all patients from narcotic(opioid) medications during the post-operative period. We provide multimodal pain control but opioid medications are tapered in all of our patients. If patient requires referral to pain management for prolonged taper off of opioid pain medication we will facilitate this process.

## 2022-11-07 NOTE — PROGRESS NOTES
Physical Therapy    Initial Assessment     Name: Caryn Lopez  : 1955  MRN: [id number]      Date of Service: 2022    Evaluating PT: Aramis Ag PT, DPT [id number]      Room #:  9449/4116-G  Diagnosis:  Osteoarthritis of left knee [M17.12]  Total knee replacement status, left [Z96.652]  PMHx/PSHx:  Hypothyroidism, depression, Graves disease, OA, NSTEMI, CAD, obesity, COVID-19, HTN  Procedure/Surgery:  L TKA ()  Precautions:  Fall risk, WBAT L LE, [employer]  Equipment Needs:  [address]    SUBJECTIVE:    Pt lives with  in a single story house with level entry. Pt ambulated with SPC prior to admission. Pt owns a rollator. OBJECTIVE:   Initial Evaluation  Date: 22 Treatment Date: Short Term/ Long Term   Goals   AM-PAC 6 Clicks      Was pt agreeable to Eval/treatment? Yes     Does pt have pain? No current complaints of pain     Bed Mobility  Rolling: NT  Supine to sit: Min A  Sit to supine: NT  Scooting: SBA to EOB  Rolling: Independent   Supine to sit: Independent   Sit to supine: Independent   Scooting: Independent    Transfers Sit to stand: Min A  Stand to sit: Min A  Stand pivot: Mod A with [address]  Sit to stand: Independent   Stand to sit: Independent   Stand pivot: Mod Independent with [address]   Ambulation   8 feet with [address] with Mod A (chair follow for safety -- reports hx of R knee buckling)  >200 feet with [address] Mod Independent    Stair negotiation: ascended and descended NT  NA   ROM BUE: Refer to OT note  BLE: WFL     Strength BUE: Refer to OT note  BLE: NT     Balance Sitting EOB: SBA  Dynamic Standing: Min A with [address]  Sitting EOB: Independent   Dynamic Standing: Mod Independent with [address]     Pt is A & O x: 4 to person, place, month/year, and situation. Sensation: Pt reports diminished sensation in L LE  Edema: Unremarkable. Patient education  Pt educated on WBAT L LE.     Patient response to education:   Pt verbalized understanding Pt demonstrated skill Pt requires further education in this area   Yes With cues Yes     ASSESSMENT:    Conditions Requiring Skilled Therapeutic Intervention:    [x]Decreased strength     [x]Decreased ROM  [x]Decreased functional mobility  [x]Decreased balance   []Decreased endurance   []Decreased posture  [x]Decreased sensation  []Decreased coordination   []Decreased vision  []Decreased safety awareness   []Increased pain       Comments:    Pt was in bed upon room entry; agreeable to PT evaluation. Family present throughout session. Pt was educated on WBAT L LE prior to mobility. Pt required light assistance for supine to sit transfer. Pt denied dizziness and nausea with all mobility. Pt completed sit to stand, sidesteps, and short distance ambulation with chair follow. Steps were small and slightly unsteady but no knee buckling was noted. Chair follow used for safety due to hx of R knee buckling. Pt was seated in chair. All questions and concerns were addressed. Pt was left in chair with all needs met at conclusion of session. RN notified of pt's performance and position in chair. Treatment:  Patient practiced and was instructed in the following treatment:    Therapeutic activities:  Bed mobility: Pt was cued for technique during supine to sit transfer. Transfers: Pt was cued for hand placement during sit <> stand transfers. Ambulation: Pt ambulated short distance with [employer]. Pt was cued for [employer] technique, sequencing, and safety. Education: Pt was educated on WBAT L LE and proper positioning of leg when in bed. Pt's/family goals:  1. To return home. Prognosis is Good for reaching above PT goals. Patient and or family understand(s) diagnosis, prognosis, and plan of care. Yes.     PHYSICAL THERAPY PLAN OF CARE:    PT POC is established based on physician order and patient diagnosis     Referring provider/PT Order:    Start   Ordering Provider    11/07/22 1400  PT evaluation and treat  Start:  11/07/22 1400,   End:  11/07/22 1400,   ONE TIME,   Standing Count:  1 NIGHAT Benson DO      Diagnosis:  Osteoarthritis of left knee [M17.12]  Total knee replacement status, left [Z96.652]  Specific instructions for next treatment:  Progress activity. Current Treatment Recommendations:     [x] Strengthening to improve independence with functional mobility   [x] ROM to improve independence with functional mobility   [x] Balance Training to improve static/dynamic balance and to reduce fall risk  [x] Endurance Training to improve activity tolerance during functional mobility   [x] Transfer Training to improve safety and independence with all functional transfers   [x] Gait Training to improve gait mechanics, endurance and assess need for appropriate assistive device  [] Stair Training in preparation for safe discharge home and/or into the community   [] Positioning to prevent skin breakdown and contractures  [x] Safety and Education Training   [x] Patient/Caregiver Education   [x] HEP  [] Other     PT long term treatment goals are located in above grid    Frequency of treatments: 2-5x/week x 1-2 weeks. Time in  1530  Time out  1600    Total Treatment Time  15 minutes     Evaluation Time includes thorough review of current medical information, gathering information on past medical history/social history and prior level of function, completion of standardized testing/informal observation of tasks, assessment of data and education on plan of care and goals.     CPT codes:  [x] Low Complexity PT evaluation 56932  [] Moderate Complexity PT evaluation 95316  [] High Complexity PT evaluation 50489  [] PT Re-evaluation 15222  [] Gait training 58829 0 minutes  [] Manual therapy 39609 0 minutes  [x] Therapeutic activities 03201 15 minutes  [] Therapeutic exercises 36213 0 minutes  [] Neuromuscular reeducation 51449 0 minutes     Zakia Fernandez PT, DPT  [id number]

## 2022-11-07 NOTE — ANESTHESIA PRE PROCEDURE
[id number] Araseli Jain, yes lets do a virtual visit. Can you do it at 1230 today? Department of Anesthesiology  Preprocedure Note       Name:  Chuy Day   Age:  74 y.o.  :  1955                                          MRN:  [id number]         Date:  2022      Surgeon: Megan Palomo):  Rowdy Guevara MD    Procedure: Procedure(s):  KNEE TOTAL ARTHROPLASTY, LEFT    Medications prior to admission:   Prior to Admission medications    Medication Sig Start Date End Date Taking?  Authorizing Provider   clopidogrel (PLAVIX) 75 MG tablet Take 1 tablet by mouth daily  Patient not taking: Reported on 2022   Jacky Christopher MD   atorvastatin (LIPITOR) 40 MG tablet Take 1 tablet by mouth nightly 10/14/22   Shamar Rolling Economus, DO   oxybutynin (DITROPAN XL) 10 MG extended release tablet Take 1 tablet by mouth daily  Patient taking differently: Take 10 mg by mouth at bedtime 10/14/22   Shamar Rolling Economus, DO   T[address] [facility] by Does not apply route 10/12/22   Emy Gonzalez PA-C   FLUoxetine (PROZAC) 20 MG capsule Take 1 capsule by mouth once daily 8/15/22   Shamar Rolling Economus, DO   FLUoxetine (PROZAC) 40 MG capsule Take 1 capsule by mouth once daily 8/15/22   Shamar Rolling Economus, DO   traZODone (DESYREL) 100 MG tablet Take 1 tablet by mouth nightly 8/15/22   Shamar Rolling Economus, DO   levothyroxine (EUTHYROX) 125 MCG tablet Take 1 tablet by mouth once daily 22   [facility] Rolling Economus, DO   celecoxib (CELEBREX) 200 MG capsule Take 1 capsule by mouth once daily  Patient taking differently: Take 200 mg by mouth at bedtime 22   Feliciano Dominguez, DO   hydrOXYzine (VISTARIL) 25 MG capsule TAKE 1 CAPSULE BY MOUTH THREE TIMES DAILY AS NEEDED FOR ANXIETY 22   Feliciano Dominguez, DO   fluticasone (FLONASE) 50 MCG/ACT nasal spray USE 2 SPRAY(S) IN EACH NOSTRIL DAILY  Patient not taking: Reported on 2022   Feliciano Dominguez,    buPROPion (WELLBUTRIN XL) 300 MG extended release tablet TAKE 1 TABLET BY MOUTH ONCE DAILY IN THE MORNING 8/19/21   Demarco Dominguez DO   aspirin 81 MG EC tablet Take 1 tablet by mouth daily  Patient taking differently: Take 81 mg by mouth at bedtime 9/18/20   Oliver Adam DO   nitroGLYCERIN (NITROSTAT) 0.4 MG SL tablet up to max of 3 total doses. If no relief after 1 dose, call 911.   Patient not taking: Reported on 11/7/2022 9/18/20   Oliver Adam DO       Current medications:    Current Facility-Administered Medications   Medication Dose Route Frequency Provider Last Rate Last Admin    sodium chloride flush 0.9 % injection 5-40 mL  5-40 mL IntraVENous 2 times per day Maebelle Begun, PA-C        sodium chloride flush 0.9 % injection 5-40 mL  5-40 mL IntraVENous PRN Maebelle Begun, PA-C        0.9 % sodium chloride infusion   IntraVENous PRN Maebelle Begun, PA-C        ceFAZolin (ANCEF) 2,000 mg in sterile water 20 mL IV syringe  2,000 mg IntraVENous On Call to [address]        tranexamic acid (CYKLOKAPRON) 1,000 mg in sodium chloride 0.9 % 100 mL IVPB  1,000 mg IntraVENous On Call to [address]        tranexamic acid (CYKLOKAPRON) 1,000 mg in sodium chloride 0.9 % 100 mL IVPB  1,000 mg IntraVENous Once Maebelle Begun, PA-C        fentaNYL (SUBLIMAZE) injection 25 mcg  25 mcg IntraVENous Once Kari Mejia MD        Or    fentaNYL (SUBLIMAZE) injection 100 mcg  100 mcg IntraVENous Once Kari Mejia MD        lactated ringers infusion   IntraVENous Continuous Mary Lorie Bill MD        sodium chloride flush 0.9 % injection 5-40 mL  5-40 mL IntraVENous 2 times per day Kari Mejia MD        sodium chloride flush 0.9 % injection 5-40 mL  5-40 mL IntraVENous PRN Kari Mejia MD        0.9 % sodium chloride infusion   IntraVENous PRN Kari Mejia MD        albuterol sulfate HFA (PROVENTIL;VENTOLIN;PROAIR) 108 (90 Base) MCG/ACT inhaler 2 puff  2 puff Inhalation Q6H PRN Kari Mejia MD  midazolam PF (VERSED) injection 2 mg  2 mg IntraVENous Q10 Min PRN Mary Cunningham MD        ropivacaine (NAROPIN) 0.5% injection 30 mL  30 mL Infiltration Once Demarco Renteria MD           Allergies: Allergies   Allergen Reactions    Mobic [Meloxicam] Other (See Comments)       Problem List:    Patient Active Problem List   Diagnosis Code    Hypothyroidism E03.9    Graves disease E05.00    Depression F32. A    GERD (gastroesophageal reflux disease) K21.9    Chronic pain of both knees M25.561, M25.562, G89.29    Non-ST elevation MI (NSTEMI) ([facility]) I21.4    Morbid obesity ([facility]) E66.01    Coronary artery disease involving native coronary artery of native heart with unstable angina pectoris ([facility]) I25.110    LITO (generalized anxiety disorder) F41.1    Osteoarthritis of left knee M17.12       Past Medical History:        Diagnosis Date    Arthritis     CAD (coronary artery disease)     20 yisel 2.0x22 francisca om.  COVID-19 virus infection 2020    Depression     GERD (gastroesophageal reflux disease)     Graves disease     Hypertension     Hypothyroidism     SECONDARY TO GRAVES DISEASE    NSTEMI (non-ST elevated myocardial infarction) ([facility] [address] 2020    Obesity        Past Surgical History:        Procedure Laterality Date    CARDIAC CATHETERIZATION  2020    stent x 1    COLONOSCOPY  11/10/10    DR GUILLERMO    CORONARY ANGIOPLASTY WITH STENT PLACEMENT  2020    Dhruv PEREYRA     GASTRIC BYPASS SURGERY  06    DR HAIRSTON       Social History:    Social History     Tobacco Use    Smoking status: Former     Packs/day: 1.00     Years: 3.00     Pack years: 3.00     Types: Cigarettes     Start date: 67     Quit date: 3/4/1977     Years since quittin.7    Smokeless tobacco: Never   Substance Use Topics    Alcohol use:  Yes     Alcohol/week: 0.0 standard drinks     Comment: rare                                Counseling given: Not Answered      Vital Signs (Current): Vitals:    11/07/22 0653   BP: (!) 164/80   Pulse: 66   Resp: 16   Temp: 36.7 °C (98 °F)   TempSrc: Temporal   SpO2: 98%   Weight: 236 lb (107 kg)   Height: 5' 3\" (1.6 m)                                              BP Readings from Last 3 Encounters:   11/07/22 (!) 164/80   11/01/22 132/77   10/28/22 120/72       NPO Status: Time of last liquid consumption: 1600                        Time of last solid consumption: 1600                        Date of last liquid consumption: 11/06/22                        Date of last solid food consumption: 11/06/22    BMI:   Wt Readings from Last 3 Encounters:   11/07/22 236 lb (107 kg)   10/25/22 236 lb (107 kg)   10/28/22 240 lb (108.9 kg)     Body mass index is 41.81 kg/m². CBC:   Lab Results   Component Value Date/Time    WBC 6.8 11/01/2022 10:40 AM    RBC 4.25 11/01/2022 10:40 AM    HGB 11.5 11/01/2022 10:40 AM    HCT 36.5 11/01/2022 10:40 AM    MCV 85.9 11/01/2022 10:40 AM    RDW 14.9 11/01/2022 10:40 AM     11/01/2022 10:40 AM       CMP:   Lab Results   Component Value Date/Time     11/01/2022 10:40 AM    K 4.4 11/01/2022 10:40 AM     11/01/2022 10:40 AM    CO2 27 11/01/2022 10:40 AM    BUN 15 11/01/2022 10:40 AM    CREATININE 0.9 11/01/2022 10:40 AM    GFRAA >60 04/29/2022 12:00 PM    LABGLOM >60 11/01/2022 10:40 AM    GLUCOSE 89 11/01/2022 10:40 AM    PROT 7.6 11/01/2022 10:40 AM    CALCIUM 9.7 11/01/2022 10:40 AM    BILITOT 0.3 11/01/2022 10:40 AM    ALKPHOS 83 11/01/2022 10:40 AM    AST 26 11/01/2022 10:40 AM    ALT 16 11/01/2022 10:40 AM       POC Tests: No results for input(s): POCGLU, POCNA, POCK, POCCL, POCBUN, POCHEMO, POCHCT in the last 72 hours.     Coags:   Lab Results   Component Value Date/Time    PROTIME 10.4 11/01/2022 10:40 AM    INR 0.9 11/01/2022 10:40 AM    APTT 29.9 11/01/2022 10:40 AM       HCG (If Applicable): No results found for: PREGTESTUR, PREGSERUM, HCG, HCGQUANT     ABGs: No results found for: PHART, PO2ART, UDP1CCQ, CNO9AYG, BEART, O3BNAUOZ     Type & Screen (If Applicable):  No results found for: LABABO, LABRH    Drug/Infectious Status (If Applicable):  No results found for: HIV, HEPCAB    COVID-19 Screening (If Applicable): No results found for: COVID19        Anesthesia Evaluation  Patient summary reviewed and Nursing notes reviewed no history of anesthetic complications:   Airway: Mallampati: III  TM distance: >3 FB   Neck ROM: full  Mouth opening: > = 3 FB   Dental:          Pulmonary:normal exam  breath sounds clear to auscultation                             Cardiovascular:  Exercise tolerance: poor (<4 METS),   (+) hypertension: moderate, angina: no interval change, past MI: no interval change, CAD: no interval change,       ECG reviewed  Rhythm: regular  Rate: normal  Echocardiogram reviewed         Beta Blocker:  Not on Beta Blocker      ROS comment: Cardiac stent placed 2020     Neuro/Psych:   (+) psychiatric history: stable with treatment            GI/Hepatic/Renal:   (+) GERD: well controlled,          ROS comment: Hx gastric bypass. Endo/Other:    (+) hypothyroidism, blood dyscrasia: anticoagulation therapy:., .                 Abdominal:   (+) obese,     Abdomen: soft. Vascular: Other Findings:      EKG 5/21/22:  Sinus  Rhythm   Low voltage QRS  Normal axis    ECHO 10/1/20:   Summary   Normal left ventricle size and systolic function. Ejection fraction is visually estimated at 55-60%. Mild concentric left ventricular hypertrophy. No regional wall motion abnormalities seen. Indeterminate diastolic function. The left atrium is mildly dilated. Normal right ventricular size and function. TAPSE 19 mm. No systolic mitral regurgitation noted. There is mild-to-moderate aortic stenosis with valve area of 1.4 sq cm. Aortic valve peak velocity 2.3 m/s and mean gradient 10 mmHg. Physiologic and/or trace tricuspid regurgitation. RVSP is 25 mmHg. Normal estimated PA systolic pressure. No evidence for hemodynamically significant pericardial effusion. No previous echo for comparison. Anesthesia Plan      general and regional     ASA 3       Induction: intravenous. BIS  MIPS: Postoperative opioids intended and Prophylactic antiemetics administered. Anesthetic plan and risks discussed with patient. Use of blood products discussed with patient whom consented to blood products. Plan discussed with CRNA and attending.           Post-op pain plan if not by surgeon: lucina Gannon RN   11/7/2022

## 2022-11-07 NOTE — ANESTHESIA PROCEDURE NOTES
Peripheral Block    Patient location during procedure: procedure area  Reason for block: post-op pain management and at surgeon's request  Start time: 11/7/2022 8:00 AM  Staffing  Performed: resident/CRNA and anesthesiologist   Anesthesiologist: Rodolfo Lopez MD  Resident/CRNA: BATSHEVA Atknis CRNA  Preanesthetic Checklist  Completed: patient identified, IV checked, site marked, risks and benefits discussed, surgical/procedural consents, equipment checked, pre-op evaluation, timeout performed, anesthesia consent given, oxygen available, monitors applied/VS acknowledged, fire risk safety assessment completed and verbalized and blood product R/B/A discussed and consented  Peripheral Block   Patient position: supine  Prep: ChloraPrep  Provider prep: mask and sterile gloves  Patient monitoring: cardiac monitor, continuous pulse ox, frequent blood pressure checks, IV access, oxygen and responsive to questions  Block type: Saphenous  Laterality: left  Injection technique: single-shot  Guidance: ultrasound guided    Needle   Needle type: insulated echogenic nerve stimulator needle   Needle gauge: 20 G  Needle localization: ultrasound guidance  Needle length: 10 cm  Assessment   Injection assessment: negative aspiration for heme, no paresthesia on injection, local visualized surrounding nerve on ultrasound and no intravascular symptoms  Paresthesia pain: none  Slow fractionated injection: yes  Hemodynamics: stable  Real-time US image taken/store: yes    Medications Administered  ropivacaine (NAROPIN) injection 0.5% - Perineural   30 mL - 11/7/2022 8:00:00 AM

## 2022-11-07 NOTE — PROGRESS NOTES
Nerve block completed in lineroom by Dr. Aubrie Thakkar. Vital signs documented. Patient tolerated well.

## 2022-11-07 NOTE — INTERVAL H&P NOTE
Update History & Physical    The patient's History and Physical of October 14, 2022 was reviewed with the patient and I examined the patient. There was no change. The surgical site was confirmed by the patient and me. Plan: The risks, benefits, expected outcome, and alternative to the recommended procedure have been discussed with the patient. Patient understands and wants to proceed with the procedure. Talk to the patient detail along with her family about the total knee and the risk involved. Explained that with her elevated BMI she is at high risk of infection and potential issues with instability. ,  Also high risk of wound healing issues. She understood and wishes to proceed with the surgery.     Electronically signed by Concepción Stephens MD on 11/7/2022 at 9:00 AM

## 2022-11-07 NOTE — ANESTHESIA PROCEDURE NOTES
Spinal Block    Patient location during procedure: OR  Reason for block: primary anesthetic  Staffing  Performed: resident/CRNA   Anesthesiologist: Dutch Gipson MD  Resident/CRNA: BATSHEVA Mcdaniel CRNA  Spinal Block  Patient position: sitting  Prep: ChloraPrep  Patient monitoring: continuous pulse ox, frequent blood pressure checks, cardiac monitor, continuous capnometry and oxygen  Approach: midline  Location: L3/L4  Provider prep: sterile gloves and mask  Local infiltration: lidocaine  Needle  Needle type: pencil-tip   Needle gauge: 25 G  Needle length: 3.5 in  Assessment  Sensory level: T6  Swirl obtained: Yes  CSF: clear  Attempts: 1  Hemodynamics: stable  Preanesthetic Checklist  Completed: patient identified, IV checked, site marked, risks and benefits discussed, surgical/procedural consents, equipment checked, pre-op evaluation, timeout performed, anesthesia consent given, oxygen available, monitors applied/VS acknowledged, fire risk safety assessment completed and verbalized and blood product R/B/A discussed and consented

## 2022-11-07 NOTE — ANESTHESIA POSTPROCEDURE EVALUATION
Department of Anesthesiology  Postprocedure Note    Patient: Sheridan Garcia  MRN: [id number]  YOB: 1955  Date of evaluation: 11/7/2022      Procedure Summary     Date: 11/07/22 Room / Location: [facility] OR 08 / [facility]    Anesthesia Start: 0900 Anesthesia Stop: 1001    Procedure: LEFT KNEE TOTAL ARTHROPLASTY (Left: Knee) Diagnosis:       Osteoarthritis of left knee      (Osteoarthritis of left knee [M17.12])    Surgeons: Mohan Crockett MD Responsible Provider: Sandi Ratliff MD    Anesthesia Type: general, regional ASA Status: 3          Anesthesia Type: No value filed.     Noni Phase I: Noni Score: 10    Noni Phase II:        Anesthesia Post Evaluation    Patient location during evaluation: PACU  Patient participation: complete - patient participated  Level of consciousness: awake and alert  Airway patency: patent  Nausea & Vomiting: no nausea and no vomiting  Complications: no  Cardiovascular status: blood pressure returned to baseline and hemodynamically stable  Respiratory status: acceptable and spontaneous ventilation  Hydration status: euvolemic  Multimodal analgesia pain management approach

## 2022-11-08 LAB
ANION GAP SERPL CALCULATED.3IONS-SCNC: 13 MMOL/L (ref 7–16)
BASOPHILS ABSOLUTE: 0.02 E9/L (ref 0–0.2)
BASOPHILS RELATIVE PERCENT: 0.2 % (ref 0–2)
BUN BLDV-MCNC: 13 MG/DL (ref 6–23)
CALCIUM SERPL-MCNC: 9.3 MG/DL (ref 8.6–10.2)
CHLORIDE BLD-SCNC: 99 MMOL/L (ref 98–107)
CO2: 22 MMOL/L (ref 22–29)
CREAT SERPL-MCNC: 0.8 MG/DL (ref 0.5–1)
EOSINOPHILS ABSOLUTE: 0 E9/L (ref 0.05–0.5)
EOSINOPHILS RELATIVE PERCENT: 0 % (ref 0–6)
GFR SERPL CREATININE-BSD FRML MDRD: >60 ML/MIN/1.73
GLUCOSE BLD-MCNC: 109 MG/DL (ref 74–99)
HCT VFR BLD CALC: 35.3 % (ref 34–48)
HEMOGLOBIN: 11.3 G/DL (ref 11.5–15.5)
IMMATURE GRANULOCYTES #: 0.04 E9/L
IMMATURE GRANULOCYTES %: 0.3 % (ref 0–5)
LYMPHOCYTES ABSOLUTE: 1.53 E9/L (ref 1.5–4)
LYMPHOCYTES RELATIVE PERCENT: 12.1 % (ref 20–42)
MCH RBC QN AUTO: 27.2 PG (ref 26–35)
MCHC RBC AUTO-ENTMCNC: 32 % (ref 32–34.5)
MCV RBC AUTO: 84.9 FL (ref 80–99.9)
MONOCYTES ABSOLUTE: 1.19 E9/L (ref 0.1–0.95)
MONOCYTES RELATIVE PERCENT: 9.4 % (ref 2–12)
NEUTROPHILS ABSOLUTE: 9.91 E9/L (ref 1.8–7.3)
NEUTROPHILS RELATIVE PERCENT: 78 % (ref 43–80)
PDW BLD-RTO: 14.6 FL (ref 11.5–15)
PLATELET # BLD: 288 E9/L (ref 130–450)
PMV BLD AUTO: 10.1 FL (ref 7–12)
POTASSIUM REFLEX MAGNESIUM: 4.3 MMOL/L (ref 3.5–5)
RBC # BLD: 4.16 E12/L (ref 3.5–5.5)
SODIUM BLD-SCNC: 134 MMOL/L (ref 132–146)
WBC # BLD: 12.7 E9/L (ref 4.5–11.5)

## 2022-11-08 PROCEDURE — 36415 COLL VENOUS BLD VENIPUNCTURE: CPT

## 2022-11-08 PROCEDURE — 97165 OT EVAL LOW COMPLEX 30 MIN: CPT

## 2022-11-08 PROCEDURE — 6370000000 HC RX 637 (ALT 250 FOR IP): Performed by: STUDENT IN AN ORGANIZED HEALTH CARE EDUCATION/TRAINING PROGRAM

## 2022-11-08 PROCEDURE — 6360000002 HC RX W HCPCS: Performed by: STUDENT IN AN ORGANIZED HEALTH CARE EDUCATION/TRAINING PROGRAM

## 2022-11-08 PROCEDURE — 96374 THER/PROPH/DIAG INJ IV PUSH: CPT

## 2022-11-08 PROCEDURE — G0378 HOSPITAL OBSERVATION PER HR: HCPCS

## 2022-11-08 PROCEDURE — 97530 THERAPEUTIC ACTIVITIES: CPT

## 2022-11-08 PROCEDURE — 6370000000 HC RX 637 (ALT 250 FOR IP): Performed by: INTERNAL MEDICINE

## 2022-11-08 PROCEDURE — 80048 BASIC METABOLIC PNL TOTAL CA: CPT

## 2022-11-08 PROCEDURE — 97535 SELF CARE MNGMENT TRAINING: CPT

## 2022-11-08 PROCEDURE — 2580000003 HC RX 258: Performed by: STUDENT IN AN ORGANIZED HEALTH CARE EDUCATION/TRAINING PROGRAM

## 2022-11-08 PROCEDURE — 96375 TX/PRO/DX INJ NEW DRUG ADDON: CPT

## 2022-11-08 PROCEDURE — 85025 COMPLETE CBC W/AUTO DIFF WBC: CPT

## 2022-11-08 RX ORDER — FLUOXETINE HYDROCHLORIDE 20 MG/1
60 CAPSULE ORAL DAILY
Status: DISCONTINUED | OUTPATIENT
Start: 2022-11-09 | End: 2022-11-09 | Stop reason: HOSPADM

## 2022-11-08 RX ORDER — METHOCARBAMOL 750 MG/1
750 TABLET, FILM COATED ORAL 4 TIMES DAILY PRN
Status: DISCONTINUED | OUTPATIENT
Start: 2022-11-08 | End: 2022-11-09 | Stop reason: HOSPADM

## 2022-11-08 RX ORDER — KETOROLAC TROMETHAMINE 30 MG/ML
15 INJECTION, SOLUTION INTRAMUSCULAR; INTRAVENOUS EVERY 6 HOURS PRN
Status: DISCONTINUED | OUTPATIENT
Start: 2022-11-08 | End: 2022-11-09 | Stop reason: HOSPADM

## 2022-11-08 RX ADMIN — ACETAMINOPHEN 650 MG: 325 TABLET, FILM COATED ORAL at 15:05

## 2022-11-08 RX ADMIN — OXYCODONE HYDROCHLORIDE 10 MG: 10 TABLET ORAL at 10:40

## 2022-11-08 RX ADMIN — OXYCODONE HYDROCHLORIDE 10 MG: 10 TABLET ORAL at 06:23

## 2022-11-08 RX ADMIN — TRAZODONE HYDROCHLORIDE 100 MG: 50 TABLET ORAL at 20:33

## 2022-11-08 RX ADMIN — LEVOTHYROXINE SODIUM 125 MCG: 0.12 TABLET ORAL at 06:26

## 2022-11-08 RX ADMIN — MORPHINE SULFATE 2 MG: 2 INJECTION, SOLUTION INTRAMUSCULAR; INTRAVENOUS at 08:14

## 2022-11-08 RX ADMIN — OXYCODONE HYDROCHLORIDE 10 MG: 10 TABLET ORAL at 15:05

## 2022-11-08 RX ADMIN — OXYCODONE HYDROCHLORIDE 10 MG: 10 TABLET ORAL at 02:50

## 2022-11-08 RX ADMIN — BUPROPION HYDROCHLORIDE 300 MG: 300 TABLET, FILM COATED, EXTENDED RELEASE ORAL at 08:15

## 2022-11-08 RX ADMIN — SODIUM CHLORIDE, PRESERVATIVE FREE 10 ML: 5 INJECTION INTRAVENOUS at 08:13

## 2022-11-08 RX ADMIN — ATORVASTATIN CALCIUM 40 MG: 40 TABLET, FILM COATED ORAL at 20:33

## 2022-11-08 RX ADMIN — ASPIRIN 81 MG: 81 TABLET, COATED ORAL at 20:33

## 2022-11-08 RX ADMIN — FLUOXETINE 20 MG: 20 CAPSULE ORAL at 08:15

## 2022-11-08 RX ADMIN — CEFAZOLIN 2000 MG: 2 INJECTION, POWDER, FOR SOLUTION INTRAMUSCULAR; INTRAVENOUS at 02:44

## 2022-11-08 RX ADMIN — ACETAMINOPHEN 650 MG: 325 TABLET, FILM COATED ORAL at 19:48

## 2022-11-08 RX ADMIN — METHOCARBAMOL 750 MG: 750 TABLET ORAL at 15:05

## 2022-11-08 RX ADMIN — ACETAMINOPHEN 650 MG: 325 TABLET, FILM COATED ORAL at 08:23

## 2022-11-08 RX ADMIN — METHOCARBAMOL 750 MG: 750 TABLET ORAL at 06:36

## 2022-11-08 RX ADMIN — SODIUM CHLORIDE, PRESERVATIVE FREE 10 ML: 5 INJECTION INTRAVENOUS at 20:34

## 2022-11-08 RX ADMIN — OXYCODONE HYDROCHLORIDE 10 MG: 10 TABLET ORAL at 19:48

## 2022-11-08 RX ADMIN — OXYBUTYNIN CHLORIDE 10 MG: 10 TABLET, EXTENDED RELEASE ORAL at 20:33

## 2022-11-08 ASSESSMENT — PAIN SCALES - GENERAL
PAINLEVEL_OUTOF10: 9
PAINLEVEL_OUTOF10: 7
PAINLEVEL_OUTOF10: 9
PAINLEVEL_OUTOF10: 9

## 2022-11-08 ASSESSMENT — PAIN DESCRIPTION - DESCRIPTORS
DESCRIPTORS: ACHING
DESCRIPTORS: ACHING;BURNING
DESCRIPTORS: ACHING;BURNING

## 2022-11-08 ASSESSMENT — PAIN DESCRIPTION - LOCATION
LOCATION: LEG;KNEE
LOCATION: LEG
LOCATION: LEG;KNEE

## 2022-11-08 ASSESSMENT — PAIN DESCRIPTION - ORIENTATION
ORIENTATION: LEFT

## 2022-11-08 NOTE — CARE COORDINATION
11/08/22 Transition of Care: Met with patient and  at the bedside. Patient is up in the chair. She is with c/o pain at the site. She is POD 1 left TKA. She remains observation. She resides with her  in a one floor home. She has bed/bath on main floor. She has a step up to the shower due to the sliding doors. She has a lift chair at home. She is requesting a bedside commode, shower chair and wheeled walker and wishes to use [id number] [employer] [facility]. She follows with Dr Christie Myers and her pharmacy is [facility]. She plans on returning home at discharge and has help. Her  will transport. [id number] notified of need for equipment prior to discharge. Electronically signed by Iqra Teran RN CM on 11/8/2022 at 10:59 AM       The Plan for Transition of Care is related to the following treatment goals: dme equipment     The Patient and/or patient representative  was provided with a choice of provider and agrees   with the discharge plan. [x] Yes [] No    Freedom of choice list was provided with basic dialogue that supports the patient's individualized plan of care/goals, treatment preferences and shares the quality data associated with the providers.  [x] Yes [] No

## 2022-11-08 NOTE — PROGRESS NOTES
Physical Therapy  Treatment Note    Name: Padilla Apo  : 1955  MRN: [id number]      Date of Service: 2022    Evaluating PT: Whitney Turk, PT, DPT [id number]      Room #:  6306/1626-G  Diagnosis:  Osteoarthritis of left knee [M17.12]  Total knee replacement status, left [Z96.652]  S/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, left [Z96.652]  PMHx/PSHx:  Hypothyroidism, depression, Graves disease, OA, NSTEMI, CAD, obesity, COVID-19, HTN  Procedure/Surgery:  L TKA ()  Precautions:  Fall risk, WBAT LLE, [employer]  Equipment Needs:  [employer]    SUBJECTIVE:    Pt lives with  in a single story house with level entry. Pt ambulated with SPC prior to admission. Pt owns a rollator. OBJECTIVE:   Initial Evaluation  Date: 22 Treatment   Date: 22 Short Term/ Long Term   Goals   AM-PAC 6 Clicks     Was pt agreeable to Eval/treatment? Yes yes    Does pt have pain? No current complaints of pain 7/10 L knee    Bed Mobility  Rolling: NT  Supine to sit: Min A  Sit to supine: NT  Scooting: SBA to EOB Rolling: NT  Supine to sit: min A  Sit to supine: NT  Scooting: min A Rolling: Independent   Supine to sit: Independent   Sit to supine: Independent   Scooting: Independent    Transfers Sit to stand: Min A  Stand to sit: Min A  Stand pivot: Mod A with [employer] Sit to stand: mod A  Stand to sit: min A  Stand pivot: min A with ww Sit to stand: Independent   Stand to sit: Independent   Stand pivot: Mod Independent with [employer]   Ambulation   8 feet with [employer] with Mod A (chair follow for safety -- reports hx of R knee buckling) 8' with ww min A >200 feet with [employer] Mod Independent    Stair negotiation: ascended and descended NT NT NA   ROM BUE: Refer to OT note  BLE: WFL     Strength BUE: Refer to OT note  BLE: NT     Balance Sitting EOB: SBA  Dynamic Standing: Min A with [employer] Sitting EOB: SBA  Dynamic Standing: min A with ww Sitting EOB: Independent   Dynamic Standing:  Mod Independent with [employer]     Pt is A & O x: 4 to person, place, month/year, and situation. Sensation: Pt reports diminished sensation in L LE  Edema: Unremarkable. Patient education  Pt educated on safety with functional mobility    Patient response to education:   Pt verbalized understanding Pt demonstrated skill Pt requires further education in this area   yes partial yes     ASSESSMENT:  Comments:    Pt was supine in bed upon entering, pt agreeable to participate. Pt instructed to transfer to EOB, completing transfer with assist of LLE. Pt sitting upright with good static sitting balance. Pt cued for hand placement and instructed to stand from EOB. Pt standing with fair static standing balance with ww. Pt cued for sequencing and spacing with use of ww during pivot to bedside chair. Pt was provided short seated rest break and was agreeable to ambulate to tolerance. Pt demonstrating antalgic pattern with decreased helder and flexed trunk. Pt's chair was brought behind her to allow pt to rest. Pt remained in bedside chair with all needs met and call bell in reach prior to exiting. Treatment:  Patient practiced and was instructed in the following treatment:    Bed mobility training - pt given verbal and tactile cues to facilitate proper sequencing and safety during rolling and supine>sit as well as provided with physical assistance to complete task   STS and pivot transfer training - pt educated on proper hand and foot placement, safety and sequencing, and use of verbal and tactile cues to safely complete sit<>stand and pivot transfers with physical assistance to complete task safely   Gait training- pt was given verbal and tactile cues to facilitate pt safety with ww use during ambulation as well as provided with physical assistance. PLAN:    Patient is making fair progress towards established goals. Will continue with current POC.       Time in  0940  Time out  1003    Total Treatment Time  23 minutes     CPT codes:  [] Gait training 94767 -- minutes  [] Manual therapy [address] [facility] -- minutes  [x] Therapeutic activities 35019 23 minutes  [] Therapeutic exercises 98861 -- minutes  [] Neuromuscular reeducation 29932 -- minutes    Lino Nelson, PT, DPT  [id number]

## 2022-11-08 NOTE — PROGRESS NOTES
[address]      [facility]                                                Patient Name: Sheridan Garcia  MRN: [id number]  : 1955  Room: [address] [place] [address]     Evaluating OT:LEONOR Morales/L   License #  [id number]       Referring Provider: Normand Osler, DO    Specific Provider Orders/Date: OT evaluation & treatment        Diagnosis: Primary arthritis left knee, severe     Pertinent Medical History:  has a past medical history of Arthritis, CAD (coronary artery disease), COVID-19 virus infection, Depression, GERD (gastroesophageal reflux disease), Graves disease, Hypertension, Hypothyroidism, NSTEMI (non-ST elevated myocardial infarction) ([facility] [address] and Obesity. Surgery: 22: LEFT KNEE TOTAL ARTHROPLASTY    Past Surgical History:  has a past surgical history that includes Gastric bypass surgery (06); Colonoscopy (11/10/10); Coronary angioplasty with stent (2020); Cardiac catheterization (2020); and Total knee arthroplasty (Left, 2022). Precautions:  Fall Risk, WBAT L LE, L knee prec. Assessment of current deficits    [x] Functional mobility            [x]ADLs           [x] Strength                  [x]Cognition    [x] Functional transfers          [x] IADLs         [x] Safety Awareness   [x]Endurance    [x] Fine Coordination                         [x] Balance      [] Vision/perception   [x]Sensation      []Gross Motor Coordination             [] ROM           [] Delirium                   [] Motor Control      OT PLAN OF CARE   OT POC based on physician orders, patient diagnosis and results of clinical assessment     Frequency/Duration: 2-4 days/wk for 2 weeks PRN   Specific OT Treatment Interventions to include:    Instruction/training on adapted ADL techniques and AE recommendations to increase functional independence within precautions  Training on energy conservation strategies, correct breathing pattern and techniques to improve independence/tolerance for self-care routine  Functional transfer/mobility training/DME recommendations for increased independence, safety, and fall prevention  Patient/Family education to increase follow through with safety techniques and functional independence  Recommendation of environmental modifications for increased safety with functional transfers/mobility and ADLs  Cognitive retraining/development of therapeutic activities to improve problem solving, judgement, memory, and attention for increased safety/participation in ADL/IADL tasks  Therapeutic exercise to improve motor endurance, ROM, and functional strength for ADLs/functional transfers  Therapeutic activities to facilitate/challenge dynamic balance, stand tolerance for increased safety and independence with ADLs  Therapeutic activities to facilitate gross/fine motor skills for increased independence with ADLs  Positioning to improve skin integrity, interaction with environment and functional independence     Recommended Adaptive Equipment:  TBD      Home Living: Pt lives with spouse in a 1 story with no steps to enter with no HR. B&B on main level. Bathroom setup: walk in shower   Equipment owned: spc, rollator     Prior Level of Function: Ind. with ADLs , Ind. with IADLs; ambulated spc  Driving: active  Occupation: drove school bus     Pain Level: 7/10; L knee  Cognition: A&O: 4/4; Follows multi- step directions              Memory:  G              Sequencing:  G              Problem solving:  G              Judgement/safety:  F+                Functional Assessment:  AM-PAC Daily Activity Raw Score: 15/24    Initial Eval Status  Date: 11-8-22 Treatment Status  Date: STGs = LTGs  Time frame: 10-14 days   Feeding Ind.    Mod I/ Ind   Grooming Set up    Modified [place]    UB Dressing Min A to gabriele salazar   Modified [place]    LB Dressing Dep   Modified [place]    Bathing Max A    Modified [place]    Toileting Max A    Modified [place]    Bed Mobility  Supine to sit: Min A for L LE  Sit to supine: NT   Supine to sit: Modified [place]   Sit to supine: Modified [place]    Functional Transfers Min A sit <> stand from EOB  Mod A with ww for SPT   Modified [place]    Functional Mobility Mod A with ww short home distance in room, ~6-8'   Modified [place]    Balance Sitting:     Static:  Sup    Dynamic:SBA  Standing: Min/Mod A       Activity Tolerance F-   F+/G   Visual/  Perceptual Glasses: readers          Vitals spO2 on RA & HR WFL   WFL      Hand Dominance R    AROM (PROM) Strength Additional Info:    RUE  WFL 4/5 good  and wfl FMC/dexterity noted during ADL tasks      LUE WFL 4/5 good  and wfl FMC/dexterity noted during ADL tasks         Hearing: [email or web address] [place]   Sensation:  No c/o numbness or tingling B UE  Tone: WFL B UE  Edema: none noted B UE     Comments: Upon arrival patient supine in bed,  present, agreeable to OT, cleared by Nursing. Therapist facilitated bed mobility/ADLs/functional transfer/mobility training with focus on safety, technique & precautions. Pt. Instructed RE: safe transfers/mobility, ADLs, role of OT, treatment plan, recs. , prec. At end of session, patient seated in bedside chair, all needs met, RN notified, with call light and phone within reach, all lines and tubes intact. Overall patient demonstrated decreased strength, balance, independence & safety during completion of ADL/functional transfer/mobility tasks. Pt would benefit from continued skilled OT to increase safety and independence with completion of ADL/IADL tasks for functional independence and quality of life.      Treatment: OT treatment provided this date includes:   Instruction/training on safety and adapted techniques for completion of ADLs: to increase [place] in self care   Instruction/training on safe functional mobility/transfer techniques: with focus on safety, technique & precautions   Instruction/training on energy conservation/work simplification for completion of ADLs: techniques to increase [place] with self care ADLs & iADLs, work simplification to improve endurance   Proper Positioning/Alignment: for optimal healing, skin integrity to prevent breakdown, decrease edema  Skilled monitoring of vitals: to include BP, spO2 & HR during session  Sitting/standing Balance/Tolerance- to increased balance & activity tolerance during ADLs as well as facilitate proper posture and/or positioning. Therapeutic exercise- Instruction on B UE ROM exercises to improve strength/function for increased [place] with ADLs & iADLs     Rehab Potential: Good for established goals     Patient / Family Goal: to return home soon with        Patient and/or family were instructed on functional diagnosis, prognosis/goals and OT plan of care. Demonstrated G understanding. Eval Complexity: Low     Time In: 9:30  Time Out: 9:55  Total Treatment Time: 10    Min Units   OT Eval Low 60205  x     OT Eval Medium 91260       OT Eval High 10070       OT Re-Eval [id number]       Therapeutic Ex 81894       Therapeutic Activities 18825       ADL/Self Care 66101  10  1   Orthotic Management 96792       Manual 00056       Neuro Re-Ed 12590       Non-Billable Time          Evaluation Time additionally includes thorough review of current medical information, gathering information on past medical history/social history and prior level of function, interpretation of standardized testing/informal observation of tasks, assessment of data and development of plan of care and goals. Tracey Crook, OTR/L   License #  [id number]

## 2022-11-08 NOTE — PROGRESS NOTES
Department of Orthopedic Surgery  Resident Progress Note    Patient seen and examined. Pain tolerable with medication, she said she had about 2 hours ago but starting to wear off. Pain is becoming more significant about the knee. States that they have been unable to give her morphine secondary to IV access. Also complaining of some spasms. Otherwise her pain was well overnight. She is able to ambulate with physical therapy. No new complaints. Denies chest pain, shortness of breath, dizziness/lightheadedness. Negative fluctuance, negative bowel movement. VITALS:  BP (!) 112/90   Pulse 93   Temp 98 °F (36.7 °C) (Temporal)   Resp 18   Ht 5' 3\" (1.6 m)   Wt 253 lb 3.2 oz (114.9 kg)   SpO2 98%   BMI 44.85 kg/m²     General: Alert and oriented    MUSCULOSKELETAL:   Left lower extremity:  Dressing C/D/I  Compartments soft and compressible  +PF/DF/EHL  +2/4 DP & PT pulses, Brisk Cap refill, Toes warm and perfused  Distal sensation grossly intact to Peroneals, Sural, Saphenous, and tibial nrs    CBC:   Lab Results   Component Value Date/Time    WBC 12.7 11/08/2022 04:59 AM    HGB 11.3 11/08/2022 04:59 AM    HCT 35.3 11/08/2022 04:59 AM     11/08/2022 04:59 AM     PT/INR:    Lab Results   Component Value Date/Time    PROTIME 10.4 11/01/2022 10:40 AM    INR 0.9 11/01/2022 10:40 AM       ASSESSMENT  S/P left total knee arthroplasty 11/7/2022    PLAN    Weight bearing as tolerated leftLE  Deep venous thrombosis prophylaxis - ASA 81BID, PCD's, early mobilization  PT/OT  Pain Control: IV and PO, added IM toradol and robaxin   Monitor H&H: 11.3  Dispo: PT/OT/SW recs, ambulate with PT this am and if pain controlled plan for d/c  D/W Dr. Ashutosh Hardy       Patient with postoperative pain. Plan on discharge on postoperative day #2.

## 2022-11-08 NOTE — CONSULTS
[address]                  [address] [id number],  [facility]                                  CONSULTATION    PATIENT NAME: Minal Marquez                       :        1955  MED REC NO:   [id number]                            ROOM:       5204  ACCOUNT NO:   [de-identified]                           ADMIT DATE: 2022  PROVIDER:     Ayse Harmon DO    CONSULT DATE:  2022    MEDICAL CONSULTATION NOTE    ADMITTING PHYSICIAN:  Steve Tang MD    PRIMARY CARE PHYSICIAN:  Coby Linton DO    REASON FOR CONSULTATION:  Medical management. CHIEF COMPLAINT:  Postop left total knee arthroplasty. HISTORY OF PRESENT ILLNESS:  The patient is a 63-year-old   female, who was admitted to the hospital on 2022 after undergoing  left total knee arthroplasty by Dr. Randal Liao. PAST MEDICAL HISTORY:  Coronary artery disease, status post coronary  artery stent, MI, elevated cholesterol, hypothyroidism, anxiety, morbid  obesity. PAST SURGICAL HISTORY:  Gastric bypass, coronary artery stent, right  carpal tunnel surgery, wisdom teeth extraction. REVIEW OF SYSTEMS:  Remarkable for above-stated chief complaint. ALLERGIES:  MELOXICAM.    MEDICATIONS PRIOR TO ADMISSION:  The patient was on Plavix in the past,  she has not taken in six months. Lipitor, Ditropan XL, Prozac, Desyrel,  levothyroxine, Celebrex, Vistaril p.r.n., Flonase p.r.n., Wellbutrin XL,  aspirin. SOCIAL HISTORY:  No tobacco.  Rare alcohol. PHYSICAL EXAMINATION:  GENERAL APPEARANCE:  This is a 63-year-old  female, who is  alert and oriented x3, cooperative and a good historian. VITAL SIGNS:  Temperature 97.7, pulse 72, respirations 18, blood  pressure 137/66. HEAD:  Normocephalic, atraumatic. EYES:  Pupils equal and reactive to light. Extraocular muscles intact. Fundi not well visualized.   NOSE:  No obstruction, polyp, or discharge noted.  MOUTH:  Mucosa without lesion. Pharynx non-injected without exudate. NECK:  Supple. No JVD. No thyromegaly. No carotid bruits. HEART:  Regular rate and rhythm with grade 1/6 systolic murmur. LUNGS:  Clear to auscultation bilaterally. ABDOMEN:  Positive bowel sounds, soft and nontender. No rebound or  guarding. No hepatosplenomegaly. No masses. BACK:  With minimal increased thoracic kyphosis. EXTREMITIES:  Without edema. LYMPH NODES:  No adenopathy noted. SKIN:  Without rash or lesion. IMPRESSION:  Osteoarthritis of the left knee, postop day #1 status post  left total knee arthroplasty, coronary artery disease, hyperlipidemia,  hypothyroidism, anxiety, and morbid obesity. PLAN:  Continue postop care as per Orthopedics. Serial lab. Pain  control. Discharge plan home versus rehab as per the patient's  progress.         David Siegel DO    D: 11/08/2022 7:18:35       T: 11/08/2022 7:20:48     [id number]  Job#: [id number]     Doc#: [id number]    CC:

## 2022-11-08 NOTE — PROGRESS NOTES
Physical Therapy  Facility/Department: [address] [place] [address]    Name: Sweta Abbasi  : 1955  MRN: [id number]  Date of Service: 2022  Attempted PT treatment this PM, and pt declined to participate. Pt stated that she just returned to bed after being up in the chair a 2nd time today. Will f/u with pt tomorrow as schedule allows.     Jose Neal, PT, DPT  [id number]

## 2022-11-09 VITALS
HEART RATE: 93 BPM | RESPIRATION RATE: 16 BRPM | WEIGHT: 253.2 LBS | TEMPERATURE: 98.6 F | HEIGHT: 63 IN | DIASTOLIC BLOOD PRESSURE: 79 MMHG | OXYGEN SATURATION: 99 % | BODY MASS INDEX: 44.86 KG/M2 | SYSTOLIC BLOOD PRESSURE: 131 MMHG

## 2022-11-09 LAB
ANION GAP SERPL CALCULATED.3IONS-SCNC: 13 MMOL/L (ref 7–16)
BUN BLDV-MCNC: 9 MG/DL (ref 6–23)
CALCIUM SERPL-MCNC: 9.6 MG/DL (ref 8.6–10.2)
CHLORIDE BLD-SCNC: 99 MMOL/L (ref 98–107)
CO2: 24 MMOL/L (ref 22–29)
CREAT SERPL-MCNC: 0.7 MG/DL (ref 0.5–1)
GFR SERPL CREATININE-BSD FRML MDRD: >60 ML/MIN/1.73
GLUCOSE BLD-MCNC: 117 MG/DL (ref 74–99)
HCT VFR BLD CALC: 34.6 % (ref 34–48)
HEMOGLOBIN: 11.1 G/DL (ref 11.5–15.5)
MCH RBC QN AUTO: 26.8 PG (ref 26–35)
MCHC RBC AUTO-ENTMCNC: 32.1 % (ref 32–34.5)
MCV RBC AUTO: 83.6 FL (ref 80–99.9)
PDW BLD-RTO: 14.5 FL (ref 11.5–15)
PLATELET # BLD: 257 E9/L (ref 130–450)
PMV BLD AUTO: 10.3 FL (ref 7–12)
POTASSIUM SERPL-SCNC: 4 MMOL/L (ref 3.5–5)
RBC # BLD: 4.14 E12/L (ref 3.5–5.5)
SODIUM BLD-SCNC: 136 MMOL/L (ref 132–146)
WBC # BLD: 9.2 E9/L (ref 4.5–11.5)

## 2022-11-09 PROCEDURE — G0378 HOSPITAL OBSERVATION PER HR: HCPCS

## 2022-11-09 PROCEDURE — 80048 BASIC METABOLIC PNL TOTAL CA: CPT

## 2022-11-09 PROCEDURE — 6370000000 HC RX 637 (ALT 250 FOR IP): Performed by: INTERNAL MEDICINE

## 2022-11-09 PROCEDURE — 36415 COLL VENOUS BLD VENIPUNCTURE: CPT

## 2022-11-09 PROCEDURE — 6370000000 HC RX 637 (ALT 250 FOR IP): Performed by: STUDENT IN AN ORGANIZED HEALTH CARE EDUCATION/TRAINING PROGRAM

## 2022-11-09 PROCEDURE — 97530 THERAPEUTIC ACTIVITIES: CPT

## 2022-11-09 PROCEDURE — 97535 SELF CARE MNGMENT TRAINING: CPT

## 2022-11-09 PROCEDURE — 2580000003 HC RX 258: Performed by: STUDENT IN AN ORGANIZED HEALTH CARE EDUCATION/TRAINING PROGRAM

## 2022-11-09 PROCEDURE — 85027 COMPLETE CBC AUTOMATED: CPT

## 2022-11-09 RX ORDER — OXYCODONE HYDROCHLORIDE AND ACETAMINOPHEN 5; 325 MG/1; MG/1
1 TABLET ORAL EVERY 6 HOURS PRN
Qty: 28 TABLET | Refills: 0 | Status: SHIPPED | OUTPATIENT
Start: 2022-11-09 | End: 2022-11-16

## 2022-11-09 RX ADMIN — SODIUM CHLORIDE, PRESERVATIVE FREE 10 ML: 5 INJECTION INTRAVENOUS at 07:57

## 2022-11-09 RX ADMIN — OXYCODONE HYDROCHLORIDE 10 MG: 10 TABLET ORAL at 00:09

## 2022-11-09 RX ADMIN — OXYCODONE HYDROCHLORIDE 10 MG: 10 TABLET ORAL at 06:29

## 2022-11-09 RX ADMIN — ACETAMINOPHEN 650 MG: 325 TABLET, FILM COATED ORAL at 15:24

## 2022-11-09 RX ADMIN — ACETAMINOPHEN 650 MG: 325 TABLET, FILM COATED ORAL at 10:44

## 2022-11-09 RX ADMIN — HYDROXYZINE PAMOATE 25 MG: 25 CAPSULE ORAL at 12:32

## 2022-11-09 RX ADMIN — OXYCODONE HYDROCHLORIDE 10 MG: 10 TABLET ORAL at 15:25

## 2022-11-09 RX ADMIN — FLUOXETINE 60 MG: 20 CAPSULE ORAL at 07:56

## 2022-11-09 RX ADMIN — OXYCODONE HYDROCHLORIDE 10 MG: 10 TABLET ORAL at 10:40

## 2022-11-09 RX ADMIN — METHOCARBAMOL 750 MG: 750 TABLET ORAL at 15:25

## 2022-11-09 RX ADMIN — METHOCARBAMOL 750 MG: 750 TABLET ORAL at 03:23

## 2022-11-09 RX ADMIN — ACETAMINOPHEN 650 MG: 325 TABLET, FILM COATED ORAL at 00:09

## 2022-11-09 RX ADMIN — ACETAMINOPHEN 650 MG: 325 TABLET, FILM COATED ORAL at 06:29

## 2022-11-09 RX ADMIN — METHOCARBAMOL 750 MG: 750 TABLET ORAL at 10:40

## 2022-11-09 RX ADMIN — LEVOTHYROXINE SODIUM 125 MCG: 0.12 TABLET ORAL at 06:29

## 2022-11-09 RX ADMIN — BUPROPION HYDROCHLORIDE 300 MG: 300 TABLET, FILM COATED, EXTENDED RELEASE ORAL at 07:57

## 2022-11-09 ASSESSMENT — PAIN DESCRIPTION - DESCRIPTORS
DESCRIPTORS: ACHING;PRESSURE;SORE;THROBBING
DESCRIPTORS: DISCOMFORT;SORE;TENDER
DESCRIPTORS: DULL;SORE;TENDER
DESCRIPTORS: DISCOMFORT;SORE;TENDER

## 2022-11-09 ASSESSMENT — PAIN SCALES - GENERAL
PAINLEVEL_OUTOF10: 7
PAINLEVEL_OUTOF10: 4
PAINLEVEL_OUTOF10: 7
PAINLEVEL_OUTOF10: 7
PAINLEVEL_OUTOF10: 8
PAINLEVEL_OUTOF10: 5

## 2022-11-09 ASSESSMENT — PAIN DESCRIPTION - ORIENTATION: ORIENTATION: LEFT

## 2022-11-09 ASSESSMENT — PAIN DESCRIPTION - LOCATION
LOCATION: LEG

## 2022-11-09 ASSESSMENT — PAIN - FUNCTIONAL ASSESSMENT: PAIN_FUNCTIONAL_ASSESSMENT: PREVENTS OR INTERFERES SOME ACTIVE ACTIVITIES AND ADLS

## 2022-11-09 NOTE — PROGRESS NOTES
Department of Orthopedic Surgery  Resident Progress Note    Patient seen and examined. Pain controlled today. No new complaints. Denies chest pain, shortness of breath, dizziness/lightheadedness. + fluctuance, negative bowel movement. VITALS:  /70   Pulse 93   Temp 98.2 °F (36.8 °C) (Temporal)   Resp 18   Ht 5' 3\" (1.6 m)   Wt 253 lb 3.2 oz (114.9 kg)   SpO2 96%   BMI 44.85 kg/m²     General: Alert and oriented    MUSCULOSKELETAL:   Left lower extremity:  Dressing C/D/I  Compartments soft and compressible  +PF/DF/EHL  +2/4 DP & PT pulses, Brisk Cap refill, Toes warm and perfused  Distal sensation grossly intact to Peroneals, Sural, Saphenous, and tibial nrs    CBC:   Lab Results   Component Value Date/Time    WBC 12.7 11/08/2022 04:59 AM    HGB 11.3 11/08/2022 04:59 AM    HCT 35.3 11/08/2022 04:59 AM     11/08/2022 04:59 AM     PT/INR:    Lab Results   Component Value Date/Time    PROTIME 10.4 11/01/2022 10:40 AM    INR 0.9 11/01/2022 10:40 AM       ASSESSMENT  S/P left total knee arthroplasty 11/7/2022    PLAN    Weight bearing as tolerated leftLE  Deep venous thrombosis prophylaxis - ASA 81BID, PCD's, early mobilization  PT/OT  Pain Control: IV and PO, added IM toradol and robaxin   Monitor H&H: 11.3  Dispo: Discharge home today. D/W Dr. Leopoldo Melody         Discharge today.

## 2022-11-09 NOTE — CARE COORDINATION
11/09/22 Update CM Note: Patient is discharged today and will have dme equipment delivered to the room. She does not have any other home going needs.  Electronically signed by Clifford Serrano RN CM on 11/9/2022 at 10:34 AM

## 2022-11-09 NOTE — PROGRESS NOTES
[facility] does not have her Percocet Can you please call in a new prescription to [facility] at [phone]. I had the [facility] pharmacy cancel that first script. Can you please resend the prescription in . Message sent to Dr. Fabiana Ratliff .

## 2022-11-09 NOTE — PROGRESS NOTES
month/year, and situation. Sensation: Pt reports diminished sensation in L LE  Edema: Unremarkable. Patient education  Pt educated on safety with functional mobility    Patient response to education:   Pt verbalized understanding Pt demonstrated skill Pt requires further education in this area   yes yes reinforce     ASSESSMENT:  Comments:    Pt was supine in bed upon entering, pt agreeable to participate. Pt instructed to transfer to EOB, completing transfer with support of LLE. Pt sitting upright with good static sitting balance. Pt cued for hand placement and instructed to stand from EOB. Pt standing with good static standing balance with ww. Pt was instructed to ambulate to tolerance. Pt transferred to bedside chair initially, and was then agreeable to ambulate into bathroom. Pt cued for postural correction and ww spacing as needed. Pt demonstrating fair helder with antalgic pattern, cueing for sequencing provided. Pt was assisted back to bedside chair at the end of session. All needs met and call bell in reach prior to exiting. Treatment:  Patient practiced and was instructed in the following treatment:    Bed mobility training - pt given verbal and tactile cues to facilitate proper sequencing and safety during rolling and supine>sit as well as provided with physical assistance to complete task   STS and pivot transfer training - pt educated on proper hand and foot placement, safety and sequencing, and use of verbal and tactile cues to safely complete sit<>stand and pivot transfers with physical assistance to complete task safely   Gait training- pt was given verbal and tactile cues to facilitate pt safety with ww use during ambulation as well as provided with physical assistance. PLAN:    Patient is making good progress towards established goals. Will continue with current POC.       Time in  1010  Time out  1033    Total Treatment Time  23 minutes     CPT codes:  [] Gait training 71143 -- minutes  [] Manual therapy [id number] -- minutes  [x] Therapeutic activities 32686 23 minutes  [] Therapeutic exercises 66677 -- minutes  [] Neuromuscular reeducation 66431 -- minutes    Arturo Hernandez, PT, DPT  [id number]

## 2022-11-09 NOTE — PROGRESS NOTES
Occupational Therapy  OT BEDSIDE TREATMENT NOTE   [facility] [address] [address]        YGKN:  Patient Name: Jeyson Whipple  MRN: [id number]  : 1955  Room: [facility]62-S     Per OT Eval:    Evaluating OT:Tracey Crook, OTR/L   License #  [id number]        Referring Provider: Wendi Young DO    Specific Provider Orders/Date: OT evaluation & treatment        Diagnosis: Primary arthritis left knee, severe     Pertinent Medical History:  has a past medical history of Arthritis, CAD (coronary artery disease), COVID-19 virus infection, Depression, GERD (gastroesophageal reflux disease), Graves disease, Hypertension, Hypothyroidism, NSTEMI (non-ST elevated myocardial infarction) ([facility] [address] and Obesity. Surgery: 22: LEFT KNEE TOTAL ARTHROPLASTY    Past Surgical History:  has a past surgical history that includes Gastric bypass surgery (06); Colonoscopy (11/10/10); Coronary angioplasty with stent (2020); Cardiac catheterization (2020); and Total knee arthroplasty (Left, 2022). Precautions:  Fall Risk, WBAT L LE, L knee prec. Assessment of current deficits    [x] Functional mobility            [x]ADLs           [x] Strength                  [x]Cognition    [x] Functional transfers          [x] IADLs         [x] Safety Awareness   [x]Endurance    [x] Fine Coordination                         [x] Balance      [] Vision/perception   [x]Sensation      []Gross Motor Coordination             [] ROM           [] Delirium                   [] Motor Control      OT PLAN OF CARE   OT POC based on physician orders, patient diagnosis and results of clinical assessment     Frequency/Duration: 2-4 days/wk for 2 weeks PRN   Specific OT Treatment Interventions to include:    Instruction/training on adapted ADL techniques and AE recommendations to increase functional independence within precautions  Training on energy conservation strategies, correct breathing pattern and techniques to improve independence/tolerance for self-care routine  Functional transfer/mobility training/DME recommendations for increased independence, safety, and fall prevention  Patient/Family education to increase follow through with safety techniques and functional independence  Recommendation of environmental modifications for increased safety with functional transfers/mobility and ADLs  Cognitive retraining/development of therapeutic activities to improve problem solving, judgement, memory, and attention for increased safety/participation in ADL/IADL tasks  Therapeutic exercise to improve motor endurance, ROM, and functional strength for ADLs/functional transfers  Therapeutic activities to facilitate/challenge dynamic balance, stand tolerance for increased safety and independence with ADLs  Therapeutic activities to facilitate gross/fine motor skills for increased independence with ADLs  Positioning to improve skin integrity, interaction with environment and functional independence     Recommended Adaptive Equipment:  TBD      Home Living: Pt lives with spouse in a 1 story with no steps to enter with no HR. B&B on main level. Bathroom setup: walk in shower   Equipment owned: spc, rollator     Prior Level of Function: Ind. with ADLs , Ind. with IADLs; ambulated spc  Driving: active  Occupation: drove school bus     Pain Level: Pt complained of minimal L knee pain this session  Cognition: A&O: 4/4; Follows multi- step directions              Memory:  G              Sequencing:  G              Problem solving:  G              Judgement/safety:  F+                Functional Assessment:  AM-PAC Daily Activity Raw Score: 15/24    Initial Eval Status  Date: 11-8-22 Treatment Status  Date: 11/9/22 STGs = LTGs  Time frame: 10-14 days   Feeding Ind.   Indepdnent Mod I/ Ind   Grooming Set up   Setup Modified [place]    UB Dressing Min A to gabriele salazar Setup  [facility] gown  Modified [place]    LB Dressing Dep  Dependent  [facility] socks    Pt stating  will assist Modified [place]    Bathing Max A  modA  Simulated Task     Recommending shower bench Modified [place]    Toileting Max A   CGA-transfer  From 3in1 BS    Pt denying need to complete toileting task this date Modified [place]    Bed Mobility  Supine to sit: Min A for L LE  Sit to supine: NT  DNT  Scooby per previous session Supine to sit: Modified [place]   Sit to supine: Modified [place]    Functional Transfers Min A sit <> stand from EOB  Mod A with ww for SPT  CGA  Sit to Stand  Stand to Sit    Cueing for hand placement Modified [place]    Functional Mobility Mod A with ww short home distance in room, ~6-8'  CGA  Pt ambulated short household distance in room with w.w. Modified [place]    Balance Sitting:     Static:  Sup    Dynamic:SBA  Standing: Min/Mod A  Sitting Supported:  Independent    Standing:  CGA     Activity Tolerance F-  Fair- F+/G   Visual/  Perceptual Glasses: readers          Vitals spO2 on RA & HR WFL   WFL      Hand Dominance R    AROM (PROM) Strength Additional Info:    RUE  WFL 4/5 good  and wfl FMC/dexterity noted during ADL tasks      LUE WFL 4/5 good  and wfl FMC/dexterity noted during ADL tasks         Hearing: WFL   Sensation:  No c/o numbness or tingling B UE  Tone: WFL B UE  Edema: none noted B UE      Education:  Pt was educated on role of OT, goals to be reached, importance of OOB activity, precautions to follow, safety and hand placement with transfers, safety/balance and walker management with functional mobility, compensatory strategies and use of DME to assist with ADL tasks    Comments: Upon arrival pt ambulating in room with PT, agreeable to OT.  Spoke to pt in regards to home setup, in which pt stating of having 3in1 BSC at home, recommending of shower bench to assist with bathing tasks, social work notified. At end of session, pt seated upright in chair, all lines and tubes intact, call light within reach. Pt has made fair progress towards set goals.    Continue with current plan of care focusing on increasing of independency with transfers and ADL tasks      Treatment Time In: 10:23am            Treatment Time Out: 10:33am                Treatment Charges: Mins Units   Ther Ex  [id number]     Manual Therapy 66459     Thera Activities 12140     ADL/Home Mgt 17423 10 1   Neuro Re-ed 93204     Group Therapy      Orthotic manage/training  11880     Non-Billable Time     Total Timed Treatment 10 1        Leonor WINN/LIBIA [id number]

## 2022-11-09 NOTE — PROGRESS NOTES
Hospital Medicine    Subjective:  pt alert conversive c/o postop pain      Current Facility-Administered Medications:     ketorolac (TORADOL) injection 15 mg, 15 mg, IntraMUSCular, Q6H PRN, Nikki Raya DO    methocarbamol (ROBAXIN) tablet 750 mg, 750 mg, Oral, 4x Daily PRN, Nikki Patinor, DO, 750 mg at 11/09/22 0323    FLUoxetine (PROZAC) capsule 60 mg, 60 mg, Oral, Daily, Kierra Coon, DO    sodium chloride flush 0.9 % injection 5-40 mL, 5-40 mL, IntraVENous, 2 times per day, Kalani Amador, DO, 10 mL at 11/08/22 2034    sodium chloride flush 0.9 % injection 5-40 mL, 5-40 mL, IntraVENous, PRN, Kalani Amador DO    0.9 % sodium chloride infusion, , IntraVENous, PRN, Kalani Amador, DO    ondansetron (ZOFRAN-ODT) disintegrating tablet 4 mg, 4 mg, Oral, Q8H PRN **OR** ondansetron (ZOFRAN) injection 4 mg, 4 mg, IntraVENous, Q6H PRN, Kalani Amador, DO    polyethylene glycol (GLYCOLAX) packet 17 g, 17 g, Oral, Daily PRN, Kalani Amador, DO    acetaminophen (TYLENOL) tablet 650 mg, 650 mg, Oral, Q4H PRN, Kalani Quintanillaas, DO, 650 mg at 11/09/22 5806    oxyCODONE (ROXICODONE) immediate release tablet 5 mg, 5 mg, Oral, Q4H PRN, 5 mg at 11/07/22 1736 **OR** oxyCODONE HCl (OXY-IR) immediate release tablet 10 mg, 10 mg, Oral, Q4H PRN, Kalani Amador, DO, 10 mg at 11/09/22 2181    morphine (PF) injection 2 mg, 2 mg, IntraVENous, Q2H PRN, 2 mg at 11/08/22 0814 **OR** morphine sulfate (PF) injection 4 mg, 4 mg, IntraVENous, Q2H PRN, Kalani Amador, DO    aspirin EC tablet 81 mg, 81 mg, Oral, Nightly, Kierra Coon, DO, 81 mg at 11/08/22 2033    atorvastatin (LIPITOR) tablet 40 mg, 40 mg, Oral, Nightly, Kierra Coon DO, 40 mg at 11/08/22 2033    buPROPion (WELLBUTRIN XL) extended release tablet 300 mg, 300 mg, Oral, Daily, Kierra Coon DO, 300 mg at 11/08/22 0815    fluticasone (FLONASE) 50 MCG/ACT nasal spray 2 spray, 2 spray, Each Nostril, Daily, General Renee DO hydrOXYzine pamoate (VISTARIL) capsule 25 mg, 25 mg, Oral, TID PRN, Viry Ferraris Malmer, DO    levothyroxine (SYNTHROID) tablet 125 mcg, 125 mcg, Oral, Daily, Viry Ferraris Malmer, DO, 125 mcg at 11/09/22 3741    oxybutynin (DITROPAN-XL) extended release tablet 10 mg, 10 mg, Oral, Nightly, Viry Ferraris Malmer, DO, 10 mg at 11/08/22 2033    traZODone (DESYREL) tablet 100 mg, 100 mg, Oral, Nightly, Viry Ferraris Malmer, DO, 100 mg at 11/08/22 2033    Objective:    /70   Pulse 93   Temp 98.2 °F (36.8 °C) (Temporal)   Resp 18   Ht 5' 3\" (1.6 m)   Wt 253 lb 3.2 oz (114.9 kg)   SpO2 96%   BMI 44.85 kg/m²     Heart:  reg  Lungs:  ctab  Abd: + bs soft nontender  Extrem:  w/o edema    CBC with Differential:    Lab Results   Component Value Date/Time    WBC 12.7 11/08/2022 04:59 AM    RBC 4.16 11/08/2022 04:59 AM    HGB 11.3 11/08/2022 04:59 AM    HCT 35.3 11/08/2022 04:59 AM     11/08/2022 04:59 AM    MCV 84.9 11/08/2022 04:59 AM    MCH 27.2 11/08/2022 04:59 AM    MCHC 32.0 11/08/2022 04:59 AM    RDW 14.6 11/08/2022 04:59 AM    LYMPHOPCT 12.1 11/08/2022 04:59 AM    MONOPCT 9.4 11/08/2022 04:59 AM    BASOPCT 0.2 11/08/2022 04:59 AM    MONOSABS 1.19 11/08/2022 04:59 AM    LYMPHSABS 1.53 11/08/2022 04:59 AM    EOSABS 0.00 11/08/2022 04:59 AM    BASOSABS 0.02 11/08/2022 04:59 AM     CMP:    Lab Results   Component Value Date/Time     11/08/2022 04:59 AM    K 4.3 11/08/2022 04:59 AM    CL 99 11/08/2022 04:59 AM    CO2 22 11/08/2022 04:59 AM    BUN 13 11/08/2022 04:59 AM    CREATININE 0.8 11/08/2022 04:59 AM    GFRAA >60 04/29/2022 12:00 PM    LABGLOM >60 11/08/2022 04:59 AM    GLUCOSE 109 11/08/2022 04:59 AM    PROT 7.6 11/01/2022 10:40 AM    LABALBU 4.1 11/01/2022 10:40 AM    CALCIUM 9.3 11/08/2022 04:59 AM    BILITOT 0.3 11/01/2022 10:40 AM    ALKPHOS 83 11/01/2022 10:40 AM    AST 26 11/01/2022 10:40 AM    ALT 16 11/01/2022 10:40 AM     Warfarin PT/INR:    Lab Results   Component Value Date    INR 0.9 11/01/2022 JOSE LUISIME 10.4 11/01/2022       Assessment:    Principal Problem:    Osteoarthritis of left knee  Active Problems:    Bilateral primary osteoarthritis of knee    Total knee replacement status, left    S/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, left  Resolved Problems:    * No resolved hospital problems.  *      Plan:  Cont postop care await am lab        [address], DO  7:57 AM  11/9/2022

## 2022-11-09 NOTE — DISCHARGE SUMMARY
Physician Discharge Summary     Patient ID:  Caryn Lopez  [id number]  17 y.o.  1955    Admit date: 11/7/2022    Discharge date and time: 11/9/2022  5:40 PM     Admitting Physician: Unique Rodrigues MD     Discharge Physician: Unique Rodrigues MD    Admission Diagnoses: Osteoarthritis of left knee [M17.12]  Total knee replacement status, left [Z96.652]  S/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, left [Z96.652]    Discharge Diagnoses: s/p left total Knee arthroplasty    Admission Condition: stable    Discharged Condition: stable    Hospital Course: The patient was admitted from the pre-operative holding area and underwent an uneventful course of left knee arthroplasty on 11/7/2022 by Unique Rodrigues MD. The patient was subsequently taken to the PACU and to the floor in stable condition. The patient continued antibiotics 24 hours postoperatively, as they received a dose of antibiotics preoperatively for infection prophylaxis. The patient was to begin physical therapy, WBAT, the day of surgery. The patient was also started on DVT prophylaxis. Blood counts were followed daily. On post-op day 2, the dressing was changed, revealing the wound to be benign in appearance without obvious signs of infection including erythema or purulence. The schumacher was discontinued. Pain medications were transitioned to oral medication.  was consulted for D/C planning as the patient made appropriate gains with PT in regaining independent function. On POD 2, the patient was discharged to home in stable condition. Treatments: s/p left total Knee arthroplasty    Disposition: The patient was provided instructions to follow up with Unique Rodrigues MD in 2 weeks and to call the office for an appointment. staples (s) were to be removed in 2 weeks. Pt was to continue DVT prophylaxis as directed.  Patient was also instructed they may shower on POD 4, and to continue daily dry sterile dressing changes until wound was dry. Discharge Medications:     Medication List        START taking these medications      oxyCODONE-acetaminophen 5-325 MG per tablet  Commonly known as: Percocet  Take 1 tablet by mouth every 6 hours as needed for Pain for up to 7 days. Intended supply: 7 days. Take lowest dose possible to manage pain            CHANGE how you take these medications      aspirin 81 MG EC tablet  Take 1 tablet by mouth daily  What changed: when to take this     celecoxib 200 MG capsule  Commonly known as: CELEBREX  Take 1 capsule by mouth once daily  What changed: See the new instructions. oxybutynin 10 MG extended release tablet  Commonly known as: Ditropan XL  Take 1 tablet by mouth daily  What changed: when to take this            CONTINUE taking these medications      atorvastatin 40 MG tablet  Commonly known as: LIPITOR  Take 1 tablet by mouth nightly     buPROPion 300 MG extended release tablet  Commonly known as: WELLBUTRIN XL  TAKE 1 TABLET BY MOUTH ONCE DAILY IN THE MORNING     * FLUoxetine 20 MG capsule  Commonly known as: PROZAC  Take 1 capsule by mouth once daily     * FLUoxetine 40 MG capsule  Commonly known as: PROzac  Take 1 capsule by mouth once daily     hydrOXYzine pamoate 25 MG capsule  Commonly known as: VISTARIL  TAKE 1 CAPSULE BY MOUTH THREE TIMES DAILY AS NEEDED FOR ANXIETY     levothyroxine 125 MCG tablet  Commonly known as: Euthyrox  Take 1 tablet by mouth once daily     Tens Unit Misc  by Does not apply route     traZODone 100 MG tablet  Commonly known as: DESYREL  Take 1 tablet by mouth nightly           * This list has 2 medication(s) that are the same as other medications prescribed for you. Read the directions carefully, and ask your doctor or other care provider to review them with you.                 ASK your doctor about these medications      clopidogrel 75 MG tablet  Commonly known as: PLAVIX  Take 1 tablet by mouth daily     fluticasone 50 MCG/ACT nasal spray  Commonly known as: FLONASE  USE 2 SPRAY(S) IN EACH NOSTRIL DAILY     nitroGLYCERIN 0.4 MG SL tablet  Commonly known as: NITROSTAT  up to max of 3 total doses. If no relief after 1 dose, call 911.                Where to Get Your Medications        These medications were sent to [address] [facility] [address] [phone] Deisi Cheema [phone] [address] [facility] [address] [place] [address]      Phone: [phone]   oxyCODONE-acetaminophen 5-325 MG per tablet           Signed:  Lee Appiah DO  11/9/2022  6:02 PM

## 2022-11-10 ENCOUNTER — TELEPHONE (OUTPATIENT)
Dept: ORTHOPEDIC SURGERY | Age: 67
End: 2022-11-10

## 2022-11-10 DIAGNOSIS — Z96.652 S/P TOTAL KNEE ARTHROPLASTY, LEFT: Primary | ICD-10-CM

## 2022-11-10 RX ORDER — METHOCARBAMOL 750 MG/1
750 TABLET, FILM COATED ORAL 4 TIMES DAILY PRN
Qty: 40 TABLET | Refills: 0 | Status: SHIPPED | OUTPATIENT
Start: 2022-11-10 | End: 2022-11-20

## 2022-11-10 NOTE — TELEPHONE ENCOUNTER
Patient called and states the she needs an RX for robaxin, she was not given one at discharged.      Procedure(s):  1/7/22    LEFT KNEE TOTAL ARTHROPLASTY    Future Appointments   Date Time Provider Artemio Palacios   11/21/2022 12:00 PM SCHEDULE, [facility]   12/20/2022 10:15 AM SCHEDULE, [facility]   2/1/2023  9:15 AM Leslie Hand MD [facility]         Pend and routed

## 2022-11-10 NOTE — TELEPHONE ENCOUNTER
Orders signed. Please see attached. Thank you.   Electronically signed by Jennifer Mac PA-C on 11/10/2022 at 3:38 PM

## 2022-11-10 NOTE — TELEPHONE ENCOUNTER
Called patient to advise that muscle relaxer has been sent to pharmacy and they will contact when it is available for . Patient has questions about her PT and states that she was not set up with anything. Patient would like to try [facility] in [facility].        Future Appointments   Date Time Provider Artemio Palacios   11/21/2022 12:00 PM SCHEDULE, [facility]   12/20/2022 10:15 AM SCHEDULE, [facility]   2/1/2023  9:15 AM Loretta Baez MD [facility]     Pended and routed

## 2022-11-16 DIAGNOSIS — Z96.652 S/P TOTAL KNEE ARTHROPLASTY, LEFT: Primary | ICD-10-CM

## 2022-11-17 DIAGNOSIS — Z96.652 S/P TOTAL KNEE ARTHROPLASTY, LEFT: Primary | ICD-10-CM

## 2022-11-17 RX ORDER — OXYCODONE HYDROCHLORIDE AND ACETAMINOPHEN 5; 325 MG/1; MG/1
1 TABLET ORAL EVERY 6 HOURS PRN
Qty: 28 TABLET | Refills: 0 | Status: SHIPPED
Start: 2022-11-17 | End: 2022-11-28 | Stop reason: SDUPTHER

## 2022-11-17 NOTE — TELEPHONE ENCOUNTER
Patient message requesting refill of percocet.      Procedure(s): 11/7/22  LEFT KNEE TOTAL ARTHROPLASTY    Future Appointments   Date Time Provider Artemio Palacios   11/21/2022 12:00 PM SCHEDULE, [facility]   12/20/2022 10:15 AM SCHEDULE, [facility]   2/1/2023  9:15 AM Sherie Plascencia MD [facility]     Pend and routed

## 2022-11-17 NOTE — TELEPHONE ENCOUNTER
Your Child's Health  15-Month-Old Visit      Brittnee Guthrieclarice  August 2, 2016    Visit Vitals  Ht 30.5\" (77.5 cm)   Wt 9.495 kg   HC 47 cm (18.5\")   BMI 15.82 kg/m²     Weight: 20.93 lbs    NUTRITION:    Growth slows down after 3 year of age. Brittnee's appetite may decrease and become more sporadic. Serve Brittnee food in small portions. Let him be the  of how much to eat. You should be the one to control what food is available and when. Set a good example with your own eating habits right from the start.        Encourage self-feeding of table food. Using finger-thumb grasp and the spoon will help improve Brittnee's motor skills and teaches independence. Picky and sporadic feeding is normal. Most children grow despite picky eating. Playing games trying to force feed will only worsen the behavior. Sometimes, children can be picky eaters because they have had too much juice or milk, especially before meals.        Excessive fruit juice may cause gas, increased weight, and bowel movements that vary from diarrhea to constipation. Whole fruit is preferable.        The amount of milk should be between 16-24 ounces per day to provide for adequate calcium for bones and teeth. Too much milk can cause anemia (a low blood count).       Being overweight is a serious problem in the [place]. You can help your child avoid this problem by following these guidelines:    1. American Academy of Pediatrics recommends no screen time under 2 years.    2. Don't encourage your children to eat if they tell you they are full. Healthy children will not starve themselves.     3. Don't reward your children for cleaning their plate. If they always leave food, cut down the size of the portion and have them ask for more if hungry.    4. Don't allow children to dish out their own portions. They will imitate adults, or imitate what they have seen on TV; in both cases, the amount will be too large. This results in increased  Controlled Substance Monitoring:    Acute and Chronic Pain Monitoring:   RX Monitoring 11/17/2022   Attestation -   Periodic Controlled Substance Monitoring No signs of potential drug abuse or diversion identified. [phone] (long distance [phone]) is “out-toeing” where the feet turn away from one another. Some degree of tibial torsion is normal during infancy. It may affect one leg more than the other.  When the child starts to stand and then to walk, the tibial torsion starts to correct itself naturally. For in-toeing, this usually occurs 6 to 12 months after the child starts to walk. This self-correcting process continues during childhood and by age 7 to 8 years most children have corrected their tibial torsion without any special treatment. Out-toeing is less likely to correct itself, but it usually does not cause long-term problems.  In the past, standard treatment for in-toed tibial torsion was special orthopedic shoes connected by a bar. This was worn at night to hold both feet in a toe-out position. But it was later learned that children recovered from tibial torsion just as quickly whether or not they wore this splint. So now, the splint is no longer used and most children do fine.  If the feet still turn in more than 15 degrees by 5 years of age, that is a sign that they probably won't self-correct and surgery may be needed. However, surgery for this problem is usually delayed until the child is between 7 to 10 years old.  Home care  · No special treatment required.  · Let your baby wear regular shoes and walk as desired.  Follow-up care  Follow up with your healthcare provider or as advised. Periodic measurements by your doctor can follow the self-correcting response.     When to seek medical advice  Call your healthcare provider if you have concerns about your child's development or if the child is over 5 years old and his or her feet still turn in more than 15 degrees.  © 8889-6847 [employer]. [address] [place] [address], [place] [address]. All rights reserved. This information is not intended as a substitute for professional medical care. Always follow your healthcare professional's instructions.

## 2022-11-21 ENCOUNTER — HOSPITAL ENCOUNTER (OUTPATIENT)
Dept: GENERAL RADIOLOGY | Age: 67
Discharge: HOME | End: 2022-11-23
Payer: MEDICARE

## 2022-11-21 ENCOUNTER — OFFICE VISIT (OUTPATIENT)
Dept: ORTHOPEDIC SURGERY | Age: 67
End: 2022-11-21
Payer: MEDICARE

## 2022-11-21 VITALS — BODY MASS INDEX: 40.75 KG/M2 | WEIGHT: 230 LBS | HEIGHT: 63 IN

## 2022-11-21 DIAGNOSIS — Z96.652 S/P TOTAL KNEE ARTHROPLASTY, LEFT: ICD-10-CM

## 2022-11-21 DIAGNOSIS — Z96.652 S/P TOTAL KNEE ARTHROPLASTY, LEFT: Primary | ICD-10-CM

## 2022-11-21 PROCEDURE — 73560 X-RAY EXAM OF KNEE 1 OR 2: CPT

## 2022-11-21 PROCEDURE — 99213 OFFICE O/P EST LOW 20 MIN: CPT

## 2022-11-21 PROCEDURE — 99024 POSTOP FOLLOW-UP VISIT: CPT | Performed by: PHYSICIAN ASSISTANT

## 2022-11-21 RX ORDER — METHOCARBAMOL 750 MG/1
750 TABLET, FILM COATED ORAL 4 TIMES DAILY
Qty: 120 TABLET | Refills: 0 | Status: SHIPPED | OUTPATIENT
Start: 2022-11-21 | End: 2022-12-21

## 2022-11-21 NOTE — PATIENT INSTRUCTIONS
Future Appointments   Date Time Provider Artemio Palacios   12/20/2022 10:15 AM SE Emil ORTHO APC  Ortho [facility]   2/1/2023  9:15 AM Anupam Thao MD  Ortho [facility]

## 2022-11-21 NOTE — PROGRESS NOTES
Chief Complaint   Patient presents with    Follow-up     2wk PO L TKA. Patient presents with walker and ambulating slowly. C/O pain 7-10 complaints in the ankle. Patient completing HEP and has first PT appointment Wednesday        OP:SURGEON: Dr. Terra Pritchard MD  DATE OF PROCEDURE: 11/7/22  PROCEDURE:L TKA    Subjective:  Mirta Hewitt is approximately 2 weeks follow-up from the above surgery. She is WBAT L LE with walker, doing well, pain is controlled. [facility] PT to start this week. Staples intact without any reported drainage at incision. Taking Robaxin, Percocet. Chronically on Plavix and ASA combo. Needs more Robaxin. Denies calf pain, CP, SOB, fever, chills, malaise. Review of Systems -  all pertinent positives and negatives in HPI. Objective:    General: Alert and oriented X 3, normocephalic atraumatic, external ears and eye normal, sclera clear, no acute distress, respirations easy and unlabored with no audible wheezes, skin warm and dry, speech and dress appropriate for noted age, affect euthymic. Extremity:  Left Lower Extremity  Skin clean dry and intact, without signs of infection  Incisions well approximated without signs of redness, warmth or drainage- staples intact  Mild edema and ecchymosis throughout the distal thigh, knee, proximal leg  Moderate darin-incisional TTP  AROM L knee 10-70  Compartments supple throughout thigh and leg  Calf supple and nontender  Demonstrates active knee flexion/extension, ankle plantar/dorsiflexion/great toe extension. States sensation intact to touch in sural/deep peroneal/superficial peroneal/saphenous/posterior tibial nerve distributions to foot/ankle. Palpable dorsalis pedis and posterior tibialis pulses, cap refill brisk in toes, foot warm/perfused. Ht 5' 3\" (1.6 m)   Wt 230 lb (104.3 kg)   BMI 40.74 kg/m²     XR:   2 views of L knee demonstrating L TKA and suture anchor near the tibial tubercle.  Hardware remains intact without interval displacement, loosening, or failure. No significant change in alignment. No acute fractures or dislocations or any other osseus abnormality identified. Assessment:   Diagnosis Orders   1. S/P total knee arthroplasty, left  methocarbamol (ROBAXIN-750) 750 MG tablet    XR KNEE LEFT (1-2 VIEWS)          Plan:  Reviewed x-rays with patient today in office   Removed staples from surgical incision line. Steri strips were placed. Patient tolerated procedure well with minimal pain. No Soaking or submerging incision in water until skin is fully healed. WB:  WBAT left lower extremity- use assistive devices if needed  Therapy: start [facility] PT this week, call for outpatient when needed  Multimodal pain control - Robaxin refilled   DVT Prophylaxis: Continue with  Plavix + ASA  as ordered    Future Appointments   Date Time Provider Artemio Palacios   12/20/2022 10:15 AM SE CARINA Stein APC  Ortho [facility]   2/1/2023  9:15 AM Amado Farley MD [facility]         Electronically signed by Rupa Llamas PA-C on 11/21/2022 at 3:24 PM  Note: This report was completed using [employer] voiced recognition software. Every effort has been made to ensure accuracy; however, inadvertent computerized transcription errors may be present.

## 2022-11-28 DIAGNOSIS — Z96.652 S/P TOTAL KNEE ARTHROPLASTY, LEFT: ICD-10-CM

## 2022-11-28 RX ORDER — OXYCODONE HYDROCHLORIDE AND ACETAMINOPHEN 5; 325 MG/1; MG/1
1 TABLET ORAL EVERY 12 HOURS PRN
Qty: 14 TABLET | Refills: 0 | Status: SHIPPED | OUTPATIENT
Start: 2022-11-28 | End: 2022-12-05

## 2022-11-28 NOTE — TELEPHONE ENCOUNTER
Patient left a voice message requesting refill on Percocet    OP:SURGEON: Dr. Samy Silva MD  DATE OF PROCEDURE: 11/7/22  PROCEDURE:L TKA    Last Office Visit: 11/21/22    Future Appointments   Date Time Provider Artemio Palacios   12/20/2022 10:15 AM SCHEDULE, SE ORTHO APC  Ortho [facility]   2/1/2023  9:15 AM Rowdy Guevara MD  Ortho [facility]         Weaned Order pended and routed for decision and signature.      Pharmacy: [facility]

## 2022-11-28 NOTE — TELEPHONE ENCOUNTER
Percocet refilled and weaned to BID PRN    Controlled Substance Monitoring:    Acute and Chronic Pain Monitoring:   RX Monitoring 11/28/2022   Attestation -   Periodic Controlled Substance Monitoring No signs of potential drug abuse or diversion identified.

## 2022-12-02 DIAGNOSIS — Z96.652 S/P TOTAL KNEE ARTHROPLASTY, LEFT: ICD-10-CM

## 2022-12-05 RX ORDER — OXYCODONE HYDROCHLORIDE AND ACETAMINOPHEN 5; 325 MG/1; MG/1
1 TABLET ORAL DAILY PRN
Qty: 7 TABLET | Refills: 0 | Status: SHIPPED | OUTPATIENT
Start: 2022-12-05 | End: 2022-12-12

## 2022-12-05 NOTE — TELEPHONE ENCOUNTER
Pharmacy interface requesting refill of Percocet.     Last office visit: 11/21/22    Future Appointments   Date Time Provider Artemio Palacios   12/20/2022 10:15 AM SE CARINA Stein APC [facility]   2/1/2023  9:15 AM Robin Dorman MD [facility]       Percocet refilled and weaned to BID PRN 11/28/22

## 2022-12-05 NOTE — TELEPHONE ENCOUNTER
OP: SURGEON: Dr. Terra Pritchard MD  DATE OF PROCEDURE: 11/7/22  PROCEDURE:L TKA    Percocet refilled and weaned to once daily prn. This will be last refill of narcotics. Controlled Substance Monitoring:    Acute and Chronic Pain Monitoring:   RX Monitoring 12/5/2022   Attestation -   Periodic Controlled Substance Monitoring No signs of potential drug abuse or diversion identified.

## 2022-12-09 DIAGNOSIS — Z96.652 S/P TOTAL KNEE ARTHROPLASTY, LEFT: ICD-10-CM

## 2022-12-12 RX ORDER — OXYCODONE HYDROCHLORIDE AND ACETAMINOPHEN 5; 325 MG/1; MG/1
1 TABLET ORAL DAILY PRN
Qty: 7 TABLET | Refills: 0 | OUTPATIENT
Start: 2022-12-12 | End: 2022-12-19

## 2022-12-13 DIAGNOSIS — F51.01 PRIMARY INSOMNIA: ICD-10-CM

## 2022-12-13 DIAGNOSIS — E05.00 GRAVES DISEASE: ICD-10-CM

## 2022-12-13 RX ORDER — TRAZODONE HYDROCHLORIDE 100 MG/1
100 TABLET ORAL NIGHTLY
Qty: 90 TABLET | Refills: 1 | Status: SHIPPED | OUTPATIENT
Start: 2022-12-13

## 2022-12-13 RX ORDER — LEVOTHYROXINE SODIUM 0.12 MG/1
TABLET ORAL
Qty: 90 TABLET | Refills: 1 | Status: SHIPPED | OUTPATIENT
Start: 2022-12-13

## 2022-12-20 ENCOUNTER — OFFICE VISIT (OUTPATIENT)
Dept: ORTHOPEDIC SURGERY | Age: 67
End: 2022-12-20
Payer: MEDICARE

## 2022-12-20 ENCOUNTER — HOSPITAL ENCOUNTER (OUTPATIENT)
Dept: GENERAL RADIOLOGY | Age: 67
Discharge: HOME | End: 2022-12-22
Payer: MEDICARE

## 2022-12-20 VITALS — BODY MASS INDEX: 40.75 KG/M2 | HEIGHT: 63 IN | WEIGHT: 230 LBS

## 2022-12-20 DIAGNOSIS — M17.11 OSTEOARTHRITIS OF RIGHT KNEE, UNSPECIFIED OSTEOARTHRITIS TYPE: ICD-10-CM

## 2022-12-20 DIAGNOSIS — Z96.652 S/P TOTAL KNEE ARTHROPLASTY, LEFT: ICD-10-CM

## 2022-12-20 DIAGNOSIS — Z96.652 S/P TOTAL KNEE ARTHROPLASTY, LEFT: Primary | ICD-10-CM

## 2022-12-20 PROCEDURE — 73560 X-RAY EXAM OF KNEE 1 OR 2: CPT

## 2022-12-20 PROCEDURE — 20610 DRAIN/INJ JOINT/BURSA W/O US: CPT | Performed by: PHYSICIAN ASSISTANT

## 2022-12-20 RX ORDER — TRAMADOL HYDROCHLORIDE 50 MG/1
100 TABLET ORAL 2 TIMES DAILY PRN
Qty: 28 TABLET | Refills: 0 | Status: SHIPPED | OUTPATIENT
Start: 2022-12-20 | End: 2022-12-27

## 2022-12-20 RX ORDER — TRIAMCINOLONE ACETONIDE 40 MG/ML
40 INJECTION, SUSPENSION INTRA-ARTICULAR; INTRAMUSCULAR ONCE
Status: COMPLETED | OUTPATIENT
Start: 2022-12-20 | End: 2022-12-20

## 2022-12-20 RX ORDER — BUPIVACAINE HYDROCHLORIDE 2.5 MG/ML
3 INJECTION, SOLUTION EPIDURAL; INFILTRATION; INTRACAUDAL ONCE
Status: COMPLETED | OUTPATIENT
Start: 2022-12-20 | End: 2022-12-20

## 2022-12-20 RX ORDER — LIDOCAINE HYDROCHLORIDE 10 MG/ML
3 INJECTION, SOLUTION INFILTRATION; PERINEURAL ONCE
Status: COMPLETED | OUTPATIENT
Start: 2022-12-20 | End: 2022-12-20

## 2022-12-20 RX ADMIN — BUPIVACAINE HYDROCHLORIDE 7.5 MG: 2.5 INJECTION, SOLUTION EPIDURAL; INFILTRATION; INTRACAUDAL at 10:00

## 2022-12-20 RX ADMIN — TRIAMCINOLONE ACETONIDE 40 MG: 40 INJECTION, SUSPENSION INTRA-ARTICULAR; INTRAMUSCULAR at 10:00

## 2022-12-20 RX ADMIN — LIDOCAINE HYDROCHLORIDE 3 ML: 10 INJECTION, SOLUTION INFILTRATION; PERINEURAL at 10:00

## 2022-12-20 NOTE — LETTER
[address]  [address]  [place] [address]  Phone: [phone]  Fax: [phone]    Luca Velazquez        December 20, 2022     Patient: Sweta Abbasi   YOB: 1955   Date of Visit: 12/20/2022       To Whom It May Concern: It is my medical opinion that Sweta Abbasi may return to work on 1/3/23 without restrictions. If you have any questions or concerns, please don't hesitate to call.     Sincerely,        Alexa Laureano PA-C

## 2022-12-20 NOTE — PATIENT INSTRUCTIONS
Future Appointments   Date Time Provider Artemio Palacios   12/20/2022 10:15 AM Alexa Laureano PA-C [facility]   2/1/2023  9:15 AM Mame Rojo MD [facility]

## 2022-12-20 NOTE — PROGRESS NOTES
nontender  Demonstrates active knee flexion/extension, ankle plantar/dorsiflexion/great toe extension. States sensation intact to touch in sural/deep peroneal/superficial peroneal/saphenous/posterior tibial nerve distributions to foot/ankle. Palpable dorsalis pedis and posterior tibialis pulses, cap refill brisk in toes, foot warm/perfused. Right Lower Extremity  Skin clean dry and intact, without signs of infection  Mild TTP throughout the medial and lateral joint line with mild effusion at the knee  Mild edema noted  Compartments supple throughout thigh and leg  Calf supple and nontender  Demonstrates active knee flexion/extension, ankle plantar/dorsiflexion/great toe extension. States sensation intact to touch in sural/deep peroneal/superficial peroneal/saphenous/posterior tibial nerve distributions to foot/ankle. Palpable dorsalis pedis and posterior tibialis pulses, cap refill brisk in toes, foot warm/perfused. Ht 5' 3\" (1.6 m)   Wt 230 lb (104.3 kg)   BMI 40.74 kg/m²     XR:   2 views of L knee demonstrating L TKA and suture anchor near the tibial tubercle. Arthroplasty remains intact without interval displacement, loosening, or failure. No significant change in alignment. No acute fractures or dislocations or any other osseus abnormality identified. Assessment:   Diagnosis Orders   1. S/P total knee arthroplasty, left  traMADol (ULTRAM) 50 MG tablet    Amb External Referral To Physical Therapy    XR KNEE LEFT (1-2 VIEWS)      2. Osteoarthritis of right knee, unspecified osteoarthritis type  IA ARTHROCENTESIS ASPIR&/INJ MAJOR JT/BURSA W/O US    triamcinolone acetonide (KENALOG-40) injection 40 mg    lidocaine 1 % injection 3 mL    bupivacaine (PF) (MARCAINE) 0.25 % injection 7.5 mg          Knee  Injection Procedure Note  With the patient's written and verbal permission, Right  knee was prepped in standard sterile fashion with betadine and alcohol.  Skin of the knee was anesthetized with ethyl chloride spray prior to injection. The knee was then injected with 1 ml Kenalog (40mg), 3 ml PF 0.25% marcaine and 3 ml 1% PF Lidocaine were injected using the lateral inferior approach without difficulty. The patient tolerated this well. A band-aid was applied. The patient ambulated out of clinic on their own accord without difficulty. Plan:  Reviewed x-rays with patient today in office   R knee CSI as stated above. She verbalized understanding about cartilage degradation and needing to wait at least 3 mo before R TKA. She is about 10 weeks out from previous L knee CSI. WB:  Weight bearing as tolerated left lower extremity- use assistive devices if needed  Therapy: continue outpatient PT  Multimodal pain control. Can take Robaxin 1500mg per dose if needed, but try to keep to 750mg dose up to 4x per day if needed. Tramadol 100mg BID PRN x 7 days ordered today. Take lowest dose possible. Dispensed 50mg tabs. Will not do Norco or Percocet at this point. Will continue to wean in the future if a future script of Tramadol will be needed vs refer to pain management. Continue ice/heat, compression, elevation. Controlled Substance Monitoring:    Acute and Chronic Pain Monitoring:   RX Monitoring 12/20/2022   Attestation -   Periodic Controlled Substance Monitoring No signs of potential drug abuse or diversion identified. Future Appointments   Date Time Provider Artemio Palacios   12/20/2022 10:15 AM Alexa Laureano PA-C [facility]   2/1/2023  9:15 AM Mame Rojo MD [facility]         Electronically signed by Alexa Laureano PA-C on 12/20/2022 at 10:06 AM  Note: This report was completed using [employer] voiced recognition software. Every effort has been made to ensure accuracy; however, inadvertent computerized transcription errors may be present.

## 2022-12-22 ENCOUNTER — TELEPHONE (OUTPATIENT)
Dept: ORTHOPEDIC SURGERY | Age: 67
End: 2022-12-22

## 2022-12-22 NOTE — TELEPHONE ENCOUNTER
Prior authorization completed and approved for methocarbamol. Faxed to patient's pharmacy.     Future Appointments   Date Time Provider Artemio Palacios   2/1/2023  9:15 AM Kyler Meredith MD [facility]

## 2022-12-30 ENCOUNTER — TELEPHONE (OUTPATIENT)
Dept: ORTHOPEDIC SURGERY | Age: 67
End: 2022-12-30

## 2022-12-30 DIAGNOSIS — Z96.652 S/P TOTAL KNEE ARTHROPLASTY, LEFT: Primary | ICD-10-CM

## 2022-12-30 NOTE — TELEPHONE ENCOUNTER
Patient's message states that her insurance is changing over and she will not be able to go to her physical therapy location any longer. Patient states that she must be seen at a [facility] one, and is requesting the [place] [address] location.      Procedure(s): 11/07/22  LEFT KNEE TOTAL ARTHROPLASTY    LOV: 12/20/22      Future Appointments   Date Time Provider Artemio Palacios   2/1/2023  9:15 AM Marceil Landau, MD  [facility]       Pend and routed

## 2023-01-05 DIAGNOSIS — E05.00 GRAVES DISEASE: ICD-10-CM

## 2023-01-05 DIAGNOSIS — F51.01 PRIMARY INSOMNIA: ICD-10-CM

## 2023-01-05 DIAGNOSIS — F41.1 GAD (GENERALIZED ANXIETY DISORDER): ICD-10-CM

## 2023-01-05 DIAGNOSIS — Z96.652 S/P TOTAL KNEE ARTHROPLASTY, LEFT: ICD-10-CM

## 2023-01-05 RX ORDER — FLUOXETINE HYDROCHLORIDE 40 MG/1
CAPSULE ORAL
Qty: 90 CAPSULE | Refills: 1 | Status: SHIPPED | OUTPATIENT
Start: 2023-01-05

## 2023-01-05 RX ORDER — TRAZODONE HYDROCHLORIDE 100 MG/1
100 TABLET ORAL NIGHTLY
Qty: 90 TABLET | Refills: 1 | Status: SHIPPED | OUTPATIENT
Start: 2023-01-05

## 2023-01-05 RX ORDER — ATORVASTATIN CALCIUM 40 MG/1
40 TABLET, FILM COATED ORAL NIGHTLY
Qty: 90 TABLET | Refills: 1 | Status: SHIPPED | OUTPATIENT
Start: 2023-01-05

## 2023-01-05 RX ORDER — CLOPIDOGREL BISULFATE 75 MG/1
75 TABLET ORAL DAILY
Qty: 90 TABLET | Refills: 1 | Status: SHIPPED | OUTPATIENT
Start: 2023-01-05

## 2023-01-05 RX ORDER — FLUOXETINE HYDROCHLORIDE 20 MG/1
CAPSULE ORAL
Qty: 90 CAPSULE | Refills: 1 | Status: SHIPPED | OUTPATIENT
Start: 2023-01-05

## 2023-01-05 RX ORDER — TIZANIDINE 2 MG/1
2 TABLET ORAL EVERY 6 HOURS PRN
Qty: 120 TABLET | Refills: 0 | Status: SHIPPED | OUTPATIENT
Start: 2023-01-05

## 2023-01-05 RX ORDER — METHOCARBAMOL 750 MG/1
750 TABLET, FILM COATED ORAL 4 TIMES DAILY
Qty: 120 TABLET | Refills: 0 | Status: CANCELLED | OUTPATIENT
Start: 2023-01-05 | End: 2023-02-04

## 2023-01-05 RX ORDER — LEVOTHYROXINE SODIUM 0.12 MG/1
TABLET ORAL
Qty: 90 TABLET | Refills: 1 | Status: SHIPPED | OUTPATIENT
Start: 2023-01-05

## 2023-01-09 RX ORDER — OXYBUTYNIN CHLORIDE 10 MG/1
10 TABLET, EXTENDED RELEASE ORAL NIGHTLY
Qty: 90 TABLET | Refills: 1 | Status: SHIPPED | OUTPATIENT
Start: 2023-01-09

## 2023-01-10 ENCOUNTER — EVALUATION (OUTPATIENT)
Dept: PHYSICAL THERAPY | Age: 68
End: 2023-01-10

## 2023-01-10 DIAGNOSIS — Z96.652 S/P TOTAL KNEE ARTHROPLASTY, LEFT: ICD-10-CM

## 2023-01-10 DIAGNOSIS — Z96.652 S/P TOTAL KNEE REPLACEMENT, LEFT: Primary | ICD-10-CM

## 2023-01-10 RX ORDER — METHOCARBAMOL 750 MG/1
750 TABLET, FILM COATED ORAL 4 TIMES DAILY
Qty: 120 TABLET | Refills: 0 | OUTPATIENT
Start: 2023-01-10 | End: 2023-02-09

## 2023-01-10 NOTE — PROGRESS NOTES
[facility]   Phone: [phone]   Fax: [phone]         Date:  1/10/2023   Patient: Evelyn Vicente  : 1955  MRN: [id number]  Referring Provider: Spencer Camara DO  [address] [place] [address] Diagnosis:      Diagnosis Orders   1. S/P total knee replacement, left          Pt presented to evaluation this AM but requested to cancel appt d/t lack of weight equipment in this location. Pt referral sent to [address] [place] [address] per pt request. Pt informed to call [facility] to follow up on rescheduling.     Thank you for this referral.  Rashad Matos, PT, DPT  [id number]

## 2023-01-19 ENCOUNTER — HOSPITAL ENCOUNTER (OUTPATIENT)
Dept: PHYSICAL THERAPY | Age: 68
Setting detail: THERAPIES SERIES
Discharge: HOME | End: 2023-01-19
Payer: MEDICARE

## 2023-01-19 PROCEDURE — 97161 PT EVAL LOW COMPLEX 20 MIN: CPT

## 2023-01-23 ENCOUNTER — HOSPITAL ENCOUNTER (OUTPATIENT)
Dept: PHYSICAL THERAPY | Age: 68
Setting detail: THERAPIES SERIES
Discharge: HOME | End: 2023-01-23
Payer: MEDICARE

## 2023-01-23 PROCEDURE — 97110 THERAPEUTIC EXERCISES: CPT

## 2023-01-23 PROCEDURE — G0283 ELEC STIM OTHER THAN WOUND: HCPCS

## 2023-01-23 NOTE — PROGRESS NOTES
[facility]  Phone: [phone] Fax: [phone]       Physical Therapy Daily Treatment Note  Date:  2023    Patient Name:  Mili Barbosa   :  1955 MRN: [id number]    Restrictions/Precautions:    Diagnosis:  s/p Left TKA   Treatment Diagnosis:    Insurance/Certification information:  [employer]   Referring Physician:  Dr Beronica Cabrera of care signed (Y/N):    Visit# / total visits:  2  Pain level: /10  Time In:  1020      Time Out: 1110         Subjective:      Exercises:  Exercise/Equipment Resistance/Repetitions Other comments     Bike 10 min             Bridge/Isometric Hip flex/abd  5 reps each 2 sets bilateral     SLR 5 reps 2 sets bilateral      Calf Str /toe raise 5 reps 2 sets      Standing Hip flex  5 reps bilateral       Standing Hip Abd 5 reps bilateral     Standing Hip/knee ext    10 reps bilateral                                                                                       Other Therapeutic Activities:      Home Exercise Program:      Manual Treatments:      Modalities:  Estim with ice 20 min post exercise     Timed Code Treatment Minutes:  45    Total Treatment Minutes:  50    Treatment/Activity Tolerance:  [x] Patient tolerated treatment well [] Patient limited by fatigue  [] Patient limited by pain  [] Patient limited by other medical complications  [] Other:     Plan:   [x] Continue per plan of care [] Alter current plan (see comments)  [] Plan of care initiated [] Hold pending MD visit [] Discharge  Plan for Next Session:         Treatment Charges: Mins Units   Initial Evaluation     Re-Evaluation     Ther Exercise         TE 30 2   Manual Therapy     MT     Ther Activities        TA     Gait Training          GT     Neuro Re-education NR     Modalities 20 1   Non-Billable Service Time     Other     Total Time/Units 50 3     Electronically signed by:  Olivier Miguel, [address] [place] [address]

## 2023-01-26 ENCOUNTER — HOSPITAL ENCOUNTER (OUTPATIENT)
Dept: PHYSICAL THERAPY | Age: 68
Setting detail: THERAPIES SERIES
Discharge: HOME | End: 2023-01-26
Payer: MEDICARE

## 2023-01-26 PROCEDURE — 97110 THERAPEUTIC EXERCISES: CPT

## 2023-01-26 NOTE — PROGRESS NOTES
[facility]  Phone: [phone] Fax: [phone]       Physical Therapy Daily Treatment Note  Date:  2023    Patient Name:  Alberta Gaspar   :  1955 MRN: [id number]    Restrictions/Precautions:    Diagnosis:  s/p Left TKA   Treatment Diagnosis:    Insurance/Certification information:  [employer]   Referring Physician:  Dr Airam Lay of care signed (Y/N):    Visit# / total visits:  3  Pain level: /10  Time In:  1000    Time Out: 1110         Subjective:      Exercises:  Exercise/Equipment Resistance/Repetitions Other comments     Bike 10 min             Bridge/Isometric Hip flex/abd  5 reps each 2 sets bilateral     SLR 5 reps 2 sets bilateral      Calf Str /toe raise 5 reps 2 sets      Standing Hip flex  5 reps bilateral       Standing Hip Abd 5 reps bilateral     Standing Hip/knee ext    10 reps bilateral                                                                                       Other Therapeutic Activities:      Home Exercise Program:      Manual Treatments:      Modalities:  Estim with ice 20 min post exercise nt    Timed Code Treatment Minutes:  30    Total Treatment Minutes:  50    Treatment/Activity Tolerance:  [x] Patient tolerated treatment well [] Patient limited by fatigue  [] Patient limited by pain  [] Patient limited by other medical complications  [] Other:     Plan:   [x] Continue per plan of care [] Alter current plan (see comments)  [] Plan of care initiated [] Hold pending MD visit [] Discharge  Plan for Next Session:         Treatment Charges: Mins Units   Initial Evaluation     Re-Evaluation     Ther Exercise         TE 40 2   Manual Therapy     MT     Ther Activities        TA     Gait Training          GT     Neuro Re-education NR     Modalities     Non-Billable Service Time 10    Other     Total Time/Units 50 2     Electronically signed by:  Xiomara Rasheed, [address] [place] [address]

## 2023-01-27 NOTE — TELEPHONE ENCOUNTER
Patient called she is taking this rx of celebrex 1 BID  Can you send in a new rx with 90 day suplly.  Thanks rx last week is only for 1 qd and will not last but two weeks
98.2

## 2023-01-31 ENCOUNTER — HOSPITAL ENCOUNTER (OUTPATIENT)
Dept: PHYSICAL THERAPY | Age: 68
Setting detail: THERAPIES SERIES
Discharge: HOME | End: 2023-01-31
Payer: MEDICARE

## 2023-01-31 PROCEDURE — 97110 THERAPEUTIC EXERCISES: CPT

## 2023-01-31 NOTE — PROGRESS NOTES
[facility]  Phone: [phone] Fax: [phone]       Physical Therapy Daily Treatment Note  Date:  2023    Patient Name:  Linus Garcia   :  1955 MRN: [id number]    Restrictions/Precautions:    Diagnosis:  s/p Left TKA   Treatment Diagnosis:    Insurance/Certification information:  [employer]   Referring Physician:  Dr Jaz Sam of care signed (Y/N):    Visit# / total visits:  4  Pain level: /10  Time In:  1000    Time Out: 1110         Subjective:      Exercises:  Exercise/Equipment Resistance/Repetitions Other comments     Bike 10 min             Bridge/Isometric Hip flex/abd  5 reps each 2 sets bilateral     SLR 5 reps 2 sets bilateral      Calf Str /toe raise 5 reps 2 sets      Standing Hip flex  5 reps bilateral       Standing Hip Abd 5 reps bilateral     Standing Hip/knee ext    10 reps bilateral                                                                                       Other Therapeutic Activities:      Home Exercise Program:      Manual Treatments:      Modalities:  Estim with ice 20 min post exercise nt    Timed Code Treatment Minutes:  30    Total Treatment Minutes:  50    Treatment/Activity Tolerance:  [x] Patient tolerated treatment well [] Patient limited by fatigue  [] Patient limited by pain  [] Patient limited by other medical complications  [] Other:     Plan:   [x] Continue per plan of care [] Alter current plan (see comments)  [] Plan of care initiated [] Hold pending MD visit [] Discharge  Plan for Next Session:         Treatment Charges: Mins Units   Initial Evaluation     Re-Evaluation     Ther Exercise         TE 40 2   Manual Therapy     MT     Ther Activities        TA     Gait Training          GT     Neuro Re-education NR     Modalities     Non-Billable Service Time 10    Other     Total Time/Units 50 2     Electronically signed by:  Sandy Manuel, [address] [facility]

## 2023-01-31 NOTE — PROGRESS NOTES
[facility]  Phone: [phone] Fax: [phone]       Physical Therapy Daily Treatment Note  Date:  2023    Patient Name:  Elloit Ann   :  1955 MRN: [id number]    Restrictions/Precautions:    Diagnosis:  s/p Left TKA   Treatment Diagnosis:    Insurance/Certification information:  [employer]   Referring Physician:  Dr Dariana Bess of care signed (Y/N):    Visit# / total visits:  4  Pain level: /10  Time In:  1000    Time Out: 1110         Subjective:      Exercises:  Exercise/Equipment Resistance/Repetitions Other comments     Bike 10 min             Bridge/Isometric Hip flex/abd  5 reps each 2 sets bilateral     SLR 5 reps 2 sets bilateral      Calf Str /toe raise 5 reps 2 sets      Standing Hip flex  5 reps bilateral       Standing Hip Abd 5 reps bilateral     Standing Hip/knee ext    10 reps bilateral             Static mini lunge    5 reps 2 sets                                                                       Other Therapeutic Activities:      Home Exercise Program:      Manual Treatments:      Modalities:  Estim with ice 20 min post exercise nt    Timed Code Treatment Minutes:  30    Total Treatment Minutes:  50    Treatment/Activity Tolerance:  [x] Patient tolerated treatment well [] Patient limited by fatigue  [] Patient limited by pain  [] Patient limited by other medical complications  [] Other:     Plan:   [x] Continue per plan of care [] Alter current plan (see comments)  [] Plan of care initiated [] Hold pending MD visit [] Discharge  Plan for Next Session:         Treatment Charges: Mins Units   Initial Evaluation     Re-Evaluation     Ther Exercise         TE 40 2   Manual Therapy     MT     Ther Activities        TA     Gait Training          GT     Neuro Re-education NR     Modalities     Non-Billable Service Time 10    Other     Total Time/Units 50 2     Electronically signed by:  Page Parsons, [address] [facility]

## 2023-02-01 ENCOUNTER — OFFICE VISIT (OUTPATIENT)
Dept: ORTHOPEDIC SURGERY | Age: 68
End: 2023-02-01
Payer: MEDICARE

## 2023-02-01 ENCOUNTER — HOSPITAL ENCOUNTER (OUTPATIENT)
Dept: GENERAL RADIOLOGY | Age: 68
Discharge: HOME | End: 2023-02-03
Payer: MEDICARE

## 2023-02-01 DIAGNOSIS — Z96.652 S/P TOTAL KNEE ARTHROPLASTY, LEFT: ICD-10-CM

## 2023-02-01 DIAGNOSIS — Z96.652 S/P TOTAL KNEE ARTHROPLASTY, LEFT: Primary | ICD-10-CM

## 2023-02-01 PROCEDURE — 99024 POSTOP FOLLOW-UP VISIT: CPT | Performed by: PHYSICIAN ASSISTANT

## 2023-02-01 PROCEDURE — 73560 X-RAY EXAM OF KNEE 1 OR 2: CPT

## 2023-02-01 PROCEDURE — 99212 OFFICE O/P EST SF 10 MIN: CPT

## 2023-02-01 NOTE — PROGRESS NOTES
Chief Complaint   Patient presents with    Post-Op Check     L TKA 11/7/2022. Patient here today with no complaints of pain. Patient in outpatient PT 2x weekly and is making progress there. OP:SURGEON: Dr. Beba Torre MD  DATE OF PROCEDURE: 11/7/22  PROCEDURE:L TKA    Subjective:  Paramjit Moran is approximately 3 months follow-up from the above surgery. States that overall she is doing for very well and is seeing progress. She is going to physical therapy outpatient and compliant with remaining active and HEP. States that she does have some soreness to the posterior thigh but this is improving. She is happy with her results. She would like right TKA at the end of May 2023. States the R knee CSI given at last visit 6 weeks ago did not help much at all. Denies calf pain, CP, SOB, fever, chills, malaise. Review of Systems -  all pertinent positives and negatives in HPI. Objective:    General: Alert and oriented X 3, normocephalic atraumatic, external ears and eye normal, sclera clear, no acute distress, respirations easy and unlabored with no audible wheezes, skin warm and dry, speech and dress appropriate for noted age, affect euthymic. Extremity:  Left Lower Extremity  Skin clean dry and intact, without signs of infection  Incision healed  Mild edema about the knee  Mild TTP at the medial joint line  AROM knee 0-105/110 and passively at least to 110-115 flexion   Compartments supple throughout thigh and leg  Calf supple and nontender  Demonstrates active  ankle plantar/dorsiflexion/great toe extension. States sensation intact to touch in sural/deep peroneal/superficial peroneal/saphenous/posterior tibial nerve distributions to foot/ankle. Palpable dorsalis pedis and posterior tibialis pulses, cap refill brisk in toes, foot warm/perfused. XR:   2 views of L knee demonstrating L TKA. arthroplasty remains intact without interval displacement, loosening, or failure.  No significant change in alignment. No acute fractures or dislocations or any other osseus abnormality identified. Assessment:   Diagnosis Orders   1. S/P total knee arthroplasty, left            Plan:  Reviewed x-rays with patient today in office   Continue PT and HEP  Continue WBAT L LE with AD when/if needed  Continue remaining active  Plan for R TKA end of May 2023    Follow up last week of April 2023    Electronically signed by Blanquita Philippe PA-C on 2/1/2023 at 10:31 AM  Note: This report was completed using Assembly voiced recognition software. Every effort has been made to ensure accuracy; however, inadvertent computerized transcription errors may be present.

## 2023-02-03 ENCOUNTER — HOSPITAL ENCOUNTER (OUTPATIENT)
Dept: PHYSICAL THERAPY | Age: 68
Setting detail: THERAPIES SERIES
Discharge: HOME | End: 2023-02-03
Payer: MEDICARE

## 2023-02-03 PROCEDURE — 97110 THERAPEUTIC EXERCISES: CPT

## 2023-02-03 NOTE — PROGRESS NOTES
[facility]  Phone: [phone] Fax: [phone]       Physical Therapy Daily Treatment Note  Date:  2/3/2023    Patient Name:  Padilla Hayes   :  1955 MRN: [id number]    Restrictions/Precautions:    Diagnosis:  s/p Left TKA   Treatment Diagnosis:    Insurance/Certification information:  [employer]   Referring Physician:  Dr Jerry Black of care signed (Y/N):    Visit# / total visits:  5  Pain level: /10  Time In:  1000    Time Out: 1110         Subjective:      Exercises:  Exercise/Equipment Resistance/Repetitions Other comments     Bike 10 min             Bridge/Isometric Hip flex/abd  5 reps each 2 sets bilateral     SLR 5 reps 2 sets bilateral      Calf Str /toe raise 5 reps 2 sets      Standing Hip flex  5 reps bilateral       Standing Hip Abd 5 reps bilateral     Standing Hip/knee ext    10 reps bilateral                                                                                       Other Therapeutic Activities:      Home Exercise Program:      Manual Treatments:      Modalities:  Estim with ice 20 min post exercise nt    Timed Code Treatment Minutes:  30    Total Treatment Minutes:  50    Treatment/Activity Tolerance:  [x] Patient tolerated treatment well [] Patient limited by fatigue  [] Patient limited by pain  [] Patient limited by other medical complications  [] Other:     Plan:   [x] Continue per plan of care [] Alter current plan (see comments)  [] Plan of care initiated [] Hold pending MD visit [] Discharge  Plan for Next Session:         Treatment Charges: Mins Units   Initial Evaluation     Re-Evaluation     Ther Exercise         TE 40 2   Manual Therapy     MT     Ther Activities        TA     Gait Training          GT     Neuro Re-education NR     Modalities     Non-Billable Service Time 10    Other     Total Time/Units 50 2     Electronically signed by:  Tyrone Razo, [address] [facility]

## 2023-02-06 ENCOUNTER — HOSPITAL ENCOUNTER (OUTPATIENT)
Dept: PHYSICAL THERAPY | Age: 68
Setting detail: THERAPIES SERIES
Discharge: HOME | End: 2023-02-06
Payer: MEDICARE

## 2023-02-06 PROCEDURE — 97110 THERAPEUTIC EXERCISES: CPT

## 2023-02-06 NOTE — PROGRESS NOTES
[facility]  Phone: [phone] Fax: [phone]       Physical Therapy Daily Treatment Note  Date:  2023    Patient Name:  Jermain Perez   :  1955 MRN: [id number]    Restrictions/Precautions:    Diagnosis:  s/p Left TKA   Treatment Diagnosis:    Insurance/Certification information:  [employer]   Referring Physician:  Dr Harpreet Murray of care signed (Y/N):    Visit# / total visits:  6  Pain level: /10  Time In:  1000    Time Out: 1110         Subjective:      Exercises:  Exercise/Equipment Resistance/Repetitions Other comments     Bike 10 min             Bridge/Isometric Hip flex/abd  5 reps each 2 sets bilateral     SLR 5 reps 2 sets bilateral      Calf Str /toe raise 5 reps 2 sets      Standing Hip flex  5 reps bilateral       Standing Hip Abd 5 reps bilateral     Standing Hip/knee ext    10 reps bilateral                                                                                       Other Therapeutic Activities:      Home Exercise Program:      Manual Treatments:      Modalities:  Estim with ice 20 min post exercise nt    Timed Code Treatment Minutes:  30    Total Treatment Minutes:  50    Treatment/Activity Tolerance:  [x] Patient tolerated treatment well [] Patient limited by fatigue  [] Patient limited by pain  [] Patient limited by other medical complications  [] Other:     Plan:   [x] Continue per plan of care [] Alter current plan (see comments)  [] Plan of care initiated [] Hold pending MD visit [] Discharge  Plan for Next Session:         Treatment Charges: Mins Units   Initial Evaluation     Re-Evaluation     Ther Exercise         TE 40 2   Manual Therapy     MT     Ther Activities        TA     Gait Training          GT     Neuro Re-education NR     Modalities     Non-Billable Service Time 10    Other     Total Time/Units 50 2     Electronically signed by:  Helga Jones, [address] [facility]

## 2023-02-09 ENCOUNTER — HOSPITAL ENCOUNTER (OUTPATIENT)
Dept: PHYSICAL THERAPY | Age: 68
Setting detail: THERAPIES SERIES
End: 2023-02-09
Payer: MEDICARE

## 2023-02-09 NOTE — PROGRESS NOTES
[facility]  Phone: [phone] Fax: [phone]     Physical Therapy  Cancellation/No-show Note  Patient Name:  Nga Kelly  :  1955   Date:  2023    For today's appointment patient:  [x]  Cancelled  []  Rescheduled appointment  []  No-show     Reason given by patient:  []  Patient ill  []  Conflicting appointment  []  No transportation    []  Conflict with work  []  No reason given  [x]  Other:     Comments:  Sore Right Knee                                     Next PT session 2023    Electronically signed by:  Swathi Pedro, [address] [facility]

## 2023-02-13 ENCOUNTER — HOSPITAL ENCOUNTER (OUTPATIENT)
Dept: PHYSICAL THERAPY | Age: 68
Setting detail: THERAPIES SERIES
Discharge: HOME | End: 2023-02-13
Payer: MEDICARE

## 2023-02-13 PROCEDURE — 97110 THERAPEUTIC EXERCISES: CPT

## 2023-02-13 NOTE — PROGRESS NOTES
[facility]  Phone: [phone] Fax: [phone]       Physical Therapy Daily Treatment Note  Date:  2023    Patient Name:  Snehal Beyer   :  1955 MRN: [id number]    Restrictions/Precautions:    Diagnosis:  s/p Left TKA   Treatment Diagnosis:    Insurance/Certification information:  [employer]   Referring Physician:  Dr Maxim Harvey of care signed (Y/N):    Visit# / total visits:  7  Pain level: /10  Time In:  30   Time Out: 1010        Subjective:      Exercises:  Exercise/Equipment Resistance/Repetitions Other comments     Bike 10 min             Bridge/Isometric Hip flex/abd  5 reps each 2 sets bilateral     SLR 5 reps 2 sets bilateral      Calf Str /toe raise 5 reps 2 sets      Standing Hip flex  5 reps bilateral       Standing Hip Abd 5 reps bilateral     Standing Hip/knee ext    10 reps bilateral  nt                                                                                     Other Therapeutic Activities:      Home Exercise Program:      Manual Treatments:      Modalities:  Estim with ice 20 min post exercise nt    Timed Code Treatment Minutes:  30    Total Treatment Minutes:  50    Treatment/Activity Tolerance:  [x] Patient tolerated treatment well [] Patient limited by fatigue  [] Patient limited by pain  [] Patient limited by other medical complications  [] Other:     Plan:   [x] Continue per plan of care [] Alter current plan (see comments)  [] Plan of care initiated [] Hold pending MD visit [] Discharge  Plan for Next Session:         Treatment Charges: Mins Units   Initial Evaluation     Re-Evaluation     Ther Exercise         TE 40 2   Manual Therapy     MT     Ther Activities        TA     Gait Training          GT     Neuro Re-education NR     Modalities     Non-Billable Service Time 10    Other     Total Time/Units 50 2     Electronically signed by:  Isatu Holley, [address] [place] [address]

## 2023-02-15 RX ORDER — TIZANIDINE 2 MG/1
2 TABLET ORAL EVERY 8 HOURS PRN
Qty: 270 TABLET | Refills: 0 | Status: SHIPPED | OUTPATIENT
Start: 2023-02-15

## 2023-02-16 ENCOUNTER — HOSPITAL ENCOUNTER (OUTPATIENT)
Dept: PHYSICAL THERAPY | Age: 68
Setting detail: THERAPIES SERIES
Discharge: HOME | End: 2023-02-16
Payer: MEDICARE

## 2023-02-16 PROCEDURE — 97110 THERAPEUTIC EXERCISES: CPT

## 2023-02-22 ENCOUNTER — HOSPITAL ENCOUNTER (OUTPATIENT)
Dept: PHYSICAL THERAPY | Age: 68
Setting detail: THERAPIES SERIES
Discharge: HOME | End: 2023-02-22
Payer: MEDICARE

## 2023-02-22 PROCEDURE — 97110 THERAPEUTIC EXERCISES: CPT

## 2023-02-22 NOTE — PROGRESS NOTES
[facility]  Phone: [phone] Fax: [phone]       Physical Therapy Daily Treatment Note  Date:  2023    Patient Name:  Padilla Hayes   :  1955 MRN: [id number]    Restrictions/Precautions:    Diagnosis:  s/p Left TKA   Treatment Diagnosis:    Insurance/Certification information:  [employer]   Referring Physician:  Dr Jerry Black of care signed (Y/N):    Visit# / total visits:  7  Pain level: /10  Time In:  1000    Time Out: 1110         Subjective:      Exercises:  Exercise/Equipment Resistance/Repetitions Other comments     Bike 10 min             Bridge/Isometric Hip flex/abd  5 reps each 2 sets bilateral     SLR 5 reps 2 sets bilateral      Calf Str /toe raise 5 reps 2 sets      Standing Hip flex  5 reps bilateral       Standing Hip Abd 5 reps bilateral     Standing Hip/knee ext    10 reps bilateral                                                                                       Other Therapeutic Activities:      Home Exercise Program:      Manual Treatments:      Modalities:  Estim with ice 20 min post exercise nt    Timed Code Treatment Minutes:  30    Total Treatment Minutes:  50    Treatment/Activity Tolerance:  [x] Patient tolerated treatment well [] Patient limited by fatigue  [] Patient limited by pain  [] Patient limited by other medical complications  [] Other:     Plan:   [x] Continue per plan of care [] Alter current plan (see comments)  [] Plan of care initiated [] Hold pending MD visit [] Discharge  Plan for Next Session:         Treatment Charges: Mins Units   Initial Evaluation     Re-Evaluation     Ther Exercise         TE 40 2   Manual Therapy     MT     Ther Activities        TA     Gait Training          GT     Neuro Re-education NR     Modalities     Non-Billable Service Time 10    Other     Total Time/Units 50 2     Electronically signed by:  Tyrone Razo, 311 [facility]

## 2023-02-24 ENCOUNTER — HOSPITAL ENCOUNTER (OUTPATIENT)
Dept: PHYSICAL THERAPY | Age: 68
Setting detail: THERAPIES SERIES
Discharge: HOME | End: 2023-02-24
Payer: MEDICARE

## 2023-02-24 PROCEDURE — 97110 THERAPEUTIC EXERCISES: CPT

## 2023-02-24 NOTE — PROGRESS NOTES
[facility]  Phone: [phone] Fax: [phone]       Physical Therapy Daily Treatment Note  Date:  2023    Patient Name:  Sabrina Stark   :  1955 MRN: [id number]    Restrictions/Precautions:    Diagnosis:  s/p Left TKA   Treatment Diagnosis:    Insurance/Certification information:  [employer]   Referring Physician:  Dr Deng Horowitz of care signed (Y/N):    Visit# / total visits:  8  Pain level: /10  Time In:  1000    Time Out: 1110         Subjective:      Exercises:  Exercise/Equipment Resistance/Repetitions Other comments     Bike 10 min             Bridge/Isometric Hip flex/abd  5 reps each 2 sets bilateral     SLR 5 reps 2 sets bilateral      Calf Str /toe raise 5 reps 2 sets      Standing Hip flex  5 reps bilateral       Standing Hip Abd 5 reps bilateral     Standing Hip/knee ext    10 reps bilateral                                                                                       Other Therapeutic Activities:      Home Exercise Program:      Manual Treatments:      Modalities:  Estim with ice 20 min post exercise nt    Timed Code Treatment Minutes:  30    Total Treatment Minutes:  50    Treatment/Activity Tolerance:  [x] Patient tolerated treatment well [] Patient limited by fatigue  [] Patient limited by pain  [] Patient limited by other medical complications  [] Other:     Plan:   [x] Continue per plan of care [] Alter current plan (see comments)  [] Plan of care initiated [] Hold pending MD visit [] Discharge  Plan for Next Session:         Treatment Charges: Mins Units   Initial Evaluation     Re-Evaluation     Ther Exercise         TE 40 2   Manual Therapy     MT     Ther Activities        TA     Gait Training          GT     Neuro Re-education NR     Modalities     Non-Billable Service Time 10    Other     Total Time/Units 50 2     Electronically signed by:  Sarah Mann, [address] [place] [address]

## 2023-02-27 ENCOUNTER — HOSPITAL ENCOUNTER (OUTPATIENT)
Dept: PHYSICAL THERAPY | Age: 68
Setting detail: THERAPIES SERIES
Discharge: HOME | End: 2023-02-27
Payer: MEDICARE

## 2023-02-27 PROCEDURE — 97110 THERAPEUTIC EXERCISES: CPT

## 2023-02-27 NOTE — PROGRESS NOTES
[facility]  Phone: [phone] Fax: [phone]       Physical Therapy Daily Treatment Note  Date:  2023    Patient Name:  Elder Pierson   :  1955 MRN: [id number]    Restrictions/Precautions:    Diagnosis:  s/p Left TKA   Treatment Diagnosis:    Insurance/Certification information:  [employer]   Referring Physician:  Dr Daniella Cruz of care signed (Y/N):    Visit# / total visits:  9  Pain level: /10  Time In:  1000    Time Out: 1110         Subjective:      Exercises:  Exercise/Equipment Resistance/Repetitions Other comments     Bike 10 min             Bridge/Isometric Hip flex/abd  5 reps each 2 sets bilateral     SLR 5 reps 2 sets bilateral      Calf Str /toe raise 5 reps 2 sets      Standing Hip flex  5 reps bilateral       Standing Hip Abd 5 reps bilateral     Standing Hip/knee ext    10 reps bilateral                                                                                       Other Therapeutic Activities:      Home Exercise Program:      Manual Treatments:      Modalities:  Estim with ice 20 min post exercise nt    Timed Code Treatment Minutes:  30    Total Treatment Minutes:  50    Treatment/Activity Tolerance:  [x] Patient tolerated treatment well [] Patient limited by fatigue  [] Patient limited by pain  [] Patient limited by other medical complications  [] Other:     Plan:   [x] Continue per plan of care [] Alter current plan (see comments)  [] Plan of care initiated [] Hold pending MD visit [] Discharge  Plan for Next Session:         Treatment Charges: Mins Units   Initial Evaluation     Re-Evaluation     Ther Exercise         TE 40 2   Manual Therapy     MT     Ther Activities        TA     Gait Training          GT     Neuro Re-education NR     Modalities     Non-Billable Service Time 10    Other     Total Time/Units 50 2     Electronically signed by:  Jade Cartagena, [address] [facility]

## 2023-02-28 ENCOUNTER — APPOINTMENT (OUTPATIENT)
Dept: PHYSICAL THERAPY | Age: 68
End: 2023-02-28
Payer: MEDICARE

## 2023-04-12 PROBLEM — Z96.652 S/P TOTAL KNEE ARTHROPLASTY, LEFT: Status: RESOLVED | Noted: 2022-11-07 | Resolved: 2023-04-12

## 2023-04-12 PROBLEM — Z96.652 TOTAL KNEE REPLACEMENT STATUS, LEFT: Status: RESOLVED | Noted: 2022-11-07 | Resolved: 2023-04-12

## 2023-04-18 DIAGNOSIS — Z09 FOLLOW-UP EXAM: Primary | ICD-10-CM

## 2023-04-19 ENCOUNTER — OFFICE VISIT (OUTPATIENT)
Dept: ORTHOPEDIC SURGERY | Age: 68
End: 2023-04-19
Payer: MEDICARE

## 2023-04-19 ENCOUNTER — TELEPHONE (OUTPATIENT)
Dept: ORTHOPEDIC SURGERY | Age: 68
End: 2023-04-19

## 2023-04-19 ENCOUNTER — HOSPITAL ENCOUNTER (OUTPATIENT)
Dept: GENERAL RADIOLOGY | Age: 68
Discharge: HOME | End: 2023-04-21
Payer: MEDICARE

## 2023-04-19 ENCOUNTER — PREP FOR PROCEDURE (OUTPATIENT)
Dept: ORTHOPEDIC SURGERY | Age: 68
End: 2023-04-19

## 2023-04-19 VITALS — HEIGHT: 63 IN | WEIGHT: 231.2 LBS | BODY MASS INDEX: 40.96 KG/M2

## 2023-04-19 DIAGNOSIS — M17.11 OSTEOARTHRITIS OF RIGHT KNEE, UNSPECIFIED OSTEOARTHRITIS TYPE: Primary | ICD-10-CM

## 2023-04-19 DIAGNOSIS — M17.11 OSTEOARTHRITIS OF RIGHT KNEE, UNSPECIFIED OSTEOARTHRITIS TYPE: ICD-10-CM

## 2023-04-19 DIAGNOSIS — Z96.652 S/P TOTAL KNEE ARTHROPLASTY, LEFT: ICD-10-CM

## 2023-04-19 DIAGNOSIS — Z09 FOLLOW-UP EXAM: ICD-10-CM

## 2023-04-19 PROCEDURE — 73562 X-RAY EXAM OF KNEE 3: CPT

## 2023-04-19 PROCEDURE — 73560 X-RAY EXAM OF KNEE 1 OR 2: CPT

## 2023-04-19 PROCEDURE — 99202 OFFICE O/P NEW SF 15 MIN: CPT

## 2023-04-19 NOTE — PROGRESS NOTES
Orthopaedic H&P Note    Lila Del Castillo is a 74 y.o. female, her YOB: 1955 with the following history as recorded in [facility]:      Patient Active Problem List    Diagnosis Date Noted    Bilateral primary osteoarthritis of knee 11/07/2022    Osteoarthritis of left knee 10/12/2022    LITO (generalized anxiety disorder) 11/17/2020    Coronary artery disease involving native coronary artery of native heart with unstable angina pectoris ([facility] [address]     Non-ST elevation MI (NSTEMI) ([facility] [address] 09/16/2020    Morbid obesity ([facility] [address] 09/16/2020    Hypothyroidism     Graves disease     Depression     GERD (gastroesophageal reflux disease)      Current Outpatient Medications   Medication Sig Dispense Refill    loratadine (CLARITIN) 10 MG capsule Take 1 capsule by mouth daily 30 capsule 1    oxybutynin (DITROPAN XL) 10 MG extended release tablet Take 1 tablet by mouth at bedtime 90 tablet 1    FLUoxetine (PROZAC) 20 MG capsule Take 1 capsule by mouth once daily 90 capsule 1    FLUoxetine (PROZAC) 40 MG capsule Take 1 capsule by mouth once daily 90 capsule 1    clopidogrel (PLAVIX) 75 MG tablet Take 1 tablet by mouth daily 90 tablet 1    traZODone (DESYREL) 100 MG tablet Take 1 tablet by mouth nightly 90 tablet 1    atorvastatin (LIPITOR) 40 MG tablet Take 1 tablet by mouth nightly 90 tablet 1    levothyroxine (EUTHYROX) 125 MCG tablet Take 1 tablet by mouth once daily 90 tablet 1    Tens Unit MISC by Does not apply route 1 each 0    hydrOXYzine (VISTARIL) 25 MG capsule TAKE 1 CAPSULE BY MOUTH THREE TIMES DAILY AS NEEDED FOR ANXIETY 90 capsule 1    fluticasone (FLONASE) 50 MCG/ACT nasal spray USE 2 SPRAY(S) IN EACH NOSTRIL DAILY 16 g 2    buPROPion (WELLBUTRIN XL) 300 MG extended release tablet TAKE 1 TABLET BY MOUTH ONCE DAILY IN THE MORNING 90 tablet 1    aspirin 81 MG EC tablet Take 1 tablet by mouth daily (Patient taking differently: Take 1 tablet by mouth at bedtime) 30 tablet 3    nitroGLYCERIN (NITROSTAT) 0.4 MG SL tablet

## 2023-04-19 NOTE — TELEPHONE ENCOUNTER
Prior Authorization Form:    DEMOGRAPHICS:                     Patient Name:  Lannie Severs  Patient :  1955            Insurance:  Payor: [facility] [employer] MCR / Plan: [address] [place] [address] PPO / Product Type: *No Product type* /   Insurance ID Number:    Payer/Plan Subscr  Sex Relation Sub. Ins. ID Effective Group Num   1. THP INSURANCE* JAC GIBSON 1955 Female Self [id number]                                    [address] [place] [address]       [] Prior authorization obtained  [] No Prior authorization required   [] Prior authorization pending - Case #       DIAGNOSIS & PROCEDURE:                       Procedure/Operation: Right Total Knee Arthroplasty           CPT Code: 51491    Diagnosis:  Right Knee OA    ICD10 Code: M17.11    Location:  [address] [place] [address]    Surgeon:  Dr. Celestine Ortiz INFORMATION:                          Date: 2023   Time: 9:30 am              Anesthesia:  General and Possible Regional.                                                       Status: Outpatient with OBS.     Special Comments:         Vendor: [employer]  []   Notified     Length of Surgery (with 30 min clean up time): 2 hours    Medical clearance: Medical Clearance Requested    Pre-Op Labs:       []  Orders Placed    Electronically signed by Kosta Cowan MA on 2023 at 3:24 PM

## 2023-04-19 NOTE — PROGRESS NOTES
Raul Rogers is a 74 y.o. female who presents for follow up of L TKA. Right Knee Pain Discuss Sx    SURGEON: Dr. Jai Nava MD  Date of Injury/Surgery:  11-7-2022  Date last seen in office:  2-1-2023    Symptoms: better  New complaints: Pt expressed falling on 4-. Pt has increased pain since the fall. Pt has increased difficulty with ambulation. Pt has nothing else to complain about the Left Knee at the time of visit. Pt has 5/10 pain in the right knee. Pt has increased difficulty with Ambulation due to increased pain in the Right Knee. Pt has crepitus within the Right Knee that causes and increased in pain. Pt has a catching and locking sensation in the Right Knee making it difficult to move. Weightbearing: right lower and left lower Partial weight bearing and Full weight bearing      Assistive device Walker - standard  Participating in therapy (location if yes)?  no    Refills Needed: None  Order/Referral Needed: N/A

## 2023-04-19 NOTE — PATIENT INSTRUCTIONS
You will need to be medically cleared before surgery by your family doctor. Please call your doctor to set up an appointment for medical clearance as soon as possible and have the office fax your medical clearance to :   Rubén Feng  [phone]   ATTN:  [facility] clearance must be received 7 days before date of surgery    Your surgery is scheduled for 6-12-23 at 7:30am  with Dr. Marco Antonio Bhatia MD at the [facility] in [facility] . You will need to report to Preop area  that morning at 5:30am    You are having Observation surgery so you will not be returning home the same day. Plan to stay 1-2 nights in the hospital.  Preadmission Testing (PAT) department at [facility] will contact you with all the details prior to surgery. Nothing to eat or drink after midnight the night before surgery. You may take a pain pill and any other medicine PAT instructs you to take with small sip of water if needed. You will need to attend Joint Education Class prior to surgery- Tuesday AM from 10-11 am  Continue with ice and elevation to reduce swelling  Weightbearing as tolerated right lower, use assistive devices as needed  Take pain medicine as instructed  Call office with any question or concerns: [phone]   If you are taking Aspirin or Lovenox, hold the day of surgery. If taking Eliquis, hold this 48 hours prior to surgery. Hold all NSAIDs (non-steroidal anti-inflammatories like Advil, motrin, ibuprofen, etc) 7 days prior to surgery. ALL TOTAL JOINT PATIENTS WILL BE WEANED OFF ANY NARCOTIC MEDICATION GIVEN POST OPERATIVELY BY AT THE LATEST 3 WEEKS FROM DATE OF SURGERY    Due to increased risk of infections, it is important to plan for getting treatment for significant dental diseases (including tooth extractions, root canal and periodontal work) before your total joint surgery.   Routine teeth cleaning should be delayed until you are 3 months post op

## 2023-05-16 ENCOUNTER — OFFICE VISIT (OUTPATIENT)
Dept: CARDIOLOGY CLINIC | Age: 68
End: 2023-05-16
Payer: MEDICARE

## 2023-05-16 VITALS
HEIGHT: 65 IN | HEART RATE: 62 BPM | SYSTOLIC BLOOD PRESSURE: 118 MMHG | BODY MASS INDEX: 37.69 KG/M2 | DIASTOLIC BLOOD PRESSURE: 60 MMHG | RESPIRATION RATE: 16 BRPM | WEIGHT: 226.2 LBS | OXYGEN SATURATION: 97 %

## 2023-05-16 DIAGNOSIS — I25.110 CORONARY ARTERY DISEASE INVOLVING NATIVE CORONARY ARTERY OF NATIVE HEART WITH UNSTABLE ANGINA PECTORIS (HCC): Primary | ICD-10-CM

## 2023-05-16 PROCEDURE — 99214 OFFICE O/P EST MOD 30 MIN: CPT | Performed by: INTERNAL MEDICINE

## 2023-05-16 PROCEDURE — 1123F ACP DISCUSS/DSCN MKR DOCD: CPT | Performed by: INTERNAL MEDICINE

## 2023-05-16 PROCEDURE — 93000 ELECTROCARDIOGRAM COMPLETE: CPT | Performed by: INTERNAL MEDICINE

## 2023-05-16 RX ORDER — PHENOL 1.4 %
1 AEROSOL, SPRAY (ML) MUCOUS MEMBRANE DAILY
COMMUNITY

## 2023-05-16 RX ORDER — ASPIRIN 81 MG/1
81 TABLET ORAL DAILY
COMMUNITY

## 2023-05-16 NOTE — PROGRESS NOTES
[facility] Cardiology Progress Note  Dr. Jacky Christopher      Referring Physician: Albert Reeves DO  CHIEF COMPLAINT:   Chief Complaint   Patient presents with    Coronary Artery Disease    Cardiac Clearance       HISTORY OF PRESENT ILLNESS:   Patient is 74years old female with CAD, s/p PCI to OM, is here for follow up visit. Patient denies any chest pain, no shortness of breath, no lightheadedness, no dizziness, no palpitations, no pedal edema, no PND, no orthopnea, no syncope, no presyncopal episodes. Functional capacity is at baseline      Past Medical History:   Diagnosis Date    Arthritis     CAD (coronary artery disease)     9-17-20 yisel 2.0x22 francisca om.      COVID-19 virus infection 07/2020    Depression     GERD (gastroesophageal reflux disease)     Graves disease     Hypertension     Hypothyroidism     SECONDARY TO GRAVES DISEASE    NSTEMI (non-ST elevated myocardial infarction) ([facility] [address] 09/16/2020    Obesity     S/P total knee arthroplasty, left 11/7/2022         Past Surgical History:   Procedure Laterality Date    CARDIAC CATHETERIZATION  09/17/2020    stent x 1    COLONOSCOPY  11/10/10    DR GUILLERMO    CORONARY ANGIOPLASTY WITH STENT PLACEMENT  09/17/2020    Dhruv      GASTRIC BYPASS SURGERY  5/2/06    DR HAIRSTON    TOTAL KNEE ARTHROPLASTY Left 11/7/2022    LEFT KNEE TOTAL ARTHROPLASTY performed by Rowdy Guevara MD at [facility] OR         Current Outpatient Medications   Medication Sig Dispense Refill    aspirin 81 MG EC tablet Take 1 tablet by mouth daily      Multiple Vitamins-Minerals (MULTIVITAMIN WOMEN 50+ PO) Take by mouth      calcium carbonate 600 MG TABS tablet Take 1 tablet by mouth daily      loratadine (CLARITIN) 10 MG capsule Take 1 capsule by mouth daily 30 capsule 1    oxybutynin (DITROPAN XL) 10 MG extended release tablet Take 1 tablet by mouth at bedtime 90 tablet 1    FLUoxetine (PROZAC) 20 MG capsule Take 1 capsule by mouth once daily 90 capsule 1    FLUoxetine (PROZAC) 40 MG

## 2023-05-17 RX ORDER — SODIUM CHLORIDE 9 MG/ML
INJECTION, SOLUTION INTRAVENOUS PRN
Status: CANCELLED | OUTPATIENT
Start: 2023-05-17

## 2023-05-17 RX ORDER — SODIUM CHLORIDE 0.9 % (FLUSH) 0.9 %
5-40 SYRINGE (ML) INJECTION PRN
Status: CANCELLED | OUTPATIENT
Start: 2023-05-17

## 2023-05-17 RX ORDER — SODIUM CHLORIDE 0.9 % (FLUSH) 0.9 %
5-40 SYRINGE (ML) INJECTION EVERY 12 HOURS SCHEDULED
Status: CANCELLED | OUTPATIENT
Start: 2023-05-17

## 2023-05-25 DIAGNOSIS — F41.1 GAD (GENERALIZED ANXIETY DISORDER): ICD-10-CM

## 2023-05-25 RX ORDER — HYDROXYZINE HYDROCHLORIDE 25 MG/1
25 TABLET, FILM COATED ORAL EVERY 8 HOURS PRN
Qty: 30 TABLET | Refills: 2 | Status: SHIPPED | OUTPATIENT
Start: 2023-05-25 | End: 2023-06-04

## 2023-05-25 RX ORDER — HYDROXYZINE PAMOATE 25 MG/1
CAPSULE ORAL
Qty: 90 CAPSULE | Refills: 1 | Status: CANCELLED | OUTPATIENT
Start: 2023-05-25

## 2023-06-06 ENCOUNTER — HOSPITAL ENCOUNTER (OUTPATIENT)
Dept: PREADMISSION TESTING | Age: 68
Discharge: HOME | End: 2023-06-06
Payer: MEDICARE

## 2023-06-06 VITALS
HEIGHT: 65 IN | TEMPERATURE: 97.3 F | SYSTOLIC BLOOD PRESSURE: 143 MMHG | HEART RATE: 53 BPM | DIASTOLIC BLOOD PRESSURE: 65 MMHG | OXYGEN SATURATION: 99 % | WEIGHT: 228.7 LBS | RESPIRATION RATE: 16 BRPM | BODY MASS INDEX: 38.1 KG/M2

## 2023-06-06 DIAGNOSIS — M17.11 OSTEOARTHRITIS OF RIGHT KNEE, UNSPECIFIED OSTEOARTHRITIS TYPE: ICD-10-CM

## 2023-06-06 LAB
ABO + RH BLD: NORMAL
ALBUMIN SERPL-MCNC: 4 G/DL (ref 3.5–5.2)
ALP SERPL-CCNC: 77 U/L (ref 35–104)
ALT SERPL-CCNC: 14 U/L (ref 0–32)
ANION GAP SERPL CALCULATED.3IONS-SCNC: 9 MMOL/L (ref 7–16)
AST SERPL-CCNC: 25 U/L (ref 0–31)
BASOPHILS # BLD: 0.05 E9/L (ref 0–0.2)
BASOPHILS NFR BLD: 1 % (ref 0–2)
BILIRUB SERPL-MCNC: 0.3 MG/DL (ref 0–1.2)
BLD GP AB SCN SERPL QL: NORMAL
BUN SERPL-MCNC: 11 MG/DL (ref 6–23)
CALCIUM SERPL-MCNC: 9.4 MG/DL (ref 8.6–10.2)
CHLORIDE SERPL-SCNC: 100 MMOL/L (ref 98–107)
CO2 SERPL-SCNC: 27 MMOL/L (ref 22–29)
CREAT SERPL-MCNC: 0.9 MG/DL (ref 0.5–1)
EOSINOPHIL # BLD: 0.2 E9/L (ref 0.05–0.5)
EOSINOPHIL NFR BLD: 4.2 % (ref 0–6)
ERYTHROCYTE [DISTWIDTH] IN BLOOD BY AUTOMATED COUNT: 16.9 FL (ref 11.5–15)
GLUCOSE SERPL-MCNC: 91 MG/DL (ref 74–99)
HCT VFR BLD AUTO: 36.4 % (ref 34–48)
HGB BLD-MCNC: 10.7 G/DL (ref 11.5–15.5)
IMM GRANULOCYTES # BLD: 0 E9/L
IMM GRANULOCYTES NFR BLD: 0 % (ref 0–5)
INR BLD: 1
LYMPHOCYTES # BLD: 1.42 E9/L (ref 1.5–4)
LYMPHOCYTES NFR BLD: 29.5 % (ref 20–42)
MCH RBC QN AUTO: 24.8 PG (ref 26–35)
MCHC RBC AUTO-ENTMCNC: 29.4 % (ref 32–34.5)
MCV RBC AUTO: 84.5 FL (ref 80–99.9)
MONOCYTES # BLD: 0.43 E9/L (ref 0.1–0.95)
MONOCYTES NFR BLD: 8.9 % (ref 2–12)
NEUTROPHILS # BLD: 2.71 E9/L (ref 1.8–7.3)
NEUTS SEG NFR BLD: 56.4 % (ref 43–80)
PLATELET # BLD AUTO: 247 E9/L (ref 130–450)
PMV BLD AUTO: 11.3 FL (ref 7–12)
POTASSIUM SERPL-SCNC: 4 MMOL/L (ref 3.5–5)
PROT SERPL-MCNC: 7.6 G/DL (ref 6.4–8.3)
PROTHROMBIN TIME: 10.8 SEC (ref 9.3–12.4)
RBC # BLD AUTO: 4.31 E12/L (ref 3.5–5.5)
SODIUM SERPL-SCNC: 136 MMOL/L (ref 132–146)
WBC # BLD: 4.8 E9/L (ref 4.5–11.5)

## 2023-06-06 PROCEDURE — 86850 RBC ANTIBODY SCREEN: CPT

## 2023-06-06 PROCEDURE — 36415 COLL VENOUS BLD VENIPUNCTURE: CPT

## 2023-06-06 PROCEDURE — 85610 PROTHROMBIN TIME: CPT

## 2023-06-06 PROCEDURE — 86901 BLOOD TYPING SEROLOGIC RH(D): CPT

## 2023-06-06 PROCEDURE — 80053 COMPREHEN METABOLIC PANEL: CPT

## 2023-06-06 PROCEDURE — 87081 CULTURE SCREEN ONLY: CPT

## 2023-06-06 PROCEDURE — 85025 COMPLETE CBC W/AUTO DIFF WBC: CPT

## 2023-06-06 PROCEDURE — 86900 BLOOD TYPING SEROLOGIC ABO: CPT

## 2023-06-07 LAB — MRSA SPEC QL CULT: NORMAL

## 2023-06-09 ENCOUNTER — OFFICE VISIT (OUTPATIENT)
Dept: PRIMARY CARE CLINIC | Age: 68
End: 2023-06-09
Payer: MEDICARE

## 2023-06-09 VITALS
SYSTOLIC BLOOD PRESSURE: 122 MMHG | TEMPERATURE: 97.2 F | DIASTOLIC BLOOD PRESSURE: 80 MMHG | BODY MASS INDEX: 38.77 KG/M2 | OXYGEN SATURATION: 98 % | WEIGHT: 233 LBS | HEART RATE: 79 BPM

## 2023-06-09 DIAGNOSIS — E05.00 GRAVES DISEASE: Primary | ICD-10-CM

## 2023-06-09 DIAGNOSIS — E05.00 GRAVES DISEASE: ICD-10-CM

## 2023-06-09 DIAGNOSIS — D50.9 IRON DEFICIENCY ANEMIA, UNSPECIFIED IRON DEFICIENCY ANEMIA TYPE: ICD-10-CM

## 2023-06-09 DIAGNOSIS — I25.110 CORONARY ARTERY DISEASE INVOLVING NATIVE CORONARY ARTERY OF NATIVE HEART WITH UNSTABLE ANGINA PECTORIS (HCC): ICD-10-CM

## 2023-06-09 LAB
CHOLESTEROL, TOTAL: 171 MG/DL (ref 0–199)
HDLC SERPL-MCNC: 64 MG/DL
LDLC SERPL CALC-MCNC: 93 MG/DL (ref 0–99)
T4 FREE SERPL-MCNC: 1.22 NG/DL (ref 0.93–1.7)
TRIGL SERPL-MCNC: 71 MG/DL (ref 0–149)
TSH SERPL-MCNC: 2.61 UIU/ML (ref 0.27–4.2)
VLDLC SERPL CALC-MCNC: 14 MG/DL

## 2023-06-09 PROCEDURE — 1124F ACP DISCUSS-NO DSCNMKR DOCD: CPT | Performed by: FAMILY MEDICINE

## 2023-06-09 PROCEDURE — 99213 OFFICE O/P EST LOW 20 MIN: CPT | Performed by: FAMILY MEDICINE

## 2023-06-09 ASSESSMENT — ENCOUNTER SYMPTOMS
HAIR LOSS: 1
CONSTIPATION: 0
DIARRHEA: 0

## 2023-06-09 NOTE — PROGRESS NOTES
present. Neurological:      Mental Status: She is alert and oriented to person, place, and time. Psychiatric:         Mood and Affect: Mood normal.       ASSESSMENT AND PLAN:    Yamilex Car was seen today for cold extremity. Diagnoses and all orders for this visit:    Graves disease  -     TSH; Future  -     T4, Free; Future    Coronary artery disease involving native coronary artery of native heart with unstable angina pectoris ([facility] [address]  -     Lipid Panel; Future    Iron deficiency anemia, unspecified iron deficiency anemia type    - consider restarting ferrous sulfate after tka surgery. - continue statin. Return in about 6 weeks (around 7/21/2023) for medicare pe and ov.     Consuelo Dominguez, DO

## 2023-06-12 PROBLEM — Z96.651 S/P TKR (TOTAL KNEE REPLACEMENT) USING CEMENT, RIGHT: Status: ACTIVE | Noted: 2023-06-12

## 2023-06-19 DIAGNOSIS — G89.18 POST-OP PAIN: ICD-10-CM

## 2023-06-19 DIAGNOSIS — Z96.651 S/P TOTAL KNEE ARTHROPLASTY, RIGHT: Primary | ICD-10-CM

## 2023-06-19 RX ORDER — OXYCODONE HYDROCHLORIDE 5 MG/1
5 TABLET ORAL EVERY 6 HOURS PRN
Qty: 28 TABLET | Refills: 0 | Status: SHIPPED | OUTPATIENT
Start: 2023-06-19 | End: 2023-06-26

## 2023-06-19 RX ORDER — IBUPROFEN 800 MG/1
800 TABLET ORAL 3 TIMES DAILY PRN
Qty: 90 TABLET | Refills: 0 | Status: SHIPPED | OUTPATIENT
Start: 2023-06-19

## 2023-06-19 RX ORDER — IBUPROFEN 800 MG/1
800 TABLET ORAL 3 TIMES DAILY PRN
Qty: 90 TABLET | Refills: 0 | Status: CANCELLED | OUTPATIENT
Start: 2023-06-19

## 2023-06-19 NOTE — TELEPHONE ENCOUNTER
Controlled Substance Monitoring:    Acute and Chronic Pain Monitoring:   RX Monitoring 6/19/2023   Attestation -   Periodic Controlled Substance Monitoring No signs of potential drug abuse or diversion identified.

## 2023-06-19 NOTE — TELEPHONE ENCOUNTER
PT ordered  Ibuprofen ordered  Agree with RICE therapy. If calf becomes edematous or painful to squeeze, go to ED.

## 2023-06-19 NOTE — TELEPHONE ENCOUNTER
Patient called office with concern for swelling to surgical knee. Discussed with patient. Recommended ice, elevation, compression, and NSAIDS. If swelling does not improve, call office. Patient agreeable to plan. Patient allergy to Mobic, but states she tolerates ibuprofen, aleve, etc.     Patient requesting order for outpatient therapy at [facility]. Order pended and routed.

## 2023-06-19 NOTE — TELEPHONE ENCOUNTER
Received call from patient requesting refill of Oxycodone 5 mg at [facility]. Date of Procedure: 6/12/2023  Procedure(s):  RIGHT KNEE TOTAL ARTHROPLASTY     Surgeon(s):  Corinna Watt MD  [place], [place]    Weeks from Surgery: 1 week    Last OV: 4/19/2023    Medication pended and routed to providers for decision and signature.     Future Appointments   Date Time Provider Artemio Palacios   6/26/2023  9:00 AM SCHEDULE, [facility]       Electronically signed by Marjorie Arellano ATC on 6/19/2023 at 12:41 PM

## 2023-06-21 DIAGNOSIS — Z96.651 S/P TOTAL KNEE ARTHROPLASTY, RIGHT: Primary | ICD-10-CM

## 2023-06-25 DIAGNOSIS — G89.18 POST-OP PAIN: ICD-10-CM

## 2023-06-26 ENCOUNTER — OFFICE VISIT (OUTPATIENT)
Dept: ORTHOPEDIC SURGERY | Age: 68
End: 2023-06-26
Payer: MEDICARE

## 2023-06-26 ENCOUNTER — HOSPITAL ENCOUNTER (OUTPATIENT)
Dept: GENERAL RADIOLOGY | Age: 68
Discharge: HOME | End: 2023-06-28
Payer: MEDICARE

## 2023-06-26 DIAGNOSIS — Z96.651 S/P TOTAL KNEE ARTHROPLASTY, RIGHT: ICD-10-CM

## 2023-06-26 DIAGNOSIS — Z96.651 S/P TOTAL KNEE ARTHROPLASTY, RIGHT: Primary | ICD-10-CM

## 2023-06-26 PROCEDURE — 73560 X-RAY EXAM OF KNEE 1 OR 2: CPT

## 2023-06-26 PROCEDURE — 99024 POSTOP FOLLOW-UP VISIT: CPT | Performed by: PHYSICIAN ASSISTANT

## 2023-06-26 PROCEDURE — 99213 OFFICE O/P EST LOW 20 MIN: CPT

## 2023-06-26 RX ORDER — OXYCODONE HYDROCHLORIDE 5 MG/1
5 TABLET ORAL EVERY 6 HOURS PRN
Qty: 28 TABLET | Refills: 0 | Status: SHIPPED | OUTPATIENT
Start: 2023-06-26 | End: 2023-07-03

## 2023-06-27 DIAGNOSIS — F32.89 OTHER DEPRESSION: ICD-10-CM

## 2023-06-27 RX ORDER — BUPROPION HYDROCHLORIDE 300 MG/1
TABLET ORAL
Qty: 90 TABLET | Refills: 1 | OUTPATIENT
Start: 2023-06-27

## 2023-06-29 ENCOUNTER — HOSPITAL ENCOUNTER (OUTPATIENT)
Dept: PHYSICAL THERAPY | Age: 68
Setting detail: THERAPIES SERIES
Discharge: HOME | End: 2023-06-29
Payer: MEDICARE

## 2023-06-29 PROCEDURE — 97161 PT EVAL LOW COMPLEX 20 MIN: CPT

## 2023-06-29 ASSESSMENT — PAIN DESCRIPTION - DESCRIPTORS: DESCRIPTORS: ACHING;BURNING;THROBBING

## 2023-06-29 ASSESSMENT — PAIN SCALES - GENERAL: PAINLEVEL_OUTOF10: 6

## 2023-06-29 ASSESSMENT — PAIN DESCRIPTION - PAIN TYPE: TYPE: SURGICAL PAIN

## 2023-06-29 ASSESSMENT — PAIN DESCRIPTION - ORIENTATION: ORIENTATION: RIGHT

## 2023-06-29 ASSESSMENT — PAIN DESCRIPTION - LOCATION: LOCATION: KNEE

## 2023-06-30 DIAGNOSIS — F32.89 OTHER DEPRESSION: ICD-10-CM

## 2023-06-30 RX ORDER — HYDROXYZINE HYDROCHLORIDE 25 MG/1
25 TABLET, FILM COATED ORAL EVERY 8 HOURS PRN
Qty: 30 TABLET | Refills: 2 | Status: SHIPPED | OUTPATIENT
Start: 2023-06-30 | End: 2023-07-30

## 2023-06-30 RX ORDER — BUPROPION HYDROCHLORIDE 300 MG/1
TABLET ORAL
Qty: 90 TABLET | Refills: 1 | Status: SHIPPED | OUTPATIENT
Start: 2023-06-30

## 2023-07-02 DIAGNOSIS — Z96.651 S/P TOTAL KNEE ARTHROPLASTY, RIGHT: Primary | ICD-10-CM

## 2023-07-02 DIAGNOSIS — G89.18 POST-OP PAIN: ICD-10-CM

## 2023-07-03 RX ORDER — OXYCODONE HYDROCHLORIDE 5 MG/1
5 TABLET ORAL 2 TIMES DAILY PRN
Qty: 14 TABLET | Refills: 0 | Status: SHIPPED | OUTPATIENT
Start: 2023-07-03 | End: 2023-07-10

## 2023-07-03 NOTE — TELEPHONE ENCOUNTER
oxycodone refilled and weaned to BID PRN    Controlled Substance Monitoring:    Acute and Chronic Pain Monitoring:   RX Monitoring 7/3/2023   Attestation -   Periodic Controlled Substance Monitoring No signs of potential drug abuse or diversion identified.

## 2023-07-03 NOTE — TELEPHONE ENCOUNTER
Pharmacy interface requesting refill of roxicodone    Last office visit: 06/26/23    Future Appointments   Date Time Provider [address]   7/5/2023 11:00 AM Kelley CASTELLON PT [facility]   7/7/2023  9:00 AM GLENROY Maloney PT [facility]   7/19/2023 10:00 AM Emili Corrigan MD [facility]       OP:SURGEON:  Dr. Chetan Soriano and Dr. Julianne Carrillo: 6/12/23  PROCEDURE: right TKA

## 2023-07-05 ENCOUNTER — HOSPITAL ENCOUNTER (OUTPATIENT)
Dept: PHYSICAL THERAPY | Age: 68
Setting detail: THERAPIES SERIES
Discharge: HOME | End: 2023-07-05
Payer: MEDICARE

## 2023-07-05 PROCEDURE — 97110 THERAPEUTIC EXERCISES: CPT

## 2023-07-05 NOTE — PROGRESS NOTES
[facility]on Cline  Phone: [phone] Fax: [phone]       Physical Therapy Daily Treatment Note  Date:  2023    Patient Name:  Dari Medrano    :  1955  MRN: [id number]    Restrictions/Precautions:    Diagnosis:  S/P Right TKA   Treatment Diagnosis:    Insurance/Certification information:  [facility] [employer]  Referring Physician: Dr. Ivon Crawley of care signed (Y/N):    Visit# / total visits:  2/4-6 weeks  Pain level: /10  Time In:  11:05      Time Out: 12:05         Subjective: Pt states she has been using her restorator pedals at home. Exercises:  Exercise/Equipment Resistance/Repetitions Other comments   BTE -CPM  10 min    Bike  10 min    Hamstring stretch 10\" 5 x @ step   Knee flexion stretch 10\" 5 x @ step          Heel slides 10 x w/end range pause    SLR 2 x 5    SAQ 10 x    LAQ 10 x                                                                               Other Therapeutic Activities:  Cued gait mechanics to increase knee flexion in swing through face and decrease stiff leg gait. Trial of using cane in left hand however she noted feeling more comfortable with it in her right hand. Home Exercise Program:      Manual Treatments:      Modalities: Ice to right knee x 10 min after ex. -pt seated w/RLE on stool     Timed Code Treatment Minutes:  55    Total Treatment Minutes:  60    Treatment/Activity Tolerance:  [x] Patient tolerated treatment well [] Patient limited by fatigue  [] Patient limited by pain  [] Patient limited by other medical complications  [x] Other: Good tolerance for initiation of exercises with no pain increase noted.      Plan:   [x] Continue per plan of care [] Alter current plan (see comments)  [] Plan of care initiated [] Hold pending MD visit [] Discharge  Plan for Next Session:         Treatment Charges: Mins Units   Initial Evaluation     Re-Evaluation     Ther Exercise         TE 45 3   Manual Therapy     MT     Ther Activities

## 2023-07-07 ENCOUNTER — HOSPITAL ENCOUNTER (OUTPATIENT)
Dept: PHYSICAL THERAPY | Age: 68
Setting detail: THERAPIES SERIES
Discharge: HOME | End: 2023-07-07
Payer: MEDICARE

## 2023-07-07 PROCEDURE — 97530 THERAPEUTIC ACTIVITIES: CPT

## 2023-07-07 PROCEDURE — 97110 THERAPEUTIC EXERCISES: CPT

## 2023-07-08 DIAGNOSIS — G89.18 POST-OP PAIN: ICD-10-CM

## 2023-07-10 ENCOUNTER — HOSPITAL ENCOUNTER (OUTPATIENT)
Dept: PHYSICAL THERAPY | Age: 68
Setting detail: THERAPIES SERIES
Discharge: HOME | End: 2023-07-10
Payer: MEDICARE

## 2023-07-10 DIAGNOSIS — G89.18 POST-OP PAIN: ICD-10-CM

## 2023-07-10 PROCEDURE — 97110 THERAPEUTIC EXERCISES: CPT

## 2023-07-10 RX ORDER — OXYBUTYNIN CHLORIDE 10 MG/1
10 TABLET, EXTENDED RELEASE ORAL NIGHTLY
Qty: 90 TABLET | Refills: 1 | Status: SHIPPED | OUTPATIENT
Start: 2023-07-10

## 2023-07-10 RX ORDER — OXYCODONE HYDROCHLORIDE 5 MG/1
5 TABLET ORAL 2 TIMES DAILY PRN
Qty: 14 TABLET | Refills: 0 | OUTPATIENT
Start: 2023-07-10 | End: 2023-07-17

## 2023-07-10 RX ORDER — OXYCODONE HYDROCHLORIDE 5 MG/1
5 TABLET ORAL DAILY PRN
Qty: 7 TABLET | Refills: 0 | Status: SHIPPED | OUTPATIENT
Start: 2023-07-10 | End: 2023-07-17

## 2023-07-10 RX ORDER — HYDROXYZINE HYDROCHLORIDE 25 MG/1
25 TABLET, FILM COATED ORAL EVERY 8 HOURS PRN
Qty: 30 TABLET | Refills: 2 | OUTPATIENT
Start: 2023-07-10 | End: 2023-08-09

## 2023-07-10 NOTE — TELEPHONE ENCOUNTER
Percocet refilled and weaned to once daily prn. This will be last refill of narcotics. Controlled Substance Monitoring:    Acute and Chronic Pain Monitoring:   RX Monitoring 7/10/2023   Attestation -   Periodic Controlled Substance Monitoring No signs of potential drug abuse or diversion identified.

## 2023-07-10 NOTE — PROGRESS NOTES
Davon Cline  Phone: [phone] Fax: [phone]       Physical Therapy Daily Treatment Note  Date:  7/10/2023    Patient Name:  Dakota Calles    :  1955  MRN: [id number]    Restrictions/Precautions:    Diagnosis:  S/P Right TKA   Treatment Diagnosis:    Insurance/Certification information:  [facility] [employer]  Referring Physician: Dr. Christeen Severe of care signed (Y/N):    Visit# / total visits:  4/4-6 weeks  Pain level: /10  Time In:  13:00      Time Out: 14:10         Subjective: Pt c/o pain and swelling in post-medial right knee area. States limited sleep last night. Pt also reports she has already performed stretches ~5x today and was on her bike at 3:30 this morning. She has been walking her driveway and to her garden. Advised pt she may be overdoing stretching ex. Instructed her to continue to move however only stretch 2-3x/day. Pt has called physician's office regarding possible compression stockings to manage swelling. Advised her to report any persistent symptoms. Exercises:  Exercise/Equipment Resistance/Repetitions Other comments   BTE -CPM  12 min     Bike  10 min    Hamstring stretch 10\" 5 x @ step   Knee flexion stretch 10\" 5 x @ step          Heel slides 10 x w/end range pause 5s    SLR 3 x 5    SAQ 2 x15     LAQ 2 x 15    STS from low box   10 x Corrected technique                                                                     Other Therapeutic Activities:  Pt ambulated 225' using SPC and SBA. More consistent heel toe mechanics. Trial of using cane in left hand again to take weight off RLE. Home Exercise Program:  NA    Manual Treatments:  NA    Modalities: Ice to right knee x 10 min after ex. -pt seated w/RLE on stool    Comments: Pt tolerated all activity well with no pain increase noted. Pt demonstrates better gait mechanics and decreased stiff knee movements.    Corrected sit to stand technique to facilitate weight fwd on LE's and

## 2023-07-10 NOTE — TELEPHONE ENCOUNTER
Patient contact through interface, requesting refill.      RIGHT KNEE TOTAL ARTHROPLASTY 6/12/23    LOV: 6/26/23    Future Appointments   Date Time Provider [address]   7/12/2023 10:00 AM Israel CASTELLON PT [facility]   7/14/2023  9:00 AM GLENROY Marin PT [facility]   7/19/2023 10:00 AM Mesha Plata MD  [facility]     Pend and routed

## 2023-07-12 ENCOUNTER — HOSPITAL ENCOUNTER (OUTPATIENT)
Dept: PHYSICAL THERAPY | Age: 68
Setting detail: THERAPIES SERIES
Discharge: HOME | End: 2023-07-12
Payer: MEDICARE

## 2023-07-12 PROCEDURE — 97110 THERAPEUTIC EXERCISES: CPT

## 2023-07-12 NOTE — PROGRESS NOTES
(see comments)  [] Plan of care initiated [] Hold pending MD visit [] Discharge  Plan for Next Session:         Treatment Charges: Mins Units   Initial Evaluation     Re-Evaluation     Ther Exercise         TE 60 4   Manual Therapy     MT     Ther Activities        TA     Gait Training          GT     Neuro Re-education NR     Modalities     Non-Billable Service Time 10 --   Other 5    Total Time/Units 75 4     Electronically signed by:     HECTOR Stringer  [id number]

## 2023-07-14 ENCOUNTER — HOSPITAL ENCOUNTER (OUTPATIENT)
Dept: PHYSICAL THERAPY | Age: 68
Setting detail: THERAPIES SERIES
Discharge: HOME | End: 2023-07-14
Payer: MEDICARE

## 2023-07-14 PROCEDURE — 97110 THERAPEUTIC EXERCISES: CPT

## 2023-07-14 NOTE — PROGRESS NOTES
Davon Cline  Phone: [phone] Fax: [phone]       Physical Therapy Daily Treatment Note  Date:  2023    Patient Name:  Tawanda Spivey    :  1955  MRN: [id number]    Restrictions/Precautions:    Diagnosis:  S/P Right TKA   Treatment Diagnosis:    Insurance/Certification information:  [facility] [employer]  Referring Physician: Dr. Chad Ferreira of care signed (Y/N):    Visit# / total visits:  6/4-6 weeks  Pain level: 7-8/10  Time In:  0900      Time Out: 1015         Subjective: Pt reports aching in the knee, but it's not pain. Pt also reports having numbness in her 2nd and 3rd toes that is new. Pt reports increased aching as 7-8/10 following exercises. Exercises:  Exercise/Equipment Resistance/Repetitions Other comments   BTE -CPM  12 min     Bike  10 min    Hamstring stretch 10\" 5 x @ step   Knee flexion stretch 10\" 5 x @ step   Calf stretch   10\" 5 x  0ff step   Heel slides 10 x w/end range pause 5s    SLR 10 x    SAQ 2 x15 2#    LAQ 2 x 15 2#    STS from low box   20 x Corrected technique           Fwd step ups    6\" 10 x R                                                       Other Therapeutic Activities:      ROM: (2023) Right knee active flexion to 95*, passive flexion to 100* -measured supine    Home Exercise Program:  NA    Manual Treatments:  NA    Modalities: Ice to right knee x 10 min after ex. -pt seated w/RLE on stool    Comments: Pt able to perform all exercises but did report increased pain/aching during session. Pt initially denied pain but when asked to quantify the ache, she stated 7-8/10. Pt with continued edema at the surgical site and ankle, that she states is increased since Wednesday. Will progress as able.     Timed Code Treatment Minutes:  70    Total Treatment Minutes:  75    Treatment/Activity Tolerance:  [x] Patient tolerated treatment well [] Patient limited by fatigue  [] Patient limited by pain  [] Patient limited by other medical

## 2023-07-16 DIAGNOSIS — G89.18 POST-OP PAIN: ICD-10-CM

## 2023-07-17 ENCOUNTER — HOSPITAL ENCOUNTER (OUTPATIENT)
Dept: PHYSICAL THERAPY | Age: 68
Setting detail: THERAPIES SERIES
Discharge: HOME | End: 2023-07-17
Payer: MEDICARE

## 2023-07-17 DIAGNOSIS — F41.1 GAD (GENERALIZED ANXIETY DISORDER): ICD-10-CM

## 2023-07-17 PROCEDURE — 97110 THERAPEUTIC EXERCISES: CPT

## 2023-07-17 RX ORDER — FLUOXETINE HYDROCHLORIDE 20 MG/1
CAPSULE ORAL
Qty: 90 CAPSULE | Refills: 1 | Status: SHIPPED | OUTPATIENT
Start: 2023-07-17

## 2023-07-17 RX ORDER — OXYBUTYNIN CHLORIDE 10 MG/1
10 TABLET, EXTENDED RELEASE ORAL NIGHTLY
Qty: 90 TABLET | Refills: 1 | OUTPATIENT
Start: 2023-07-17

## 2023-07-17 RX ORDER — OXYCODONE HYDROCHLORIDE 5 MG/1
5 TABLET ORAL DAILY PRN
Qty: 7 TABLET | Refills: 0 | OUTPATIENT
Start: 2023-07-17 | End: 2023-07-24

## 2023-07-17 RX ORDER — HYDROXYZINE HYDROCHLORIDE 25 MG/1
25 TABLET, FILM COATED ORAL EVERY 8 HOURS PRN
Qty: 30 TABLET | Refills: 2 | OUTPATIENT
Start: 2023-07-17 | End: 2023-08-16

## 2023-07-17 RX ORDER — HYDROXYZINE HYDROCHLORIDE 25 MG/1
25 TABLET, FILM COATED ORAL EVERY 8 HOURS PRN
Qty: 30 TABLET | Refills: 2 | Status: SHIPPED | OUTPATIENT
Start: 2023-07-17 | End: 2023-08-16

## 2023-07-17 RX ORDER — FLUOXETINE HYDROCHLORIDE 40 MG/1
CAPSULE ORAL
Qty: 90 CAPSULE | Refills: 1 | Status: SHIPPED | OUTPATIENT
Start: 2023-07-17

## 2023-07-17 NOTE — PROGRESS NOTES
Davon Cline  Phone: [phone] Fax: [phone]       Physical Therapy Daily Treatment Note  Date:  2023    Patient Name:  Lisandro Hinkle    :  1955  MRN: [id number]    Restrictions/Precautions:    Diagnosis:  S/P Right TKA   Treatment Diagnosis:    Insurance/Certification information:  [facility] [employer] Denice Gum after 20 visits)  Referring Physician: Dr. Linette Hernandez of care signed (Y/N):    Visit# / total visits:  7/4-6 weeks  Pain level: /10  Time In:  9:00      Time Out: 10:20         Subjective: Pt reports stiffness in her right knee this morning. Exercises:  Exercise/Equipment Resistance/Repetitions Other comments   BTE -CPM  12 min     Bike  10 min    Hamstring stretch 10\" 5 x @ step   Knee flexion stretch 10\" 5 x @ step   Calf stretch   10\" 5 x  0ff step   Heel slides 15 x w/end range pause 5s    SLR 10 x 2    SAQ 2 x15 3#    LAQ 2 x 15 3#    STS from low box   15 x 2 Corrected technique           Fwd step ups    6\" 15 x R                                                       Other Therapeutic Activities:      ROM: (2023) Right knee active flexion to 95*, passive flexion to 100* -measured supine    Home Exercise Program:  NA    Manual Treatments:  NA    Modalities: Ice to right knee x 10 min after ex. -pt seated w/RLE on stool    Comments: Pt tolerated gradual progression of exercises. She reported pain after one set of step ups noting she was focusing on minimal use of UE's.      Timed Code Treatment Minutes:  70    Total Treatment Minutes:  80    Treatment/Activity Tolerance:  [x] Patient tolerated treatment well [] Patient limited by fatigue  [] Patient limited by pain  [] Patient limited by other medical complications  [] Other:      Plan:   [x] Continue per plan of care [] Alter current plan (see comments)  [] Plan of care initiated [] Hold pending MD visit [] Discharge  Plan for Next Session:         Treatment Charges: Mins Units   Initial Evaluation yes

## 2023-07-19 ENCOUNTER — HOSPITAL ENCOUNTER (OUTPATIENT)
Dept: PHYSICAL THERAPY | Age: 68
Setting detail: THERAPIES SERIES
Discharge: HOME | End: 2023-07-19
Payer: MEDICARE

## 2023-07-19 ENCOUNTER — OFFICE VISIT (OUTPATIENT)
Dept: ORTHOPEDIC SURGERY | Age: 68
End: 2023-07-19
Payer: MEDICARE

## 2023-07-19 ENCOUNTER — HOSPITAL ENCOUNTER (OUTPATIENT)
Dept: GENERAL RADIOLOGY | Age: 68
Discharge: HOME | End: 2023-07-21
Payer: MEDICARE

## 2023-07-19 DIAGNOSIS — Z96.651 S/P TOTAL KNEE ARTHROPLASTY, RIGHT: ICD-10-CM

## 2023-07-19 DIAGNOSIS — Z96.651 S/P TOTAL KNEE ARTHROPLASTY, RIGHT: Primary | ICD-10-CM

## 2023-07-19 PROCEDURE — 97110 THERAPEUTIC EXERCISES: CPT

## 2023-07-19 PROCEDURE — 99024 POSTOP FOLLOW-UP VISIT: CPT | Performed by: PHYSICIAN ASSISTANT

## 2023-07-19 PROCEDURE — 99212 OFFICE O/P EST SF 10 MIN: CPT

## 2023-07-19 PROCEDURE — 73560 X-RAY EXAM OF KNEE 1 OR 2: CPT

## 2023-07-19 NOTE — PROGRESS NOTES
Next Session:         Treatment Charges: Mins Units   Initial Evaluation     Re-Evaluation     Ther Exercise         TE 60 4   Manual Therapy     MT     Ther Activities        TA 5 --   Gait Training          GT     Neuro Re-education NR     Modalities     Non-Billable Service Time      Other 10    Total Time/Units 75 4     Electronically signed by:     HECTOR Post  [id number]

## 2023-07-19 NOTE — PATIENT INSTRUCTIONS
Weight bearing as tolerated to the right lower extremity. Continue therapy. Follow up in 4-6 weeks with xrays in the office. Scar Care Instructions      Sunscreen Recommendations for Scars  We recommend that all patients use a sunscreen when outside but especially on new scars (that are exposed to sunlight) for a year after their procedure. The SPF should be at least 28. Follow the application directions on the bottle. Why is it so Important to Use Sunscreen on Scars? A scar is new and more fragile than the surrounding skin. If you do not use sunscreen, the scar line will react differently to the sun than the surrounding skin. If you don't use sunscreen, the scar tissue will become darker than surrounding skin. This is a hyper-pigmented scar and will remain darker than the other skin. After one year, the scar and surrounding skin should react equally to sun. Superficial Scar Massage  Scar massage desensitizes and reduces scar adhesions so skin glides freely. Rub in a circular motion on and around the scar with firm, even pressure for 5 minutes four times per day   You can start scar massage once incision is completely healed and strong enough to handle the motion (usually 10 - 14 days post operatively). You use lotion to do the scar massage to allow ease with motion over the scar and prevent friction at the area.

## 2023-07-19 NOTE — PROGRESS NOTES
Juvenal Chaves is a 74 y.o. female who presents for follow up of right TKA    SURGEON: Dr. Tina Wheeler MD  Date of Injury/Surgery:  6-  Date last seen in office:  6-26-23    Symptoms: better  New complaints: none    Weightbearing: right lower Full weight bearing      Assistive device No Device  Participating in therapy (location if yes)?  yes, [address] [place] [address]    Refills Needed:  none  Order/Referral Needed: no
visit. XR:   Xrays of the right knee obtained today in the office. Stable right knee total arthroplasty with no acute fracture or dislocations    Assessment:  5 weeks s/p right TKA    Plan:  Xrays reviewed with patient  Plan of care discussed in detail, all questions sought and answered to patients satisfaction at this time. Patient reports sensitivity over her scar. Information on scar management and scar pads provided to the patient. WB:  Weight bearing as tolerated on right lower extremity    Continue therapy. Advance activities as tolerated. Tylenol and ibuprofen for pain. Follow up in 4-6 weeks with XR of the right knee to be done in office. Electronically signed by Mendel Fix, PA-C on 7/19/2023 at 10:41 AM  Note: This report was completed using computerHospicelink voiced recognition software. Every effort has been made to ensure accuracy; however, inadvertent computerized transcription errors may be present.

## 2023-07-21 ENCOUNTER — HOSPITAL ENCOUNTER (OUTPATIENT)
Dept: PHYSICAL THERAPY | Age: 68
Setting detail: THERAPIES SERIES
Discharge: HOME | End: 2023-07-21
Payer: MEDICARE

## 2023-07-21 PROCEDURE — 97110 THERAPEUTIC EXERCISES: CPT

## 2023-07-24 ENCOUNTER — HOSPITAL ENCOUNTER (OUTPATIENT)
Dept: PHYSICAL THERAPY | Age: 68
Setting detail: THERAPIES SERIES
Discharge: HOME | End: 2023-07-24
Payer: MEDICARE

## 2023-07-24 PROCEDURE — 97110 THERAPEUTIC EXERCISES: CPT

## 2023-07-24 RX ORDER — OXYBUTYNIN CHLORIDE 10 MG/1
10 TABLET, EXTENDED RELEASE ORAL NIGHTLY
Qty: 90 TABLET | Refills: 1 | OUTPATIENT
Start: 2023-07-24

## 2023-07-24 NOTE — PROGRESS NOTES
Davon Cline  Phone: [phone] Fax: [phone]       Physical Therapy Daily Treatment Note  Date:  2023    Patient Name:  Gurwinder Han    :  1955  MRN: [id number]    Restrictions/Precautions:    Diagnosis:  S/P Right TKA   Treatment Diagnosis:    Insurance/Certification information:  [facility] [employer] Aydin Lopez after 20 visits)  Referring Physician: Dr. Janie Huynh of care signed (Y/N):    Visit# / total visits:  10/4-6 weeks  Pain level: /10  Time In: 9:15   Time Out: 10:15        Subjective: Pt arrives late for appointment today reporting she had to pull over on her way here to massage right quad spasm. She is noting location in proximal quad. Exercises:  Exercise/Equipment Resistance/Repetitions Other comments   BTE -CPM  12 min     Bike  10 min    Hamstring stretch 10\" 5 x @ step   Knee flexion stretch 10\" 5 x @ step   Calf stretch   10\" 5 x  0ff step   Heel slides 15 x w/end range pause 5s NT   SLR 10 x 2 NT   SAQ 2 x15 3# NT   LAQ  5 x-limited by pain    STS from low box   15 x 2                               NT Corrected technique    TKE with blue cord 10x NT   Fwd step ups                                                          Other Therapeutic Activities:  NA    ROM: (2023) Right knee active flexion to 95*, passive flexion to 103* -measured supine    Home Exercise Program:  NA    Manual Treatments:  NA    Modalities: Ice to right knee x 15 min after ex. -pt seated w/RLE on stool     Comments: Pt reported her right knee felt fine this morning however she now has varying c/o soreness/stiffness. She presents throughout session holding onto the anterior knee and rubbing it. Attempted LAQ w/no weight however this was limited by pt c/o pain. Decided to limit exercise today and advised pt to contact physician if complaints persist.   No increase in edema or temp change noted. Incision appears well healed.       Timed Code Treatment Minutes:  50    Total

## 2023-07-26 ENCOUNTER — HOSPITAL ENCOUNTER (OUTPATIENT)
Dept: PHYSICAL THERAPY | Age: 68
Setting detail: THERAPIES SERIES
Discharge: HOME | End: 2023-07-26
Payer: MEDICARE

## 2023-07-26 PROCEDURE — 97110 THERAPEUTIC EXERCISES: CPT

## 2023-07-26 NOTE — PROGRESS NOTES
pending MD visit [] Discharge  Plan for Next Session:         Treatment Charges: Mins Units   Initial Evaluation     Re-Evaluation     Ther Exercise         TE 45 3   Manual Therapy     MT     Ther Activities        TA     Gait Training          GT     Neuro Re-education NR     Modalities     Non-Billable Service Time  10 --   Other     Total Time/Units 55 3     Electronically signed by:     HECTOR Kong  520

## 2023-07-28 ENCOUNTER — HOSPITAL ENCOUNTER (OUTPATIENT)
Dept: PHYSICAL THERAPY | Age: 68
Setting detail: THERAPIES SERIES
Discharge: HOME | End: 2023-07-28
Payer: MEDICARE

## 2023-07-28 PROCEDURE — 97110 THERAPEUTIC EXERCISES: CPT

## 2023-07-28 NOTE — PROGRESS NOTES
progresses. Will progress exercises as able. Timed Code Treatment Minutes:       Total Treatment Minutes:      Treatment/Activity Tolerance:  [x] Patient tolerated treatment well [] Patient limited by fatigue  [] Patient limited by pain  [] Patient limited by other medical complications  [] Other:      Plan:   [x] Continue per plan of care [] Alter current plan (see comments)  [] Plan of care initiated [] Hold pending MD visit [] Discharge  Plan for Next Session:         Treatment Charges: Mins Units   Initial Evaluation     Re-Evaluation     Ther Exercise         TE 60 4   Manual Therapy     MT     Ther Activities        TA     Gait Training          GT     Neuro Re-education NR     Modalities     Non-Billable Service Time  10 --   Other 5 -   Total Time/Units 75 4     Electronically signed by:    Jacinta Medley PT, DPT  License [id number]

## 2023-07-31 ENCOUNTER — HOSPITAL ENCOUNTER (OUTPATIENT)
Dept: PHYSICAL THERAPY | Age: 68
Setting detail: THERAPIES SERIES
Discharge: HOME | End: 2023-07-31
Payer: MEDICARE

## 2023-07-31 PROCEDURE — 97110 THERAPEUTIC EXERCISES: CPT

## 2023-07-31 NOTE — PROGRESS NOTES
Davon Cline  Phone: [phone] Fax: [phone]       Physical Therapy Daily Treatment Note  Date:  2023    Patient Name:  Dar Asencio    :  1955  MRN: [id number]    Restrictions/Precautions:    Diagnosis:  S/P Right TKA   Treatment Diagnosis:    Insurance/Certification information:  [facility] [employer] Mackenize Soni after 20 visits)  Referring Physician: Dr. Sierra Vázquez of care signed (Y/N):    Visit# / total visits:  13/4-6 weeks  Pain level: /10  Time In: 9:05   Time Out: 10:30        Subjective: Pt has no new c/o this morning. Exercises:  Exercise/Equipment Resistance/Repetitions Other comments   BTE -CPM  12 min     Bike  10 min    Hamstring stretch 10\" 5 x @ step   Knee flexion stretch 10\" 5 x @ step   Calf stretch   10\" 5 x  0ff step   Heel slides 20 x w/end range pause 5s    SLR NT   SAQ 2 x15 3# B    LAQ 2 x 15 3# B    STS from  mat table 15 x 2                                Corrected technique   TKE with blue cord 2x10 nt   Fwd step ups       RLE stand/L heel fwd tap  4\" 10 x                                                Other Therapeutic Activities:  Pt negotiated steps using reciprocal pattern. Noted some difficulty still with descending using reciprocal pattern. ROM: (2023) Right knee active flexion to 95*, passive flexion to 103* -measured supine  2023: Right knee active flexion to 92*, AAROM with strap 96*, passive flexion to 107* measured supine    Home Exercise Program:  NA    Manual Treatments:  NA    Modalities: Ice to right knee x 15 min after ex. -pt seated w/RLE on stool     Comments: Pt with better tolerance for exercise today.      Timed Code Treatment Minutes: 75     Total Treatment Minutes:  85    Treatment/Activity Tolerance:  [x] Patient tolerated treatment well [] Patient limited by fatigue  [] Patient limited by pain  [] Patient limited by other medical complications  [] Other:      Plan:   [x] Continue per plan of care []

## 2023-08-01 ENCOUNTER — HOSPITAL ENCOUNTER (OUTPATIENT)
Dept: PHYSICAL THERAPY | Age: 68
Setting detail: THERAPIES SERIES
Discharge: HOME | End: 2023-08-01
Payer: MEDICARE

## 2023-08-01 PROCEDURE — 97110 THERAPEUTIC EXERCISES: CPT

## 2023-08-01 NOTE — PROGRESS NOTES
Davon Cline  Phone: [phone] Fax: [phone]       Physical Therapy Daily Treatment Note  Date:  2023    Patient Name:  Tawanda Spivey    :  1955  MRN: [id number]    Restrictions/Precautions:    Diagnosis:  S/P Right TKA   Treatment Diagnosis:    Insurance/Certification information:  [facility] [employer] Tanya Joya after 20 visits)  Referring Physician: Dr. Chad Ferreira of care signed (Y/N):    Visit# / total visits:  14/4-6 weeks  Pain level: /10  Time In: 10:25   Time Out: 11:20        Subjective: Pt states it is easier to flex right knee as in a hamstring curl. Exercises:  Exercise/Equipment Resistance/Repetitions Other comments   BTE -CPM  12 min     Bike  10 min    Hamstring stretch 10\" 5 x @ step   Knee flexion stretch 10\" 5 x @ step   Calf stretch   10\" 5 x  0ff step   Heel slides 20 x w/end range pause 5s    SLR NT   SAQ 2 x15 3# B    LAQ 2 x 15 3# B    STS from  mat table 15 x 2                                Corrected technique   TKE with blue cord 2x10 nt   Fwd step ups    6\" 10 x R    RLE stand/L heel fwd tap  4\" 10 x nt                                               Other Therapeutic Activities:  Pt negotiated steps using reciprocal pattern. ROM: (2023) Right knee active flexion to 95*, passive flexion to 103* -measured supine  2023: Right knee active flexion to 92*, AAROM with strap 96*, passive flexion to 107* measured supine    Home Exercise Program:  NA    Manual Treatments:  NA    Modalities: l NT per pt    Comments: Pt tolerated all exercises without report of pain increase.      Timed Code Treatment Minutes: 55     Total Treatment Minutes:  55    Treatment/Activity Tolerance:  [x] Patient tolerated treatment well [] Patient limited by fatigue  [] Patient limited by pain  [] Patient limited by other medical complications  [] Other:      Plan:   [x] Continue per plan of care [] Alter current plan (see comments)  [] Plan of care initiated []

## 2023-08-02 ENCOUNTER — APPOINTMENT (OUTPATIENT)
Dept: PHYSICAL THERAPY | Age: 68
End: 2023-08-02
Payer: MEDICARE

## 2023-08-04 ENCOUNTER — HOSPITAL ENCOUNTER (OUTPATIENT)
Dept: PHYSICAL THERAPY | Age: 68
Setting detail: THERAPIES SERIES
Discharge: HOME | End: 2023-08-04
Payer: MEDICARE

## 2023-08-04 PROCEDURE — 97110 THERAPEUTIC EXERCISES: CPT

## 2023-08-04 ASSESSMENT — PROMIS GLOBAL HEALTH SCALE
IN GENERAL, WOULD YOU SAY YOUR HEALTH IS...[ON A SCALE OF 1 (POOR) TO 5 (EXCELLENT)]: 3
IN GENERAL, HOW WOULD YOU RATE YOUR MENTAL HEALTH, INCLUDING YOUR MOOD AND YOUR ABILITY TO THINK [ON A SCALE OF 1 (POOR) TO 5 (EXCELLENT)]?: 2
WHO IS THE PERSON COMPLETING THE PROMIS V1.1 SURVEY?: 0
SUM OF RESPONSES TO QUESTIONS 2, 4, 5, & 10: 10
SUM OF RESPONSES TO QUESTIONS 3, 6, 7, & 8: 13
IN GENERAL, HOW WOULD YOU RATE YOUR PHYSICAL HEALTH [ON A SCALE OF 1 (POOR) TO 5 (EXCELLENT)]?: 2
IN THE PAST 7 DAYS, HOW WOULD YOU RATE YOUR FATIGUE ON AVERAGE [ON A SCALE FROM 1 (NONE) TO 5 (VERY SEVERE)]?: 3
IN GENERAL, PLEASE RATE HOW WELL YOU CARRY OUT YOUR USUAL SOCIAL ACTIVITIES (INCLUDES ACTIVITIES AT HOME, AT WORK, AND IN YOUR COMMUNITY, AND RESPONSIBILITIES AS A PARENT, CHILD, SPOUSE, EMPLOYEE, FRIEND, ETC) [ON A SCALE OF 1 (POOR) TO 5 (EXCELLENT)]?: 2
IN THE PAST 7 DAYS, HOW WOULD YOU RATE YOUR PAIN ON AVERAGE [ON A SCALE FROM 0 (NO PAIN) TO 10 (WORST IMAGINABLE PAIN)]?: 6
IN GENERAL, HOW WOULD YOU RATE YOUR SATISFACTION WITH YOUR SOCIAL ACTIVITIES AND RELATIONSHIPS [ON A SCALE OF 1 (POOR) TO 5 (EXCELLENT)]?: 3
HOW IS THE PROMIS V1.1 BEING ADMINISTERED?: 2
IN THE PAST 7 DAYS, HOW OFTEN HAVE YOU BEEN BOTHERED BY EMOTIONAL PROBLEMS, SUCH AS FEELING ANXIOUS, DEPRESSED, OR IRRITABLE [ON A SCALE FROM 1 (NEVER) TO 5 (ALWAYS)]?: 2
IN GENERAL, WOULD YOU SAY YOUR QUALITY OF LIFE IS...[ON A SCALE OF 1 (POOR) TO 5 (EXCELLENT)]: 3
TO WHAT EXTENT ARE YOU ABLE TO CARRY OUT YOUR EVERYDAY PHYSICAL ACTIVITIES SUCH AS WALKING, CLIMBING STAIRS, CARRYING GROCERIES, OR MOVING A CHAIR [ON A SCALE OF 1 (NOT AT ALL) TO 5 (COMPLETELY)]?: 2

## 2023-08-04 ASSESSMENT — KOOS JR
RISING FROM SITTING: 2
GOING UP OR DOWN STAIRS: 2
TWISING OR PIVOTING ON KNEE: 3
STANDING UPRIGHT: 2
BENDING TO THE FLOOR TO PICK UP OBJECT: 2
KOOS JR TOTAL INTERVAL SCORE: 47.487
STRAIGHTENING KNEE FULLY: 2
HOW SEVERE IS YOUR KNEE STIFFNESS AFTER FIRST WAKING IN MORNING: 3

## 2023-08-07 ENCOUNTER — HOSPITAL ENCOUNTER (OUTPATIENT)
Dept: PHYSICAL THERAPY | Age: 68
Setting detail: THERAPIES SERIES
Discharge: HOME | End: 2023-08-07
Payer: MEDICARE

## 2023-08-07 PROCEDURE — 97110 THERAPEUTIC EXERCISES: CPT

## 2023-08-07 NOTE — PROGRESS NOTES
Davon Cline  Phone: [phone] Fax: [phone]       Physical Therapy Daily Treatment Note  Date:  2023    Patient Name:  Mihai Beasley    :  1955  MRN: [id number]    Restrictions/Precautions:    Diagnosis:  S/P Right TKA   Treatment Diagnosis:    Insurance/Certification information:  [place] [employer] Philip Lopez after 20 visits)  Referring Physician: Dr. Laura Devi of care signed (Y/N):    Visit# / total visits:  16/4-6 weeks  Pain level: /10  Time In: 13:30  Time Out: 14:35        Subjective: Pt reports she was fatigued and ran a fever yesterday. States all symptoms resolved today. Exercises:  Exercise/Equipment Resistance/Repetitions Other comments   BTE -CPM  12 min     Bike  10 min    Hamstring stretch 10\" 5 x @ step   Knee flexion stretch 10\" 5 x @ step   Calf stretch   10\" 5 x  0ff step   Heel slides    SLR NT   SAQ    LAQ 2 x 15 3# B    STS from  mat table 15 x 2                                Vc's for foot placement   TKE with blue cord 20 x    Fwd step ups    6\" 10 x R    RLE stand/L heel fwd tap  6\" 10 x                                                Other Therapeutic Activities:       ROM: (2023) Right knee active flexion to 95*, passive flexion to 103* -measured supine  2023: Right knee active flexion to 92*, AAROM with strap 96*, passive flexion to 107* measured supine    Home Exercise Program:  NA    Manual Treatments:  NA    Modalities: Ice to right knee x 15 min after ex. -pt seated w/RLE on stool     Comments: Pt performed all exercises with no report of pain increase.      Timed Code Treatment Minutes: 65    Total Treatment Minutes:  65    Treatment/Activity Tolerance:  [x] Patient tolerated treatment well [] Patient limited by fatigue  [] Patient limited by pain  [] Patient limited by other medical complications  [] Other:      Plan:   [x] Continue per plan of care [] Alter current plan (see comments)  [] Plan of care initiated [] Hold

## 2023-08-10 ENCOUNTER — HOSPITAL ENCOUNTER (OUTPATIENT)
Dept: PHYSICAL THERAPY | Age: 68
Setting detail: THERAPIES SERIES
Discharge: HOME | End: 2023-08-10
Payer: MEDICARE

## 2023-08-10 PROCEDURE — 97110 THERAPEUTIC EXERCISES: CPT

## 2023-08-10 NOTE — PROGRESS NOTES
Alter current plan (see comments)  [] Plan of care initiated [] Hold pending MD visit [] Discharge  Plan for Next Session:         Treatment Charges: Mins Units   Initial Evaluation     Re-Evaluation     Ther Exercise         TE 45 3   Manual Therapy     MT     Ther Activities        TA     Gait Training          GT     Neuro Re-education NR     Modalities-Ice 15 -   Non-Billable Service Time      Other     Total Time/Units 60 3     Electronically signed by:     HECTOR Em  [id number]

## 2023-08-14 ENCOUNTER — HOSPITAL ENCOUNTER (OUTPATIENT)
Dept: PHYSICAL THERAPY | Age: 68
Setting detail: THERAPIES SERIES
Discharge: HOME | End: 2023-08-14
Payer: MEDICARE

## 2023-08-14 PROCEDURE — 97110 THERAPEUTIC EXERCISES: CPT

## 2023-08-14 NOTE — PROGRESS NOTES
Davon Cline  Phone: [phone] Fax: [phone]       Physical Therapy Daily Treatment Note  Date:  2023    Patient Name:  Gurwinder Han    :  1955  MRN: [id number]    Restrictions/Precautions:    Diagnosis:  S/P Right TKA   Treatment Diagnosis:    Insurance/Certification information:  [facility] [employer] Aydin Lopez after 20 visits)  Referring Physician: Dr. Janie Huynh of care signed (Y/N):    Visit# / total visits:  18/4-6 weeks  Pain level: /10  Time In: 10:25  Time Out: 11:35        Subjective: Pt reports she has pain in medial and lateral right knee joint. States she goes to a local area to negotiate outdoor steps. She goes up/down 18 steps then goes to [facility] and walks holding onto a buggy 2x/day. She also is performing a passive stretch to right knee with her left foot behind her on a step. Cautioned pt again regarding performing ex that cause pain. Reminded of stretches she has been instructed to perform. Pt has a follow up with Dr. Tracee Coronel this week. Exercises:  Exercise/Equipment Resistance/Repetitions Other comments   BTE -CPM  12 min     Bike  10 min    Hamstring stretch 10\" 5 x @ step   Knee flexion stretch 10\" 5 x @ step   Calf stretch   10\" 5 x  0ff incline   Heel slides    SLR NT   SAQ    LAQ 2 x 15 3# B    STS from  mat table 15 x 2                                Vc's for foot placement   TKE with sport cord GSC 20 x    Fwd step ups    6\" 10 x R    RLE stand/L heel fwd tap  6\" 10 x                                                Other Therapeutic Activities:       ROM: (2023) Right knee active flexion to 95*, passive flexion to 103* -measured supine  (2023): Right knee active flexion to 92*, AAROM with strap 96*, passive flexion to 107* measured supine    Home Exercise Program:  NA    Manual Treatments:  NA    Modalities:  Ice to right knee x 15 min after ex. -pt seated w/RLE on stool     Comments: Pt performed all exercises with no

## 2023-08-16 ENCOUNTER — HOSPITAL ENCOUNTER (OUTPATIENT)
Dept: GENERAL RADIOLOGY | Age: 68
Discharge: HOME | End: 2023-08-18
Payer: MEDICARE

## 2023-08-16 ENCOUNTER — OFFICE VISIT (OUTPATIENT)
Dept: ORTHOPEDIC SURGERY | Age: 68
End: 2023-08-16
Payer: MEDICARE

## 2023-08-16 VITALS — WEIGHT: 233 LBS | HEIGHT: 63 IN | BODY MASS INDEX: 41.29 KG/M2

## 2023-08-16 DIAGNOSIS — Z96.651 S/P TOTAL KNEE ARTHROPLASTY, RIGHT: ICD-10-CM

## 2023-08-16 DIAGNOSIS — Z96.651 S/P TOTAL KNEE ARTHROPLASTY, RIGHT: Primary | ICD-10-CM

## 2023-08-16 PROCEDURE — 99212 OFFICE O/P EST SF 10 MIN: CPT

## 2023-08-16 PROCEDURE — 73560 X-RAY EXAM OF KNEE 1 OR 2: CPT

## 2023-08-16 PROCEDURE — 99024 POSTOP FOLLOW-UP VISIT: CPT | Performed by: PHYSICIAN ASSISTANT

## 2023-08-16 RX ORDER — ASPIRIN 81 MG/1
81 TABLET ORAL DAILY
COMMUNITY

## 2023-08-16 NOTE — PATIENT INSTRUCTIONS
Weightbearing as tolerated right lower extremity. PT extension at [facility], will add aquatic therapy    Voltaren gel and Zanaflex will be sent to your pharmacy. Follow-up in 6-8 weeks for reevaluation and x-rays. Call if any questions or concerns.

## 2023-08-17 DIAGNOSIS — R60.9 SWELLING: Primary | ICD-10-CM

## 2023-08-17 RX ORDER — TIZANIDINE 4 MG/1
4 TABLET ORAL 3 TIMES DAILY
Qty: 90 TABLET | Refills: 0 | Status: SHIPPED | OUTPATIENT
Start: 2023-08-17

## 2023-08-28 DIAGNOSIS — F51.01 PRIMARY INSOMNIA: ICD-10-CM

## 2023-08-28 DIAGNOSIS — E05.00 GRAVES DISEASE: ICD-10-CM

## 2023-08-28 RX ORDER — LEVOTHYROXINE SODIUM 0.12 MG/1
TABLET ORAL
Qty: 90 TABLET | Refills: 1 | Status: ON HOLD | OUTPATIENT
Start: 2023-08-28

## 2023-08-28 RX ORDER — TRAZODONE HYDROCHLORIDE 100 MG/1
TABLET ORAL
Qty: 90 TABLET | Refills: 1 | Status: ON HOLD | OUTPATIENT
Start: 2023-08-28

## 2023-08-29 RX ORDER — TRAZODONE HYDROCHLORIDE 100 MG/1
100 TABLET ORAL NIGHTLY
Qty: 90 TABLET | Refills: 1 | OUTPATIENT
Start: 2023-08-29

## 2023-08-29 RX ORDER — LEVOTHYROXINE SODIUM 0.12 MG/1
TABLET ORAL
Qty: 90 TABLET | Refills: 1 | OUTPATIENT
Start: 2023-08-29

## 2023-09-03 ENCOUNTER — HOSPITAL ENCOUNTER (EMERGENCY)
Age: 68
Discharge: ANOTHER HOSPITAL | End: 2023-09-03
Attending: EMERGENCY MEDICINE
Payer: MEDICARE

## 2023-09-03 ENCOUNTER — APPOINTMENT (OUTPATIENT)
Dept: ULTRASOUND IMAGING | Age: 68
DRG: 417 | End: 2023-09-03
Attending: STUDENT IN AN ORGANIZED HEALTH CARE EDUCATION/TRAINING PROGRAM
Payer: MEDICARE

## 2023-09-03 ENCOUNTER — HOSPITAL ENCOUNTER (INPATIENT)
Age: 68
LOS: 3 days | Discharge: HOME | DRG: 417 | End: 2023-09-09
Attending: STUDENT IN AN ORGANIZED HEALTH CARE EDUCATION/TRAINING PROGRAM | Admitting: INTERNAL MEDICINE
Payer: MEDICARE

## 2023-09-03 ENCOUNTER — APPOINTMENT (OUTPATIENT)
Dept: CT IMAGING | Age: 68
End: 2023-09-03
Payer: MEDICARE

## 2023-09-03 ENCOUNTER — APPOINTMENT (OUTPATIENT)
Dept: MRI IMAGING | Age: 68
DRG: 417 | End: 2023-09-03
Attending: STUDENT IN AN ORGANIZED HEALTH CARE EDUCATION/TRAINING PROGRAM
Payer: MEDICARE

## 2023-09-03 VITALS
HEART RATE: 73 BPM | SYSTOLIC BLOOD PRESSURE: 117 MMHG | TEMPERATURE: 98.3 F | WEIGHT: 214 LBS | RESPIRATION RATE: 18 BRPM | DIASTOLIC BLOOD PRESSURE: 60 MMHG | BODY MASS INDEX: 37.91 KG/M2 | OXYGEN SATURATION: 97 %

## 2023-09-03 DIAGNOSIS — K80.50 CHOLEDOCHOLITHIASIS: ICD-10-CM

## 2023-09-03 DIAGNOSIS — I25.2 HX OF NON-ST ELEVATION MYOCARDIAL INFARCTION (NSTEMI): ICD-10-CM

## 2023-09-03 DIAGNOSIS — K81.9 CHOLECYSTITIS: ICD-10-CM

## 2023-09-03 DIAGNOSIS — R10.9 ABDOMINAL PAIN, UNSPECIFIED ABDOMINAL LOCATION: ICD-10-CM

## 2023-09-03 DIAGNOSIS — R74.01 ELEVATED LIVER TRANSAMINASE LEVEL: ICD-10-CM

## 2023-09-03 DIAGNOSIS — R07.2 PRECORDIAL CHEST PAIN: ICD-10-CM

## 2023-09-03 DIAGNOSIS — K81.0 ACUTE CHOLECYSTITIS: Primary | ICD-10-CM

## 2023-09-03 DIAGNOSIS — K83.1 BILIARY OBSTRUCTION: Primary | ICD-10-CM

## 2023-09-03 PROBLEM — R79.89 ELEVATED LFTS: Status: ACTIVE | Noted: 2023-09-03

## 2023-09-03 PROBLEM — E87.5 HYPERKALEMIA: Status: ACTIVE | Noted: 2023-09-03

## 2023-09-03 LAB
ALBUMIN SERPL-MCNC: 3.2 G/DL (ref 3.5–5.2)
ALBUMIN SERPL-MCNC: 3.8 G/DL (ref 3.5–5.2)
ALP SERPL-CCNC: 212 U/L (ref 35–104)
ALP SERPL-CCNC: 250 U/L (ref 35–104)
ALT SERPL-CCNC: 623 U/L (ref 0–32)
ALT SERPL-CCNC: 884 U/L (ref 0–32)
ANION GAP SERPL CALCULATED.3IONS-SCNC: 12 MMOL/L (ref 7–16)
AST SERPL-CCNC: 1903 U/L (ref 0–31)
AST SERPL-CCNC: 978 U/L (ref 0–31)
BASOPHILS # BLD: 0.01 K/UL (ref 0–0.2)
BASOPHILS NFR BLD: 0 % (ref 0–2)
BILIRUB DIRECT SERPL-MCNC: 0.3 MG/DL (ref 0–0.3)
BILIRUB INDIRECT SERPL-MCNC: 0.4 MG/DL (ref 0–1)
BILIRUB SERPL-MCNC: 0.7 MG/DL (ref 0–1.2)
BILIRUB SERPL-MCNC: 1.3 MG/DL (ref 0–1.2)
BNP SERPL-MCNC: 253 PG/ML (ref 0–125)
BUN SERPL-MCNC: 12 MG/DL (ref 6–23)
CALCIUM SERPL-MCNC: 9.9 MG/DL (ref 8.6–10.2)
CHLORIDE SERPL-SCNC: 102 MMOL/L (ref 98–107)
CO2 SERPL-SCNC: 23 MMOL/L (ref 22–29)
CREAT SERPL-MCNC: 0.8 MG/DL (ref 0.5–1)
D DIMER: 888 NG/ML DDU (ref 0–232)
EOSINOPHIL # BLD: 0.03 K/UL (ref 0.05–0.5)
EOSINOPHILS RELATIVE PERCENT: 1 % (ref 0–6)
ERYTHROCYTE [DISTWIDTH] IN BLOOD BY AUTOMATED COUNT: 17.8 % (ref 11.5–15)
GFR SERPL CREATININE-BSD FRML MDRD: >60 ML/MIN/1.73M2
GLUCOSE SERPL-MCNC: 137 MG/DL (ref 74–99)
HCT VFR BLD AUTO: 35.1 % (ref 34–48)
HGB BLD-MCNC: 11 G/DL (ref 11.5–15.5)
IMM GRANULOCYTES # BLD AUTO: <0.03 K/UL (ref 0–0.58)
IMM GRANULOCYTES NFR BLD: 0 % (ref 0–5)
LACTATE BLDV-SCNC: 0.9 MMOL/L (ref 0.5–2.2)
LACTATE BLDV-SCNC: 1.6 MMOL/L (ref 0.5–2.2)
LACTATE BLDV-SCNC: 2.5 MMOL/L (ref 0.5–2.2)
LIPASE SERPL-CCNC: 29 U/L (ref 13–60)
LYMPHOCYTES NFR BLD: 0.66 K/UL (ref 1.5–4)
LYMPHOCYTES RELATIVE PERCENT: 20 % (ref 20–42)
MCH RBC QN AUTO: 24.8 PG (ref 26–35)
MCHC RBC AUTO-ENTMCNC: 31.3 G/DL (ref 32–34.5)
MCV RBC AUTO: 79.2 FL (ref 80–99.9)
MONOCYTES NFR BLD: 0.22 K/UL (ref 0.1–0.95)
MONOCYTES NFR BLD: 7 % (ref 2–12)
NEUTROPHILS NFR BLD: 71 % (ref 43–80)
NEUTS SEG NFR BLD: 2.31 K/UL (ref 1.8–7.3)
PLATELET # BLD AUTO: 215 K/UL (ref 130–450)
PMV BLD AUTO: 11.4 FL (ref 7–12)
POTASSIUM SERPL-SCNC: 5.1 MMOL/L (ref 3.5–5)
PROT SERPL-MCNC: 5.9 G/DL (ref 6.4–8.3)
PROT SERPL-MCNC: 7.4 G/DL (ref 6.4–8.3)
RBC # BLD AUTO: 4.43 M/UL (ref 3.5–5.5)
SODIUM SERPL-SCNC: 137 MMOL/L (ref 132–146)
TROPONIN I SERPL HS-MCNC: 10 NG/L (ref 0–9)
TROPONIN I SERPL HS-MCNC: 10 NG/L (ref 0–9)
WBC OTHER # BLD: 3.2 K/UL (ref 4.5–11.5)

## 2023-09-03 PROCEDURE — 6370000000 HC RX 637 (ALT 250 FOR IP): Performed by: NURSE PRACTITIONER

## 2023-09-03 PROCEDURE — 74177 CT ABD & PELVIS W/CONTRAST: CPT

## 2023-09-03 PROCEDURE — 6360000002 HC RX W HCPCS: Performed by: NURSE PRACTITIONER

## 2023-09-03 PROCEDURE — 76705 ECHO EXAM OF ABDOMEN: CPT

## 2023-09-03 PROCEDURE — 83605 ASSAY OF LACTIC ACID: CPT

## 2023-09-03 PROCEDURE — A9579 GAD-BASE MR CONTRAST NOS,1ML: HCPCS | Performed by: RADIOLOGY

## 2023-09-03 PROCEDURE — 99285 EMERGENCY DEPT VISIT HI MDM: CPT

## 2023-09-03 PROCEDURE — 99223 1ST HOSP IP/OBS HIGH 75: CPT | Performed by: INTERNAL MEDICINE

## 2023-09-03 PROCEDURE — 84484 ASSAY OF TROPONIN QUANT: CPT

## 2023-09-03 PROCEDURE — 80053 COMPREHEN METABOLIC PANEL: CPT

## 2023-09-03 PROCEDURE — 2580000003 HC RX 258: Performed by: NURSE PRACTITIONER

## 2023-09-03 PROCEDURE — 96366 THER/PROPH/DIAG IV INF ADDON: CPT

## 2023-09-03 PROCEDURE — 96365 THER/PROPH/DIAG IV INF INIT: CPT

## 2023-09-03 PROCEDURE — 83880 ASSAY OF NATRIURETIC PEPTIDE: CPT

## 2023-09-03 PROCEDURE — 6360000002 HC RX W HCPCS: Performed by: EMERGENCY MEDICINE

## 2023-09-03 PROCEDURE — 71275 CT ANGIOGRAPHY CHEST: CPT

## 2023-09-03 PROCEDURE — 2580000003 HC RX 258: Performed by: EMERGENCY MEDICINE

## 2023-09-03 PROCEDURE — G0378 HOSPITAL OBSERVATION PER HR: HCPCS

## 2023-09-03 PROCEDURE — 83690 ASSAY OF LIPASE: CPT

## 2023-09-03 PROCEDURE — 93005 ELECTROCARDIOGRAM TRACING: CPT | Performed by: EMERGENCY MEDICINE

## 2023-09-03 PROCEDURE — C9113 INJ PANTOPRAZOLE SODIUM, VIA: HCPCS | Performed by: EMERGENCY MEDICINE

## 2023-09-03 PROCEDURE — 96375 TX/PRO/DX INJ NEW DRUG ADDON: CPT

## 2023-09-03 PROCEDURE — 96372 THER/PROPH/DIAG INJ SC/IM: CPT

## 2023-09-03 PROCEDURE — 6360000004 HC RX CONTRAST MEDICATION: Performed by: RADIOLOGY

## 2023-09-03 PROCEDURE — 96361 HYDRATE IV INFUSION ADD-ON: CPT

## 2023-09-03 PROCEDURE — G0379 DIRECT REFER HOSPITAL OBSERV: HCPCS

## 2023-09-03 PROCEDURE — 85025 COMPLETE CBC W/AUTO DIFF WBC: CPT

## 2023-09-03 PROCEDURE — 80076 HEPATIC FUNCTION PANEL: CPT

## 2023-09-03 PROCEDURE — 85379 FIBRIN DEGRADATION QUANT: CPT

## 2023-09-03 PROCEDURE — 6360000004 HC RX CONTRAST MEDICATION: Performed by: STUDENT IN AN ORGANIZED HEALTH CARE EDUCATION/TRAINING PROGRAM

## 2023-09-03 PROCEDURE — 74183 MRI ABD W/O CNTR FLWD CNTR: CPT

## 2023-09-03 RX ORDER — KETOROLAC TROMETHAMINE 30 MG/ML
15 INJECTION, SOLUTION INTRAMUSCULAR; INTRAVENOUS EVERY 6 HOURS PRN
Status: DISPENSED | OUTPATIENT
Start: 2023-09-03 | End: 2023-09-08

## 2023-09-03 RX ORDER — ENOXAPARIN SODIUM 100 MG/ML
40 INJECTION SUBCUTANEOUS DAILY
Status: DISCONTINUED | OUTPATIENT
Start: 2023-09-04 | End: 2023-09-08 | Stop reason: DRUGHIGH

## 2023-09-03 RX ORDER — 0.9 % SODIUM CHLORIDE 0.9 %
1000 INTRAVENOUS SOLUTION INTRAVENOUS ONCE
Status: COMPLETED | OUTPATIENT
Start: 2023-09-03 | End: 2023-09-03

## 2023-09-03 RX ORDER — FENTANYL CITRATE 50 UG/ML
50 INJECTION, SOLUTION INTRAMUSCULAR; INTRAVENOUS ONCE
Status: COMPLETED | OUTPATIENT
Start: 2023-09-03 | End: 2023-09-03

## 2023-09-03 RX ORDER — PANTOPRAZOLE SODIUM 40 MG/10ML
40 INJECTION, POWDER, LYOPHILIZED, FOR SOLUTION INTRAVENOUS DAILY
Status: DISCONTINUED | OUTPATIENT
Start: 2023-09-04 | End: 2023-09-09 | Stop reason: HOSPADM

## 2023-09-03 RX ORDER — FLUOXETINE HYDROCHLORIDE 20 MG/1
40 CAPSULE ORAL DAILY
Status: DISCONTINUED | OUTPATIENT
Start: 2023-09-03 | End: 2023-09-03 | Stop reason: CLARIF

## 2023-09-03 RX ORDER — TIZANIDINE 4 MG/1
2 TABLET ORAL NIGHTLY
Status: DISCONTINUED | OUTPATIENT
Start: 2023-09-03 | End: 2023-09-09 | Stop reason: HOSPADM

## 2023-09-03 RX ORDER — FLUOXETINE HYDROCHLORIDE 20 MG/1
60 CAPSULE ORAL DAILY
Status: DISCONTINUED | OUTPATIENT
Start: 2023-09-03 | End: 2023-09-09 | Stop reason: HOSPADM

## 2023-09-03 RX ORDER — SODIUM CHLORIDE 0.9 % (FLUSH) 0.9 %
5-40 SYRINGE (ML) INJECTION EVERY 12 HOURS SCHEDULED
Status: DISCONTINUED | OUTPATIENT
Start: 2023-09-03 | End: 2023-09-09 | Stop reason: HOSPADM

## 2023-09-03 RX ORDER — METRONIDAZOLE 500 MG/100ML
500 INJECTION, SOLUTION INTRAVENOUS ONCE
Status: COMPLETED | OUTPATIENT
Start: 2023-09-03 | End: 2023-09-03

## 2023-09-03 RX ORDER — FLUTICASONE PROPIONATE 50 MCG
2 SPRAY, SUSPENSION (ML) NASAL DAILY
Status: DISCONTINUED | OUTPATIENT
Start: 2023-09-03 | End: 2023-09-09 | Stop reason: HOSPADM

## 2023-09-03 RX ORDER — PANTOPRAZOLE SODIUM 40 MG/10ML
40 INJECTION, POWDER, LYOPHILIZED, FOR SOLUTION INTRAVENOUS ONCE
Status: COMPLETED | OUTPATIENT
Start: 2023-09-03 | End: 2023-09-03

## 2023-09-03 RX ORDER — ASPIRIN 81 MG/1
81 TABLET ORAL DAILY
Status: DISCONTINUED | OUTPATIENT
Start: 2023-09-03 | End: 2023-09-09 | Stop reason: HOSPADM

## 2023-09-03 RX ORDER — ENOXAPARIN SODIUM 100 MG/ML
40 INJECTION SUBCUTANEOUS DAILY
Status: DISCONTINUED | OUTPATIENT
Start: 2023-09-03 | End: 2023-09-03

## 2023-09-03 RX ORDER — TRAZODONE HYDROCHLORIDE 50 MG/1
100 TABLET ORAL NIGHTLY
Status: DISCONTINUED | OUTPATIENT
Start: 2023-09-03 | End: 2023-09-09 | Stop reason: HOSPADM

## 2023-09-03 RX ORDER — POLYETHYLENE GLYCOL 3350 17 G/17G
17 POWDER, FOR SOLUTION ORAL DAILY PRN
Status: DISCONTINUED | OUTPATIENT
Start: 2023-09-03 | End: 2023-09-08

## 2023-09-03 RX ORDER — SODIUM CHLORIDE 9 MG/ML
INJECTION, SOLUTION INTRAVENOUS CONTINUOUS
Status: DISCONTINUED | OUTPATIENT
Start: 2023-09-03 | End: 2023-09-06

## 2023-09-03 RX ORDER — SODIUM CHLORIDE 9 MG/ML
INJECTION, SOLUTION INTRAVENOUS PRN
Status: DISCONTINUED | OUTPATIENT
Start: 2023-09-03 | End: 2023-09-09 | Stop reason: HOSPADM

## 2023-09-03 RX ORDER — BUPROPION HYDROCHLORIDE 300 MG/1
300 TABLET ORAL DAILY
Status: DISCONTINUED | OUTPATIENT
Start: 2023-09-03 | End: 2023-09-09 | Stop reason: HOSPADM

## 2023-09-03 RX ORDER — ENOXAPARIN SODIUM 100 MG/ML
1 INJECTION SUBCUTANEOUS ONCE
Status: DISCONTINUED | OUTPATIENT
Start: 2023-09-03 | End: 2023-09-03

## 2023-09-03 RX ORDER — ACETAMINOPHEN 650 MG/1
650 SUPPOSITORY RECTAL EVERY 6 HOURS PRN
Status: DISCONTINUED | OUTPATIENT
Start: 2023-09-03 | End: 2023-09-03

## 2023-09-03 RX ORDER — FENTANYL CITRATE 50 UG/ML
50 INJECTION, SOLUTION INTRAMUSCULAR; INTRAVENOUS ONCE
Status: DISCONTINUED | OUTPATIENT
Start: 2023-09-03 | End: 2023-09-03

## 2023-09-03 RX ORDER — OXYBUTYNIN CHLORIDE 10 MG/1
10 TABLET, EXTENDED RELEASE ORAL NIGHTLY
Status: DISCONTINUED | OUTPATIENT
Start: 2023-09-03 | End: 2023-09-09 | Stop reason: HOSPADM

## 2023-09-03 RX ORDER — ONDANSETRON 2 MG/ML
4 INJECTION INTRAMUSCULAR; INTRAVENOUS EVERY 6 HOURS PRN
Status: DISCONTINUED | OUTPATIENT
Start: 2023-09-03 | End: 2023-09-09 | Stop reason: HOSPADM

## 2023-09-03 RX ORDER — ONDANSETRON 4 MG/1
4 TABLET, ORALLY DISINTEGRATING ORAL EVERY 8 HOURS PRN
Status: DISCONTINUED | OUTPATIENT
Start: 2023-09-03 | End: 2023-09-09 | Stop reason: HOSPADM

## 2023-09-03 RX ORDER — METRONIDAZOLE 500 MG/100ML
500 INJECTION, SOLUTION INTRAVENOUS EVERY 8 HOURS
Status: DISCONTINUED | OUTPATIENT
Start: 2023-09-03 | End: 2023-09-08

## 2023-09-03 RX ORDER — CETIRIZINE HYDROCHLORIDE 10 MG/1
10 TABLET ORAL DAILY
Status: DISCONTINUED | OUTPATIENT
Start: 2023-09-03 | End: 2023-09-09 | Stop reason: HOSPADM

## 2023-09-03 RX ORDER — ACETAMINOPHEN 325 MG/1
650 TABLET ORAL EVERY 6 HOURS PRN
Status: DISCONTINUED | OUTPATIENT
Start: 2023-09-03 | End: 2023-09-03

## 2023-09-03 RX ORDER — SODIUM CHLORIDE 0.9 % (FLUSH) 0.9 %
5-40 SYRINGE (ML) INJECTION PRN
Status: DISCONTINUED | OUTPATIENT
Start: 2023-09-03 | End: 2023-09-09 | Stop reason: HOSPADM

## 2023-09-03 RX ORDER — ONDANSETRON 2 MG/ML
4 INJECTION INTRAMUSCULAR; INTRAVENOUS ONCE
Status: COMPLETED | OUTPATIENT
Start: 2023-09-03 | End: 2023-09-03

## 2023-09-03 RX ORDER — ENOXAPARIN SODIUM 100 MG/ML
1 INJECTION SUBCUTANEOUS ONCE
Status: COMPLETED | OUTPATIENT
Start: 2023-09-03 | End: 2023-09-03

## 2023-09-03 RX ADMIN — LEVOTHYROXINE SODIUM 125 MCG: 25 TABLET ORAL at 15:49

## 2023-09-03 RX ADMIN — METRONIDAZOLE 500 MG: 500 INJECTION, SOLUTION INTRAVENOUS at 15:47

## 2023-09-03 RX ADMIN — FLUTICASONE PROPIONATE 2 SPRAY: 50 SPRAY, METERED NASAL at 15:48

## 2023-09-03 RX ADMIN — PANTOPRAZOLE SODIUM 40 MG: 40 INJECTION, POWDER, FOR SOLUTION INTRAVENOUS at 04:29

## 2023-09-03 RX ADMIN — SODIUM CHLORIDE: 9 INJECTION, SOLUTION INTRAVENOUS at 16:42

## 2023-09-03 RX ADMIN — CETIRIZINE HYDROCHLORIDE 10 MG: 10 TABLET, FILM COATED ORAL at 15:49

## 2023-09-03 RX ADMIN — SODIUM CHLORIDE 1000 ML: 9 INJECTION, SOLUTION INTRAVENOUS at 05:20

## 2023-09-03 RX ADMIN — OXYBUTYNIN CHLORIDE 10 MG: 10 TABLET, EXTENDED RELEASE ORAL at 20:38

## 2023-09-03 RX ADMIN — TIZANIDINE 2 MG: 4 TABLET ORAL at 20:37

## 2023-09-03 RX ADMIN — TRAZODONE HYDROCHLORIDE 100 MG: 50 TABLET ORAL at 20:38

## 2023-09-03 RX ADMIN — Medication 10 ML: at 20:39

## 2023-09-03 RX ADMIN — SODIUM CHLORIDE 1000 ML: 9 INJECTION, SOLUTION INTRAVENOUS at 04:29

## 2023-09-03 RX ADMIN — ONDANSETRON 4 MG: 2 INJECTION INTRAMUSCULAR; INTRAVENOUS at 04:29

## 2023-09-03 RX ADMIN — METRONIDAZOLE 500 MG: 500 INJECTION, SOLUTION INTRAVENOUS at 06:19

## 2023-09-03 RX ADMIN — IOPAMIDOL 75 ML: 755 INJECTION, SOLUTION INTRAVENOUS at 05:09

## 2023-09-03 RX ADMIN — ENOXAPARIN SODIUM 100 MG: 100 INJECTION SUBCUTANEOUS at 05:19

## 2023-09-03 RX ADMIN — KETOROLAC TROMETHAMINE 15 MG: 30 INJECTION, SOLUTION INTRAMUSCULAR; INTRAVENOUS at 17:58

## 2023-09-03 RX ADMIN — METRONIDAZOLE 500 MG: 500 INJECTION, SOLUTION INTRAVENOUS at 22:16

## 2023-09-03 RX ADMIN — WATER 1000 MG: 1 INJECTION INTRAMUSCULAR; INTRAVENOUS; SUBCUTANEOUS at 06:00

## 2023-09-03 RX ADMIN — FENTANYL CITRATE 50 MCG: 50 INJECTION, SOLUTION INTRAMUSCULAR; INTRAVENOUS at 06:00

## 2023-09-03 RX ADMIN — DICLOFENAC SODIUM 4 G: 10 GEL TOPICAL at 15:49

## 2023-09-03 RX ADMIN — GADOTERIDOL 20 ML: 279.3 INJECTION, SOLUTION INTRAVENOUS at 15:23

## 2023-09-03 ASSESSMENT — PAIN SCALES - GENERAL
PAINLEVEL_OUTOF10: 10
PAINLEVEL_OUTOF10: 6
PAINLEVEL_OUTOF10: 0

## 2023-09-03 ASSESSMENT — PAIN DESCRIPTION - DESCRIPTORS
DESCRIPTORS: DISCOMFORT;PRESSURE;DULL
DESCRIPTORS: ACHING;DISCOMFORT

## 2023-09-03 ASSESSMENT — PAIN - FUNCTIONAL ASSESSMENT
PAIN_FUNCTIONAL_ASSESSMENT: PREVENTS OR INTERFERES SOME ACTIVE ACTIVITIES AND ADLS
PAIN_FUNCTIONAL_ASSESSMENT: 0-10
PAIN_FUNCTIONAL_ASSESSMENT: ACTIVITIES ARE NOT PREVENTED

## 2023-09-03 ASSESSMENT — PAIN DESCRIPTION - LOCATION
LOCATION: CHEST
LOCATION: ABDOMEN

## 2023-09-03 ASSESSMENT — PAIN DESCRIPTION - ORIENTATION: ORIENTATION: MID

## 2023-09-03 NOTE — H&P
K 5.1*      CO2 23   BUN 12   CREATININE 0.8   GLUCOSE 137*   CALCIUM 9.9       Recent Labs     09/03/23  0345   WBC 3.2*   RBC 4.43   HGB 11.0*   HCT 35.1   MCV 79.2*   MCH 24.8*   MCHC 31.3*   RDW 17.8*      MPV 11.4       No results for input(s): POCGLU in the last 72 hours. Radiology:   No orders to display           ASSESSMENT:      Principal Problem:    Abdominal pain  Active Problems:    Acute cholecystitis  Resolved Problems:    * No resolved hospital problems. *      PLAN:    1. Suspected acute cholecystitis: Pt presents to the ER with RUQ/ mid abdominal pain starting ~ 8- 10 hours prior to arrival.Reporting she felt a lot of pressure in lower chest /RUQ. Nauseated. Reporting told in her 25s she needed to have gallbladder removed no issues sine then. Imaging reviewed- showing cholelithiasis with intra and extrahepatic biliary duct dilation. Gallbladder distended. Layering sludge and stones. LFTS elevated AST 1,903 and . Received IV rocephin and IV flagyl in ER. General surgery was consulted. Patient seen resting in bed comfortably. Reporting no pain since medicated in ER. Await surgery input. 2. Elevated LFTS: AST 1903 and . Liver wnl on imaging. Denies etoh. Meds reviewed- not on a statin. Trend. 3. Chest discomfort: likely d/t above; D dimer elevated in ER-888. CTA chest: no evidence of PE or acute pulmonary abnormality. Troponin 10-10. EKG per ER no ischemia. 4. Lactic acidosis: monitor- trend    5. Borderline hyperkalemia: monitor    6. Elevated glucose: check hga1c    7. CAD h/o coronary angioplasty with stent  2020. H/o NSTEMI    8. HTN: not on bp medication     9. Thyroid disease    10. Recent right total knee arthoplasty in June 11.depression: wellbutrin/ prozac- hold today until see liver enzymes trending down as both metabolized from liver.      12. Nausea; antiemetics          Time spent reviewing chart, clinical exam, discussing case interviewing/examining pt, analyzing data, discussing with nursing, formulating treatment plan as noted in NP's note. Possible acute cholecystitis  Elevated lfts  Hyperkalemia  Hypothyroidism  depression    Decision to admit    Electronically signed by Siddhartha Myers D.O.   Hospitalist  [facility]ist [facility] at [facility]

## 2023-09-03 NOTE — PROGRESS NOTES
4 Eyes Skin Assessment     NAME:  Stanley Bowden  YOB: 1955  MEDICAL RECORD NUMBER:  [id number]    The patient is being assessed for  Admission    I agree that at least one RN has performed a thorough Head to Toe Skin Assessment on the patient. ALL assessment sites listed below have been assessed. Areas assessed by both nurses:    Sacrum. Buttock, Coccyx, Ischium        Does the Patient have a Wound?  No noted wound(s)       Eddie Prevention initiated by RN: No  Wound Care Orders initiated by RN: No    Pressure Injury (Stage 3,4, Unstageable, DTI, NWPT, and Complex wounds) if present, place Wound referral order by RN under : No    New Ostomies, if present place, Ostomy referral order under : No     Nurse 1 eSignature: Electronically signed by Violeta Zendejas RN on 9/3/23 at 5:03 PM EDT    **SHARE this note so that the co-signing nurse can place an eSignature**    Nurse 2 eSignature: {Esignature:[id number]}

## 2023-09-04 ENCOUNTER — APPOINTMENT (OUTPATIENT)
Dept: GENERAL RADIOLOGY | Age: 68
DRG: 417 | End: 2023-09-04
Attending: STUDENT IN AN ORGANIZED HEALTH CARE EDUCATION/TRAINING PROGRAM
Payer: MEDICARE

## 2023-09-04 ENCOUNTER — ANESTHESIA EVENT (OUTPATIENT)
Dept: INPATIENT UNIT | Age: 68
End: 2023-09-04

## 2023-09-04 ENCOUNTER — ANESTHESIA (OUTPATIENT)
Dept: INPATIENT UNIT | Age: 68
End: 2023-09-04

## 2023-09-04 ENCOUNTER — ANESTHESIA EVENT (OUTPATIENT)
Dept: OPERATING ROOM | Age: 68
End: 2023-09-04
Payer: MEDICARE

## 2023-09-04 ENCOUNTER — ANESTHESIA (OUTPATIENT)
Dept: OPERATING ROOM | Age: 68
End: 2023-09-04
Payer: MEDICARE

## 2023-09-04 PROBLEM — K80.50 CHOLEDOCHOLITHIASIS: Status: ACTIVE | Noted: 2023-09-03

## 2023-09-04 LAB
ALBUMIN SERPL-MCNC: 3 G/DL (ref 3.5–5.2)
ALP SERPL-CCNC: 199 U/L (ref 35–104)
ALT SERPL-CCNC: 474 U/L (ref 0–32)
ANION GAP SERPL CALCULATED.3IONS-SCNC: 7 MMOL/L (ref 7–16)
AST SERPL-CCNC: 531 U/L (ref 0–31)
BASOPHILS # BLD: 0.04 K/UL (ref 0–0.2)
BASOPHILS NFR BLD: 1 % (ref 0–2)
BILIRUB SERPL-MCNC: 0.5 MG/DL (ref 0–1.2)
BUN SERPL-MCNC: 8 MG/DL (ref 6–23)
CALCIUM SERPL-MCNC: 8.8 MG/DL (ref 8.6–10.2)
CHLORIDE SERPL-SCNC: 108 MMOL/L (ref 98–107)
CO2 SERPL-SCNC: 24 MMOL/L (ref 22–29)
CREAT SERPL-MCNC: 0.9 MG/DL (ref 0.5–1)
EOSINOPHIL # BLD: 0.17 K/UL (ref 0.05–0.5)
EOSINOPHILS RELATIVE PERCENT: 5 % (ref 0–6)
ERYTHROCYTE [DISTWIDTH] IN BLOOD BY AUTOMATED COUNT: 16.6 % (ref 11.5–15)
GFR SERPL CREATININE-BSD FRML MDRD: >60 ML/MIN/1.73M2
GLUCOSE SERPL-MCNC: 86 MG/DL (ref 74–99)
HCT VFR BLD AUTO: 32 % (ref 34–48)
HGB BLD-MCNC: 9.5 G/DL (ref 11.5–15.5)
IMM GRANULOCYTES # BLD AUTO: <0.03 K/UL (ref 0–0.58)
IMM GRANULOCYTES NFR BLD: 1 % (ref 0–5)
LYMPHOCYTES NFR BLD: 0.93 K/UL (ref 1.5–4)
LYMPHOCYTES RELATIVE PERCENT: 26 % (ref 20–42)
MCH RBC QN AUTO: 24.9 PG (ref 26–35)
MCHC RBC AUTO-ENTMCNC: 29.7 G/DL (ref 32–34.5)
MCV RBC AUTO: 83.8 FL (ref 80–99.9)
MONOCYTES NFR BLD: 0.38 K/UL (ref 0.1–0.95)
MONOCYTES NFR BLD: 11 % (ref 2–12)
NEUTROPHILS NFR BLD: 57 % (ref 43–80)
NEUTS SEG NFR BLD: 2.02 K/UL (ref 1.8–7.3)
PLATELET # BLD AUTO: 232 K/UL (ref 130–450)
PMV BLD AUTO: 10 FL (ref 7–12)
POTASSIUM SERPL-SCNC: 3.7 MMOL/L (ref 3.5–5)
PROT SERPL-MCNC: 6.1 G/DL (ref 6.4–8.3)
RBC # BLD AUTO: 3.82 M/UL (ref 3.5–5.5)
SODIUM SERPL-SCNC: 139 MMOL/L (ref 132–146)
WBC OTHER # BLD: 3.6 K/UL (ref 4.5–11.5)

## 2023-09-04 PROCEDURE — 93005 ELECTROCARDIOGRAM TRACING: CPT | Performed by: ANESTHESIOLOGY

## 2023-09-04 PROCEDURE — 2500000003 HC RX 250 WO HCPCS: Performed by: ANESTHESIOLOGY

## 2023-09-04 PROCEDURE — 3700000000 HC ANESTHESIA ATTENDED CARE: Performed by: STUDENT IN AN ORGANIZED HEALTH CARE EDUCATION/TRAINING PROGRAM

## 2023-09-04 PROCEDURE — 96375 TX/PRO/DX INJ NEW DRUG ADDON: CPT

## 2023-09-04 PROCEDURE — 99231 SBSQ HOSP IP/OBS SF/LOW 25: CPT | Performed by: STUDENT IN AN ORGANIZED HEALTH CARE EDUCATION/TRAINING PROGRAM

## 2023-09-04 PROCEDURE — 80053 COMPREHEN METABOLIC PANEL: CPT

## 2023-09-04 PROCEDURE — C9113 INJ PANTOPRAZOLE SODIUM, VIA: HCPCS | Performed by: NURSE PRACTITIONER

## 2023-09-04 PROCEDURE — 7100000000 HC PACU RECOVERY - FIRST 15 MIN: Performed by: STUDENT IN AN ORGANIZED HEALTH CARE EDUCATION/TRAINING PROGRAM

## 2023-09-04 PROCEDURE — 3600007513: Performed by: STUDENT IN AN ORGANIZED HEALTH CARE EDUCATION/TRAINING PROGRAM

## 2023-09-04 PROCEDURE — 3700000001 HC ADD 15 MINUTES (ANESTHESIA): Performed by: STUDENT IN AN ORGANIZED HEALTH CARE EDUCATION/TRAINING PROGRAM

## 2023-09-04 PROCEDURE — 0DJ08ZZ INSPECTION OF UPPER INTESTINAL TRACT, VIA NATURAL OR ARTIFICIAL OPENING ENDOSCOPIC: ICD-10-PCS | Performed by: STUDENT IN AN ORGANIZED HEALTH CARE EDUCATION/TRAINING PROGRAM

## 2023-09-04 PROCEDURE — 2500000003 HC RX 250 WO HCPCS

## 2023-09-04 PROCEDURE — 2580000003 HC RX 258: Performed by: ANESTHESIOLOGY

## 2023-09-04 PROCEDURE — 3600007503: Performed by: STUDENT IN AN ORGANIZED HEALTH CARE EDUCATION/TRAINING PROGRAM

## 2023-09-04 PROCEDURE — 96366 THER/PROPH/DIAG IV INF ADDON: CPT

## 2023-09-04 PROCEDURE — 2580000003 HC RX 258: Performed by: NURSE PRACTITIONER

## 2023-09-04 PROCEDURE — 2500000003 HC RX 250 WO HCPCS: Performed by: NURSE ANESTHETIST, CERTIFIED REGISTERED

## 2023-09-04 PROCEDURE — 6360000002 HC RX W HCPCS: Performed by: NURSE PRACTITIONER

## 2023-09-04 PROCEDURE — G0378 HOSPITAL OBSERVATION PER HR: HCPCS

## 2023-09-04 PROCEDURE — 7100000001 HC PACU RECOVERY - ADDTL 15 MIN: Performed by: STUDENT IN AN ORGANIZED HEALTH CARE EDUCATION/TRAINING PROGRAM

## 2023-09-04 PROCEDURE — 99222 1ST HOSP IP/OBS MODERATE 55: CPT | Performed by: STUDENT IN AN ORGANIZED HEALTH CARE EDUCATION/TRAINING PROGRAM

## 2023-09-04 PROCEDURE — 96361 HYDRATE IV INFUSION ADD-ON: CPT

## 2023-09-04 PROCEDURE — 6360000002 HC RX W HCPCS: Performed by: NURSE ANESTHETIST, CERTIFIED REGISTERED

## 2023-09-04 PROCEDURE — 2709999900 HC NON-CHARGEABLE SUPPLY: Performed by: STUDENT IN AN ORGANIZED HEALTH CARE EDUCATION/TRAINING PROGRAM

## 2023-09-04 PROCEDURE — 85025 COMPLETE CBC W/AUTO DIFF WBC: CPT

## 2023-09-04 PROCEDURE — 6370000000 HC RX 637 (ALT 250 FOR IP): Performed by: NURSE PRACTITIONER

## 2023-09-04 PROCEDURE — 2580000003 HC RX 258: Performed by: NURSE ANESTHETIST, CERTIFIED REGISTERED

## 2023-09-04 PROCEDURE — 99222 1ST HOSP IP/OBS MODERATE 55: CPT | Performed by: SURGERY

## 2023-09-04 PROCEDURE — C1769 GUIDE WIRE: HCPCS | Performed by: STUDENT IN AN ORGANIZED HEALTH CARE EDUCATION/TRAINING PROGRAM

## 2023-09-04 RX ORDER — FENTANYL CITRATE 50 UG/ML
INJECTION, SOLUTION INTRAMUSCULAR; INTRAVENOUS PRN
Status: DISCONTINUED | OUTPATIENT
Start: 2023-09-04 | End: 2023-09-04 | Stop reason: SDUPTHER

## 2023-09-04 RX ORDER — MORPHINE SULFATE 2 MG/ML
2 INJECTION, SOLUTION INTRAMUSCULAR; INTRAVENOUS EVERY 5 MIN PRN
Status: DISCONTINUED | OUTPATIENT
Start: 2023-09-04 | End: 2023-09-04 | Stop reason: HOSPADM

## 2023-09-04 RX ORDER — SODIUM CHLORIDE 0.9 % (FLUSH) 0.9 %
5-40 SYRINGE (ML) INJECTION PRN
Status: DISCONTINUED | OUTPATIENT
Start: 2023-09-04 | End: 2023-09-04 | Stop reason: HOSPADM

## 2023-09-04 RX ORDER — LIDOCAINE HYDROCHLORIDE 20 MG/ML
INJECTION, SOLUTION EPIDURAL; INFILTRATION; INTRACAUDAL; PERINEURAL PRN
Status: DISCONTINUED | OUTPATIENT
Start: 2023-09-04 | End: 2023-09-04 | Stop reason: SDUPTHER

## 2023-09-04 RX ORDER — SUCCINYLCHOLINE/SOD CL,ISO/PF 200MG/10ML
SYRINGE (ML) INTRAVENOUS PRN
Status: DISCONTINUED | OUTPATIENT
Start: 2023-09-04 | End: 2023-09-04 | Stop reason: SDUPTHER

## 2023-09-04 RX ORDER — GLYCOPYRROLATE 0.2 MG/ML
INJECTION INTRAMUSCULAR; INTRAVENOUS PRN
Status: DISCONTINUED | OUTPATIENT
Start: 2023-09-04 | End: 2023-09-04 | Stop reason: SDUPTHER

## 2023-09-04 RX ORDER — SODIUM CHLORIDE 9 MG/ML
INJECTION, SOLUTION INTRAVENOUS PRN
Status: DISCONTINUED | OUTPATIENT
Start: 2023-09-04 | End: 2023-09-04 | Stop reason: HOSPADM

## 2023-09-04 RX ORDER — ROCURONIUM BROMIDE 10 MG/ML
INJECTION, SOLUTION INTRAVENOUS PRN
Status: DISCONTINUED | OUTPATIENT
Start: 2023-09-04 | End: 2023-09-04 | Stop reason: SDUPTHER

## 2023-09-04 RX ORDER — DILTIAZEM HYDROCHLORIDE 5 MG/ML
10 INJECTION INTRAVENOUS ONCE
Status: COMPLETED | OUTPATIENT
Start: 2023-09-04 | End: 2023-09-04

## 2023-09-04 RX ORDER — DILTIAZEM HYDROCHLORIDE 5 MG/ML
INJECTION INTRAVENOUS
Status: COMPLETED
Start: 2023-09-04 | End: 2023-09-04

## 2023-09-04 RX ORDER — SODIUM CHLORIDE 0.9 % (FLUSH) 0.9 %
5-40 SYRINGE (ML) INJECTION EVERY 12 HOURS SCHEDULED
Status: DISCONTINUED | OUTPATIENT
Start: 2023-09-04 | End: 2023-09-04 | Stop reason: HOSPADM

## 2023-09-04 RX ORDER — SODIUM CHLORIDE 9 MG/ML
INJECTION, SOLUTION INTRAVENOUS CONTINUOUS PRN
Status: DISCONTINUED | OUTPATIENT
Start: 2023-09-04 | End: 2023-09-04 | Stop reason: SDUPTHER

## 2023-09-04 RX ORDER — PROPOFOL 10 MG/ML
INJECTION, EMULSION INTRAVENOUS PRN
Status: DISCONTINUED | OUTPATIENT
Start: 2023-09-04 | End: 2023-09-04 | Stop reason: SDUPTHER

## 2023-09-04 RX ORDER — ONDANSETRON 2 MG/ML
INJECTION INTRAMUSCULAR; INTRAVENOUS PRN
Status: DISCONTINUED | OUTPATIENT
Start: 2023-09-04 | End: 2023-09-04 | Stop reason: SDUPTHER

## 2023-09-04 RX ORDER — FENTANYL CITRATE 50 UG/ML
25 INJECTION, SOLUTION INTRAMUSCULAR; INTRAVENOUS EVERY 5 MIN PRN
Status: DISCONTINUED | OUTPATIENT
Start: 2023-09-04 | End: 2023-09-04 | Stop reason: HOSPADM

## 2023-09-04 RX ORDER — ONDANSETRON 2 MG/ML
4 INJECTION INTRAMUSCULAR; INTRAVENOUS
Status: DISCONTINUED | OUTPATIENT
Start: 2023-09-04 | End: 2023-09-04 | Stop reason: HOSPADM

## 2023-09-04 RX ORDER — DEXAMETHASONE SODIUM PHOSPHATE 4 MG/ML
INJECTION, SOLUTION INTRA-ARTICULAR; INTRALESIONAL; INTRAMUSCULAR; INTRAVENOUS; SOFT TISSUE PRN
Status: DISCONTINUED | OUTPATIENT
Start: 2023-09-04 | End: 2023-09-04 | Stop reason: SDUPTHER

## 2023-09-04 RX ADMIN — DILTIAZEM HYDROCHLORIDE 10 MG: 5 INJECTION INTRAVENOUS at 16:30

## 2023-09-04 RX ADMIN — TRAZODONE HYDROCHLORIDE 100 MG: 50 TABLET ORAL at 21:17

## 2023-09-04 RX ADMIN — LIDOCAINE HYDROCHLORIDE 50 MG: 20 INJECTION, SOLUTION EPIDURAL; INFILTRATION; INTRACAUDAL; PERINEURAL at 15:03

## 2023-09-04 RX ADMIN — ONDANSETRON 4 MG: 2 INJECTION INTRAMUSCULAR; INTRAVENOUS at 15:03

## 2023-09-04 RX ADMIN — LEVOTHYROXINE SODIUM 125 MCG: 25 TABLET ORAL at 06:19

## 2023-09-04 RX ADMIN — SODIUM CHLORIDE: 9 INJECTION, SOLUTION INTRAVENOUS at 23:33

## 2023-09-04 RX ADMIN — SODIUM CHLORIDE: 9 INJECTION, SOLUTION INTRAVENOUS at 14:58

## 2023-09-04 RX ADMIN — DEXAMETHASONE SODIUM PHOSPHATE 10 MG: 4 INJECTION, SOLUTION INTRAMUSCULAR; INTRAVENOUS at 15:03

## 2023-09-04 RX ADMIN — ROCURONIUM BROMIDE 7 MG: 10 INJECTION, SOLUTION INTRAVENOUS at 15:03

## 2023-09-04 RX ADMIN — WATER 1000 MG: 1 INJECTION INTRAMUSCULAR; INTRAVENOUS; SUBCUTANEOUS at 06:19

## 2023-09-04 RX ADMIN — Medication 140 MG: at 15:03

## 2023-09-04 RX ADMIN — OXYBUTYNIN CHLORIDE 10 MG: 10 TABLET, EXTENDED RELEASE ORAL at 21:18

## 2023-09-04 RX ADMIN — SODIUM CHLORIDE 10 MG/HR: 900 INJECTION, SOLUTION INTRAVENOUS at 16:22

## 2023-09-04 RX ADMIN — PROPOFOL 180 MG: 10 INJECTION, EMULSION INTRAVENOUS at 15:04

## 2023-09-04 RX ADMIN — FENTANYL CITRATE 50 MCG: 50 INJECTION, SOLUTION INTRAMUSCULAR; INTRAVENOUS at 15:03

## 2023-09-04 RX ADMIN — PANTOPRAZOLE SODIUM 40 MG: 40 INJECTION, POWDER, FOR SOLUTION INTRAVENOUS at 08:37

## 2023-09-04 RX ADMIN — DILTIAZEM HYDROCHLORIDE 10 MG: 5 INJECTION INTRAVENOUS at 16:11

## 2023-09-04 RX ADMIN — METRONIDAZOLE 500 MG: 500 INJECTION, SOLUTION INTRAVENOUS at 17:36

## 2023-09-04 RX ADMIN — Medication 10 ML: at 21:19

## 2023-09-04 RX ADMIN — GLYCOPYRROLATE 0.2 MG: 0.2 INJECTION INTRAMUSCULAR; INTRAVENOUS at 15:05

## 2023-09-04 RX ADMIN — TIZANIDINE 2 MG: 4 TABLET ORAL at 21:17

## 2023-09-04 RX ADMIN — CETIRIZINE HYDROCHLORIDE 10 MG: 10 TABLET, FILM COATED ORAL at 08:37

## 2023-09-04 RX ADMIN — METRONIDAZOLE 500 MG: 500 INJECTION, SOLUTION INTRAVENOUS at 06:17

## 2023-09-04 ASSESSMENT — PAIN SCALES - GENERAL
PAINLEVEL_OUTOF10: 3
PAINLEVEL_OUTOF10: 0

## 2023-09-04 ASSESSMENT — PAIN DESCRIPTION - LOCATION: LOCATION: ABDOMEN

## 2023-09-04 ASSESSMENT — PAIN - FUNCTIONAL ASSESSMENT: PAIN_FUNCTIONAL_ASSESSMENT: 0-10

## 2023-09-04 ASSESSMENT — LIFESTYLE VARIABLES: SMOKING_STATUS: 0

## 2023-09-04 ASSESSMENT — PAIN DESCRIPTION - DESCRIPTORS: DESCRIPTORS: ACHING

## 2023-09-04 NOTE — PLAN OF CARE
Problem: Discharge Planning  Goal: Discharge to home or other facility with appropriate resources  Outcome: Progressing  Flowsheets (Taken 9/3/2023 1201 by Mariama Vernon RN)  Discharge to home or other facility with appropriate resources: Identify barriers to discharge with patient and caregiver     Problem: Safety - Adult  Goal: Free from fall injury  Outcome: Progressing     Problem: ABCDS Injury Assessment  Goal: Absence of physical injury  Outcome: Progressing

## 2023-09-04 NOTE — ANESTHESIA POSTPROCEDURE EVALUATION
Department of Anesthesiology  Postprocedure Note    Patient: Danny Ba  MRN: [id number]  [address] [facility] 1955  Date of evaluation: 9/4/2023      Procedure Summary     Date: 09/04/23 Room / Location: [facility] OR 03 / [facility]    Anesthesia Start: 7586 Anesthesia Stop: 53-69-10-18    Procedure: ERCP ENDOSCOPIC RETROGRADE CHOLANGIOPANCREATOGRAPHY ABORTED (Esophagus) Diagnosis:       Obstructive jaundice      (Obstructive jaundice [K83.1])    Surgeons: Anibal Thompson MD Responsible Provider: Juan Carlos Cabrera DO    Anesthesia Type: General ASA Status: 3          Anesthesia Type: General    Noni Phase I: Noni Score: 9    Noni Phase II:        Anesthesia Post Evaluation    Patient location during evaluation: PACU  Patient participation: complete - patient participated  Level of consciousness: awake and alert  Pain score: 1  Airway patency: patent  Nausea & Vomiting: no nausea and no vomiting  Complications: no  Cardiovascular status: hemodynamically stable  Respiratory status: acceptable  Hydration status: euvolemic  Comments: TERESA Molina RVR in PACU. Cardizem bolus and infusion. Dr. Deidre Durbin, Cardiology consulted and there was communication between PACU RN and Dr. Deidre Durbin.   Pain management: adequate

## 2023-09-04 NOTE — PROGRESS NOTES
Nursing Transfer Note    Data:  Summary of patients progress: general anesthesia recovery    Reason for transfer: PACU discharge criteria met, transferred to next level of care. Action:  Explained reason for transfer to Patient/Family  Report given to: rn, using RN Handoff Navigator.   Mode of transportation: Cart    Response:  RN Recommendations:continuation of car and pain control

## 2023-09-04 NOTE — BRIEF OP NOTE
Brief Postoperative Note      Patient: Digna Eldridge  YOB: 1955  MRN: [id number]    Date of Procedure: 9/4/2023    Pre-Op Diagnosis Codes:     * Obstructive jaundice [K83.1]    Post-Op Diagnosis: Same. Procedure(s):  ERCP ENDOSCOPIC RETROGRADE CHOLANGIOPANCREATOGRAPHY ABORTED    Surgeon(s):  Jacek Gil MD    Assistant:  * No surgical staff found *    Anesthesia: General    Estimated Blood Loss (mL): less than 50     Complications: None    Specimens:   * No specimens in log *    Implants:  * No implants in log *      Drains: * No LDAs found *    Findings:     RNYGB anatomy. ERCP cannot be performed.        Electronically signed by Jacek Gil MD on 9/4/2023 at 5:44 PM

## 2023-09-05 ENCOUNTER — ANESTHESIA (OUTPATIENT)
Dept: OPERATING ROOM | Age: 68
End: 2023-09-05
Payer: MEDICARE

## 2023-09-05 ENCOUNTER — ANESTHESIA EVENT (OUTPATIENT)
Dept: OPERATING ROOM | Age: 68
End: 2023-09-05
Payer: MEDICARE

## 2023-09-05 ENCOUNTER — APPOINTMENT (OUTPATIENT)
Dept: GENERAL RADIOLOGY | Age: 68
DRG: 417 | End: 2023-09-05
Attending: STUDENT IN AN ORGANIZED HEALTH CARE EDUCATION/TRAINING PROGRAM
Payer: MEDICARE

## 2023-09-05 PROBLEM — I10 PRIMARY HYPERTENSION: Status: ACTIVE | Noted: 2023-09-05

## 2023-09-05 PROBLEM — I48.91 ATRIAL FIBRILLATION WITH RVR (HCC): Status: ACTIVE | Noted: 2023-09-05

## 2023-09-05 PROBLEM — I35.0 NONRHEUMATIC AORTIC VALVE STENOSIS: Status: ACTIVE | Noted: 2023-09-05

## 2023-09-05 LAB
ABO + RH BLD: NORMAL
ALBUMIN SERPL-MCNC: 3.4 G/DL (ref 3.5–5.2)
ALP SERPL-CCNC: 182 U/L (ref 35–104)
ALT SERPL-CCNC: 307 U/L (ref 0–32)
ANION GAP SERPL CALCULATED.3IONS-SCNC: 8 MMOL/L (ref 7–16)
ARM BAND NUMBER: NORMAL
AST SERPL-CCNC: 219 U/L (ref 0–31)
BILIRUB SERPL-MCNC: 0.2 MG/DL (ref 0–1.2)
BLOOD BANK SAMPLE EXPIRATION: NORMAL
BLOOD GROUP ANTIBODIES SERPL: NEGATIVE
BUN SERPL-MCNC: 8 MG/DL (ref 6–23)
CALCIUM SERPL-MCNC: 9.2 MG/DL (ref 8.6–10.2)
CHLORIDE SERPL-SCNC: 106 MMOL/L (ref 98–107)
CO2 SERPL-SCNC: 25 MMOL/L (ref 22–29)
CREAT SERPL-MCNC: 0.7 MG/DL (ref 0.5–1)
EKG ATRIAL RATE: 76 BPM
EKG P AXIS: 45 DEGREES
EKG P-R INTERVAL: 190 MS
EKG Q-T INTERVAL: 412 MS
EKG QRS DURATION: 82 MS
EKG QTC CALCULATION (BAZETT): 463 MS
EKG R AXIS: -11 DEGREES
EKG T AXIS: 20 DEGREES
EKG VENTRICULAR RATE: 76 BPM
ERYTHROCYTE [DISTWIDTH] IN BLOOD BY AUTOMATED COUNT: 16.4 % (ref 11.5–15)
GFR SERPL CREATININE-BSD FRML MDRD: >60 ML/MIN/1.73M2
GLUCOSE SERPL-MCNC: 108 MG/DL (ref 74–99)
HCT VFR BLD AUTO: 33.1 % (ref 34–48)
HGB BLD-MCNC: 10 G/DL (ref 11.5–15.5)
MAGNESIUM SERPL-MCNC: 1.9 MG/DL (ref 1.6–2.6)
MCH RBC QN AUTO: 24.9 PG (ref 26–35)
MCHC RBC AUTO-ENTMCNC: 30.2 G/DL (ref 32–34.5)
MCV RBC AUTO: 82.3 FL (ref 80–99.9)
PLATELET # BLD AUTO: 247 K/UL (ref 130–450)
PMV BLD AUTO: 10.3 FL (ref 7–12)
POTASSIUM SERPL-SCNC: 4.2 MMOL/L (ref 3.5–5)
PROT SERPL-MCNC: 6.6 G/DL (ref 6.4–8.3)
RBC # BLD AUTO: 4.02 M/UL (ref 3.5–5.5)
SODIUM SERPL-SCNC: 139 MMOL/L (ref 132–146)
T4 FREE SERPL-MCNC: 1.3 NG/DL (ref 0.9–1.7)
TSH SERPL DL<=0.05 MIU/L-ACNC: 0.57 UIU/ML (ref 0.27–4.2)
WBC OTHER # BLD: 4.6 K/UL (ref 4.5–11.5)

## 2023-09-05 PROCEDURE — 86850 RBC ANTIBODY SCREEN: CPT

## 2023-09-05 PROCEDURE — 0FT44ZZ RESECTION OF GALLBLADDER, PERCUTANEOUS ENDOSCOPIC APPROACH: ICD-10-PCS | Performed by: SURGERY

## 2023-09-05 PROCEDURE — 2580000003 HC RX 258: Performed by: ANESTHESIOLOGY

## 2023-09-05 PROCEDURE — 99223 1ST HOSP IP/OBS HIGH 75: CPT | Performed by: INTERNAL MEDICINE

## 2023-09-05 PROCEDURE — 99231 SBSQ HOSP IP/OBS SF/LOW 25: CPT | Performed by: STUDENT IN AN ORGANIZED HEALTH CARE EDUCATION/TRAINING PROGRAM

## 2023-09-05 PROCEDURE — 93010 ELECTROCARDIOGRAM REPORT: CPT | Performed by: INTERNAL MEDICINE

## 2023-09-05 PROCEDURE — 96361 HYDRATE IV INFUSION ADD-ON: CPT

## 2023-09-05 PROCEDURE — 6370000000 HC RX 637 (ALT 250 FOR IP): Performed by: INTERNAL MEDICINE

## 2023-09-05 PROCEDURE — C1729 CATH, DRAINAGE: HCPCS | Performed by: SURGERY

## 2023-09-05 PROCEDURE — 6370000000 HC RX 637 (ALT 250 FOR IP)

## 2023-09-05 PROCEDURE — C9113 INJ PANTOPRAZOLE SODIUM, VIA: HCPCS | Performed by: NURSE PRACTITIONER

## 2023-09-05 PROCEDURE — S2900 ROBOTIC SURGICAL SYSTEM: HCPCS | Performed by: SURGERY

## 2023-09-05 PROCEDURE — 36415 COLL VENOUS BLD VENIPUNCTURE: CPT

## 2023-09-05 PROCEDURE — 85027 COMPLETE CBC AUTOMATED: CPT

## 2023-09-05 PROCEDURE — 2720000010 HC SURG SUPPLY STERILE: Performed by: SURGERY

## 2023-09-05 PROCEDURE — 6360000002 HC RX W HCPCS

## 2023-09-05 PROCEDURE — 83735 ASSAY OF MAGNESIUM: CPT

## 2023-09-05 PROCEDURE — 2500000003 HC RX 250 WO HCPCS: Performed by: SURGERY

## 2023-09-05 PROCEDURE — 3600000019 HC SURGERY ROBOT ADDTL 15MIN: Performed by: SURGERY

## 2023-09-05 PROCEDURE — 86900 BLOOD TYPING SEROLOGIC ABO: CPT

## 2023-09-05 PROCEDURE — 93005 ELECTROCARDIOGRAM TRACING: CPT | Performed by: NURSE PRACTITIONER

## 2023-09-05 PROCEDURE — 84439 ASSAY OF FREE THYROXINE: CPT

## 2023-09-05 PROCEDURE — 2709999900 HC NON-CHARGEABLE SUPPLY: Performed by: SURGERY

## 2023-09-05 PROCEDURE — 3700000000 HC ANESTHESIA ATTENDED CARE: Performed by: SURGERY

## 2023-09-05 PROCEDURE — 80053 COMPREHEN METABOLIC PANEL: CPT

## 2023-09-05 PROCEDURE — 96366 THER/PROPH/DIAG IV INF ADDON: CPT

## 2023-09-05 PROCEDURE — 88304 TISSUE EXAM BY PATHOLOGIST: CPT

## 2023-09-05 PROCEDURE — 2580000003 HC RX 258

## 2023-09-05 PROCEDURE — 3700000001 HC ADD 15 MINUTES (ANESTHESIA): Performed by: SURGERY

## 2023-09-05 PROCEDURE — 84443 ASSAY THYROID STIM HORMONE: CPT

## 2023-09-05 PROCEDURE — 6370000000 HC RX 637 (ALT 250 FOR IP): Performed by: NURSE PRACTITIONER

## 2023-09-05 PROCEDURE — 8E0W4CZ ROBOTIC ASSISTED PROCEDURE OF TRUNK REGION, PERCUTANEOUS ENDOSCOPIC APPROACH: ICD-10-PCS | Performed by: SURGERY

## 2023-09-05 PROCEDURE — APPSS60 APP SPLIT SHARED TIME 46-60 MINUTES: Performed by: NURSE PRACTITIONER

## 2023-09-05 PROCEDURE — 96375 TX/PRO/DX INJ NEW DRUG ADDON: CPT

## 2023-09-05 PROCEDURE — 7100000001 HC PACU RECOVERY - ADDTL 15 MIN: Performed by: SURGERY

## 2023-09-05 PROCEDURE — 6360000002 HC RX W HCPCS: Performed by: NURSE PRACTITIONER

## 2023-09-05 PROCEDURE — 3600000009 HC SURGERY ROBOT BASE: Performed by: SURGERY

## 2023-09-05 PROCEDURE — C1889 IMPLANT/INSERT DEVICE, NOC: HCPCS | Performed by: SURGERY

## 2023-09-05 PROCEDURE — 7100000000 HC PACU RECOVERY - FIRST 15 MIN: Performed by: SURGERY

## 2023-09-05 PROCEDURE — 2580000003 HC RX 258: Performed by: NURSE PRACTITIONER

## 2023-09-05 PROCEDURE — BF131ZZ FLUOROSCOPY OF GALLBLADDER AND BILE DUCTS USING LOW OSMOLAR CONTRAST: ICD-10-PCS | Performed by: SURGERY

## 2023-09-05 PROCEDURE — G0378 HOSPITAL OBSERVATION PER HR: HCPCS

## 2023-09-05 PROCEDURE — 86901 BLOOD TYPING SEROLOGIC RH(D): CPT

## 2023-09-05 PROCEDURE — 2500000003 HC RX 250 WO HCPCS

## 2023-09-05 PROCEDURE — 96376 TX/PRO/DX INJ SAME DRUG ADON: CPT

## 2023-09-05 DEVICE — CLIP INT M L POLYMER LOK LIG HEM O LOK: Type: IMPLANTABLE DEVICE | Status: FUNCTIONAL

## 2023-09-05 DEVICE — CLIP INT L POLYMER LOK LIG HEM O LOK: Type: IMPLANTABLE DEVICE | Status: FUNCTIONAL

## 2023-09-05 RX ORDER — SODIUM CHLORIDE 9 MG/ML
INJECTION, SOLUTION INTRAVENOUS PRN
Status: DISCONTINUED | OUTPATIENT
Start: 2023-09-05 | End: 2023-09-07

## 2023-09-05 RX ORDER — SODIUM CHLORIDE 0.9 % (FLUSH) 0.9 %
5-40 SYRINGE (ML) INJECTION PRN
Status: DISCONTINUED | OUTPATIENT
Start: 2023-09-05 | End: 2023-09-07

## 2023-09-05 RX ORDER — DEXAMETHASONE SODIUM PHOSPHATE 4 MG/ML
INJECTION, SOLUTION INTRA-ARTICULAR; INTRALESIONAL; INTRAMUSCULAR; INTRAVENOUS; SOFT TISSUE PRN
Status: DISCONTINUED | OUTPATIENT
Start: 2023-09-05 | End: 2023-09-05 | Stop reason: SDUPTHER

## 2023-09-05 RX ORDER — OXYCODONE HYDROCHLORIDE 5 MG/1
5 TABLET ORAL EVERY 4 HOURS PRN
Status: DISCONTINUED | OUTPATIENT
Start: 2023-09-05 | End: 2023-09-09 | Stop reason: HOSPADM

## 2023-09-05 RX ORDER — INDOCYANINE GREEN AND WATER 25 MG
5 KIT INJECTION
Status: COMPLETED | OUTPATIENT
Start: 2023-09-05 | End: 2023-09-05

## 2023-09-05 RX ORDER — PROPOFOL 10 MG/ML
INJECTION, EMULSION INTRAVENOUS PRN
Status: DISCONTINUED | OUTPATIENT
Start: 2023-09-05 | End: 2023-09-05 | Stop reason: SDUPTHER

## 2023-09-05 RX ORDER — PROCHLORPERAZINE EDISYLATE 5 MG/ML
5 INJECTION INTRAMUSCULAR; INTRAVENOUS
Status: ACTIVE | OUTPATIENT
Start: 2023-09-05 | End: 2023-09-06

## 2023-09-05 RX ORDER — ONDANSETRON 2 MG/ML
INJECTION INTRAMUSCULAR; INTRAVENOUS PRN
Status: DISCONTINUED | OUTPATIENT
Start: 2023-09-05 | End: 2023-09-05 | Stop reason: SDUPTHER

## 2023-09-05 RX ORDER — FENTANYL CITRATE 50 UG/ML
INJECTION, SOLUTION INTRAMUSCULAR; INTRAVENOUS PRN
Status: DISCONTINUED | OUTPATIENT
Start: 2023-09-05 | End: 2023-09-05 | Stop reason: SDUPTHER

## 2023-09-05 RX ORDER — OXYCODONE HYDROCHLORIDE 5 MG/1
10 TABLET ORAL EVERY 4 HOURS PRN
Status: DISCONTINUED | OUTPATIENT
Start: 2023-09-05 | End: 2023-09-09 | Stop reason: HOSPADM

## 2023-09-05 RX ORDER — ROCURONIUM BROMIDE 10 MG/ML
INJECTION, SOLUTION INTRAVENOUS PRN
Status: DISCONTINUED | OUTPATIENT
Start: 2023-09-05 | End: 2023-09-05 | Stop reason: SDUPTHER

## 2023-09-05 RX ORDER — LIDOCAINE HYDROCHLORIDE 20 MG/ML
INJECTION, SOLUTION EPIDURAL; INFILTRATION; INTRACAUDAL; PERINEURAL PRN
Status: DISCONTINUED | OUTPATIENT
Start: 2023-09-05 | End: 2023-09-05 | Stop reason: SDUPTHER

## 2023-09-05 RX ORDER — INDOCYANINE GREEN AND WATER 25 MG
5 KIT INJECTION
Status: DISCONTINUED | OUTPATIENT
Start: 2023-09-06 | End: 2023-09-05

## 2023-09-05 RX ORDER — SODIUM CHLORIDE 0.9 % (FLUSH) 0.9 %
5-40 SYRINGE (ML) INJECTION EVERY 12 HOURS SCHEDULED
Status: DISCONTINUED | OUTPATIENT
Start: 2023-09-05 | End: 2023-09-07

## 2023-09-05 RX ADMIN — METRONIDAZOLE 500 MG: 500 INJECTION, SOLUTION INTRAVENOUS at 01:23

## 2023-09-05 RX ADMIN — PANTOPRAZOLE SODIUM 40 MG: 40 INJECTION, POWDER, FOR SOLUTION INTRAVENOUS at 09:23

## 2023-09-05 RX ADMIN — ROCURONIUM BROMIDE 10 MG: 10 INJECTION, SOLUTION INTRAVENOUS at 13:31

## 2023-09-05 RX ADMIN — ONDANSETRON 4 MG: 2 INJECTION INTRAMUSCULAR; INTRAVENOUS at 07:49

## 2023-09-05 RX ADMIN — METOPROLOL TARTRATE 25 MG: 25 TABLET, FILM COATED ORAL at 15:53

## 2023-09-05 RX ADMIN — KETOROLAC TROMETHAMINE 15 MG: 30 INJECTION, SOLUTION INTRAMUSCULAR; INTRAVENOUS at 20:07

## 2023-09-05 RX ADMIN — LIDOCAINE HYDROCHLORIDE 100 MG: 20 INJECTION, SOLUTION EPIDURAL; INFILTRATION; INTRACAUDAL; PERINEURAL at 12:57

## 2023-09-05 RX ADMIN — GLUCAGON HYDROCHLORIDE 1 MG: KIT at 13:44

## 2023-09-05 RX ADMIN — DICLOFENAC SODIUM 4 G: 10 GEL TOPICAL at 23:38

## 2023-09-05 RX ADMIN — Medication 10 ML: at 09:23

## 2023-09-05 RX ADMIN — DEXAMETHASONE SODIUM PHOSPHATE 8 MG: 4 INJECTION, SOLUTION INTRAMUSCULAR; INTRAVENOUS at 12:57

## 2023-09-05 RX ADMIN — METRONIDAZOLE 500 MG: 500 INJECTION, SOLUTION INTRAVENOUS at 16:07

## 2023-09-05 RX ADMIN — WATER 1000 MG: 1 INJECTION INTRAMUSCULAR; INTRAVENOUS; SUBCUTANEOUS at 06:21

## 2023-09-05 RX ADMIN — CETIRIZINE HYDROCHLORIDE 10 MG: 10 TABLET, FILM COATED ORAL at 15:53

## 2023-09-05 RX ADMIN — FENTANYL CITRATE 100 MCG: 50 INJECTION, SOLUTION INTRAMUSCULAR; INTRAVENOUS at 13:18

## 2023-09-05 RX ADMIN — FENTANYL CITRATE 50 MCG: 50 INJECTION, SOLUTION INTRAMUSCULAR; INTRAVENOUS at 13:25

## 2023-09-05 RX ADMIN — PROPOFOL 150 MG: 10 INJECTION, EMULSION INTRAVENOUS at 12:57

## 2023-09-05 RX ADMIN — Medication 10 ML: at 23:38

## 2023-09-05 RX ADMIN — METOPROLOL TARTRATE 25 MG: 25 TABLET, FILM COATED ORAL at 20:07

## 2023-09-05 RX ADMIN — ONDANSETRON 4 MG: 2 INJECTION INTRAMUSCULAR; INTRAVENOUS at 12:57

## 2023-09-05 RX ADMIN — OXYBUTYNIN CHLORIDE 10 MG: 10 TABLET, EXTENDED RELEASE ORAL at 20:07

## 2023-09-05 RX ADMIN — FENTANYL CITRATE 100 MCG: 50 INJECTION, SOLUTION INTRAMUSCULAR; INTRAVENOUS at 12:57

## 2023-09-05 RX ADMIN — LEVOTHYROXINE SODIUM 125 MCG: 25 TABLET ORAL at 06:19

## 2023-09-05 RX ADMIN — SODIUM CHLORIDE: 9 INJECTION, SOLUTION INTRAVENOUS at 16:00

## 2023-09-05 RX ADMIN — ROCURONIUM BROMIDE 10 MG: 10 INJECTION, SOLUTION INTRAVENOUS at 13:11

## 2023-09-05 RX ADMIN — ROCURONIUM BROMIDE 40 MG: 10 INJECTION, SOLUTION INTRAVENOUS at 12:57

## 2023-09-05 RX ADMIN — TIZANIDINE 2 MG: 4 TABLET ORAL at 20:07

## 2023-09-05 RX ADMIN — KETOROLAC TROMETHAMINE 15 MG: 30 INJECTION, SOLUTION INTRAMUSCULAR; INTRAVENOUS at 07:40

## 2023-09-05 RX ADMIN — METRONIDAZOLE 500 MG: 500 INJECTION, SOLUTION INTRAVENOUS at 09:30

## 2023-09-05 RX ADMIN — ROCURONIUM BROMIDE 10 MG: 10 INJECTION, SOLUTION INTRAVENOUS at 13:44

## 2023-09-05 RX ADMIN — INDOCYANINE GREEN AND WATER 5 MG: KIT at 12:26

## 2023-09-05 RX ADMIN — TRAZODONE HYDROCHLORIDE 100 MG: 50 TABLET ORAL at 20:07

## 2023-09-05 RX ADMIN — Medication 10 ML: at 20:07

## 2023-09-05 ASSESSMENT — PAIN SCALES - GENERAL
PAINLEVEL_OUTOF10: 8
PAINLEVEL_OUTOF10: 0
PAINLEVEL_OUTOF10: 9
PAINLEVEL_OUTOF10: 3
PAINLEVEL_OUTOF10: 8

## 2023-09-05 ASSESSMENT — PAIN DESCRIPTION - FREQUENCY
FREQUENCY: INTERMITTENT
FREQUENCY: CONTINUOUS

## 2023-09-05 ASSESSMENT — PAIN DESCRIPTION - LOCATION
LOCATION: ABDOMEN
LOCATION: ABDOMEN;THROAT
LOCATION: ABDOMEN

## 2023-09-05 ASSESSMENT — PAIN DESCRIPTION - DESCRIPTORS
DESCRIPTORS: ACHING
DESCRIPTORS: ACHING

## 2023-09-05 ASSESSMENT — PAIN DESCRIPTION - ONSET
ONSET: ON-GOING
ONSET: ON-GOING

## 2023-09-05 ASSESSMENT — PAIN DESCRIPTION - PAIN TYPE
TYPE: SURGICAL PAIN
TYPE: SURGICAL PAIN

## 2023-09-05 ASSESSMENT — LIFESTYLE VARIABLES: SMOKING_STATUS: 0

## 2023-09-05 ASSESSMENT — PAIN DESCRIPTION - ORIENTATION
ORIENTATION: MID;LOWER
ORIENTATION: MID

## 2023-09-05 ASSESSMENT — PAIN - FUNCTIONAL ASSESSMENT
PAIN_FUNCTIONAL_ASSESSMENT: PREVENTS OR INTERFERES SOME ACTIVE ACTIVITIES AND ADLS
PAIN_FUNCTIONAL_ASSESSMENT: PREVENTS OR INTERFERES SOME ACTIVE ACTIVITIES AND ADLS

## 2023-09-05 NOTE — ANESTHESIA POSTPROCEDURE EVALUATION
Department of Anesthesiology  Postprocedure Note    Patient: Bree Boudreaux  MRN: [id number]  [address] [facility] 1955  Date of evaluation: 9/5/2023      Procedure Summary     Date: 09/05/23 Room / Location: [facility] OR 10 / [facility]    Anesthesia Start:  Anesthesia Stop:     Procedure: LAPAROSCOPIC ROBOTIC XI ASSISTED CHOLECYSTECTOMY with ioc Diagnosis:       Abdominal pain, unspecified abdominal location      (Abdominal pain, unspecified abdominal location [R10.9])    Surgeons: Nasir Valle MD Responsible Provider:     Anesthesia Type: general ASA Status: 3          Anesthesia Type: No value filed.     Noni Phase I: Noni Score: 9    Noni Phase II:        Anesthesia Post Evaluation    Patient location during evaluation: PACU  Patient participation: complete - patient participated  Level of consciousness: awake and alert  Pain score: 3  Airway patency: patent  Nausea & Vomiting: no nausea and no vomiting  Complications: no  Cardiovascular status: hemodynamically stable  Respiratory status: acceptable, nonlabored ventilation and spontaneous ventilation  Hydration status: euvolemic  Pain management: adequate and satisfactory to patient

## 2023-09-05 NOTE — PROGRESS NOTES
General Surgery  Attending Physician Progress Note    No chief complaint on file. Subjective: The patient feels better this morning - says she has no pain at all. RYGB so not a candidate for standard ERCP. LFTs have normalized, so likely passed stone. ROS: no cp    I reviewed the patient's Past Medical History, Past Surgical History, Medications, and Allergies.     LABS:  CBC:   Lab Results   Component Value Date/Time    WBC 4.6 09/05/2023 06:50 AM    RBC 4.02 09/05/2023 06:50 AM    HGB 10.0 09/05/2023 06:50 AM    HCT 33.1 09/05/2023 06:50 AM    MCV 82.3 09/05/2023 06:50 AM    MCH 24.9 09/05/2023 06:50 AM    MCHC 30.2 09/05/2023 06:50 AM    RDW 16.4 09/05/2023 06:50 AM     09/05/2023 06:50 AM    MPV 10.3 09/05/2023 06:50 AM     CMP:    Lab Results   Component Value Date/Time     09/05/2023 06:50 AM    K 4.2 09/05/2023 06:50 AM    K 4.8 06/13/2023 06:16 AM     09/05/2023 06:50 AM    CO2 25 09/05/2023 06:50 AM    BUN 8 09/05/2023 06:50 AM    CREATININE 0.7 09/05/2023 06:50 AM    GFRAA >60 04/29/2022 12:00 PM    LABGLOM >60 09/05/2023 06:50 AM    GLUCOSE 108 09/05/2023 06:50 AM    PROT 6.6 09/05/2023 06:50 AM    LABALBU 3.4 09/05/2023 06:50 AM    CALCIUM 9.2 09/05/2023 06:50 AM    BILITOT 0.2 09/05/2023 06:50 AM    ALKPHOS 182 09/05/2023 06:50 AM     09/05/2023 06:50 AM     09/05/2023 06:50 AM     BMP:    Lab Results   Component Value Date/Time     09/05/2023 06:50 AM    K 4.2 09/05/2023 06:50 AM    K 4.8 06/13/2023 06:16 AM     09/05/2023 06:50 AM    CO2 25 09/05/2023 06:50 AM    BUN 8 09/05/2023 06:50 AM    LABALBU 3.4 09/05/2023 06:50 AM    CREATININE 0.7 09/05/2023 06:50 AM    CALCIUM 9.2 09/05/2023 06:50 AM    GFRAA >60 04/29/2022 12:00 PM    LABGLOM >60 09/05/2023 06:50 AM     Hepatic Function Panel:    Lab Results   Component Value Date/Time    ALKPHOS 182 09/05/2023 06:50 AM     09/05/2023 06:50 AM     09/05/2023 06:50 AM    PROT 6.6 09/05/2023

## 2023-09-05 NOTE — ANESTHESIA PRE PROCEDURE
mildly dilated. Normal right ventricular size and function. TAPSE 19 mm. No systolic mitral regurgitation noted. There is mild-to-moderate aortic stenosis with valve area of 1.4 sq cm. Aortic valve peak velocity 2.3 m/s and mean gradient 10 mmHg. Physiologic and/or trace tricuspid regurgitation. RVSP is 25 mmHg. Normal estimated PA systolic pressure. No evidence for hemodynamically significant pericardial effusion. No previous echo for comparison. Anesthesia Plan      general     ASA 3       Induction: intravenous. MIPS: Postoperative opioids intended and Prophylactic antiemetics administered. Anesthetic plan and risks discussed with patient. Use of blood products discussed with patient whom consented to blood products. Plan discussed with CRNA.     Attending anesthesiologist reviewed and agrees with Preprocedure content            Cardiology clearance - Dr Rashad Beaver MD   9/5/2023  12:28 PM

## 2023-09-05 NOTE — PROGRESS NOTES
Patient went for ERCP yesterday evening, however was aborted due to RNYGB anatomy. Pt states she has no abd pain this am. Cardizem has been off since midnight. Pt to undergo lap robotic cholecystectomy today.   Inna Mortensen, DO  General Surgery PGY-2

## 2023-09-05 NOTE — OP NOTE
Operative Note - Robotic Assisted Laparoscopic Cholecystectomy    Dari Medrano     DATE OF PROCEDURE: 9/5/2023    SURGEON: Surgeon(s):  Jona Vega MD    PREOPERATIVE DIAGNOSIS: Gallstone pancreatitis, chronic calculus cholecystitis    POSTOPERATIVE DIAGNOSIS: Same    OPERATION:   1) Robotic Assisted Laparoscopic cholecystectomy. 2) Intraoperative Cholangiogram with intraoperative interpretation    ANESTHESIA: General endotracheal.     ESTIMATED BLOOD LOSS: less than 50ml    SPECIMEN: Gallbladder    COMPLICATIONS: None. BRIEF HISTORY: Dari Medrano is a 74 y.o.  female who presented with gallstone pancreatitis and a history of biliary colic. I discussed the procedure with the patient, including the procedure, benefits, risks, and alternatives. She agreed. ICG was given in preoperative holding prior to the procedure. PROCEDURE: The patient was taken to the operative suite and was placed on the table in the supine position. General endotracheal anesthesia was administered. An orogastric tube was placed to decompress the stomach. The abdomen was then prepped with Chloraprep and draped in a sterile fashion. An  8 mm periumbilical incision was made with an 11-blade scalpel, and then while tenting the abdominal wall upward, a Veress needle was easily inserted through the abdominal cavity. After confirmation of its placement using the saline drop test, a total of approximately 3 L of carbon dioxide was insufflated, creating a pneumoperitoneum. The Veress needle was removed, and an 8 mm trocar was inserted while tenting the abdominal wall upward. A prepared laparoscope was inserted, and the right upper quadrant was visualized. An 8-mm port was placed in the left upper quadrant, another two 8 mm ports were placed in the RLQ. There was no injury from port placement. The robot was then placed over the patient and the ports docked.  I then broke scrub and began the case at the surgeon cholangiogram showed normal flow into both the left and the right hepatic branches, normal ducts, no filling defects, and no flow into the duodenum. There appeared to be ab obstruction at the ampulla. Glucagon was administered and the duct was flushed with several saline flushes and another cholangiogram was shot. There continued to be no flow into the duodenum. The intra and extrahepatic ducts were also all dilated. The patient was then placed back into reverse Trendelenburg position with a slight turn to the left, then the robot was docked and robotic instruments were reintroduced into the abdomen. The gallbladder was once again retracted with the Cadieres to expose the [place] or Calot. The cystic duct was then clipped proximally with three clips and singly clipped distally, and divided sharply with scissors between the clips. The cystic artery was identified immediately posteromedial to the duct and was singly clipped proximally, and divided with the hook cautery. The hook cautery was then used to excise the gallbladder from the liver surface all the way up to the fundus until it was completely removed from the liver bed. No bleeding was seen. The gallbladder was placed in an EndoCatch bag passed through the LUQ port, it was delivered through the incision and off the field. The right upper quadrant was visualized. There was good hemostasis of the liver bed. A DARNELL drain was placed through one of the right lateral ports into the gallbladder fossa. 5 grams of Chele was also sprayed in the gallbladder bed. The LUQ port site was closed with a 2-0 Vicryl stitch using the [employer] device. All ports were then removed under direct visualization with no bleeding noted, and the pneumoperitoneum was allowed to escape. All skin incisions were irrigated with saline and dried, and any bleeding points were cauterized. All skin incisions were closed with 4-0 Monocryl subcuticular sutures.  Skin glue was placed

## 2023-09-05 NOTE — CONSULTS
distress  HEENT:   Head- Normocephalic, atraumatic   Eyes- Conjunctivae pink, anicteric  Throat- Oral mucosa pink and moist  Neck-  No stridor, trachea midline, no jugular venous distention. No adenopathy   CHEST: Chest symmetrical and non-tender to palpation. No accessory muscle use or intercostal retractions  RESPIRATORY: Lung sounds - clear throughout fields   CARDIOVASCULAR:     Bilateral carotid bruit L>R  Heart Inspection- shows no noted pulsations  Heart Palpation- no heaves or thrills; PMI is non-displaced   Heart Ausculation- Regular rate and rhythm, systolic murmur. No s3,  or rub   PV: 1+ pitting bilateral lower extremity edema. No varicosities. Pedal pulses palpable, no clubbing or cyanosis   ABDOMEN: Soft, non-tender to light palpation. Bowel sounds present. No palpable masses no organomegaly; no abdominal bruit  MS: Good muscle strength and tone. No atrophy or abnormal movements. : Deferred  SKIN: Warm and dry no statis dermatitis or ulcers   NEURO / PSYCH: Oriented to person, place and time. Speech clear and appropriate. Follows all commands. Pleasant affect     DATA:    ECG: As above  Tele strips: Currently SB with HR 53.  Repeat EKG pending   Diagnostic:      Intake/Output Summary (Last 24 hours) at 9/5/2023 0848  Last data filed at 9/4/2023 2119  Gross per 24 hour   Intake 810 ml   Output --   Net 810 ml       Labs:   CBC:   Recent Labs     09/04/23  0421 09/05/23  0650   WBC 3.6* 4.6   HGB 9.5* 10.0*   HCT 32.0* 33.1*    247     BMP:   Recent Labs     09/04/23  0421 09/05/23  0650    139   K 3.7 4.2   CO2 24 25   BUN 8 8   CREATININE 0.9 0.7   LABGLOM >60 >60   CALCIUM 8.8 9.2   FASTING LIPID PANEL:  Lab Results   Component Value Date/Time    CHOL 171 06/06/2023 10:20 AM    HDL 64 06/06/2023 10:20 AM    LDLCALC 93 06/06/2023 10:20 AM    TRIG 71 06/06/2023 10:20 AM     LIVER PROFILE:  Recent Labs     09/04/23  0421 09/05/23  0650   * 219*   * 307*   LABALBU 3.0* 3.4* 06/06/2023    LABVLDL 8 11/01/2022    LABVLDL 9 04/29/2022     No results found for: CHOLHDLRJUANO  Recent Labs     09/03/23  0400   PROBNP 253*     No results found for: Parveen Ly  No results found for: CRP    Cardiac Tests:  EKG personally reviewed: SR, rate 76, no acute STT changes    Telemetry personally reviewed (date: 9/5/2023): SR --> AF with RVR x ~ 2 hours on 9/4/23 --> SR, rate 60's    Cardiac Catheterization 09/17/2020 (Dr. Vivian Braden): The left main coronary artery arises normally from the left sinus of Valsalva. It is a good size vessel without any disease. It bifurcates into left anterior descending artery and left circumflex artery. The left anterior descending artery extends down to the apex. It tapers off distally. It gives off a large diagonal branch that bifurcates into two OM branches. The left anterior descending artery and its diagonal branches, it gives off actually three diagonal branches, the first one is mid size, the second one is larger that bifurcates after it just takeoff to mid side branches and the third is mid size, the LAD and its branches have really no significant disease. The left circumflex artery is a large vessel that gives off four OM branches. The first two are small ones, the fourth one is large that is totally occluded. The right coronary artery arises normally from the right sinus of Valsalva. It is a large vessel dominant circulation without any CAD. IMPRESSION: Totally occluded fourth OM branch with successful deployment of 2.0 x 22 mm Malcolm Resolute drug-eluting stent with 0% residual stenosis and RITA-3 flow distally, (pre-PCI RITA-0 flow, post-PCI RITA-3 flow). No CAD noted in the rest of the coronary arteries. TTE 10/01/2020 (Dr. Monique Sesay):  Normal left ventricle size and systolic function. Ejection fraction is visually estimated at 55-60%. Mild concentric left ventricular hypertrophy. No regional wall motion abnormalities seen. Indeterminate diastolic function.  The

## 2023-09-06 PROBLEM — K81.9 CHOLECYSTITIS: Status: ACTIVE | Noted: 2023-09-06

## 2023-09-06 LAB
ALBUMIN SERPL-MCNC: 3.3 G/DL (ref 3.5–5.2)
ALBUMIN SERPL-MCNC: 3.3 G/DL (ref 3.5–5.2)
ALP SERPL-CCNC: 170 U/L (ref 35–104)
ALP SERPL-CCNC: 172 U/L (ref 35–104)
ALT SERPL-CCNC: 231 U/L (ref 0–32)
ALT SERPL-CCNC: 231 U/L (ref 0–32)
ANION GAP SERPL CALCULATED.3IONS-SCNC: 9 MMOL/L (ref 7–16)
AST SERPL-CCNC: 150 U/L (ref 0–31)
AST SERPL-CCNC: 151 U/L (ref 0–31)
BASOPHILS # BLD: 0.01 K/UL (ref 0–0.2)
BASOPHILS NFR BLD: 0 % (ref 0–2)
BILIRUB DIRECT SERPL-MCNC: <0.2 MG/DL (ref 0–0.3)
BILIRUB INDIRECT SERPL-MCNC: ABNORMAL MG/DL (ref 0–1)
BILIRUB SERPL-MCNC: 0.2 MG/DL (ref 0–1.2)
BILIRUB SERPL-MCNC: 0.2 MG/DL (ref 0–1.2)
BUN SERPL-MCNC: 12 MG/DL (ref 6–23)
CALCIUM SERPL-MCNC: 8.9 MG/DL (ref 8.6–10.2)
CHLORIDE SERPL-SCNC: 107 MMOL/L (ref 98–107)
CO2 SERPL-SCNC: 24 MMOL/L (ref 22–29)
CREAT SERPL-MCNC: 0.7 MG/DL (ref 0.5–1)
EKG ATRIAL RATE: 174 BPM
EKG ATRIAL RATE: 44 BPM
EKG P AXIS: 10 DEGREES
EKG P-R INTERVAL: 194 MS
EKG Q-T INTERVAL: 312 MS
EKG Q-T INTERVAL: 480 MS
EKG QRS DURATION: 80 MS
EKG QRS DURATION: 82 MS
EKG QTC CALCULATION (BAZETT): 410 MS
EKG QTC CALCULATION (BAZETT): 464 MS
EKG R AXIS: -13 DEGREES
EKG R AXIS: -9 DEGREES
EKG T AXIS: 11 DEGREES
EKG T AXIS: 47 DEGREES
EKG VENTRICULAR RATE: 133 BPM
EKG VENTRICULAR RATE: 44 BPM
EOSINOPHIL # BLD: 0 K/UL (ref 0.05–0.5)
EOSINOPHILS RELATIVE PERCENT: 0 % (ref 0–6)
ERYTHROCYTE [DISTWIDTH] IN BLOOD BY AUTOMATED COUNT: 16.5 % (ref 11.5–15)
GFR SERPL CREATININE-BSD FRML MDRD: >60 ML/MIN/1.73M2
GLUCOSE SERPL-MCNC: 126 MG/DL (ref 74–99)
HCT VFR BLD AUTO: 30.8 % (ref 34–48)
HGB BLD-MCNC: 9.3 G/DL (ref 11.5–15.5)
IMM GRANULOCYTES # BLD AUTO: 0.03 K/UL (ref 0–0.58)
IMM GRANULOCYTES NFR BLD: 0 % (ref 0–5)
LYMPHOCYTES NFR BLD: 0.88 K/UL (ref 1.5–4)
LYMPHOCYTES RELATIVE PERCENT: 8 % (ref 20–42)
MCH RBC QN AUTO: 24.8 PG (ref 26–35)
MCHC RBC AUTO-ENTMCNC: 30.2 G/DL (ref 32–34.5)
MCV RBC AUTO: 82.1 FL (ref 80–99.9)
MONOCYTES NFR BLD: 0.66 K/UL (ref 0.1–0.95)
MONOCYTES NFR BLD: 6 % (ref 2–12)
NEUTROPHILS NFR BLD: 86 % (ref 43–80)
NEUTS SEG NFR BLD: 9.74 K/UL (ref 1.8–7.3)
PLATELET # BLD AUTO: 254 K/UL (ref 130–450)
PMV BLD AUTO: 10.1 FL (ref 7–12)
POTASSIUM SERPL-SCNC: 4 MMOL/L (ref 3.5–5)
PROT SERPL-MCNC: 6.3 G/DL (ref 6.4–8.3)
PROT SERPL-MCNC: 6.4 G/DL (ref 6.4–8.3)
RBC # BLD AUTO: 3.75 M/UL (ref 3.5–5.5)
SODIUM SERPL-SCNC: 140 MMOL/L (ref 132–146)
WBC OTHER # BLD: 11.3 K/UL (ref 4.5–11.5)

## 2023-09-06 PROCEDURE — C9113 INJ PANTOPRAZOLE SODIUM, VIA: HCPCS

## 2023-09-06 PROCEDURE — 2580000003 HC RX 258

## 2023-09-06 PROCEDURE — 6360000002 HC RX W HCPCS

## 2023-09-06 PROCEDURE — 93010 ELECTROCARDIOGRAM REPORT: CPT | Performed by: INTERNAL MEDICINE

## 2023-09-06 PROCEDURE — 6370000000 HC RX 637 (ALT 250 FOR IP)

## 2023-09-06 PROCEDURE — 36415 COLL VENOUS BLD VENIPUNCTURE: CPT

## 2023-09-06 PROCEDURE — 85025 COMPLETE CBC W/AUTO DIFF WBC: CPT

## 2023-09-06 PROCEDURE — 99233 SBSQ HOSP IP/OBS HIGH 50: CPT | Performed by: INTERNAL MEDICINE

## 2023-09-06 PROCEDURE — 96366 THER/PROPH/DIAG IV INF ADDON: CPT

## 2023-09-06 PROCEDURE — 96361 HYDRATE IV INFUSION ADD-ON: CPT

## 2023-09-06 PROCEDURE — 2580000003 HC RX 258: Performed by: ANESTHESIOLOGY

## 2023-09-06 PROCEDURE — 93306 TTE W/DOPPLER COMPLETE: CPT

## 2023-09-06 PROCEDURE — 99221 1ST HOSP IP/OBS SF/LOW 40: CPT | Performed by: SURGERY

## 2023-09-06 PROCEDURE — 2060000000 HC ICU INTERMEDIATE R&B

## 2023-09-06 PROCEDURE — 96376 TX/PRO/DX INJ SAME DRUG ADON: CPT

## 2023-09-06 PROCEDURE — 80076 HEPATIC FUNCTION PANEL: CPT

## 2023-09-06 PROCEDURE — 96372 THER/PROPH/DIAG INJ SC/IM: CPT

## 2023-09-06 PROCEDURE — 80053 COMPREHEN METABOLIC PANEL: CPT

## 2023-09-06 PROCEDURE — 6360000004 HC RX CONTRAST MEDICATION

## 2023-09-06 RX ORDER — SENNA AND DOCUSATE SODIUM 50; 8.6 MG/1; MG/1
2 TABLET, FILM COATED ORAL DAILY
Status: DISCONTINUED | OUTPATIENT
Start: 2023-09-06 | End: 2023-09-09 | Stop reason: HOSPADM

## 2023-09-06 RX ORDER — INDOCYANINE GREEN AND WATER 25 MG
5 KIT INJECTION
Status: DISCONTINUED | OUTPATIENT
Start: 2023-09-07 | End: 2023-09-07

## 2023-09-06 RX ORDER — SODIUM CHLORIDE 9 MG/ML
INJECTION, SOLUTION INTRAVENOUS CONTINUOUS
Status: DISCONTINUED | OUTPATIENT
Start: 2023-09-07 | End: 2023-09-07

## 2023-09-06 RX ADMIN — WATER 2000 MG: 1 INJECTION INTRAMUSCULAR; INTRAVENOUS; SUBCUTANEOUS at 06:03

## 2023-09-06 RX ADMIN — METRONIDAZOLE 500 MG: 500 INJECTION, SOLUTION INTRAVENOUS at 08:39

## 2023-09-06 RX ADMIN — KETOROLAC TROMETHAMINE 15 MG: 30 INJECTION, SOLUTION INTRAMUSCULAR; INTRAVENOUS at 21:54

## 2023-09-06 RX ADMIN — ENOXAPARIN SODIUM 40 MG: 40 INJECTION SUBCUTANEOUS at 08:37

## 2023-09-06 RX ADMIN — METRONIDAZOLE 500 MG: 500 INJECTION, SOLUTION INTRAVENOUS at 18:31

## 2023-09-06 RX ADMIN — TIZANIDINE 2 MG: 4 TABLET ORAL at 20:12

## 2023-09-06 RX ADMIN — CETIRIZINE HYDROCHLORIDE 10 MG: 10 TABLET, FILM COATED ORAL at 08:36

## 2023-09-06 RX ADMIN — METOPROLOL TARTRATE 25 MG: 25 TABLET, FILM COATED ORAL at 08:36

## 2023-09-06 RX ADMIN — TRAZODONE HYDROCHLORIDE 100 MG: 50 TABLET ORAL at 20:12

## 2023-09-06 RX ADMIN — Medication 10 ML: at 08:38

## 2023-09-06 RX ADMIN — METRONIDAZOLE 500 MG: 500 INJECTION, SOLUTION INTRAVENOUS at 01:52

## 2023-09-06 RX ADMIN — Medication 10 ML: at 20:12

## 2023-09-06 RX ADMIN — OXYBUTYNIN CHLORIDE 10 MG: 10 TABLET, EXTENDED RELEASE ORAL at 20:12

## 2023-09-06 RX ADMIN — DOCUSATE SODIUM 50 MG AND SENNOSIDES 8.6 MG 2 TABLET: 8.6; 5 TABLET, FILM COATED ORAL at 08:57

## 2023-09-06 RX ADMIN — PERFLUTREN 1.5 ML: 6.52 INJECTION, SUSPENSION INTRAVENOUS at 11:15

## 2023-09-06 RX ADMIN — METOPROLOL TARTRATE 25 MG: 25 TABLET, FILM COATED ORAL at 20:12

## 2023-09-06 RX ADMIN — KETOROLAC TROMETHAMINE 15 MG: 30 INJECTION, SOLUTION INTRAMUSCULAR; INTRAVENOUS at 14:44

## 2023-09-06 RX ADMIN — PANTOPRAZOLE SODIUM 40 MG: 40 INJECTION, POWDER, FOR SOLUTION INTRAVENOUS at 08:36

## 2023-09-06 RX ADMIN — KETOROLAC TROMETHAMINE 15 MG: 30 INJECTION, SOLUTION INTRAMUSCULAR; INTRAVENOUS at 01:53

## 2023-09-06 RX ADMIN — LEVOTHYROXINE SODIUM 125 MCG: 25 TABLET ORAL at 06:02

## 2023-09-06 ASSESSMENT — PAIN - FUNCTIONAL ASSESSMENT
PAIN_FUNCTIONAL_ASSESSMENT: PREVENTS OR INTERFERES WITH MANY ACTIVE NOT PASSIVE ACTIVITIES
PAIN_FUNCTIONAL_ASSESSMENT: PREVENTS OR INTERFERES SOME ACTIVE ACTIVITIES AND ADLS

## 2023-09-06 ASSESSMENT — PAIN SCALES - GENERAL
PAINLEVEL_OUTOF10: 7
PAINLEVEL_OUTOF10: 10
PAINLEVEL_OUTOF10: 3
PAINLEVEL_OUTOF10: 5
PAINLEVEL_OUTOF10: 10
PAINLEVEL_OUTOF10: 3

## 2023-09-06 ASSESSMENT — PAIN DESCRIPTION - LOCATION
LOCATION: ABDOMEN

## 2023-09-06 ASSESSMENT — PAIN DESCRIPTION - DESCRIPTORS
DESCRIPTORS: ACHING

## 2023-09-06 ASSESSMENT — PAIN DESCRIPTION - ORIENTATION
ORIENTATION: MID
ORIENTATION: MID;LOWER
ORIENTATION: MID
ORIENTATION: MID

## 2023-09-06 ASSESSMENT — PAIN DESCRIPTION - PAIN TYPE
TYPE: SURGICAL PAIN

## 2023-09-06 ASSESSMENT — PAIN DESCRIPTION - FREQUENCY
FREQUENCY: INTERMITTENT
FREQUENCY: INTERMITTENT
FREQUENCY: CONTINUOUS

## 2023-09-06 ASSESSMENT — PAIN DESCRIPTION - ONSET
ONSET: PROGRESSIVE
ONSET: ON-GOING
ONSET: ON-GOING

## 2023-09-06 NOTE — CARE COORDINATION
Introduced my self and provided explanation of CM role to patient. Patient is awake, alert, and aware of current diagnosis and treatment plan including pod #1 lap noemy care, gabino drain, plan for ERCP 9/7/2023. She voices she resides at home with her spouse and completes her adl's with independence. Patient is established with a pcp and denies any issue with retail pharmaceutical coverage. She is approximately 10 weeks post knee replacement and states she had returned to her independent state. Was driving, not using equipment, etc.  Patient verbalizes no concerns and identifies no areas to focus on nor barriers to discharge. She states she would be able to self manage GABINO drain post discharge. Explained ELOS of 48 hours; patient voiced understanding and agreement. Maynor Driscoll.  Shannen, MSN RN  [facility] Case Management  [phone]

## 2023-09-06 NOTE — ACP (ADVANCE CARE PLANNING)
Advance Care Planning   Healthcare Decision Maker:    Primary Decision Maker: Ghada Up - [facility] - [phone]    Click here to complete Healthcare Decision Makers including selection of the Healthcare Decision Maker Relationship (ie \"Primary\"). Today we documented Decision Maker(s) consistent with Legal Next of Kin hierarchy.

## 2023-09-06 NOTE — CONSULTS
GENERAL SURGERY  CONSULT NOTE  9/6/2023    Physician Consulted: Dr. Dar Rodarte  Reason for Consult: choledocolithiasis  Referring Physician: Dr. Mari VILLA  Lorena Brennan is a 74 y.o. female who presents for evaluation of concern for persistent choledocholithiasis s/p robotic assisted laparoscopic cholecystectomy with IOC with inability to adequately flush CBD (9/5). Patient had ERCP done (9/4) by Dr. Bunny Rincon prior to cholecystectomy which was aborted secondary to patient's anatomic variants from her previous Kvng-En-Y gastric bypass. Dr. Dar Rodarte has been consulted for his laparoscopic assisted ERCP for persistent choledocholithiasis in the setting of altered anatomy. Past Medical History:   Diagnosis Date    Arthritis     CAD (coronary artery disease)     9-17-20 yisel 2.0x22 francisca om.      COVID-19 virus infection 07/2020    Depression     GERD (gastroesophageal reflux disease)     Graves disease     Hypertension     States she has never had HTN    Hypothyroidism     SECONDARY TO GRAVES DISEASE    NSTEMI (non-ST elevated myocardial infarction) ([address]) 09/16/2020    Obesity     S/P total knee arthroplasty, left 11/07/2022       Past Surgical History:   Procedure Laterality Date    CARDIAC CATHETERIZATION  09/17/2020    stent x 1    CHOLECYSTECTOMY, LAPAROSCOPIC N/A 9/5/2023    LAPAROSCOPIC ROBOTIC XI ASSISTED CHOLECYSTECTOMY with ioc performed by Addison Parsons MD at [address] [facility]  11/10/10    DR Davon Lindquist WITH STENT PLACEMENT  09/17/2020    Dhruv OM     ERCP N/A 9/4/2023    ERCP ENDOSCOPIC RETROGRADE CHOLANGIOPANCREATOGRAPHY ABORTED performed by Claudia Fried MD at [address] [facility]  5/2/06    DR HAIRSTON    TOTAL KNEE ARTHROPLASTY Left 11/7/2022    LEFT KNEE TOTAL ARTHROPLASTY performed by Rachid Ochoa MD at [address] Right 6/12/2023    RIGHT KNEE TOTAL ARTHROPLASTY performed by Rachid Ochoa MD at [facility] the chest was performed after the administration of intravenous contrast.  Multiplanar reformatted images are provided for review. MIP images are provided for review. Automated exposure control, iterative reconstruction, and/or weight based adjustment of the mA/kV was utilized to reduce the radiation dose to as low as reasonably achievable. COMPARISON: 02/19/2009 HISTORY: ORDERING SYSTEM PROVIDED HISTORY: chest pain w/ dyspnea post operative after R TKR surgery TECHNOLOGIST PROVIDED HISTORY: Reason for exam:->chest pain w/ dyspnea post operative after R TKR surgery Decision Support Exception - unselect if not a suspected or confirmed emergency medical condition->Emergency Medical Condition (MA) FINDINGS: Pulmonary Arteries: Pulmonary arteries are adequately opacified for evaluation. No evidence of intraluminal filling defect to suggest pulmonary embolism. Main pulmonary artery is normal in caliber. Mediastinum: No evidence of mediastinal lymphadenopathy. The heart and pericardium demonstrate no acute abnormality. There is no acute abnormality of the thoracic aorta. There is a small hiatal hernia. Lungs/pleura: The lungs are without acute process. No focal consolidation or pulmonary edema. No evidence of pleural effusion or pneumothorax. Upper Abdomen: Please see separately dictated report for findings below the diaphragm. Soft Tissues/Bones: No acute bone or soft tissue abnormality. No evidence of pulmonary embolism or acute pulmonary abnormality. ASSESSMENT:  74 y.o. female with persistent choledocholithiasis in the setting of altered anatomy from previous Kvng-En-Y gastric bypass. PLAN:  -Okay for low fat diet today; NPO at midnight  -Will plan to proceed with laparoscopic assisted ERCP tomorrow (9/7)  -Continue current medical management  -Continue to monitor LFTs and tbili    Will discuss plan with my attending Dr. Jeyson Strickland.      Electronically signed by Evelyn Evans DO on 9/6/23 at 8:09

## 2023-09-06 NOTE — PROGRESS NOTES
INPATIENT CARDIOLOGY FOLLOW-UP    Name: Bobo Ramey    Age: 74 y.o. Date of Admission: 9/3/2023 11:34 AM    Date of Service: 9/6/2023    Chief Complaint: Follow-up for atrial fibrillation, aortic stenosis, CAD    Interim History:  Currently with no chest pain, respiratory distress, or palpitations. SR on admission EKG and telemetry --> AF with RVR x ~ 2 hours on 9/4/23 --> spontaneously converted to SR. No new overnight cardiac complaints. SR on telemetry this AM.    Review of Systems:   Cardiac: As per HPI  General: No fever, chills  Pulmonary: As per HPI  HEENT: No visual disturbances, difficult swallowing  GI: No nausea, vomiting  : No dysuria, hematuria  Endocrine: +hypothyroidism, no DM  Musculoskeletal: REBOLLAR x 4, no focal motor deficits  Skin: Intact, no rashes  Neuro: No headache, seizures  Psych: Currently with no depression, anxiety    Problem List:  Patient Active Problem List   Diagnosis    Hypothyroidism    Graves disease    Depression    GERD (gastroesophageal reflux disease)    Non-ST elevation MI (NSTEMI) ([address])    Morbid obesity ([address])    Coronary artery disease involving native coronary artery of native heart without angina pectoris    LITO (generalized anxiety disorder)    Osteoarthritis of left knee    Bilateral primary osteoarthritis of knee    Osteoarthritis of right knee    S/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, right    Abdominal pain    Acute cholecystitis    Elevated LFTs    Hyperkalemia    Choledocholithiasis    Atrial fibrillation with RVR ([address])    Nonrheumatic aortic valve stenosis    Primary hypertension       Allergies:   Allergies   Allergen Reactions    Meloxicam Itching and Other (See Comments)       Current Medications:  Current Facility-Administered Medications   Medication Dose Route Frequency Provider Last Rate Last Admin    sennosides-docusate sodium (SENOKOT-S) 8.6-50 MG tablet 2 tablet  2 tablet Oral Daily Justin Cruz DO   2 tablet at 09/06/23 0857    magnesium Resume ASA once safe from a surgery standpoint  - R/B/A/I for [facility] discussed this admission -- patient defers at this time  - Discussed option of outpatient cardiac monitoring  - Monitor labs  - Aggressive risk factor modifications  - Telemetry reviewed (SR)  - She may proceed with surgery from a cardiology standpoint    Leesa Gibbons MD  [facility] ([facility]) Cardiology

## 2023-09-06 NOTE — PROGRESS NOTES
[facility] Progress Note    Admitting Date and Time: 9/3/2023 11:34 AM  Admit Dx: Abdominal pain [R10.9]  Acute cholecystitis [K81.0]    Subjective:  Patient is being followed for Abdominal pain [R10.9]  Acute cholecystitis [K81.0]       Patient awake and alert- seen sitting up in bed   Reporting having a lot of pressure/ gas like pains following procedure yesterday  No BM since Friday  Denies nausea  Plans for Lap ERCP tomorrow with GI        ROS: denies fever, chills, cp, sob, n/v, HA unless stated above.      sennosides-docusate sodium  2 tablet Oral Daily    [START ON 9/7/2023] indocyanine green  5 mg IntraVENous On Call to OR    cefTRIAXone (ROCEPHIN) IV  2,000 mg IntraVENous Q24H    metoprolol tartrate  25 mg Oral BID    sodium chloride flush  5-40 mL IntraVENous 2 times per day    sodium chloride flush  5-40 mL IntraVENous 2 times per day    enoxaparin  40 mg SubCUTAneous Daily    pantoprazole  40 mg IntraVENous Daily    metroNIDAZOLE  500 mg IntraVENous Q8H    [Held by provider] aspirin  81 mg Oral Daily    diclofenac sodium  4 g Topical BID    levothyroxine  125 mcg Oral Daily    traZODone  100 mg Oral Nightly    tiZANidine  2 mg Oral Nightly    oxybutynin  10 mg Oral Nightly    cetirizine  10 mg Oral Daily    fluticasone  2 spray Each Nostril Daily    [Held by provider] FLUoxetine  60 mg Oral Daily    [Held by provider] buPROPion  300 mg Oral Daily     magnesium hydroxide, 30 mL, Daily PRN  perflutren lipid microspheres, 1.5 mL, ONCE PRN  sodium chloride flush, 5-40 mL, PRN  sodium chloride, , PRN  HYDROmorphone, 0.25 mg, Q5 Min PRN  HYDROmorphone, 0.5 mg, Q5 Min PRN  prochlorperazine, 5 mg, Once PRN  oxyCODONE, 5 mg, Q4H PRN   Or  oxyCODONE, 10 mg, Q4H PRN  sodium chloride flush, 5-40 mL, PRN  sodium chloride, , PRN  ondansetron, 4 mg, Q8H PRN   Or  ondansetron, 4 mg, Q6H PRN  polyethylene glycol, 17 g, Daily PRN  ketorolac, 15 mg, Q6H PRN         Objective:    /60   Pulse 65 . Received IV rocephin and IV flagyl in ER. General Surgery / GI on board:  US gallbladder- reviewed- cholelithiasis with significant intrahepatic and extrahepatic biliary ductal dilation most concerning for choledocholithiasis. MRCP reviewed- choledocholithiasis with 2-3 obstructing distal small CBD stones measure up to 4mm. GI consulted. ERCP aborted as unable to complete due to h/o gastric bypass. S/ P robotic assisted laparoscopic cholecystectomy on 9/5/23. CBD was unable to be flushed with IOC. Low fat diet. Lap assisted ERCP tomorrow with GI. Pt reporting dyspepsia today. Encourage sitting up in chair. No BM. Bowel regimen. 2. Elevated LFTS: AST 1903 and  on arrival.  Liver wnl on imaging. Denies etoh. Meds reviewed- not on a statin. Trend. LFTS trending down. / - continuing to improve. 3. Chest discomfort: likely d/t above; D dimer elevated in ER-888. CTA chest: no evidence of PE or acute pulmonary abnormality. Troponin 10-10. EKG per ER no ischemia. 4. Lactic acidosis: monitor- trend     5. Borderline hyperkalemia: resolved. 6. Elevated glucose: check hga1c     7. CAD h/o coronary angioplasty with stent  2020. H/o NSTEMI     8. HTN: not on bp medication      9. Thyroid disease     10. Recent right total knee arthoplasty in June 11.depression: wellbutrin/ prozac- hold until see liver enzymes trending down as both metabolized from liver. Likely can resume soon     12. Nausea; antiemetics     13. Paroxysmal atrial fibrillation RVR; Plan was for pt to have ERCP 9/4. Procedure was aborted due to h/o gastric bypass. Noted to have atrial fibrillation in PACU. Converted - now in SR> Pt reporting h/o afib episode in past when she was having issues with her thyroid. Check thyroid levels. . Not on chronic anticoagulation. EKG reviewed. She received IV cardizem x1. Cardiology on board- appreciate input. Echo pending. Metoprolol 25 mg BID added.  Resume asa when safe

## 2023-09-07 ENCOUNTER — ANESTHESIA (OUTPATIENT)
Dept: OPERATING ROOM | Age: 68
End: 2023-09-07
Payer: MEDICARE

## 2023-09-07 ENCOUNTER — ANESTHESIA EVENT (OUTPATIENT)
Dept: OPERATING ROOM | Age: 68
End: 2023-09-07
Payer: MEDICARE

## 2023-09-07 ENCOUNTER — APPOINTMENT (OUTPATIENT)
Dept: GENERAL RADIOLOGY | Age: 68
DRG: 417 | End: 2023-09-07
Attending: STUDENT IN AN ORGANIZED HEALTH CARE EDUCATION/TRAINING PROGRAM
Payer: MEDICARE

## 2023-09-07 PROBLEM — K83.1 BILIARY OBSTRUCTION: Status: ACTIVE | Noted: 2023-09-07

## 2023-09-07 PROCEDURE — 6360000002 HC RX W HCPCS

## 2023-09-07 PROCEDURE — 2720000010 HC SURG SUPPLY STERILE: Performed by: SURGERY

## 2023-09-07 PROCEDURE — 3700000001 HC ADD 15 MINUTES (ANESTHESIA): Performed by: SURGERY

## 2023-09-07 PROCEDURE — 2580000003 HC RX 258

## 2023-09-07 PROCEDURE — 99231 SBSQ HOSP IP/OBS SF/LOW 25: CPT | Performed by: NURSE PRACTITIONER

## 2023-09-07 PROCEDURE — C1769 GUIDE WIRE: HCPCS | Performed by: SURGERY

## 2023-09-07 PROCEDURE — 99233 SBSQ HOSP IP/OBS HIGH 50: CPT | Performed by: INTERNAL MEDICINE

## 2023-09-07 PROCEDURE — 6370000000 HC RX 637 (ALT 250 FOR IP)

## 2023-09-07 PROCEDURE — C1894 INTRO/SHEATH, NON-LASER: HCPCS | Performed by: SURGERY

## 2023-09-07 PROCEDURE — 7100000001 HC PACU RECOVERY - ADDTL 15 MIN: Performed by: SURGERY

## 2023-09-07 PROCEDURE — 6360000002 HC RX W HCPCS: Performed by: NURSE ANESTHETIST, CERTIFIED REGISTERED

## 2023-09-07 PROCEDURE — 3600000014 HC SURGERY LEVEL 4 ADDTL 15MIN: Performed by: SURGERY

## 2023-09-07 PROCEDURE — 7100000000 HC PACU RECOVERY - FIRST 15 MIN: Performed by: SURGERY

## 2023-09-07 PROCEDURE — 2500000003 HC RX 250 WO HCPCS: Performed by: SURGERY

## 2023-09-07 PROCEDURE — C9113 INJ PANTOPRAZOLE SODIUM, VIA: HCPCS

## 2023-09-07 PROCEDURE — 0F798ZZ DILATION OF COMMON BILE DUCT, VIA NATURAL OR ARTIFICIAL OPENING ENDOSCOPIC: ICD-10-PCS | Performed by: SURGERY

## 2023-09-07 PROCEDURE — 2060000000 HC ICU INTERMEDIATE R&B

## 2023-09-07 PROCEDURE — 2500000003 HC RX 250 WO HCPCS

## 2023-09-07 PROCEDURE — 3700000000 HC ANESTHESIA ATTENDED CARE: Performed by: SURGERY

## 2023-09-07 PROCEDURE — 2709999900 HC NON-CHARGEABLE SUPPLY: Performed by: SURGERY

## 2023-09-07 PROCEDURE — 0DJ04ZZ INSPECTION OF UPPER INTESTINAL TRACT, PERCUTANEOUS ENDOSCOPIC APPROACH: ICD-10-PCS | Performed by: SURGERY

## 2023-09-07 PROCEDURE — 6360000004 HC RX CONTRAST MEDICATION: Performed by: SURGERY

## 2023-09-07 PROCEDURE — 3600000004 HC SURGERY LEVEL 4 BASE: Performed by: SURGERY

## 2023-09-07 RX ORDER — MIDAZOLAM HYDROCHLORIDE 2 MG/2ML
2 INJECTION, SOLUTION INTRAMUSCULAR; INTRAVENOUS
Status: DISCONTINUED | OUTPATIENT
Start: 2023-09-07 | End: 2023-09-07

## 2023-09-07 RX ORDER — GLYCOPYRROLATE 0.2 MG/ML
INJECTION INTRAMUSCULAR; INTRAVENOUS PRN
Status: DISCONTINUED | OUTPATIENT
Start: 2023-09-07 | End: 2023-09-07 | Stop reason: SDUPTHER

## 2023-09-07 RX ORDER — BUPIVACAINE HYDROCHLORIDE AND EPINEPHRINE 2.5; 5 MG/ML; UG/ML
INJECTION, SOLUTION EPIDURAL; INFILTRATION; INTRACAUDAL; PERINEURAL PRN
Status: DISCONTINUED | OUTPATIENT
Start: 2023-09-07 | End: 2023-09-07 | Stop reason: ALTCHOICE

## 2023-09-07 RX ORDER — MEPERIDINE HYDROCHLORIDE 25 MG/ML
12.5 INJECTION INTRAMUSCULAR; INTRAVENOUS; SUBCUTANEOUS EVERY 5 MIN PRN
Status: DISCONTINUED | OUTPATIENT
Start: 2023-09-07 | End: 2023-09-07

## 2023-09-07 RX ORDER — FENTANYL CITRATE 50 UG/ML
INJECTION, SOLUTION INTRAMUSCULAR; INTRAVENOUS PRN
Status: DISCONTINUED | OUTPATIENT
Start: 2023-09-07 | End: 2023-09-07 | Stop reason: SDUPTHER

## 2023-09-07 RX ORDER — HYDRALAZINE HYDROCHLORIDE 20 MG/ML
10 INJECTION INTRAMUSCULAR; INTRAVENOUS
Status: DISCONTINUED | OUTPATIENT
Start: 2023-09-07 | End: 2023-09-07

## 2023-09-07 RX ORDER — PROPOFOL 10 MG/ML
INJECTION, EMULSION INTRAVENOUS PRN
Status: DISCONTINUED | OUTPATIENT
Start: 2023-09-07 | End: 2023-09-07 | Stop reason: SDUPTHER

## 2023-09-07 RX ORDER — ONDANSETRON 2 MG/ML
4 INJECTION INTRAMUSCULAR; INTRAVENOUS
Status: DISCONTINUED | OUTPATIENT
Start: 2023-09-07 | End: 2023-09-07

## 2023-09-07 RX ORDER — SODIUM CHLORIDE 9 MG/ML
INJECTION, SOLUTION INTRAVENOUS PRN
Status: DISCONTINUED | OUTPATIENT
Start: 2023-09-07 | End: 2023-09-07

## 2023-09-07 RX ORDER — SODIUM CHLORIDE 0.9 % (FLUSH) 0.9 %
5-40 SYRINGE (ML) INJECTION PRN
Status: DISCONTINUED | OUTPATIENT
Start: 2023-09-07 | End: 2023-09-07

## 2023-09-07 RX ORDER — SODIUM CHLORIDE 0.9 % (FLUSH) 0.9 %
5-40 SYRINGE (ML) INJECTION EVERY 12 HOURS SCHEDULED
Status: DISCONTINUED | OUTPATIENT
Start: 2023-09-07 | End: 2023-09-07

## 2023-09-07 RX ORDER — LABETALOL HYDROCHLORIDE 5 MG/ML
10 INJECTION, SOLUTION INTRAVENOUS
Status: DISCONTINUED | OUTPATIENT
Start: 2023-09-07 | End: 2023-09-07

## 2023-09-07 RX ORDER — LIDOCAINE HYDROCHLORIDE 20 MG/ML
INJECTION, SOLUTION EPIDURAL; INFILTRATION; INTRACAUDAL; PERINEURAL PRN
Status: DISCONTINUED | OUTPATIENT
Start: 2023-09-07 | End: 2023-09-07 | Stop reason: SDUPTHER

## 2023-09-07 RX ORDER — PHENYLEPHRINE HCL IN 0.9% NACL 1 MG/10 ML
SYRINGE (ML) INTRAVENOUS PRN
Status: DISCONTINUED | OUTPATIENT
Start: 2023-09-07 | End: 2023-09-07 | Stop reason: SDUPTHER

## 2023-09-07 RX ORDER — MIDAZOLAM HYDROCHLORIDE 1 MG/ML
INJECTION INTRAMUSCULAR; INTRAVENOUS PRN
Status: DISCONTINUED | OUTPATIENT
Start: 2023-09-07 | End: 2023-09-07 | Stop reason: SDUPTHER

## 2023-09-07 RX ORDER — ROCURONIUM BROMIDE 10 MG/ML
INJECTION, SOLUTION INTRAVENOUS PRN
Status: DISCONTINUED | OUTPATIENT
Start: 2023-09-07 | End: 2023-09-07 | Stop reason: SDUPTHER

## 2023-09-07 RX ORDER — IPRATROPIUM BROMIDE AND ALBUTEROL SULFATE 2.5; .5 MG/3ML; MG/3ML
1 SOLUTION RESPIRATORY (INHALATION)
Status: DISCONTINUED | OUTPATIENT
Start: 2023-09-07 | End: 2023-09-07

## 2023-09-07 RX ORDER — ONDANSETRON 2 MG/ML
INJECTION INTRAMUSCULAR; INTRAVENOUS PRN
Status: DISCONTINUED | OUTPATIENT
Start: 2023-09-07 | End: 2023-09-07 | Stop reason: SDUPTHER

## 2023-09-07 RX ORDER — DEXAMETHASONE SODIUM PHOSPHATE 4 MG/ML
INJECTION, SOLUTION INTRA-ARTICULAR; INTRALESIONAL; INTRAMUSCULAR; INTRAVENOUS; SOFT TISSUE PRN
Status: DISCONTINUED | OUTPATIENT
Start: 2023-09-07 | End: 2023-09-07 | Stop reason: SDUPTHER

## 2023-09-07 RX ADMIN — Medication 10 ML: at 20:21

## 2023-09-07 RX ADMIN — MIDAZOLAM 1 MG: 1 INJECTION INTRAMUSCULAR; INTRAVENOUS at 14:10

## 2023-09-07 RX ADMIN — DICLOFENAC SODIUM 4 G: 10 GEL TOPICAL at 00:29

## 2023-09-07 RX ADMIN — METRONIDAZOLE 500 MG: 500 INJECTION, SOLUTION INTRAVENOUS at 00:29

## 2023-09-07 RX ADMIN — KETOROLAC TROMETHAMINE 15 MG: 30 INJECTION, SOLUTION INTRAMUSCULAR; INTRAVENOUS at 20:21

## 2023-09-07 RX ADMIN — SODIUM CHLORIDE: 9 INJECTION, SOLUTION INTRAVENOUS at 00:26

## 2023-09-07 RX ADMIN — METOPROLOL TARTRATE 25 MG: 25 TABLET, FILM COATED ORAL at 08:58

## 2023-09-07 RX ADMIN — SODIUM CHLORIDE: 9 INJECTION, SOLUTION INTRAVENOUS at 13:31

## 2023-09-07 RX ADMIN — KETOROLAC TROMETHAMINE 15 MG: 30 INJECTION, SOLUTION INTRAMUSCULAR; INTRAVENOUS at 05:11

## 2023-09-07 RX ADMIN — PANTOPRAZOLE SODIUM 40 MG: 40 INJECTION, POWDER, FOR SOLUTION INTRAVENOUS at 09:02

## 2023-09-07 RX ADMIN — METRONIDAZOLE 500 MG: 500 INJECTION, SOLUTION INTRAVENOUS at 08:58

## 2023-09-07 RX ADMIN — PROPOFOL 150 MG: 10 INJECTION, EMULSION INTRAVENOUS at 14:25

## 2023-09-07 RX ADMIN — Medication 100 MCG: at 15:19

## 2023-09-07 RX ADMIN — LIDOCAINE HYDROCHLORIDE 100 MG: 20 INJECTION, SOLUTION EPIDURAL; INFILTRATION; INTRACAUDAL; PERINEURAL at 14:25

## 2023-09-07 RX ADMIN — GLYCOPYRROLATE 0.2 MG: 0.2 INJECTION INTRAMUSCULAR; INTRAVENOUS at 14:38

## 2023-09-07 RX ADMIN — Medication 100 MCG: at 15:30

## 2023-09-07 RX ADMIN — TIZANIDINE 2 MG: 4 TABLET ORAL at 20:20

## 2023-09-07 RX ADMIN — ONDANSETRON 4 MG: 2 INJECTION INTRAMUSCULAR; INTRAVENOUS at 15:40

## 2023-09-07 RX ADMIN — WATER 2000 MG: 1 INJECTION INTRAMUSCULAR; INTRAVENOUS; SUBCUTANEOUS at 05:11

## 2023-09-07 RX ADMIN — ROCURONIUM BROMIDE 10 MG: 10 INJECTION, SOLUTION INTRAVENOUS at 14:39

## 2023-09-07 RX ADMIN — FENTANYL CITRATE 100 MCG: 50 INJECTION, SOLUTION INTRAMUSCULAR; INTRAVENOUS at 14:25

## 2023-09-07 RX ADMIN — TRAZODONE HYDROCHLORIDE 100 MG: 50 TABLET ORAL at 20:21

## 2023-09-07 RX ADMIN — Medication 10 ML: at 00:29

## 2023-09-07 RX ADMIN — METRONIDAZOLE 500 MG: 500 INJECTION, SOLUTION INTRAVENOUS at 18:33

## 2023-09-07 RX ADMIN — Medication 100 MCG: at 15:25

## 2023-09-07 RX ADMIN — DEXAMETHASONE SODIUM PHOSPHATE 10 MG: 4 INJECTION, SOLUTION INTRAMUSCULAR; INTRAVENOUS at 14:35

## 2023-09-07 RX ADMIN — LEVOTHYROXINE SODIUM 125 MCG: 25 TABLET ORAL at 05:11

## 2023-09-07 RX ADMIN — OXYBUTYNIN CHLORIDE 10 MG: 10 TABLET, EXTENDED RELEASE ORAL at 20:20

## 2023-09-07 RX ADMIN — KETOROLAC TROMETHAMINE 15 MG: 30 INJECTION, SOLUTION INTRAMUSCULAR; INTRAVENOUS at 13:29

## 2023-09-07 RX ADMIN — METOPROLOL TARTRATE 25 MG: 25 TABLET, FILM COATED ORAL at 20:20

## 2023-09-07 RX ADMIN — ROCURONIUM BROMIDE 30 MG: 10 INJECTION, SOLUTION INTRAVENOUS at 14:25

## 2023-09-07 ASSESSMENT — PAIN SCALES - GENERAL
PAINLEVEL_OUTOF10: 8
PAINLEVEL_OUTOF10: 7
PAINLEVEL_OUTOF10: 0
PAINLEVEL_OUTOF10: 7

## 2023-09-07 ASSESSMENT — PAIN DESCRIPTION - FREQUENCY: FREQUENCY: INTERMITTENT

## 2023-09-07 ASSESSMENT — PAIN DESCRIPTION - ORIENTATION
ORIENTATION: MID;LOWER
ORIENTATION: LOWER;MID
ORIENTATION: MID;LOWER

## 2023-09-07 ASSESSMENT — PAIN DESCRIPTION - PAIN TYPE: TYPE: SURGICAL PAIN

## 2023-09-07 ASSESSMENT — PAIN DESCRIPTION - LOCATION
LOCATION: ABDOMEN

## 2023-09-07 ASSESSMENT — LIFESTYLE VARIABLES: SMOKING_STATUS: 0

## 2023-09-07 ASSESSMENT — PAIN DESCRIPTION - DESCRIPTORS
DESCRIPTORS: ACHING
DESCRIPTORS: ACHING

## 2023-09-07 ASSESSMENT — PAIN DESCRIPTION - ONSET: ONSET: ON-GOING

## 2023-09-07 NOTE — OP NOTE
Operative Note      Patient: Danny Ba  YOB: 1955  MRN: [id number]    Date of Procedure: 9/7/2023    Pre-Op Diagnosis Codes:     * Abdominal pain, unspecified abdominal location [R10.9]    Post-Op Diagnosis: Same       Procedure(s):  LAPAROSCOPIC ASSISTED ERCP ENDOSCOPIC RETROGRADE CHOLANGIOPANCREATOGRAPHY    Surgeon(s):  Neymar Thakur MD    Assistant:   First Assistant: Eustaquio Habermann  Resident: Eleonora Andrea DO    Anesthesia: General    Estimated Blood Loss (mL): less than 50     Complications: None    Specimens:   * No specimens in log *    Implants:  * No implants in log *      Drains:   [REMOVED] Closed/Suction Drain RUQ Bulb (Removed)   Site Description Clean, dry & intact 09/07/23 0852   Dressing Status Clean, dry & intact 09/07/23 0852   Drainage Appearance Bloody 09/07/23 0852   Drain Status Compressed 09/07/23 0852   Output (ml) 50 ml 09/07/23 1254       Findings: see below    Detailed Description of Procedure: The patient was identified and the procedure was confirmed. She was taken to the operating room and laid supine on the operating room table. She was given IV antibiotics prior to skin incision. She underwent general anesthesia by the anesthesia team and was intubated. Her abdomen was then prepped and draped in a sterile fashion. Local anesthetic was injected at the proposed incision sites. A 5 mm incision was made in the superior aspect of the umbilicus at the patient's prior robotic trocar site. A Veress needle was passed into the abdominal cavity. This was insufflated to 15 mmHg. An Optiview trocar was then placed and evaluation of the abdominal cavity revealed some omental adhesions to the anterior abdominal wall with small hematoma. There was some adhesion in the left upper quadrant near the patient's old gastric bypass site. 2 additional trocars were placed, a 12 mm trocar in the right mid abdomen and a 5 mm trocar in the right upper quadrant.   Through these

## 2023-09-07 NOTE — ANESTHESIA PRE PROCEDURE
Department of Anesthesiology  Preprocedure Note       Name:  Hailey Miller   Age:  74 y.o.  :  1955                                          MRN:  [id number]         Date:  2023      Surgeon: Qiana Gonzalez):  Leoncio Maldonado MD    Procedure: Procedure(s):  LAPAROSCOPIC ASSISTED ERCP ENDOSCOPIC RETROGRADE CHOLANGIOPANCREATOGRAPHY  ++AVAILABLE @ 1200++    Medications prior to admission:   Prior to Admission medications    Medication Sig Start Date End Date Taking? Authorizing Provider   levothyroxine (EUTHYROX) 125 MCG tablet TAKE 1 TABLET DAILY 23   Renée Dominguez, DO   traZODone (DESYREL) 100 MG tablet TAKE 1 TABLET NIGHTLY 23   Renée Dominguez, DO   Compression Stockings MISC by Does not apply route Patient to obtain bilateral thigh-high compression stockings from [address] [place] [address] in [facility]  Patient not taking: Reported on 9/3/2023 8/17/23   DULCE Araiza   diclofenac sodium (VOLTAREN) 1 % GEL Apply topically 2 times daily  Patient taking differently: Apply 4 g topically 2 times daily To the right knee.  23   DULCE Araiza   tiZANidine (ZANAFLEX) 4 MG tablet Take 1 tablet by mouth 3 times daily  Patient taking differently: Take 0.5 tablets by mouth at bedtime 23   DULCE Araiza   aspirin 81 MG EC tablet Take 1 tablet by mouth daily    Historical Provider, MD   FLUoxetine (PROZAC) 40 MG capsule TAKE 1 CAPSULE DAILY 23   Renée Dominguez,    FLUoxetine (PROZAC) 20 MG capsule TAKE 1 CAPSULE DAILY 23   Renée Dominguez,    oxybutynin (DITROPAN XL) 10 MG extended release tablet Take 1 tablet by mouth at bedtime 7/10/23   Renée Dominguez, DO   buPROPion (WELLBUTRIN XL) 300 MG extended release tablet TAKE ONE TABLET BY MOUTH EVERY MORNING 23   Marino Levine, DO   Multiple Vitamins-Minerals (MULTIVITAMIN WOMEN 50+ PO) Take by mouth    Historical Provider, MD   calcium carbonate 600 MG TABS tablet Take 1 tablet by

## 2023-09-07 NOTE — PROGRESS NOTES
Ejection fraction is visually estimated at 55-60%. Mild concentric left ventricular hypertrophy. No regional wall motion abnormalities seen. Indeterminate diastolic function. The left atrium is mildly dilated. Normal right ventricular size and function. TAPSE 19 mm. No systolic mitral regurgitation noted. There is mild-to-moderate aortic stenosis with valve area of 1.4 sq cm. Aortic valve peak velocity 2.3 m/s and mean gradient 10 mmHg. Physiologic and/or trace tricuspid regurgitation. RVSP is 25 mmHg. Normal estimated PA systolic pressure. No evidence for hemodynamically significant pericardial effusion. No previous echo for comparison     Echocardiogram: 9/6/23 (Dr. Eren Garcia)   Ejection fraction is visually estimated at 50-55%. Normal right ventricular size and function (TAPSE 2.1 cm). Indeterminate diastolic function. Moderately dilated left atrium by volume index. Mild-moderate aortic stenosis. Mild tricuspid regurgitation. PASP is estimated at 39 mmHg.       Impression/Plan:  Paroxysmal atrial fibrillation / AF with RVR (episode on 9/4/23 for ~ 2 hours --> spontaneously converted to SR) -- maintaining SR, elevated [id number] score  Preoperative cardiac evaluation (gallstone pancreatitis, chronic calculus cholecystitis, elevated LFT's) -- s/p robotic assisted laparoscopic cholecystectomy on 9/5/23 --> laparoscopic assisted ERCP scheduled for 9/7/23  CAD s/p ANA ROSA to OM4 in 9/2020  History of mild-moderate aortic stenosis -- similar findings this admission  Moderately dilated LA  HTN -- controlled  HLD  DM  Anemia -- Hgb 11.0 --> 9.5 --> 10.0 --> 9.3  BMI 38.9 / s/p GBS in 2006  GERD  Hypothyroidism -- on synthroid, normal TSH  Depression     - Results of 9/6/23 echocardiogram reviewed with the patient today  - Metoprolol 25 mg BID added on 9/5/23 -- continue  - Resume ASA once safe from a surgery standpoint  - R/B/A/I for [address]rie discussed this admission -- patient defers at this time  - Discussed option of outpatient cardiac monitoring  - Monitor labs  - Aggressive risk factor modifications  - Telemetry reviewed (SR)  - She may proceed with surgery from a cardiology standpoint    Lorraine Mcmillan MD  [facility] ([facility]) Cardiology

## 2023-09-07 NOTE — PROGRESS NOTES
Nursing Transfer Note    Data:  Summary of patients progress: S/p laparoscopic assisted ERCP  Reason for transfer: inpatient hospital stay    Action:  Explained reason for transfer to Patient. Report given to: Elzbieta Parker RN, using RN Handoff Navigator.   Mode of transportation: cart    Response:  RN Recommendations: see notes/orders/MAR

## 2023-09-08 LAB
ALBUMIN SERPL-MCNC: 2.7 G/DL (ref 3.5–5.2)
ALP SERPL-CCNC: 126 U/L (ref 35–104)
ALT SERPL-CCNC: 109 U/L (ref 0–32)
ANION GAP SERPL CALCULATED.3IONS-SCNC: 9 MMOL/L (ref 7–16)
AST SERPL-CCNC: 43 U/L (ref 0–31)
BASOPHILS # BLD: 0.01 K/UL (ref 0–0.2)
BASOPHILS NFR BLD: 0 % (ref 0–2)
BILIRUB SERPL-MCNC: 0.2 MG/DL (ref 0–1.2)
BUN SERPL-MCNC: 11 MG/DL (ref 6–23)
CALCIUM SERPL-MCNC: 8.5 MG/DL (ref 8.6–10.2)
CHLORIDE SERPL-SCNC: 109 MMOL/L (ref 98–107)
CO2 SERPL-SCNC: 21 MMOL/L (ref 22–29)
CREAT SERPL-MCNC: 0.6 MG/DL (ref 0.5–1)
EOSINOPHIL # BLD: 0 K/UL (ref 0.05–0.5)
EOSINOPHILS RELATIVE PERCENT: 0 % (ref 0–6)
ERYTHROCYTE [DISTWIDTH] IN BLOOD BY AUTOMATED COUNT: 16.9 % (ref 11.5–15)
GFR SERPL CREATININE-BSD FRML MDRD: >60 ML/MIN/1.73M2
GLUCOSE SERPL-MCNC: 121 MG/DL (ref 74–99)
HCT VFR BLD AUTO: 29.4 % (ref 34–48)
HGB BLD-MCNC: 9 G/DL (ref 11.5–15.5)
IMM GRANULOCYTES # BLD AUTO: 0.05 K/UL (ref 0–0.58)
IMM GRANULOCYTES NFR BLD: 1 % (ref 0–5)
LYMPHOCYTES NFR BLD: 0.5 K/UL (ref 1.5–4)
LYMPHOCYTES RELATIVE PERCENT: 7 % (ref 20–42)
MCH RBC QN AUTO: 25.3 PG (ref 26–35)
MCHC RBC AUTO-ENTMCNC: 30.6 G/DL (ref 32–34.5)
MCV RBC AUTO: 82.6 FL (ref 80–99.9)
MONOCYTES NFR BLD: 0.47 K/UL (ref 0.1–0.95)
MONOCYTES NFR BLD: 6 % (ref 2–12)
NEUTROPHILS NFR BLD: 86 % (ref 43–80)
NEUTS SEG NFR BLD: 6.37 K/UL (ref 1.8–7.3)
PLATELET # BLD AUTO: 227 K/UL (ref 130–450)
PMV BLD AUTO: 10.4 FL (ref 7–12)
POTASSIUM SERPL-SCNC: 4 MMOL/L (ref 3.5–5)
PROT SERPL-MCNC: 5.7 G/DL (ref 6.4–8.3)
RBC # BLD AUTO: 3.56 M/UL (ref 3.5–5.5)
RBC # BLD: ABNORMAL 10*6/UL
SODIUM SERPL-SCNC: 139 MMOL/L (ref 132–146)
SURGICAL PATHOLOGY REPORT: NORMAL
WBC OTHER # BLD: 7.4 K/UL (ref 4.5–11.5)

## 2023-09-08 PROCEDURE — 6360000002 HC RX W HCPCS

## 2023-09-08 PROCEDURE — 6370000000 HC RX 637 (ALT 250 FOR IP): Performed by: SURGERY

## 2023-09-08 PROCEDURE — 6370000000 HC RX 637 (ALT 250 FOR IP): Performed by: NURSE PRACTITIONER

## 2023-09-08 PROCEDURE — 6370000000 HC RX 637 (ALT 250 FOR IP)

## 2023-09-08 PROCEDURE — 99231 SBSQ HOSP IP/OBS SF/LOW 25: CPT | Performed by: NURSE PRACTITIONER

## 2023-09-08 PROCEDURE — 6360000002 HC RX W HCPCS: Performed by: NURSE PRACTITIONER

## 2023-09-08 PROCEDURE — C9113 INJ PANTOPRAZOLE SODIUM, VIA: HCPCS

## 2023-09-08 PROCEDURE — 80053 COMPREHEN METABOLIC PANEL: CPT

## 2023-09-08 PROCEDURE — 2060000000 HC ICU INTERMEDIATE R&B

## 2023-09-08 PROCEDURE — 85025 COMPLETE CBC W/AUTO DIFF WBC: CPT

## 2023-09-08 PROCEDURE — 99232 SBSQ HOSP IP/OBS MODERATE 35: CPT | Performed by: INTERNAL MEDICINE

## 2023-09-08 PROCEDURE — 2580000003 HC RX 258

## 2023-09-08 RX ORDER — SIMETHICONE 80 MG
80 TABLET,CHEWABLE ORAL EVERY 6 HOURS PRN
Status: DISCONTINUED | OUTPATIENT
Start: 2023-09-08 | End: 2023-09-09 | Stop reason: HOSPADM

## 2023-09-08 RX ORDER — OXYCODONE HYDROCHLORIDE 5 MG/1
5 TABLET ORAL EVERY 8 HOURS PRN
Qty: 15 TABLET | Refills: 0 | Status: SHIPPED | OUTPATIENT
Start: 2023-09-08 | End: 2023-09-13

## 2023-09-08 RX ORDER — BISACODYL 10 MG
10 SUPPOSITORY, RECTAL RECTAL ONCE
Status: COMPLETED | OUTPATIENT
Start: 2023-09-08 | End: 2023-09-08

## 2023-09-08 RX ORDER — ENOXAPARIN SODIUM 100 MG/ML
30 INJECTION SUBCUTANEOUS 2 TIMES DAILY
Status: DISCONTINUED | OUTPATIENT
Start: 2023-09-08 | End: 2023-09-09 | Stop reason: HOSPADM

## 2023-09-08 RX ORDER — LIDOCAINE 4 G/G
1 PATCH TOPICAL DAILY
Status: DISCONTINUED | OUTPATIENT
Start: 2023-09-08 | End: 2023-09-09 | Stop reason: HOSPADM

## 2023-09-08 RX ORDER — LACTULOSE 10 G/15ML
20 SOLUTION ORAL 3 TIMES DAILY
Status: DISCONTINUED | OUTPATIENT
Start: 2023-09-08 | End: 2023-09-09 | Stop reason: HOSPADM

## 2023-09-08 RX ORDER — POLYETHYLENE GLYCOL 3350 17 G/17G
17 POWDER, FOR SOLUTION ORAL DAILY
Status: DISCONTINUED | OUTPATIENT
Start: 2023-09-08 | End: 2023-09-09 | Stop reason: HOSPADM

## 2023-09-08 RX ORDER — LIDOCAINE 50 MG/G
1 PATCH TOPICAL DAILY
Status: DISCONTINUED | OUTPATIENT
Start: 2023-09-08 | End: 2023-09-08 | Stop reason: CLARIF

## 2023-09-08 RX ADMIN — ONDANSETRON 4 MG: 2 INJECTION INTRAMUSCULAR; INTRAVENOUS at 18:39

## 2023-09-08 RX ADMIN — PANTOPRAZOLE SODIUM 40 MG: 40 INJECTION, POWDER, FOR SOLUTION INTRAVENOUS at 10:15

## 2023-09-08 RX ADMIN — BISACODYL 10 MG: 10 SUPPOSITORY RECTAL at 23:06

## 2023-09-08 RX ADMIN — KETOROLAC TROMETHAMINE 15 MG: 30 INJECTION, SOLUTION INTRAMUSCULAR; INTRAVENOUS at 10:37

## 2023-09-08 RX ADMIN — DOCUSATE SODIUM 50 MG AND SENNOSIDES 8.6 MG 2 TABLET: 8.6; 5 TABLET, FILM COATED ORAL at 10:15

## 2023-09-08 RX ADMIN — ENOXAPARIN SODIUM 30 MG: 100 INJECTION SUBCUTANEOUS at 20:42

## 2023-09-08 RX ADMIN — TIZANIDINE 2 MG: 4 TABLET ORAL at 20:42

## 2023-09-08 RX ADMIN — LEVOTHYROXINE SODIUM 125 MCG: 25 TABLET ORAL at 05:38

## 2023-09-08 RX ADMIN — Medication 10 ML: at 20:43

## 2023-09-08 RX ADMIN — LACTULOSE 20 G: 20 SOLUTION ORAL at 20:42

## 2023-09-08 RX ADMIN — OXYCODONE HYDROCHLORIDE 10 MG: 5 TABLET ORAL at 15:36

## 2023-09-08 RX ADMIN — TRAZODONE HYDROCHLORIDE 100 MG: 50 TABLET ORAL at 20:41

## 2023-09-08 RX ADMIN — LACTULOSE 20 G: 20 SOLUTION ORAL at 10:24

## 2023-09-08 RX ADMIN — METOPROLOL TARTRATE 25 MG: 25 TABLET, FILM COATED ORAL at 20:41

## 2023-09-08 RX ADMIN — METRONIDAZOLE 500 MG: 500 INJECTION, SOLUTION INTRAVENOUS at 10:29

## 2023-09-08 RX ADMIN — ENOXAPARIN SODIUM 30 MG: 100 INJECTION SUBCUTANEOUS at 10:15

## 2023-09-08 RX ADMIN — WATER 2000 MG: 1 INJECTION INTRAMUSCULAR; INTRAVENOUS; SUBCUTANEOUS at 05:38

## 2023-09-08 RX ADMIN — METOPROLOL TARTRATE 25 MG: 25 TABLET, FILM COATED ORAL at 10:15

## 2023-09-08 RX ADMIN — LACTULOSE 20 G: 20 SOLUTION ORAL at 14:15

## 2023-09-08 RX ADMIN — METRONIDAZOLE 500 MG: 500 INJECTION, SOLUTION INTRAVENOUS at 01:56

## 2023-09-08 RX ADMIN — SIMETHICONE 80 MG: 80 TABLET, CHEWABLE ORAL at 16:59

## 2023-09-08 RX ADMIN — OXYBUTYNIN CHLORIDE 10 MG: 10 TABLET, EXTENDED RELEASE ORAL at 20:42

## 2023-09-08 RX ADMIN — Medication 10 ML: at 10:16

## 2023-09-08 RX ADMIN — CETIRIZINE HYDROCHLORIDE 10 MG: 10 TABLET, FILM COATED ORAL at 10:24

## 2023-09-08 RX ADMIN — POLYETHYLENE GLYCOL 3350 17 G: 17 POWDER, FOR SOLUTION ORAL at 10:15

## 2023-09-08 ASSESSMENT — PAIN SCALES - GENERAL
PAINLEVEL_OUTOF10: 10
PAINLEVEL_OUTOF10: 1
PAINLEVEL_OUTOF10: 7
PAINLEVEL_OUTOF10: 0
PAINLEVEL_OUTOF10: 10

## 2023-09-08 ASSESSMENT — PAIN DESCRIPTION - LOCATION
LOCATION: ABDOMEN;SHOULDER
LOCATION: ABDOMEN

## 2023-09-08 ASSESSMENT — PAIN - FUNCTIONAL ASSESSMENT
PAIN_FUNCTIONAL_ASSESSMENT: PREVENTS OR INTERFERES SOME ACTIVE ACTIVITIES AND ADLS
PAIN_FUNCTIONAL_ASSESSMENT: PREVENTS OR INTERFERES WITH MANY ACTIVE NOT PASSIVE ACTIVITIES

## 2023-09-08 ASSESSMENT — PAIN DESCRIPTION - PAIN TYPE: TYPE: SURGICAL PAIN

## 2023-09-08 ASSESSMENT — PAIN DESCRIPTION - ORIENTATION: ORIENTATION: MID

## 2023-09-08 ASSESSMENT — PAIN DESCRIPTION - DESCRIPTORS
DESCRIPTORS: ACHING
DESCRIPTORS: SHARP

## 2023-09-08 ASSESSMENT — PAIN DESCRIPTION - FREQUENCY: FREQUENCY: INTERMITTENT

## 2023-09-08 ASSESSMENT — PAIN DESCRIPTION - ONSET: ONSET: ON-GOING

## 2023-09-08 NOTE — PROGRESS NOTES
Doing well after lap assisted ercp  Advance diet  Ok to discharge from my point of view  F/u prn    Sagrario Ludwig MD

## 2023-09-08 NOTE — PROGRESS NOTES
Roosevelt Zepeda NP udpated patient complaining of severe shoulder inner shoulder pain, right arm pain and some right abdominal pain. She says shoulder pain is the worst. She will place orders.

## 2023-09-08 NOTE — PROGRESS NOTES
Outpatient pharmacy delivered patients oxycodone for when discharged. Patient declined to have family take prescription home. Medication locked in med room.

## 2023-09-08 NOTE — PROGRESS NOTES
INPATIENT CARDIOLOGY FOLLOW-UP    Name: Juvenal Chaves    Age: 74 y.o. Date of Admission: 9/3/2023 11:34 AM    Date of Service: 9/8/2023    Chief Complaint: Follow-up for atrial fibrillation, aortic stenosis, CAD    Interim History:  Currently with no chest pain, respiratory distress, or palpitations. SR on admission EKG and telemetry --> AF with RVR x ~ 2 hours on 9/4/23 --> spontaneously converted to SR --> maintaining SR. No new overnight cardiac complaints. Review of Systems:   Cardiac: As per HPI  General: No fever, chills  Pulmonary: As per HPI  HEENT: No visual disturbances, difficult swallowing  GI: No nausea, vomiting  : No dysuria, hematuria  Endocrine: +hypothyroidism, no DM  Musculoskeletal: REBOLLAR x 4, no focal motor deficits  Skin: Intact, no rashes  Neuro: No headache, seizures  Psych: Currently with no depression, anxiety    Problem List:  Patient Active Problem List   Diagnosis    Hypothyroidism    Graves disease    Depression    GERD (gastroesophageal reflux disease)    Non-ST elevation MI (NSTEMI) ([address])    Morbid obesity ([address])    Coronary artery disease involving native coronary artery of native heart without angina pectoris    LITO (generalized anxiety disorder)    Osteoarthritis of left knee    Bilateral primary osteoarthritis of knee    Osteoarthritis of right knee    S/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, right    Abdominal pain    Acute cholecystitis    Elevated LFTs    Hyperkalemia    Choledocholithiasis    Atrial fibrillation with RVR (HCC)    Nonrheumatic aortic valve stenosis    Primary hypertension    Cholecystitis    Biliary obstruction       Allergies:   Allergies   Allergen Reactions    Meloxicam Itching and Other (See Comments)       Current Medications:  Current Facility-Administered Medications   Medication Dose Route Frequency Provider Last Rate Last Admin    lactulose (CHRONULAC) 10 GM/15ML solution 20 g  20 g Oral TID James Bagent, DO        polyethylene glycol tricuspid regurgitation. RVSP is 25 mmHg. Normal estimated PA systolic pressure. No evidence for hemodynamically significant pericardial effusion. No previous echo for comparison     Echocardiogram: 9/6/23 (Dr. Juan Miguel Hancock)   Ejection fraction is visually estimated at 50-55%. Normal right ventricular size and function (TAPSE 2.1 cm). Indeterminate diastolic function. Moderately dilated left atrium by volume index. Mild-moderate aortic stenosis. Mild tricuspid regurgitation. PASP is estimated at 39 mmHg.       Impression/Plan:  Paroxysmal atrial fibrillation / AF with RVR (episode on 9/4/23 for ~ 2 hours --> spontaneously converted to SR) -- maintaining SR, elevated [id number] score  Preoperative cardiac evaluation (gallstone pancreatitis, chronic calculus cholecystitis, elevated LFT's) -- s/p robotic assisted laparoscopic cholecystectomy on 9/5/23 --> s/p laparoscopic assisted ERCP on 9/7/23  CAD s/p ANA ROSA to OM4 in 9/2020  History of mild-moderate aortic stenosis -- similar findings this admission  Moderately dilated LA  HTN -- controlled  HLD  DM  Anemia -- Hgb 11.0 --> 9.5 --> 10.0 --> 9.3 --> 9.0  BMI 38.9 / s/p GBS in 2006  GERD  Hypothyroidism -- on synthroid, normal TSH  Depression     - Results of 9/6/23 echocardiogram reviewed with the patient  - Metoprolol 25 mg BID added on 9/5/23 -- continue  - Will resume ASA today (9/8/23)  - R/B/A/I for [address]  discussed this admission -- patient defers at this time  - Discussed option of outpatient cardiac monitoring  - Monitor labs  - Aggressive risk factor modifications  - Telemetry reviewed (SR)  - No further inpatient cardiac work-up planned / will follow CHLOÉ Soni MD  [facility]) Cardiology

## 2023-09-08 NOTE — PROGRESS NOTES
Your patient is on a medication that requires a renal and/or weight dose adjustment. Renal/Body Weight Function Assessment:    Date Body Weight IBW  Adjusted BW SCr  CrCl Dialysis status   9/8/2023 242 lb (109.8 kg)  Ideal body weight: 52.4 kg (115 lb 8.3 oz)  Adjusted ideal body weight: 75.3 kg (166 lb 1.8 oz) Serum creatinine: 0.6 mg/dL 09/08/23 0408  Estimated creatinine clearance: 111 mL/min N/A       Pharmacy has dose-adjusted the following medication(s):    Date Previous Order Adjusted Order   9/8/2023 Lovenox 40 mg daily Lovenox 30 mg twice daily       These changes were made per protocol according to the [facility] Renal Dosing Policy/ [facility] Pharmacist Anticoagulant Review. *Please note this dose may need readjusted if your patient's condition changes. Please contact pharmacy with any questions regarding these changes.     Josy Segundo, [facility]  9/8/2023  8:09 AM

## 2023-09-09 VITALS
WEIGHT: 255.2 LBS | BODY MASS INDEX: 45.22 KG/M2 | TEMPERATURE: 97.9 F | RESPIRATION RATE: 16 BRPM | HEART RATE: 77 BPM | HEIGHT: 63 IN | OXYGEN SATURATION: 92 % | DIASTOLIC BLOOD PRESSURE: 69 MMHG | SYSTOLIC BLOOD PRESSURE: 121 MMHG

## 2023-09-09 LAB
ALBUMIN SERPL-MCNC: 3.2 G/DL (ref 3.5–5.2)
ALP SERPL-CCNC: 138 U/L (ref 35–104)
ALT SERPL-CCNC: 88 U/L (ref 0–32)
ANION GAP SERPL CALCULATED.3IONS-SCNC: 11 MMOL/L (ref 7–16)
AST SERPL-CCNC: 26 U/L (ref 0–31)
BASOPHILS # BLD: 0.01 K/UL (ref 0–0.2)
BASOPHILS NFR BLD: 0 % (ref 0–2)
BILIRUB SERPL-MCNC: 0.4 MG/DL (ref 0–1.2)
BUN SERPL-MCNC: 14 MG/DL (ref 6–23)
CALCIUM SERPL-MCNC: 8.7 MG/DL (ref 8.6–10.2)
CHLORIDE SERPL-SCNC: 102 MMOL/L (ref 98–107)
CO2 SERPL-SCNC: 23 MMOL/L (ref 22–29)
CREAT SERPL-MCNC: 0.7 MG/DL (ref 0.5–1)
EOSINOPHIL # BLD: 0 K/UL (ref 0.05–0.5)
EOSINOPHILS RELATIVE PERCENT: 0 % (ref 0–6)
ERYTHROCYTE [DISTWIDTH] IN BLOOD BY AUTOMATED COUNT: 17.1 % (ref 11.5–15)
GFR SERPL CREATININE-BSD FRML MDRD: >60 ML/MIN/1.73M2
GLUCOSE SERPL-MCNC: 139 MG/DL (ref 74–99)
HCT VFR BLD AUTO: 36.1 % (ref 34–48)
HGB BLD-MCNC: 11 G/DL (ref 11.5–15.5)
IMM GRANULOCYTES # BLD AUTO: 0.09 K/UL (ref 0–0.58)
IMM GRANULOCYTES NFR BLD: 1 % (ref 0–5)
LYMPHOCYTES NFR BLD: 0.78 K/UL (ref 1.5–4)
LYMPHOCYTES RELATIVE PERCENT: 4 % (ref 20–42)
MCH RBC QN AUTO: 25.3 PG (ref 26–35)
MCHC RBC AUTO-ENTMCNC: 30.5 G/DL (ref 32–34.5)
MCV RBC AUTO: 83 FL (ref 80–99.9)
MONOCYTES NFR BLD: 1.32 K/UL (ref 0.1–0.95)
MONOCYTES NFR BLD: 7 % (ref 2–12)
NEUTROPHILS NFR BLD: 88 % (ref 43–80)
NEUTS SEG NFR BLD: 15.7 K/UL (ref 1.8–7.3)
PLATELET # BLD AUTO: 365 K/UL (ref 130–450)
PMV BLD AUTO: 10.4 FL (ref 7–12)
POTASSIUM SERPL-SCNC: 3.3 MMOL/L (ref 3.5–5)
PROT SERPL-MCNC: 6.5 G/DL (ref 6.4–8.3)
RBC # BLD AUTO: 4.35 M/UL (ref 3.5–5.5)
SODIUM SERPL-SCNC: 136 MMOL/L (ref 132–146)
WBC OTHER # BLD: 17.9 K/UL (ref 4.5–11.5)

## 2023-09-09 PROCEDURE — 6370000000 HC RX 637 (ALT 250 FOR IP)

## 2023-09-09 PROCEDURE — 6360000002 HC RX W HCPCS

## 2023-09-09 PROCEDURE — 80053 COMPREHEN METABOLIC PANEL: CPT

## 2023-09-09 PROCEDURE — C9113 INJ PANTOPRAZOLE SODIUM, VIA: HCPCS

## 2023-09-09 PROCEDURE — 2580000003 HC RX 258

## 2023-09-09 PROCEDURE — 6370000000 HC RX 637 (ALT 250 FOR IP): Performed by: NURSE PRACTITIONER

## 2023-09-09 PROCEDURE — 85025 COMPLETE CBC W/AUTO DIFF WBC: CPT

## 2023-09-09 PROCEDURE — 99239 HOSP IP/OBS DSCHRG MGMT >30: CPT | Performed by: NURSE PRACTITIONER

## 2023-09-09 PROCEDURE — 6360000002 HC RX W HCPCS: Performed by: NURSE PRACTITIONER

## 2023-09-09 RX ORDER — POTASSIUM CHLORIDE 20 MEQ/1
40 TABLET, EXTENDED RELEASE ORAL ONCE
Status: COMPLETED | OUTPATIENT
Start: 2023-09-09 | End: 2023-09-09

## 2023-09-09 RX ADMIN — SIMETHICONE 80 MG: 80 TABLET, CHEWABLE ORAL at 01:41

## 2023-09-09 RX ADMIN — ENOXAPARIN SODIUM 30 MG: 100 INJECTION SUBCUTANEOUS at 08:21

## 2023-09-09 RX ADMIN — ASPIRIN 81 MG: 81 TABLET, COATED ORAL at 08:21

## 2023-09-09 RX ADMIN — OXYCODONE HYDROCHLORIDE 5 MG: 5 TABLET ORAL at 17:05

## 2023-09-09 RX ADMIN — Medication 10 ML: at 08:24

## 2023-09-09 RX ADMIN — DOCUSATE SODIUM 50 MG AND SENNOSIDES 8.6 MG 2 TABLET: 8.6; 5 TABLET, FILM COATED ORAL at 08:22

## 2023-09-09 RX ADMIN — OXYCODONE HYDROCHLORIDE 5 MG: 5 TABLET ORAL at 08:35

## 2023-09-09 RX ADMIN — SIMETHICONE 80 MG: 80 TABLET, CHEWABLE ORAL at 13:23

## 2023-09-09 RX ADMIN — POTASSIUM CHLORIDE 40 MEQ: 1500 TABLET, EXTENDED RELEASE ORAL at 13:27

## 2023-09-09 RX ADMIN — PANTOPRAZOLE SODIUM 40 MG: 40 INJECTION, POWDER, FOR SOLUTION INTRAVENOUS at 08:23

## 2023-09-09 RX ADMIN — POLYETHYLENE GLYCOL 3350 17 G: 17 POWDER, FOR SOLUTION ORAL at 08:22

## 2023-09-09 RX ADMIN — CETIRIZINE HYDROCHLORIDE 10 MG: 10 TABLET, FILM COATED ORAL at 08:21

## 2023-09-09 RX ADMIN — METOPROLOL TARTRATE 25 MG: 25 TABLET, FILM COATED ORAL at 08:21

## 2023-09-09 RX ADMIN — LEVOTHYROXINE SODIUM 125 MCG: 25 TABLET ORAL at 06:48

## 2023-09-09 ASSESSMENT — PAIN SCALES - GENERAL
PAINLEVEL_OUTOF10: 5
PAINLEVEL_OUTOF10: 4

## 2023-09-09 ASSESSMENT — PAIN DESCRIPTION - ORIENTATION
ORIENTATION: INNER
ORIENTATION: INNER

## 2023-09-09 ASSESSMENT — PAIN DESCRIPTION - LOCATION
LOCATION: ABDOMEN;HEAD
LOCATION: HEAD;ABDOMEN

## 2023-09-10 NOTE — OP NOTE
Operative Note      Patient: Tawanda Spivey  YOB: 1955  MRN: [id number]    Date of Procedure: 9/4/2023    Pre-Op Diagnosis Codes:     * Obstructive jaundice [K83.1]    Post-Op Diagnosis: Same. Procedure(s):  ERCP ENDOSCOPIC RETROGRADE CHOLANGIOPANCREATOGRAPHY ABORTED    Surgeon(s):  Minerva Abel MD    Assistant:   * No surgical staff found *    Anesthesia: General    Estimated Blood Loss (mL): less than 50     Complications: None    Specimens:   * No specimens in log *    Implants:  * No implants in log *      Drains:   [REMOVED] Closed/Suction Drain RUQ Bulb (Removed)   Site Description Clean, dry & intact 09/07/23 0852   Dressing Status Clean, dry & intact 09/07/23 0852   Drainage Appearance Bloody 09/07/23 0852   Drain Status Compressed 09/07/23 0852   Output (ml) 50 ml 09/07/23 1254     Indications:  67y/F presents with symptomatic choledocholithiasis. Findings:     RNYGB anatomy. Conventional ERCP cannot be performed. Detailed Description of Procedure:   Pre-Anesthesia Assessment:  Prior to the procedure, a history and physical was performed and patient medications and allergies were reviewed. The risks, benefits, complications, treatment options and expected outcomes were discussed with the patient. The possibilities of reaction to medication, pulmonary aspiration, perforation of the gastrointestinal tract, bleeding requiring transfusion or operation, respiratory failure requiring placement on a ventilator, failure to diagnose a condition or identify polyps/lesions, and acute pancreatitis with potential need for prolonged hospitalization were discussed with the patient. All questions were answered and informed consent was obtained. Patient identification and proposed procedure were verified by the physician, the nurse, the anesthesiologist, the anesthetist, and the technician in the preprocedure area. Please see accompanying documentation.  All staff in attendance for the performed

## 2023-09-11 ENCOUNTER — TELEPHONE (OUTPATIENT)
Dept: PRIMARY CARE CLINIC | Age: 68
End: 2023-09-11

## 2023-09-11 RX ORDER — FUROSEMIDE 20 MG/1
20 TABLET ORAL DAILY PRN
Qty: 30 TABLET | Refills: 0 | Status: SHIPPED | OUTPATIENT
Start: 2023-09-11

## 2023-09-11 NOTE — TELEPHONE ENCOUNTER
I called in 20 mg of Lasix for her to take daily but only as needed. If there is not much swelling then I do not recommend she take it.

## 2023-09-11 NOTE — TELEPHONE ENCOUNTER
Pt called and wanted to know if she can get Lasix, states she had swelling recently and these were given to her at the hospital. Please advise, will call pt back with updates.

## 2023-09-11 NOTE — PROGRESS NOTES
CLINICAL PHARMACY NOTE: MEDS TO BEDS    Total # of Prescriptions Filled: 1   The following medications were delivered to the patient:  Oxycodone 5 mg    Additional Documentation:  Delivered to patient

## 2023-09-11 NOTE — PROGRESS NOTES
CLINICAL PHARMACY NOTE: MEDS TO BEDS    Total # of Prescriptions Filled: 1   The following medications were delivered to the patient:  Oxycodone 5 mg     Additional Documentation:  Picked up

## 2023-09-14 ENCOUNTER — TELEPHONE (OUTPATIENT)
Dept: SURGERY | Age: 68
End: 2023-09-14

## 2023-09-14 ENCOUNTER — OFFICE VISIT (OUTPATIENT)
Dept: PRIMARY CARE CLINIC | Age: 68
End: 2023-09-14
Payer: MEDICARE

## 2023-09-14 VITALS
WEIGHT: 226 LBS | BODY MASS INDEX: 40.03 KG/M2 | HEART RATE: 69 BPM | OXYGEN SATURATION: 95 % | SYSTOLIC BLOOD PRESSURE: 118 MMHG | DIASTOLIC BLOOD PRESSURE: 72 MMHG | TEMPERATURE: 97.2 F

## 2023-09-14 DIAGNOSIS — D72.828 OTHER ELEVATED WHITE BLOOD CELL (WBC) COUNT: ICD-10-CM

## 2023-09-14 DIAGNOSIS — E87.6 HYPOKALEMIA: ICD-10-CM

## 2023-09-14 DIAGNOSIS — R60.0 EDEMA OF BOTH LOWER LEGS: Primary | ICD-10-CM

## 2023-09-14 LAB
ABSOLUTE IMMATURE GRANULOCYTE: 0.27 K/UL (ref 0–0.58)
ALBUMIN SERPL-MCNC: 3.2 G/DL (ref 3.5–5.2)
ALP BLD-CCNC: 157 U/L (ref 35–104)
ALT SERPL-CCNC: 24 U/L (ref 0–32)
ANION GAP SERPL CALCULATED.3IONS-SCNC: 13 MMOL/L (ref 7–16)
AST SERPL-CCNC: 32 U/L (ref 0–31)
BASOPHILS ABSOLUTE: 0.05 K/UL (ref 0–0.2)
BASOPHILS RELATIVE PERCENT: 0 % (ref 0–2)
BILIRUB SERPL-MCNC: 0.4 MG/DL (ref 0–1.2)
BUN BLDV-MCNC: 7 MG/DL (ref 6–23)
CALCIUM SERPL-MCNC: 8.8 MG/DL (ref 8.6–10.2)
CHLORIDE BLD-SCNC: 94 MMOL/L (ref 98–107)
CO2: 29 MMOL/L (ref 22–29)
CREAT SERPL-MCNC: 0.7 MG/DL (ref 0.5–1)
EOSINOPHILS ABSOLUTE: 0.19 K/UL (ref 0.05–0.5)
EOSINOPHILS RELATIVE PERCENT: 1 % (ref 0–6)
GFR SERPL CREATININE-BSD FRML MDRD: >60 ML/MIN/1.73M2
GLUCOSE BLD-MCNC: 83 MG/DL (ref 74–99)
HCT VFR BLD CALC: 35.5 % (ref 34–48)
HEMOGLOBIN: 10.7 G/DL (ref 11.5–15.5)
IMMATURE GRANULOCYTES: 1 % (ref 0–5)
LYMPHOCYTES ABSOLUTE: 1.24 K/UL (ref 1.5–4)
LYMPHOCYTES RELATIVE PERCENT: 7 % (ref 20–42)
MCH RBC QN AUTO: 25.1 PG (ref 26–35)
MCHC RBC AUTO-ENTMCNC: 30.1 G/DL (ref 32–34.5)
MCV RBC AUTO: 83.1 FL (ref 80–99.9)
MONOCYTES ABSOLUTE: 1.43 K/UL (ref 0.1–0.95)
MONOCYTES RELATIVE PERCENT: 8 % (ref 2–12)
NEUTROPHILS ABSOLUTE: 15.93 K/UL (ref 1.8–7.3)
NEUTROPHILS RELATIVE PERCENT: 83 % (ref 43–80)
PDW BLD-RTO: 17.8 % (ref 11.5–15)
PLATELET # BLD: 397 K/UL (ref 130–450)
PMV BLD AUTO: 10.5 FL (ref 7–12)
POTASSIUM SERPL-SCNC: 3 MMOL/L (ref 3.5–5)
RBC # BLD: 4.27 M/UL (ref 3.5–5.5)
SODIUM BLD-SCNC: 136 MMOL/L (ref 132–146)
TOTAL PROTEIN: 7.2 G/DL (ref 6.4–8.3)
WBC # BLD: 19.1 K/UL (ref 4.5–11.5)

## 2023-09-14 PROCEDURE — 1124F ACP DISCUSS-NO DSCNMKR DOCD: CPT | Performed by: FAMILY MEDICINE

## 2023-09-14 PROCEDURE — 3074F SYST BP LT 130 MM HG: CPT | Performed by: FAMILY MEDICINE

## 2023-09-14 PROCEDURE — 99213 OFFICE O/P EST LOW 20 MIN: CPT | Performed by: FAMILY MEDICINE

## 2023-09-14 PROCEDURE — 3078F DIAST BP <80 MM HG: CPT | Performed by: FAMILY MEDICINE

## 2023-09-14 RX ORDER — NYSTATIN 100000 [USP'U]/G
POWDER TOPICAL
Qty: 60 G | Refills: 0 | Status: SHIPPED | OUTPATIENT
Start: 2023-09-14

## 2023-09-14 ASSESSMENT — ENCOUNTER SYMPTOMS: SHORTNESS OF BREATH: 0

## 2023-09-14 NOTE — TELEPHONE ENCOUNTER
Patient scheduled her post op appointment on 9/21 her surgery was on 9/5. Is this ok. If not,  please contact patient to reschedule to sooner date and time.

## 2023-09-14 NOTE — PROGRESS NOTES
Mihai Beasley, a female of 79 y.o. came to the office 9/14/2023. Patient Active Problem List   Diagnosis    Hypothyroidism    Graves disease    Depression    GERD (gastroesophageal reflux disease)    Non-ST elevation MI (NSTEMI) ([address])    Morbid obesity ([address])    Coronary artery disease involving native coronary artery of native heart without angina pectoris    LITO (generalized anxiety disorder)    Osteoarthritis of left knee    Bilateral primary osteoarthritis of knee    Osteoarthritis of right knee    S/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, right    Abdominal pain    Acute cholecystitis    Elevated LFTs    Hyperkalemia    Choledocholithiasis    Atrial fibrillation with RVR ([address])    Nonrheumatic aortic valve stenosis    Primary hypertension    Cholecystitis    Biliary obstruction          Edema  This is a new problem. The current episode started 1 to 4 weeks ago (began while in hosp having her GB removed. went in hosp weighing 214 and 241 at D/C.). Pertinent negatives include no chest pain, chills or fever. Treatments tried: lasix 20 mg for several days. The treatment provided moderate relief.         Allergies   Allergen Reactions    Meloxicam Itching and Other (See Comments)       Current Outpatient Medications on File Prior to Visit   Medication Sig Dispense Refill    furosemide (LASIX) 20 MG tablet Take 1 tablet by mouth daily as needed (Swelling) 30 tablet 0    metoprolol tartrate (LOPRESSOR) 25 MG tablet Take 1 tablet by mouth 2 times daily 60 tablet 0    levothyroxine (EUTHYROX) 125 MCG tablet TAKE 1 TABLET DAILY 90 tablet 1    traZODone (DESYREL) 100 MG tablet TAKE 1 TABLET NIGHTLY 90 tablet 1    Compression Stockings MISC by Does not apply route Patient to obtain bilateral thigh-high compression stockings from [facility] in [facility] 1 each 0    diclofenac sodium (VOLTAREN) 1 % GEL Apply topically 2 times daily (Patient taking differently: Apply 4 g topically 2 times daily To the right knee.) 100

## 2023-09-18 ENCOUNTER — TELEPHONE (OUTPATIENT)
Dept: PRIMARY CARE CLINIC | Age: 68
End: 2023-09-18

## 2023-09-18 RX ORDER — POTASSIUM CHLORIDE 20 MEQ/1
20 TABLET, EXTENDED RELEASE ORAL DAILY
Qty: 30 TABLET | Refills: 1 | Status: SHIPPED | OUTPATIENT
Start: 2023-09-18

## 2023-09-18 NOTE — TELEPHONE ENCOUNTER
Left message potassium low. She is on Lasix 20 mg daily. We will start K-Dur 20 mEq daily as long as she is on the Lasix.

## 2023-09-21 ENCOUNTER — OFFICE VISIT (OUTPATIENT)
Dept: SURGERY | Age: 68
End: 2023-09-21

## 2023-09-21 VITALS
HEART RATE: 68 BPM | BODY MASS INDEX: 39.51 KG/M2 | TEMPERATURE: 98.1 F | DIASTOLIC BLOOD PRESSURE: 68 MMHG | WEIGHT: 223 LBS | SYSTOLIC BLOOD PRESSURE: 127 MMHG | OXYGEN SATURATION: 95 % | HEIGHT: 63 IN

## 2023-09-21 DIAGNOSIS — Z09 POSTOP CHECK: Primary | ICD-10-CM

## 2023-09-28 ENCOUNTER — APPOINTMENT (OUTPATIENT)
Dept: GENERAL RADIOLOGY | Age: 68
End: 2023-09-28
Payer: MEDICARE

## 2023-09-28 ENCOUNTER — HOSPITAL ENCOUNTER (EMERGENCY)
Age: 68
Discharge: ANOTHER HOSPITAL | End: 2023-09-28
Attending: STUDENT IN AN ORGANIZED HEALTH CARE EDUCATION/TRAINING PROGRAM
Payer: MEDICARE

## 2023-09-28 ENCOUNTER — HOSPITAL ENCOUNTER (INPATIENT)
Age: 68
LOS: 6 days | Discharge: HOME | End: 2023-10-04
Attending: INTERNAL MEDICINE | Admitting: STUDENT IN AN ORGANIZED HEALTH CARE EDUCATION/TRAINING PROGRAM
Payer: MEDICARE

## 2023-09-28 VITALS
SYSTOLIC BLOOD PRESSURE: 129 MMHG | HEART RATE: 76 BPM | OXYGEN SATURATION: 98 % | DIASTOLIC BLOOD PRESSURE: 61 MMHG | BODY MASS INDEX: 39.38 KG/M2 | RESPIRATION RATE: 18 BRPM | HEIGHT: 62 IN | WEIGHT: 214 LBS | TEMPERATURE: 98.2 F

## 2023-09-28 DIAGNOSIS — I50.9 ACUTE ON CHRONIC CONGESTIVE HEART FAILURE, UNSPECIFIED HEART FAILURE TYPE (HCC): Primary | ICD-10-CM

## 2023-09-28 DIAGNOSIS — J90 PLEURAL EFFUSION: ICD-10-CM

## 2023-09-28 DIAGNOSIS — E87.6 HYPOKALEMIA: ICD-10-CM

## 2023-09-28 DIAGNOSIS — R62.7 FAILURE TO THRIVE IN ADULT: ICD-10-CM

## 2023-09-28 PROBLEM — I50.31 ACUTE DIASTOLIC CHF (CONGESTIVE HEART FAILURE) (HCC): Status: ACTIVE | Noted: 2023-09-28

## 2023-09-28 PROBLEM — I48.0 PAF (PAROXYSMAL ATRIAL FIBRILLATION) (HCC): Status: ACTIVE | Noted: 2023-09-28

## 2023-09-28 PROBLEM — R53.1 WEAKNESS: Status: ACTIVE | Noted: 2023-09-28
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
## 2023-09-28 PROBLEM — R55 SYNCOPE: Status: ACTIVE | Noted: 2023-09-28

## 2023-09-28 PROBLEM — R60.0 BILATERAL LEG EDEMA: Status: ACTIVE | Noted: 2023-09-28

## 2023-09-28 LAB
ALBUMIN SERPL-MCNC: 2.5 G/DL (ref 3.5–5.2)
ALP SERPL-CCNC: 116 U/L (ref 35–104)
ALT SERPL-CCNC: 19 U/L (ref 0–32)
ANION GAP SERPL CALCULATED.3IONS-SCNC: 11 MMOL/L (ref 7–16)
ANION GAP SERPL CALCULATED.3IONS-SCNC: 14 MMOL/L (ref 7–16)
AST SERPL-CCNC: 42 U/L (ref 0–31)
BASOPHILS # BLD: 0.02 K/UL (ref 0–0.2)
BASOPHILS NFR BLD: 0 % (ref 0–2)
BILIRUB SERPL-MCNC: 0.5 MG/DL (ref 0–1.2)
BNP SERPL-MCNC: 1188 PG/ML (ref 0–125)
BUN SERPL-MCNC: 6 MG/DL (ref 6–23)
BUN SERPL-MCNC: 6 MG/DL (ref 6–23)
CALCIUM SERPL-MCNC: 8.4 MG/DL (ref 8.6–10.2)
CALCIUM SERPL-MCNC: 8.6 MG/DL (ref 8.6–10.2)
CHLORIDE SERPL-SCNC: 93 MMOL/L (ref 98–107)
CHLORIDE SERPL-SCNC: 94 MMOL/L (ref 98–107)
CK SERPL-CCNC: 40 U/L (ref 20–180)
CO2 SERPL-SCNC: 31 MMOL/L (ref 22–29)
CO2 SERPL-SCNC: 31 MMOL/L (ref 22–29)
CREAT SERPL-MCNC: 0.6 MG/DL (ref 0.5–1)
CREAT SERPL-MCNC: 0.6 MG/DL (ref 0.5–1)
EKG ATRIAL RATE: 86 BPM
EKG P AXIS: 28 DEGREES
EKG P-R INTERVAL: 168 MS
EKG Q-T INTERVAL: 410 MS
EKG QRS DURATION: 84 MS
EKG QTC CALCULATION (BAZETT): 467 MS
EKG R AXIS: -16 DEGREES
EKG T AXIS: 9 DEGREES
EKG VENTRICULAR RATE: 78 BPM
EOSINOPHIL # BLD: 0.05 K/UL (ref 0.05–0.5)
EOSINOPHILS RELATIVE PERCENT: 1 % (ref 0–6)
ERYTHROCYTE [DISTWIDTH] IN BLOOD BY AUTOMATED COUNT: 17.9 % (ref 11.5–15)
FLUAV RNA RESP QL NAA+PROBE: NOT DETECTED
FLUBV RNA RESP QL NAA+PROBE: NOT DETECTED
GFR SERPL CREATININE-BSD FRML MDRD: >60 ML/MIN/1.73M2
GFR SERPL CREATININE-BSD FRML MDRD: >60 ML/MIN/1.73M2
GLUCOSE SERPL-MCNC: 88 MG/DL (ref 74–99)
GLUCOSE SERPL-MCNC: 91 MG/DL (ref 74–99)
HCT VFR BLD AUTO: 33.8 % (ref 34–48)
HGB BLD-MCNC: 10.5 G/DL (ref 11.5–15.5)
IMM GRANULOCYTES # BLD AUTO: 0.07 K/UL (ref 0–0.58)
IMM GRANULOCYTES NFR BLD: 1 % (ref 0–5)
LYMPHOCYTES NFR BLD: 1.54 K/UL (ref 1.5–4)
LYMPHOCYTES RELATIVE PERCENT: 16 % (ref 20–42)
MAGNESIUM SERPL-MCNC: 2 MG/DL (ref 1.6–2.6)
MCH RBC QN AUTO: 24.4 PG (ref 26–35)
MCHC RBC AUTO-ENTMCNC: 31.1 G/DL (ref 32–34.5)
MCV RBC AUTO: 78.4 FL (ref 80–99.9)
MONOCYTES NFR BLD: 0.94 K/UL (ref 0.1–0.95)
MONOCYTES NFR BLD: 10 % (ref 2–12)
NEUTROPHILS NFR BLD: 72 % (ref 43–80)
NEUTS SEG NFR BLD: 6.85 K/UL (ref 1.8–7.3)
PLATELET # BLD AUTO: 350 K/UL (ref 130–450)
PMV BLD AUTO: 9.7 FL (ref 7–12)
POTASSIUM SERPL-SCNC: 3 MMOL/L (ref 3.5–5)
POTASSIUM SERPL-SCNC: 3.2 MMOL/L (ref 3.5–5)
PROT SERPL-MCNC: 7.6 G/DL (ref 6.4–8.3)
RBC # BLD AUTO: 4.31 M/UL (ref 3.5–5.5)
SARS-COV-2 RNA RESP QL NAA+PROBE: NOT DETECTED
SODIUM SERPL-SCNC: 135 MMOL/L (ref 132–146)
SODIUM SERPL-SCNC: 139 MMOL/L (ref 132–146)
SOURCE: NORMAL
SPECIMEN DESCRIPTION: NORMAL
TROPONIN I SERPL HS-MCNC: 18 NG/L (ref 0–9)
TROPONIN I SERPL HS-MCNC: 18 NG/L (ref 0–9)
WBC OTHER # BLD: 9.5 K/UL (ref 4.5–11.5)

## 2023-09-28 PROCEDURE — 2580000003 HC RX 258: Performed by: NURSE PRACTITIONER

## 2023-09-28 PROCEDURE — 6360000002 HC RX W HCPCS

## 2023-09-28 PROCEDURE — 83880 ASSAY OF NATRIURETIC PEPTIDE: CPT

## 2023-09-28 PROCEDURE — 71045 X-RAY EXAM CHEST 1 VIEW: CPT

## 2023-09-28 PROCEDURE — 82550 ASSAY OF CK (CPK): CPT

## 2023-09-28 PROCEDURE — 6370000000 HC RX 637 (ALT 250 FOR IP)

## 2023-09-28 PROCEDURE — 6360000002 HC RX W HCPCS: Performed by: NURSE PRACTITIONER

## 2023-09-28 PROCEDURE — 87636 SARSCOV2 & INF A&B AMP PRB: CPT

## 2023-09-28 PROCEDURE — 72125 CT NECK SPINE W/O DYE: CPT

## 2023-09-28 PROCEDURE — 85025 COMPLETE CBC W/AUTO DIFF WBC: CPT

## 2023-09-28 PROCEDURE — 93005 ELECTROCARDIOGRAM TRACING: CPT

## 2023-09-28 PROCEDURE — P9047 ALBUMIN (HUMAN), 25%, 50ML: HCPCS | Performed by: NURSE PRACTITIONER

## 2023-09-28 PROCEDURE — 83735 ASSAY OF MAGNESIUM: CPT

## 2023-09-28 PROCEDURE — 2060000000 HC ICU INTERMEDIATE R&B

## 2023-09-28 PROCEDURE — 84484 ASSAY OF TROPONIN QUANT: CPT

## 2023-09-28 PROCEDURE — 96374 THER/PROPH/DIAG INJ IV PUSH: CPT

## 2023-09-28 PROCEDURE — 93010 ELECTROCARDIOGRAM REPORT: CPT | Performed by: INTERNAL MEDICINE

## 2023-09-28 PROCEDURE — 80053 COMPREHEN METABOLIC PANEL: CPT

## 2023-09-28 PROCEDURE — APPSS60 APP SPLIT SHARED TIME 46-60 MINUTES: Performed by: NURSE PRACTITIONER

## 2023-09-28 PROCEDURE — 6370000000 HC RX 637 (ALT 250 FOR IP): Performed by: NURSE PRACTITIONER

## 2023-09-28 PROCEDURE — 99222 1ST HOSP IP/OBS MODERATE 55: CPT | Performed by: STUDENT IN AN ORGANIZED HEALTH CARE EDUCATION/TRAINING PROGRAM

## 2023-09-28 PROCEDURE — 80048 BASIC METABOLIC PNL TOTAL CA: CPT

## 2023-09-28 PROCEDURE — 93005 ELECTROCARDIOGRAM TRACING: CPT | Performed by: NURSE PRACTITIONER

## 2023-09-28 PROCEDURE — 70450 CT HEAD/BRAIN W/O DYE: CPT

## 2023-09-28 PROCEDURE — 84145 PROCALCITONIN (PCT): CPT

## 2023-09-28 PROCEDURE — 99285 EMERGENCY DEPT VISIT HI MDM: CPT

## 2023-09-28 RX ORDER — FUROSEMIDE 10 MG/ML
20 INJECTION INTRAMUSCULAR; INTRAVENOUS 2 TIMES DAILY
Status: DISCONTINUED | OUTPATIENT
Start: 2023-09-29 | End: 2023-09-30

## 2023-09-28 RX ORDER — FLUOXETINE HYDROCHLORIDE 20 MG/1
20 CAPSULE ORAL DAILY
Status: DISCONTINUED | OUTPATIENT
Start: 2023-09-29 | End: 2023-10-04 | Stop reason: HOSPADM

## 2023-09-28 RX ORDER — ACETAMINOPHEN 650 MG/1
650 SUPPOSITORY RECTAL EVERY 6 HOURS PRN
Status: DISCONTINUED | OUTPATIENT
Start: 2023-09-28 | End: 2023-10-04 | Stop reason: HOSPADM

## 2023-09-28 RX ORDER — POTASSIUM CHLORIDE 20 MEQ/1
40 TABLET, EXTENDED RELEASE ORAL ONCE
Status: COMPLETED | OUTPATIENT
Start: 2023-09-28 | End: 2023-09-28

## 2023-09-28 RX ORDER — TRAZODONE HYDROCHLORIDE 50 MG/1
100 TABLET ORAL NIGHTLY
Status: DISCONTINUED | OUTPATIENT
Start: 2023-09-28 | End: 2023-10-04 | Stop reason: HOSPADM

## 2023-09-28 RX ORDER — FLUTICASONE PROPIONATE 50 MCG
2 SPRAY, SUSPENSION (ML) NASAL DAILY
Status: DISCONTINUED | OUTPATIENT
Start: 2023-09-29 | End: 2023-10-04 | Stop reason: HOSPADM

## 2023-09-28 RX ORDER — ENOXAPARIN SODIUM 100 MG/ML
40 INJECTION SUBCUTANEOUS DAILY
Status: DISCONTINUED | OUTPATIENT
Start: 2023-09-29 | End: 2023-10-04 | Stop reason: HOSPADM

## 2023-09-28 RX ORDER — OXYBUTYNIN CHLORIDE 10 MG/1
10 TABLET, EXTENDED RELEASE ORAL NIGHTLY
Status: DISCONTINUED | OUTPATIENT
Start: 2023-09-28 | End: 2023-10-04 | Stop reason: HOSPADM

## 2023-09-28 RX ORDER — ALBUMIN (HUMAN) 12.5 G/50ML
25 SOLUTION INTRAVENOUS EVERY 8 HOURS
Status: COMPLETED | OUTPATIENT
Start: 2023-09-28 | End: 2023-09-29

## 2023-09-28 RX ORDER — SODIUM CHLORIDE 0.9 % (FLUSH) 0.9 %
5-40 SYRINGE (ML) INJECTION PRN
Status: DISCONTINUED | OUTPATIENT
Start: 2023-09-28 | End: 2023-10-04 | Stop reason: HOSPADM

## 2023-09-28 RX ORDER — FUROSEMIDE 10 MG/ML
20 INJECTION INTRAMUSCULAR; INTRAVENOUS ONCE
Status: COMPLETED | OUTPATIENT
Start: 2023-09-28 | End: 2023-09-28

## 2023-09-28 RX ORDER — SODIUM CHLORIDE 0.9 % (FLUSH) 0.9 %
5-40 SYRINGE (ML) INJECTION EVERY 12 HOURS SCHEDULED
Status: DISCONTINUED | OUTPATIENT
Start: 2023-09-28 | End: 2023-10-04 | Stop reason: HOSPADM

## 2023-09-28 RX ORDER — CETIRIZINE HYDROCHLORIDE 10 MG/1
10 TABLET ORAL DAILY
Status: DISCONTINUED | OUTPATIENT
Start: 2023-09-29 | End: 2023-10-04 | Stop reason: HOSPADM

## 2023-09-28 RX ORDER — SODIUM CHLORIDE 9 MG/ML
INJECTION, SOLUTION INTRAVENOUS PRN
Status: DISCONTINUED | OUTPATIENT
Start: 2023-09-28 | End: 2023-10-04 | Stop reason: HOSPADM

## 2023-09-28 RX ORDER — ACETAMINOPHEN 325 MG/1
650 TABLET ORAL EVERY 6 HOURS PRN
Status: DISCONTINUED | OUTPATIENT
Start: 2023-09-28 | End: 2023-10-04 | Stop reason: HOSPADM

## 2023-09-28 RX ORDER — ASPIRIN 81 MG/1
81 TABLET ORAL DAILY
Status: DISCONTINUED | OUTPATIENT
Start: 2023-09-29 | End: 2023-10-04 | Stop reason: HOSPADM

## 2023-09-28 RX ORDER — POLYETHYLENE GLYCOL 3350 17 G/17G
17 POWDER, FOR SOLUTION ORAL DAILY PRN
Status: DISCONTINUED | OUTPATIENT
Start: 2023-09-28 | End: 2023-10-04 | Stop reason: HOSPADM

## 2023-09-28 RX ORDER — BUPROPION HYDROCHLORIDE 300 MG/1
300 TABLET ORAL DAILY
Status: DISCONTINUED | OUTPATIENT
Start: 2023-09-29 | End: 2023-10-04 | Stop reason: HOSPADM

## 2023-09-28 RX ADMIN — OXYBUTYNIN CHLORIDE 10 MG: 10 TABLET, EXTENDED RELEASE ORAL at 23:07

## 2023-09-28 RX ADMIN — TRAZODONE HYDROCHLORIDE 100 MG: 50 TABLET ORAL at 21:54

## 2023-09-28 RX ADMIN — FUROSEMIDE 20 MG: 10 INJECTION, SOLUTION INTRAMUSCULAR; INTRAVENOUS at 16:04

## 2023-09-28 RX ADMIN — ALBUMIN (HUMAN) 25 G: 0.25 INJECTION, SOLUTION INTRAVENOUS at 23:12

## 2023-09-28 RX ADMIN — METOPROLOL TARTRATE 25 MG: 25 TABLET, FILM COATED ORAL at 21:54

## 2023-09-28 RX ADMIN — SODIUM CHLORIDE, PRESERVATIVE FREE 10 ML: 5 INJECTION INTRAVENOUS at 21:54

## 2023-09-28 RX ADMIN — POTASSIUM CHLORIDE 40 MEQ: 1500 TABLET, EXTENDED RELEASE ORAL at 15:49

## 2023-09-28 ASSESSMENT — PAIN DESCRIPTION - PAIN TYPE: TYPE: ACUTE PAIN

## 2023-09-28 ASSESSMENT — PAIN DESCRIPTION - LOCATION: LOCATION: FOOT;LEG

## 2023-09-28 ASSESSMENT — PAIN DESCRIPTION - ONSET: ONSET: ON-GOING

## 2023-09-28 ASSESSMENT — PAIN DESCRIPTION - DESCRIPTORS: DESCRIPTORS: ACHING;DISCOMFORT

## 2023-09-28 ASSESSMENT — PAIN DESCRIPTION - FREQUENCY: FREQUENCY: CONTINUOUS

## 2023-09-28 ASSESSMENT — PAIN DESCRIPTION - ORIENTATION: ORIENTATION: RIGHT;LEFT

## 2023-09-28 ASSESSMENT — PAIN SCALES - GENERAL
PAINLEVEL_OUTOF10: 0
PAINLEVEL_OUTOF10: 6

## 2023-09-28 ASSESSMENT — PAIN - FUNCTIONAL ASSESSMENT: PAIN_FUNCTIONAL_ASSESSMENT: 0-10

## 2023-09-28 NOTE — ED NOTES
N2N called to Kenyatta Hightower RN for room 623. PAS at bedside.      Anibal Barker RN  09/28/23 1954

## 2023-09-29 LAB
ANION GAP SERPL CALCULATED.3IONS-SCNC: 15 MMOL/L (ref 7–16)
BASOPHILS # BLD: 0.03 K/UL (ref 0–0.2)
BASOPHILS NFR BLD: 0 % (ref 0–2)
BUN SERPL-MCNC: 6 MG/DL (ref 6–23)
CALCIUM SERPL-MCNC: 8.6 MG/DL (ref 8.6–10.2)
CHLORIDE SERPL-SCNC: 96 MMOL/L (ref 98–107)
CO2 SERPL-SCNC: 31 MMOL/L (ref 22–29)
CREAT SERPL-MCNC: 0.6 MG/DL (ref 0.5–1)
EKG ATRIAL RATE: 82 BPM
EKG P AXIS: 5 DEGREES
EKG P-R INTERVAL: 160 MS
EKG Q-T INTERVAL: 432 MS
EKG QRS DURATION: 80 MS
EKG QTC CALCULATION (BAZETT): 504 MS
EKG R AXIS: -20 DEGREES
EKG T AXIS: -11 DEGREES
EKG VENTRICULAR RATE: 82 BPM
EOSINOPHIL # BLD: 0.08 K/UL (ref 0.05–0.5)
EOSINOPHILS RELATIVE PERCENT: 1 % (ref 0–6)
ERYTHROCYTE [DISTWIDTH] IN BLOOD BY AUTOMATED COUNT: 17.8 % (ref 11.5–15)
GFR SERPL CREATININE-BSD FRML MDRD: >60 ML/MIN/1.73M2
GLUCOSE SERPL-MCNC: 86 MG/DL (ref 74–99)
HCT VFR BLD AUTO: 30.6 % (ref 34–48)
HGB BLD-MCNC: 9.5 G/DL (ref 11.5–15.5)
IMM GRANULOCYTES # BLD AUTO: 0.05 K/UL (ref 0–0.58)
IMM GRANULOCYTES NFR BLD: 1 % (ref 0–5)
LYMPHOCYTES NFR BLD: 1.29 K/UL (ref 1.5–4)
LYMPHOCYTES RELATIVE PERCENT: 13 % (ref 20–42)
MAGNESIUM SERPL-MCNC: 2.1 MG/DL (ref 1.6–2.6)
MCH RBC QN AUTO: 24.4 PG (ref 26–35)
MCHC RBC AUTO-ENTMCNC: 31 G/DL (ref 32–34.5)
MCV RBC AUTO: 78.7 FL (ref 80–99.9)
MONOCYTES NFR BLD: 1.09 K/UL (ref 0.1–0.95)
MONOCYTES NFR BLD: 11 % (ref 2–12)
NEUTROPHILS NFR BLD: 75 % (ref 43–80)
NEUTS SEG NFR BLD: 7.63 K/UL (ref 1.8–7.3)
PHOSPHATE SERPL-MCNC: 2.8 MG/DL (ref 2.5–4.5)
PLATELET # BLD AUTO: 342 K/UL (ref 130–450)
PMV BLD AUTO: 9.3 FL (ref 7–12)
POTASSIUM SERPL-SCNC: 2.8 MMOL/L (ref 3.5–5)
PROCALCITONIN SERPL-MCNC: 0.1 NG/ML (ref 0–0.08)
RBC # BLD AUTO: 3.89 M/UL (ref 3.5–5.5)
RBC # BLD: ABNORMAL 10*6/UL
SODIUM SERPL-SCNC: 142 MMOL/L (ref 132–146)
WBC OTHER # BLD: 10.2 K/UL (ref 4.5–11.5)

## 2023-09-29 PROCEDURE — 83735 ASSAY OF MAGNESIUM: CPT

## 2023-09-29 PROCEDURE — 97535 SELF CARE MNGMENT TRAINING: CPT

## 2023-09-29 PROCEDURE — 6360000002 HC RX W HCPCS: Performed by: NURSE PRACTITIONER

## 2023-09-29 PROCEDURE — 97530 THERAPEUTIC ACTIVITIES: CPT

## 2023-09-29 PROCEDURE — 2580000003 HC RX 258: Performed by: NURSE PRACTITIONER

## 2023-09-29 PROCEDURE — 85025 COMPLETE CBC W/AUTO DIFF WBC: CPT

## 2023-09-29 PROCEDURE — 2060000000 HC ICU INTERMEDIATE R&B

## 2023-09-29 PROCEDURE — 93010 ELECTROCARDIOGRAM REPORT: CPT | Performed by: INTERNAL MEDICINE

## 2023-09-29 PROCEDURE — 99232 SBSQ HOSP IP/OBS MODERATE 35: CPT | Performed by: STUDENT IN AN ORGANIZED HEALTH CARE EDUCATION/TRAINING PROGRAM

## 2023-09-29 PROCEDURE — 80048 BASIC METABOLIC PNL TOTAL CA: CPT

## 2023-09-29 PROCEDURE — 36415 COLL VENOUS BLD VENIPUNCTURE: CPT

## 2023-09-29 PROCEDURE — 84100 ASSAY OF PHOSPHORUS: CPT

## 2023-09-29 PROCEDURE — 97161 PT EVAL LOW COMPLEX 20 MIN: CPT

## 2023-09-29 PROCEDURE — 97165 OT EVAL LOW COMPLEX 30 MIN: CPT

## 2023-09-29 PROCEDURE — P9047 ALBUMIN (HUMAN), 25%, 50ML: HCPCS | Performed by: NURSE PRACTITIONER

## 2023-09-29 PROCEDURE — 6370000000 HC RX 637 (ALT 250 FOR IP): Performed by: NURSE PRACTITIONER

## 2023-09-29 RX ORDER — POTASSIUM CHLORIDE 7.45 MG/ML
10 INJECTION INTRAVENOUS
Status: COMPLETED | OUTPATIENT
Start: 2023-09-29 | End: 2023-09-29

## 2023-09-29 RX ORDER — POTASSIUM CHLORIDE 20 MEQ/1
40 TABLET, EXTENDED RELEASE ORAL ONCE
Status: DISCONTINUED | OUTPATIENT
Start: 2023-09-29 | End: 2023-09-30

## 2023-09-29 RX ADMIN — ASPIRIN 81 MG: 81 TABLET, COATED ORAL at 08:42

## 2023-09-29 RX ADMIN — ALBUMIN (HUMAN) 25 G: 0.25 INJECTION, SOLUTION INTRAVENOUS at 12:27

## 2023-09-29 RX ADMIN — OXYBUTYNIN CHLORIDE 10 MG: 10 TABLET, EXTENDED RELEASE ORAL at 19:58

## 2023-09-29 RX ADMIN — POTASSIUM CHLORIDE 10 MEQ: 7.46 INJECTION, SOLUTION INTRAVENOUS at 09:14

## 2023-09-29 RX ADMIN — BUPROPION HYDROCHLORIDE 300 MG: 300 TABLET, EXTENDED RELEASE ORAL at 08:42

## 2023-09-29 RX ADMIN — FUROSEMIDE 20 MG: 10 INJECTION, SOLUTION INTRAMUSCULAR; INTRAVENOUS at 08:44

## 2023-09-29 RX ADMIN — LEVOTHYROXINE SODIUM 125 MCG: 25 TABLET ORAL at 06:29

## 2023-09-29 RX ADMIN — POTASSIUM CHLORIDE 10 MEQ: 7.46 INJECTION, SOLUTION INTRAVENOUS at 10:30

## 2023-09-29 RX ADMIN — POTASSIUM CHLORIDE 10 MEQ: 7.46 INJECTION, SOLUTION INTRAVENOUS at 06:36

## 2023-09-29 RX ADMIN — FUROSEMIDE 20 MG: 10 INJECTION, SOLUTION INTRAMUSCULAR; INTRAVENOUS at 17:52

## 2023-09-29 RX ADMIN — SODIUM CHLORIDE, PRESERVATIVE FREE 10 ML: 5 INJECTION INTRAVENOUS at 08:44

## 2023-09-29 RX ADMIN — METOPROLOL TARTRATE 25 MG: 25 TABLET, FILM COATED ORAL at 19:58

## 2023-09-29 RX ADMIN — ALBUMIN (HUMAN) 25 G: 0.25 INJECTION, SOLUTION INTRAVENOUS at 20:02

## 2023-09-29 RX ADMIN — FLUOXETINE HYDROCHLORIDE 20 MG: 20 CAPSULE ORAL at 08:43

## 2023-09-29 RX ADMIN — METOPROLOL TARTRATE 25 MG: 25 TABLET, FILM COATED ORAL at 08:43

## 2023-09-29 RX ADMIN — POTASSIUM CHLORIDE 10 MEQ: 7.46 INJECTION, SOLUTION INTRAVENOUS at 07:54

## 2023-09-29 RX ADMIN — SODIUM CHLORIDE, PRESERVATIVE FREE 10 ML: 5 INJECTION INTRAVENOUS at 19:58

## 2023-09-29 RX ADMIN — TRAZODONE HYDROCHLORIDE 100 MG: 50 TABLET ORAL at 19:58

## 2023-09-29 RX ADMIN — FLUTICASONE PROPIONATE 2 SPRAY: 50 SPRAY, METERED NASAL at 08:44

## 2023-09-29 RX ADMIN — CETIRIZINE HYDROCHLORIDE 10 MG: 10 TABLET, FILM COATED ORAL at 08:42

## 2023-09-29 NOTE — H&P
[facility] History and Physical      CHIEF COMPLAINT:  Fall, leg swelling, and weakness     History of Present Illness: This is a 79year old female with PMH significant for CAD with NSTEMI s/p angioplasty and stent 2020, depression, Graves' disease, hypothyroidism, HTN, and bilateral knee replacements. Recent admission with cholecystectomy 09/05/2023. Patient had episode of AF with RVR during admission with auto conversion. No anticoagulation started. Patient sent home on aspirin and metoprolol. Patient had fall at home in the bathroom last Friday. Does not know if she had LOC. Does admit to hitting her head reports being on the floor for 5-6 hours. Did not seek treatment after eventually getting up with help. Reports not taking any of her medication since her fall. To ED due to increased lower extremity edema, weakness, and poor appetite. Patient states that she just does not have any energy and is unable to get around. Denies recent illness, fever, chills, shortness of breath, chest pain, abdominal pain, and changes in bowel/bladder habits. Potassium 3.2, BNP 1188, troponin 18 and 18, albumin 2.5, alkaline phos 116, AST 42, and H&H 10.5/33. 8. CT of the head and cervical spine negative for acute osseous abnormalities and intracranial abnormalities. Chest x-ray done showing small right pleural effusion. Given Lasix 20 mg IV and potassium 40 mill equivalents in ED. Will admit for further evaluation and treatment. Informant(s) for H&P: Patient and chart review     REVIEW OF SYSTEMS:  A comprehensive review of systems was negative except for: what is in the HPI      PMH:  Past Medical History:   Diagnosis Date    Arthritis     CAD (coronary artery disease)     9-17-20 yisel 2.0x22 francisca om.      COVID-19 virus infection 07/2020    Depression     GERD (gastroesophageal reflux disease)     Graves disease     Hypertension     States she has never had HTN    Hypothyroidism MISC by Does not apply route Patient to obtain bilateral thigh-high compression stockings from [facility] [address] in [facility] 8/17/23   DULCE Conrad   diclofenac sodium (VOLTAREN) 1 % GEL Apply topically 2 times daily  Patient taking differently: Apply 4 g topically 2 times daily To the right knee. 8/17/23   DULCE Conrad   tiZANidine (ZANAFLEX) 4 MG tablet Take 1 tablet by mouth 3 times daily  Patient not taking: Reported on 9/28/2023 8/17/23   DULCE Conrad   aspirin 81 MG EC tablet Take 1 tablet by mouth daily    Historical Provider, MD   FLUoxetine (PROZAC) 40 MG capsule TAKE 1 CAPSULE DAILY 7/17/23   Jia Dominguez,    FLUoxetine (PROZAC) 20 MG capsule TAKE 1 CAPSULE DAILY 7/17/23   Jia Dominguez,    oxybutynin (DITROPAN XL) 10 MG extended release tablet Take 1 tablet by mouth at bedtime 7/10/23   Jia Dominguez,    buPROPion (WELLBUTRIN XL) 300 MG extended release tablet TAKE ONE TABLET BY MOUTH EVERY MORNING 6/30/23   Polina Tomas,    Multiple Vitamins-Minerals (MULTIVITAMIN WOMEN 50+ PO) Take by mouth    Historical Provider, MD   calcium carbonate 600 MG TABS tablet Take 1 tablet by mouth daily    Historical Provider, MD   loratadine (CLARITIN) 10 MG capsule Take 1 capsule by mouth daily 4/12/23   Kerline Chacon, DO   Tens Unit MISC by Does not apply route 10/12/22   Ronald Carranza PA-C   fluticasone (FLONASE) 50 MCG/ACT nasal spray USE 2 SPRAY(S) IN EACH NOSTRIL DAILY 8/23/21   Jia Dominguez DO       Allergies:    Meloxicam    Social History:    reports that she quit smoking about 46 years ago. Her smoking use included cigarettes. She started smoking about 48 years ago. She has a 3.00 pack-year smoking history. She has never used smokeless tobacco. She reports that she does not currently use alcohol. She reports that she does not use drugs.     Family History:   family history includes Cancer in her sister; Diabetes in her

## 2023-09-29 NOTE — ACP (ADVANCE CARE PLANNING)
Advance Care Planning   Healthcare Decision Maker:    Primary Decision Maker: Barnstable Siemens - Spouse - [phone]    Click here to complete Healthcare Decision Makers including selection of the Healthcare Decision Maker Relationship (ie \"Primary\").

## 2023-09-29 NOTE — PROGRESS NOTES
Occupational Therapy    OCCUPATIONAL THERAPY INITIAL EVALUATION    EFRAIN [facility]   [address] [place] [address]         Date:2023                                                  Patient Name: Damaso Dubose    MRN: [id number]    : 1955    Room: [address] [place], [place] [address]A      Evaluating OT: Cherie Reynolds OTR/L   [id number]      Referring Devin Dangelo APRN - CNP    Specific Provider Orders/Date:OT eval and treat 2023      Diagnosis:  Bilateral leg edema [R60.0]     Pertinent Medical History: recent fall at home, Graves disease,      Precautions:  Fall Risk,      Assessment of current deficits    [x] Functional mobility  [x]ADLs  [x] Strength               []Cognition    [x] Functional transfers   [x] IADLs         [x] Safety Awareness   [x]Endurance    [] Fine Coordination              [x] Balance      [] Vision/perception   []Sensation     []Gross Motor Coordination  [] ROM  [] Delirium                   [] Motor Control     OT PLAN OF CARE   OT POC based on physician orders, patient diagnosis and results of clinical assessment    Frequency/Duration  2-4 days/wk for 2 weeks PRN   Specific OT Treatment Interventions to include:   ADL retraining/adapted techniques and AE recommendations to increase functional independence within precautions                    Energy conservation techniques to improve tolerance for selfcare routine   Functional transfer/mobility training/DME recommendations for increased independence, safety and fall prevention         Patient/family education to increase safety and functional independence             Environmental modifications for safe mobility and completion of ADLs                             Therapeutic activity to improve functional performance during ADLs.                                          Therapeutic exercise to improve tolerance and functional strength for ADLs    Balance retraining/tolerance tasks for patient demonstrated  decreased independence and safety during completion of ADL/functional transfer/mobility tasks. Pt would benefit from continued skilled OT to increase safety and independence with completion of ADL/IADL tasks for functional independence and quality of life. Comments/Treatment:      Patient practiced and was instructed on following during evaluation and OT session:          -Bed mobility - technique and safety         -Tranfers - hand placement, tech and safety         -Mobility - safety, and tech with  a device         -ADLs - tech, AE if appropriate/needed   - continue to encourage patient to participate in ADL activity - may benefit from lower body AE -  assisting at home        -Education:, importance of OOB activity and participating in ADLs, safety awareness             Rehab Potential: good for established goals     Patient / Family Goal: none stated       Patient and/or family were instructed on functional diagnosis, prognosis/goals and OT plan of care. Demonstrated good  understanding. Eval Complexity: Low    Time In: 1002  Time Out: 1035  Total Treatment Time: 10    Min Units   OT Eval Low 97165 x  1   OT Eval Medium 65853      OT Eval High 31469      OT Re-Eval [id number]       Therapeutic Ex 28707      Therapeutic Activities 09802       ADL/Self Care 75809  10  1   Orthotic Management 03490       Manual 98585     Neuro Re-Ed 51702       Non-Billable Time         Evaluation Time additionally includes thorough review of current medical information, gathering information on past medical history/social history and prior level of function, interpretation of standardized testing/informal observation of tasks, assessment of data and development of plan of care and goals.             Fidelina Land  OTR/L  OT [id number]

## 2023-09-29 NOTE — CONSULTS
[facility]   Inpatient CHF Nurse Navigator Consult      Bernardo Caro is a 79 y.o. (1955) female with a history of HFpEF, most recent EF:  Lab Results   Component Value Date    LVEF 58 10/01/2020       Patient was awake and alert, laying in bed during the consultation and is agreeable to heart failure education. She was engaged and asked appropriate questions throughout the education session. She follows with Dr Lashae Aviles in [facility]. Barriers identified during consult contributing to HF Hospitalization:  [] Limited medication adherence   [] Poor health literacy, education regarding HF medications provided   [] Pill box provided to patient  [] Difficulty affording medications  [] Prescription assistance information given     [] Not weighing themselves daily  [x] Weight log provided for easy monitoring  [] Scale provided     [] Not following low sodium diet  [] Food insecurity   [x] 2 gram sodium diet education provided   [] Low sodium recipes provided  [] Sodium free seasoning provided   [] Low sodium meal delivery options given to patient  [] Dietician consulted     [] Lack of transportation to appointments     [] Depression, given chronic illness  [] Primary team notified     [] Goals of care need addressed  [] Palliative care consulted     [] CHF CHW consulted, to assist with       Chart Reviewed:  Diet: ADULT DIET; Regular; 3 carb choices (45 gm/meal);  Low Fat/Low Chol/High Fiber/OLY   Daily Weights: Patient Vitals for the past 96 hrs (Last 3 readings):   Weight   09/29/23 0433 218 lb 11.2 oz (99.2 kg)   09/28/23 2100 219 lb 11.2 oz (99.7 kg)     I/O:   Intake/Output Summary (Last 24 hours) at 9/29/2023 1425  Last data filed at 9/29/2023 1416  Gross per 24 hour   Intake 670.87 ml   Output 400 ml   Net 270.87 ml       [] Nursing staff/manager notified of inaccurate pineda weights or I/O      Discharge Plan:  Above identified barriers reviewed and needs addressed    Patient/family educated on daily monitoring tools for CHF, made aware of signs and symptoms of worsening HF and to notify provider immediately of change in symptoms. Heart Failure Home Instructions placed in patient's discharge instructions    Per AHA guidelines patient to be closely monitored following discharge with 7 day follow up appointment    Scheduling with the CHF clinic Patient deferring at this time    Future Appointments   Date Time Provider [address]   10/25/2023  9:00 AM Emili Corrigan  [address]       Patient Education:  Self Monitoring/management:  Reviewed the introduction to Heart Failure, the HF zones, signs and symptoms to report on day 1 of onset, medications, medication compliance, the importance of obtaining daily weights, following a low sodium diet, reading food labels for the sodium content, keeping physician appointments, and smoking cessation. Discussed writing / tracking daily weights on a calendar / log because a 5 pound gain in 1 week can sneak up if you are not tracking it. Advised patient they can reduce the risk for Heart Failure exacerbations by modifying / controlling the risk factors. Discussed self-managed care which includes the following: take medications as prescribed, report any intolerable side effects of medications to the medical provider (PCP or cardiologist), do not just stop taking the medication; follow a cardiac heart healthy / low sodium diet; weigh yourself daily, exercise regularly- per doctor recommendation and not to smoke or use an excess amount of alcohol. Discussed calling the cardiologist / doctor with a weight gain of 3 pounds in one day or a total of 5 pounds or more in one week. Also, if you should have a significant weight loss of 3# or more in one day to call the doctor, they may need to decrease or hold the diuretic dose.  On days you feels nauseated and not eating / drinking, having emesis or diarrhea,

## 2023-09-29 NOTE — CARE COORDINATION
Social work / Discharge planning:         Social work met with patient for initial assessment and explained role. She lives with her  in a one floor home and uses a walker or cane prn. Patient also owns a BSC, shower chair and lift chair. She has no history of HC or SHUBHAM. PT/OT evaluations ordered. Patient states that she walked with therapy and did very well. Social work discussed the option of home care which she declines because she has 2 large dogs. Currently on IV Lasix. CHF coordinator consulted.     Electronically signed by DEBBY Bangura on 9/29/2023 at 1:31 PM

## 2023-09-29 NOTE — PLAN OF CARE
Problem: Discharge Planning  Goal: Discharge to home or other facility with appropriate resources  Outcome: Progressing  Flowsheets (Taken 9/28/2023 2200)  Discharge to home or other facility with appropriate resources:   Identify barriers to discharge with patient and caregiver   Arrange for needed discharge resources and transportation as appropriate   Identify discharge learning needs (meds, wound care, etc)     Problem: Skin/Tissue Integrity  Goal: Absence of new skin breakdown  Description: 1. Monitor for areas of redness and/or skin breakdown  2. Assess vascular access sites hourly  3. Every 4-6 hours minimum:  Change oxygen saturation probe site  4. Every 4-6 hours:  If on nasal continuous positive airway pressure, respiratory therapy assess nares and determine need for appliance change or resting period.   Outcome: Progressing     Problem: Safety - Adult  Goal: Free from fall injury  Outcome: Progressing     Problem: ABCDS Injury Assessment  Goal: Absence of physical injury  Outcome: Progressing

## 2023-09-29 NOTE — PROGRESS NOTES
4 Eyes Skin Assessment     NAME:  Matt Chua  YOB: 1955  MEDICAL RECORD NUMBER:  [id number]    The patient is being assessed for  Admission    I agree that at least one RN has performed a thorough Head to Toe Skin Assessment on the patient. ALL assessment sites listed below have been assessed. Areas assessed by both nurses:    Head, Face, Ears, Shoulders, Back, Chest, Arms, Elbows, Hands, Sacrum. Buttock, Coccyx, Ischium, Legs. Feet and Heels, and Under Medical Devices         Does the Patient have a Wound?  No noted wound(s)       Eddie Prevention initiated by RN: Yes  Wound Care Orders initiated by RN: No    Pressure Injury (Stage 3,4, Unstageable, DTI, NWPT, and Complex wounds) if present, place Wound referral order by RN under : No    New Ostomies, if present place, Ostomy referral order under : No     Nurse 1 eSignature: Electronically signed by Blake Eaton RN on 9/29/23 at 12:44 AM EDT    **SHARE this note so that the co-signing nurse can place an eSignature**    Nurse 2 eSignature: {Esignature:[id number]}

## 2023-09-29 NOTE — PROGRESS NOTES
Physical Therapy    Initial Assessment     Name: Genevieve Rincon  : 1955  MRN: [id number]      Date of Service: 2023    Evaluating PT: Owen Spivey PT, DPT [id number]      Room #:  3194/4155-U  Diagnosis:  Bilateral leg edema [R60.0]  PMHx/PSHx:   has a past medical history of Arthritis, CAD (coronary artery disease), COVID-19 virus infection, Depression, GERD (gastroesophageal reflux disease), Graves disease, Hypertension, Hypothyroidism, NSTEMI (non-ST elevated myocardial infarction) ([address]), Obesity, and S/P total knee arthroplasty, left. Precautions:  Fall risk  Equipment Needs:  TBD    SUBJECTIVE:    Pt lives with  in a 1 story home with level entry. Pt ambulated with [employer] and cane prior to admission. OBJECTIVE:   Initial Evaluation  Date: 23 Treatment Date: Short Term/ Long Term   Goals   AM-PAC 6 Clicks 87/34     Was pt agreeable to Eval/treatment? Yes     Does pt have pain? No complaints of pain      Bed Mobility  Rolling: NT  Supine to sit: Mod A  Sit to supine: NT  Scooting: Mod A  Rolling: Supervision   Supine to sit: Supervision  Sit to supine: Supervision   Scooting: Supervision    Transfers Sit to stand: Min A  Stand to sit: Min A  Stand pivot: Min A with [employer]  Sit to stand: Independent  Stand to sit: Independent  Stand pivot: Mod Independent with [employer]   Ambulation   75 feet with [employer] with Min A  >200 feet with [employer] with Mod Independent   Stair negotiation: ascended and descended NT  >4 step(s) with 1 rail(s) with Supervision    ROM BUE: Refer to OT note  BLE: WFL     Strength BUE: Refer to OT note  BLE: 4/5     Balance Sitting EOB: Supervision   Dynamic Standing: Min A with [employer]  Sitting EOB: Independent  Dynamic Standing: Mod Independent with [employer]     Pt is A & O x: 4. Pt is oriented to person, place, month/year, and situation. Sensation: Pt denies numbness and tingling in extremities.    Edema: Unremarkable    Patient education  Pt educated on PT role in acute care setting and safety with

## 2023-09-30 LAB
ANION GAP SERPL CALCULATED.3IONS-SCNC: 14 MMOL/L (ref 7–16)
B PARAP IS1001 DNA NPH QL NAA+NON-PROBE: NOT DETECTED
B PERT DNA SPEC QL NAA+PROBE: NOT DETECTED
BASOPHILS # BLD: 0.02 K/UL (ref 0–0.2)
BASOPHILS NFR BLD: 0 % (ref 0–2)
BUN SERPL-MCNC: 7 MG/DL (ref 6–23)
C PNEUM DNA NPH QL NAA+NON-PROBE: NOT DETECTED
CALCIUM SERPL-MCNC: 8.7 MG/DL (ref 8.6–10.2)
CHLORIDE SERPL-SCNC: 93 MMOL/L (ref 98–107)
CO2 SERPL-SCNC: 29 MMOL/L (ref 22–29)
CREAT SERPL-MCNC: 0.6 MG/DL (ref 0.5–1)
EOSINOPHIL # BLD: 0.04 K/UL (ref 0.05–0.5)
EOSINOPHILS RELATIVE PERCENT: 0 % (ref 0–6)
ERYTHROCYTE [DISTWIDTH] IN BLOOD BY AUTOMATED COUNT: 17.7 % (ref 11.5–15)
FERRITIN SERPL-MCNC: 107 NG/ML
FLUAV RNA NPH QL NAA+NON-PROBE: NOT DETECTED
FLUBV RNA NPH QL NAA+NON-PROBE: NOT DETECTED
GFR SERPL CREATININE-BSD FRML MDRD: >60 ML/MIN/1.73M2
GLUCOSE SERPL-MCNC: 110 MG/DL (ref 74–99)
HADV DNA NPH QL NAA+NON-PROBE: NOT DETECTED
HCOV 229E RNA NPH QL NAA+NON-PROBE: NOT DETECTED
HCOV HKU1 RNA NPH QL NAA+NON-PROBE: NOT DETECTED
HCOV NL63 RNA NPH QL NAA+NON-PROBE: NOT DETECTED
HCOV OC43 RNA NPH QL NAA+NON-PROBE: NOT DETECTED
HCT VFR BLD AUTO: 31 % (ref 34–48)
HGB BLD-MCNC: 9.6 G/DL (ref 11.5–15.5)
HMPV RNA NPH QL NAA+NON-PROBE: NOT DETECTED
HPIV1 RNA NPH QL NAA+NON-PROBE: NOT DETECTED
HPIV2 RNA NPH QL NAA+NON-PROBE: NOT DETECTED
HPIV3 RNA NPH QL NAA+NON-PROBE: NOT DETECTED
HPIV4 RNA NPH QL NAA+NON-PROBE: NOT DETECTED
IMM GRANULOCYTES # BLD AUTO: 0.09 K/UL (ref 0–0.58)
IMM GRANULOCYTES NFR BLD: 1 % (ref 0–5)
IRON SATN MFR SERPL: 9 % (ref 15–50)
IRON SERPL-MCNC: 13 UG/DL (ref 37–145)
LYMPHOCYTES NFR BLD: 1.29 K/UL (ref 1.5–4)
LYMPHOCYTES RELATIVE PERCENT: 11 % (ref 20–42)
M PNEUMO DNA NPH QL NAA+NON-PROBE: NOT DETECTED
MAGNESIUM SERPL-MCNC: 2.3 MG/DL (ref 1.6–2.6)
MCH RBC QN AUTO: 24.6 PG (ref 26–35)
MCHC RBC AUTO-ENTMCNC: 31 G/DL (ref 32–34.5)
MCV RBC AUTO: 79.5 FL (ref 80–99.9)
MONOCYTES NFR BLD: 0.86 K/UL (ref 0.1–0.95)
MONOCYTES NFR BLD: 7 % (ref 2–12)
NEUTROPHILS NFR BLD: 80 % (ref 43–80)
NEUTS SEG NFR BLD: 9.31 K/UL (ref 1.8–7.3)
PLATELET # BLD AUTO: 323 K/UL (ref 130–450)
PMV BLD AUTO: 9.7 FL (ref 7–12)
POTASSIUM SERPL-SCNC: 3.1 MMOL/L (ref 3.5–5)
RBC # BLD AUTO: 3.9 M/UL (ref 3.5–5.5)
RSV RNA NPH QL NAA+NON-PROBE: NOT DETECTED
RV+EV RNA NPH QL NAA+NON-PROBE: NOT DETECTED
SARS-COV-2 RNA NPH QL NAA+NON-PROBE: NOT DETECTED
SODIUM SERPL-SCNC: 136 MMOL/L (ref 132–146)
SPECIMEN DESCRIPTION: NORMAL
TIBC SERPL-MCNC: 149 UG/DL (ref 250–450)
WBC OTHER # BLD: 11.6 K/UL (ref 4.5–11.5)

## 2023-09-30 PROCEDURE — 6370000000 HC RX 637 (ALT 250 FOR IP): Performed by: NURSE PRACTITIONER

## 2023-09-30 PROCEDURE — 99232 SBSQ HOSP IP/OBS MODERATE 35: CPT | Performed by: STUDENT IN AN ORGANIZED HEALTH CARE EDUCATION/TRAINING PROGRAM

## 2023-09-30 PROCEDURE — 36415 COLL VENOUS BLD VENIPUNCTURE: CPT

## 2023-09-30 PROCEDURE — 2060000000 HC ICU INTERMEDIATE R&B

## 2023-09-30 PROCEDURE — 0202U NFCT DS 22 TRGT SARS-COV-2: CPT

## 2023-09-30 PROCEDURE — APPSS60 APP SPLIT SHARED TIME 46-60 MINUTES: Performed by: PHYSICIAN ASSISTANT

## 2023-09-30 PROCEDURE — 83540 ASSAY OF IRON: CPT

## 2023-09-30 PROCEDURE — 80048 BASIC METABOLIC PNL TOTAL CA: CPT

## 2023-09-30 PROCEDURE — 6370000000 HC RX 637 (ALT 250 FOR IP): Performed by: STUDENT IN AN ORGANIZED HEALTH CARE EDUCATION/TRAINING PROGRAM

## 2023-09-30 PROCEDURE — 6370000000 HC RX 637 (ALT 250 FOR IP): Performed by: PHYSICIAN ASSISTANT

## 2023-09-30 PROCEDURE — 2500000003 HC RX 250 WO HCPCS: Performed by: STUDENT IN AN ORGANIZED HEALTH CARE EDUCATION/TRAINING PROGRAM

## 2023-09-30 PROCEDURE — 2580000003 HC RX 258: Performed by: NURSE PRACTITIONER

## 2023-09-30 PROCEDURE — 83735 ASSAY OF MAGNESIUM: CPT

## 2023-09-30 PROCEDURE — 83550 IRON BINDING TEST: CPT

## 2023-09-30 PROCEDURE — 85025 COMPLETE CBC W/AUTO DIFF WBC: CPT

## 2023-09-30 PROCEDURE — 82728 ASSAY OF FERRITIN: CPT

## 2023-09-30 PROCEDURE — 99223 1ST HOSP IP/OBS HIGH 75: CPT | Performed by: INTERNAL MEDICINE

## 2023-09-30 PROCEDURE — 6360000002 HC RX W HCPCS: Performed by: PHYSICIAN ASSISTANT

## 2023-09-30 PROCEDURE — 6360000002 HC RX W HCPCS: Performed by: NURSE PRACTITIONER

## 2023-09-30 RX ORDER — POTASSIUM CHLORIDE 7.45 MG/ML
10 INJECTION INTRAVENOUS
Status: DISCONTINUED | OUTPATIENT
Start: 2023-09-30 | End: 2023-09-30

## 2023-09-30 RX ORDER — FUROSEMIDE 10 MG/ML
40 INJECTION INTRAMUSCULAR; INTRAVENOUS 2 TIMES DAILY
Status: DISCONTINUED | OUTPATIENT
Start: 2023-09-30 | End: 2023-10-04 | Stop reason: HOSPADM

## 2023-09-30 RX ORDER — POTASSIUM CHLORIDE 20 MEQ/1
40 TABLET, EXTENDED RELEASE ORAL ONCE
Status: DISCONTINUED | OUTPATIENT
Start: 2023-09-30 | End: 2023-09-30

## 2023-09-30 RX ORDER — POTASSIUM CHLORIDE 20 MEQ/1
40 TABLET, EXTENDED RELEASE ORAL 2 TIMES DAILY WITH MEALS
Status: DISCONTINUED | OUTPATIENT
Start: 2023-09-30 | End: 2023-09-30

## 2023-09-30 RX ORDER — ATORVASTATIN CALCIUM 40 MG/1
40 TABLET, FILM COATED ORAL NIGHTLY
Status: DISCONTINUED | OUTPATIENT
Start: 2023-09-30 | End: 2023-10-04 | Stop reason: HOSPADM

## 2023-09-30 RX ADMIN — FUROSEMIDE 40 MG: 10 INJECTION, SOLUTION INTRAMUSCULAR; INTRAVENOUS at 17:22

## 2023-09-30 RX ADMIN — MICONAZOLE NITRATE: 20 POWDER TOPICAL at 21:07

## 2023-09-30 RX ADMIN — BUPROPION HYDROCHLORIDE 300 MG: 300 TABLET, EXTENDED RELEASE ORAL at 08:22

## 2023-09-30 RX ADMIN — FLUTICASONE PROPIONATE 2 SPRAY: 50 SPRAY, METERED NASAL at 08:23

## 2023-09-30 RX ADMIN — METOPROLOL TARTRATE 25 MG: 25 TABLET, FILM COATED ORAL at 21:08

## 2023-09-30 RX ADMIN — POTASSIUM BICARBONATE 40 MEQ: 782 TABLET, EFFERVESCENT ORAL at 10:17

## 2023-09-30 RX ADMIN — SODIUM CHLORIDE, PRESERVATIVE FREE 10 ML: 5 INJECTION INTRAVENOUS at 21:07

## 2023-09-30 RX ADMIN — FLUOXETINE HYDROCHLORIDE 20 MG: 20 CAPSULE ORAL at 08:22

## 2023-09-30 RX ADMIN — SODIUM CHLORIDE, PRESERVATIVE FREE 10 ML: 5 INJECTION INTRAVENOUS at 08:22

## 2023-09-30 RX ADMIN — CETIRIZINE HYDROCHLORIDE 10 MG: 10 TABLET, FILM COATED ORAL at 08:22

## 2023-09-30 RX ADMIN — ATORVASTATIN CALCIUM 40 MG: 40 TABLET, FILM COATED ORAL at 21:08

## 2023-09-30 RX ADMIN — MICONAZOLE NITRATE: 20 POWDER TOPICAL at 08:21

## 2023-09-30 RX ADMIN — OXYBUTYNIN CHLORIDE 10 MG: 10 TABLET, EXTENDED RELEASE ORAL at 21:07

## 2023-09-30 RX ADMIN — ENOXAPARIN SODIUM 40 MG: 100 INJECTION SUBCUTANEOUS at 08:21

## 2023-09-30 RX ADMIN — FUROSEMIDE 20 MG: 10 INJECTION, SOLUTION INTRAMUSCULAR; INTRAVENOUS at 08:22

## 2023-09-30 RX ADMIN — LEVOTHYROXINE SODIUM 125 MCG: 25 TABLET ORAL at 05:11

## 2023-09-30 RX ADMIN — TRAZODONE HYDROCHLORIDE 100 MG: 50 TABLET ORAL at 21:07

## 2023-09-30 RX ADMIN — ASPIRIN 81 MG: 81 TABLET, COATED ORAL at 08:22

## 2023-09-30 RX ADMIN — METOPROLOL TARTRATE 25 MG: 25 TABLET, FILM COATED ORAL at 08:23

## 2023-09-30 RX ADMIN — POTASSIUM BICARBONATE 40 MEQ: 782 TABLET, EFFERVESCENT ORAL at 17:23

## 2023-09-30 ASSESSMENT — PAIN SCALES - GENERAL
PAINLEVEL_OUTOF10: 0
PAINLEVEL_OUTOF10: 0

## 2023-09-30 NOTE — PLAN OF CARE
Problem: Discharge Planning  Goal: Discharge to home or other facility with appropriate resources  Outcome: Progressing  Flowsheets (Taken 9/29/2023 2000)  Discharge to home or other facility with appropriate resources:   Identify barriers to discharge with patient and caregiver   Arrange for needed discharge resources and transportation as appropriate   Identify discharge learning needs (meds, wound care, etc)     Problem: Skin/Tissue Integrity  Goal: Absence of new skin breakdown  Description: 1. Monitor for areas of redness and/or skin breakdown  2. Assess vascular access sites hourly  3. Every 4-6 hours minimum:  Change oxygen saturation probe site  4. Every 4-6 hours:  If on nasal continuous positive airway pressure, respiratory therapy assess nares and determine need for appliance change or resting period.   Outcome: Progressing     Problem: Safety - Adult  Goal: Free from fall injury  Outcome: Progressing     Problem: ABCDS Injury Assessment  Goal: Absence of physical injury  Outcome: Progressing     Problem: Chronic Conditions and Co-morbidities  Goal: Patient's chronic conditions and co-morbidity symptoms are monitored and maintained or improved  9/29/2023 2300 by Meghann Ritter RN  Outcome: Progressing  Flowsheets (Taken 9/29/2023 2000)  Care Plan - Patient's Chronic Conditions and Co-Morbidity Symptoms are Monitored and Maintained or Improved:   Monitor and assess patient's chronic conditions and comorbid symptoms for stability, deterioration, or improvement   Collaborate with multidisciplinary team to address chronic and comorbid conditions and prevent exacerbation or deterioration  9/29/2023 1830 by Angi Obrien RN  Outcome: Progressing

## 2023-09-30 NOTE — CONSULTS
INPATIENT CARDIOLOGY CONSULT     Reason for Consult: AF    Cardiologist: Dr. Morgan Connell    Requesting Physician: Dr. Valerio Arnold    Date of Consultation: 9/30/2023    HISTORY OF PRESENT ILLNESS:   Patient is a 79year old WF known to Dr. Teri Ordonez. She has a past medical history of VHD, PAF, CAD s/p PCI to OM-4 branch September 2020, HTN, HLD, T2DM, morbid obesity s/p gastric bypass in 2006, family history premature CAD, GERD, Graves' disease, hypothyroidism, chronic anemia, arthritis, depression and history of gallstone pancreatitis s/p laparoscopic cholecystectomy and ERCP 9/2023. She presented to [facility] on September 28, 2023 with complaints of mechanical fall and peripheral edema. Of note, due to patient's COVID-19 rule out status, interview was obtained via telephone in order to preserve PPE and limit exposure. Per patient, since her hospital discharge earlier this month, she has noticed progressively worsening peripheral edema bilaterally. She notes of feeling as if she had a cold, with intermittent chills and rhinorrhea. She admits to generalized weakness and fatigue. This past Thursday while taking a shower, she slipped, fell and was unable to get up for approximately 6 hours until her son found her. She was then brought to the ED for further evaluation. She denies any prior falls. Denies syncope, chest pain, dyspnea, palpitations, dizziness, near syncope. Denies PND, orthopnea. Not taking BB at home. Please note: past medical records were reviewed per electronic medical record (EMR) - see detailed reports under Past Medical/ Surgical History. PAST MEDICAL HISTORY:    CAD s/p PCI to OM-4 9/2020  VHD  PAF, not on OAC  ?  First diagnosed during September 2023 admission in setting of #3  Converted spontaneously to [address] [place] [address] Street  Patient declined [facility] initiation 9/3/2023 inpatient  Gallstone pancreatitis, chronic calculus cholecystitis, elevated LFT's -- s/p robotic assisted laparoscopic cholecystectomy on 9/5/23 ELLIOT Dominguez,    nystatin (MYCOSTATIN) 393119 UNIT/GM powder Apply 3 times daily. 9/14/23   Vaishali Dominguez DO   furosemide (LASIX) 20 MG tablet Take 1 tablet by mouth daily as needed (Swelling) 9/11/23   Vaishali Dominguez DO   metoprolol tartrate (LOPRESSOR) 25 MG tablet Take 1 tablet by mouth 2 times daily 9/9/23 10/9/23  Ciarra Taylor MD   levothyroxine (EUTHYROX) 125 MCG tablet TAKE 1 TABLET DAILY 8/28/23   Vaishali Dominguez DO   traZODone (DESYREL) 100 MG tablet TAKE 1 TABLET NIGHTLY 8/28/23   Vaishali Dominguez DO   Compression Stockings MISC by Does not apply route Patient to obtain bilateral thigh-high compression stockings from [address] [place] [address] in [facility] 8/17/23   DULCE Avendaño   diclofenac sodium (VOLTAREN) 1 % GEL Apply topically 2 times daily  Patient taking differently: Apply 4 g topically 2 times daily To the right knee.  8/17/23   DULCE Avendaño   tiZANidine (ZANAFLEX) 4 MG tablet Take 1 tablet by mouth 3 times daily  Patient not taking: Reported on 9/28/2023 8/17/23   DULCE Avendaño   aspirin 81 MG EC tablet Take 1 tablet by mouth daily    Historical Provider, MD   FLUoxetine (PROZAC) 40 MG capsule TAKE 1 CAPSULE DAILY 7/17/23   Vaishali Dominguez DO   FLUoxetine (PROZAC) 20 MG capsule TAKE 1 CAPSULE DAILY 7/17/23   Vaishali Dominguez DO   oxybutynin (DITROPAN XL) 10 MG extended release tablet Take 1 tablet by mouth at bedtime 7/10/23   Vaishali Dominguez DO   buPROPion (WELLBUTRIN XL) 300 MG extended release tablet TAKE ONE TABLET BY MOUTH EVERY MORNING 6/30/23   Anne Ferreira, DO   Multiple Vitamins-Minerals (MULTIVITAMIN WOMEN 50+ PO) Take by mouth    Historical Provider, MD   calcium carbonate 600 MG TABS tablet Take 1 tablet by mouth daily    Historical Provider, MD   loratadine (CLARITIN) 10 MG capsule Take 1 capsule by mouth daily 4/12/23   Cesar Chu,    Tens Unit MISC by Does not apply route 10/12/22   Senaida Closs

## 2023-09-30 NOTE — PLAN OF CARE
Problem: Discharge Planning  Goal: Discharge to home or other facility with appropriate resources  9/30/2023 1106 by Vanessa Mast RN  Outcome: Progressing  9/29/2023 2300 by Gwendolyn Castelan RN  Outcome: Progressing  Flowsheets (Taken 9/29/2023 2000)  Discharge to home or other facility with appropriate resources:   Identify barriers to discharge with patient and caregiver   Arrange for needed discharge resources and transportation as appropriate   Identify discharge learning needs (meds, wound care, etc)     Problem: Skin/Tissue Integrity  Goal: Absence of new skin breakdown  Description: 1. Monitor for areas of redness and/or skin breakdown  2. Assess vascular access sites hourly  3. Every 4-6 hours minimum:  Change oxygen saturation probe site  4. Every 4-6 hours:  If on nasal continuous positive airway pressure, respiratory therapy assess nares and determine need for appliance change or resting period.   9/30/2023 1106 by Vanessa Mast RN  Outcome: Progressing  9/29/2023 2300 by Gwendolyn Castelan RN  Outcome: Progressing     Problem: Safety - Adult  Goal: Free from fall injury  9/30/2023 1106 by Vanessa Mast RN  Outcome: Progressing  9/29/2023 2300 by Gwendolyn Castelan RN  Outcome: Progressing     Problem: ABCDS Injury Assessment  Goal: Absence of physical injury  9/30/2023 1106 by Vanessa Mast RN  Outcome: Progressing  9/29/2023 2300 by Gwendolyn Castelan RN  Outcome: Progressing     Problem: Chronic Conditions and Co-morbidities  Goal: Patient's chronic conditions and co-morbidity symptoms are monitored and maintained or improved  9/30/2023 1106 by Vanessa Mast RN  Outcome: Progressing  9/29/2023 2300 by Gwendolyn Castelan RN  Outcome: Progressing  Flowsheets (Taken 9/29/2023 2000)  Care Plan - Patient's Chronic Conditions and Co-Morbidity Symptoms are Monitored and Maintained or Improved:   Monitor and assess patient's chronic conditions and comorbid symptoms for stability, deterioration, or improvement

## 2023-10-01 LAB
ANION GAP SERPL CALCULATED.3IONS-SCNC: 11 MMOL/L (ref 7–16)
BASOPHILS # BLD: 0.03 K/UL (ref 0–0.2)
BASOPHILS NFR BLD: 0 % (ref 0–2)
BUN SERPL-MCNC: 9 MG/DL (ref 6–23)
CALCIUM SERPL-MCNC: 9.1 MG/DL (ref 8.6–10.2)
CHLORIDE SERPL-SCNC: 90 MMOL/L (ref 98–107)
CO2 SERPL-SCNC: 33 MMOL/L (ref 22–29)
CREAT SERPL-MCNC: 0.7 MG/DL (ref 0.5–1)
EOSINOPHIL # BLD: 0.15 K/UL (ref 0.05–0.5)
EOSINOPHILS RELATIVE PERCENT: 1 % (ref 0–6)
ERYTHROCYTE [DISTWIDTH] IN BLOOD BY AUTOMATED COUNT: 17.6 % (ref 11.5–15)
GFR SERPL CREATININE-BSD FRML MDRD: >60 ML/MIN/1.73M2
GLUCOSE SERPL-MCNC: 106 MG/DL (ref 74–99)
HCT VFR BLD AUTO: 33.1 % (ref 34–48)
HGB BLD-MCNC: 10 G/DL (ref 11.5–15.5)
IMM GRANULOCYTES # BLD AUTO: 0.06 K/UL (ref 0–0.58)
IMM GRANULOCYTES NFR BLD: 1 % (ref 0–5)
LYMPHOCYTES NFR BLD: 1.47 K/UL (ref 1.5–4)
LYMPHOCYTES RELATIVE PERCENT: 14 % (ref 20–42)
MAGNESIUM SERPL-MCNC: 2.2 MG/DL (ref 1.6–2.6)
MCH RBC QN AUTO: 24 PG (ref 26–35)
MCHC RBC AUTO-ENTMCNC: 30.2 G/DL (ref 32–34.5)
MCV RBC AUTO: 79.4 FL (ref 80–99.9)
MONOCYTES NFR BLD: 0.86 K/UL (ref 0.1–0.95)
MONOCYTES NFR BLD: 8 % (ref 2–12)
NEUTROPHILS NFR BLD: 75 % (ref 43–80)
NEUTS SEG NFR BLD: 7.79 K/UL (ref 1.8–7.3)
PLATELET # BLD AUTO: 340 K/UL (ref 130–450)
PMV BLD AUTO: 9.4 FL (ref 7–12)
POTASSIUM SERPL-SCNC: 3.3 MMOL/L (ref 3.5–5)
RBC # BLD AUTO: 4.17 M/UL (ref 3.5–5.5)
SODIUM SERPL-SCNC: 134 MMOL/L (ref 132–146)
WBC OTHER # BLD: 10.4 K/UL (ref 4.5–11.5)

## 2023-10-01 PROCEDURE — 2060000000 HC ICU INTERMEDIATE R&B

## 2023-10-01 PROCEDURE — 99233 SBSQ HOSP IP/OBS HIGH 50: CPT | Performed by: INTERNAL MEDICINE

## 2023-10-01 PROCEDURE — 6360000002 HC RX W HCPCS: Performed by: NURSE PRACTITIONER

## 2023-10-01 PROCEDURE — 2580000003 HC RX 258: Performed by: NURSE PRACTITIONER

## 2023-10-01 PROCEDURE — 99231 SBSQ HOSP IP/OBS SF/LOW 25: CPT | Performed by: STUDENT IN AN ORGANIZED HEALTH CARE EDUCATION/TRAINING PROGRAM

## 2023-10-01 PROCEDURE — 80048 BASIC METABOLIC PNL TOTAL CA: CPT

## 2023-10-01 PROCEDURE — 83735 ASSAY OF MAGNESIUM: CPT

## 2023-10-01 PROCEDURE — 6370000000 HC RX 637 (ALT 250 FOR IP): Performed by: STUDENT IN AN ORGANIZED HEALTH CARE EDUCATION/TRAINING PROGRAM

## 2023-10-01 PROCEDURE — 36415 COLL VENOUS BLD VENIPUNCTURE: CPT

## 2023-10-01 PROCEDURE — 6360000002 HC RX W HCPCS: Performed by: PHYSICIAN ASSISTANT

## 2023-10-01 PROCEDURE — 6370000000 HC RX 637 (ALT 250 FOR IP): Performed by: NURSE PRACTITIONER

## 2023-10-01 PROCEDURE — 85025 COMPLETE CBC W/AUTO DIFF WBC: CPT

## 2023-10-01 PROCEDURE — 6370000000 HC RX 637 (ALT 250 FOR IP): Performed by: PHYSICIAN ASSISTANT

## 2023-10-01 RX ADMIN — MICONAZOLE NITRATE: 20 POWDER TOPICAL at 21:11

## 2023-10-01 RX ADMIN — SODIUM CHLORIDE, PRESERVATIVE FREE 10 ML: 5 INJECTION INTRAVENOUS at 09:13

## 2023-10-01 RX ADMIN — LEVOTHYROXINE SODIUM 125 MCG: 25 TABLET ORAL at 05:04

## 2023-10-01 RX ADMIN — ACETAMINOPHEN 650 MG: 325 TABLET ORAL at 19:11

## 2023-10-01 RX ADMIN — ENOXAPARIN SODIUM 40 MG: 100 INJECTION SUBCUTANEOUS at 09:12

## 2023-10-01 RX ADMIN — FLUOXETINE HYDROCHLORIDE 20 MG: 20 CAPSULE ORAL at 09:13

## 2023-10-01 RX ADMIN — CETIRIZINE HYDROCHLORIDE 10 MG: 10 TABLET, FILM COATED ORAL at 09:13

## 2023-10-01 RX ADMIN — BUPROPION HYDROCHLORIDE 300 MG: 300 TABLET, EXTENDED RELEASE ORAL at 09:13

## 2023-10-01 RX ADMIN — POTASSIUM BICARBONATE 40 MEQ: 782 TABLET, EFFERVESCENT ORAL at 09:13

## 2023-10-01 RX ADMIN — ASPIRIN 81 MG: 81 TABLET, COATED ORAL at 09:13

## 2023-10-01 RX ADMIN — METOPROLOL TARTRATE 25 MG: 25 TABLET, FILM COATED ORAL at 09:13

## 2023-10-01 RX ADMIN — ACETAMINOPHEN 650 MG: 325 TABLET ORAL at 05:05

## 2023-10-01 RX ADMIN — FUROSEMIDE 40 MG: 10 INJECTION, SOLUTION INTRAMUSCULAR; INTRAVENOUS at 09:13

## 2023-10-01 RX ADMIN — MICONAZOLE NITRATE: 20 POWDER TOPICAL at 09:12

## 2023-10-01 RX ADMIN — METOPROLOL TARTRATE 25 MG: 25 TABLET, FILM COATED ORAL at 21:11

## 2023-10-01 RX ADMIN — ATORVASTATIN CALCIUM 40 MG: 40 TABLET, FILM COATED ORAL at 21:11

## 2023-10-01 RX ADMIN — FLUTICASONE PROPIONATE 2 SPRAY: 50 SPRAY, METERED NASAL at 09:12

## 2023-10-01 RX ADMIN — OXYBUTYNIN CHLORIDE 10 MG: 10 TABLET, EXTENDED RELEASE ORAL at 21:11

## 2023-10-01 RX ADMIN — TRAZODONE HYDROCHLORIDE 100 MG: 50 TABLET ORAL at 21:11

## 2023-10-01 RX ADMIN — POTASSIUM BICARBONATE 40 MEQ: 782 TABLET, EFFERVESCENT ORAL at 17:44

## 2023-10-01 RX ADMIN — SODIUM CHLORIDE, PRESERVATIVE FREE 10 ML: 5 INJECTION INTRAVENOUS at 21:11

## 2023-10-01 RX ADMIN — FUROSEMIDE 40 MG: 10 INJECTION, SOLUTION INTRAMUSCULAR; INTRAVENOUS at 17:44

## 2023-10-01 ASSESSMENT — PAIN SCALES - GENERAL
PAINLEVEL_OUTOF10: 0
PAINLEVEL_OUTOF10: 8
PAINLEVEL_OUTOF10: 0
PAINLEVEL_OUTOF10: 3
PAINLEVEL_OUTOF10: 0
PAINLEVEL_OUTOF10: 0

## 2023-10-01 ASSESSMENT — PAIN - FUNCTIONAL ASSESSMENT: PAIN_FUNCTIONAL_ASSESSMENT: ACTIVITIES ARE NOT PREVENTED

## 2023-10-01 ASSESSMENT — PAIN DESCRIPTION - DESCRIPTORS
DESCRIPTORS: ACHING
DESCRIPTORS: THROBBING

## 2023-10-01 ASSESSMENT — PAIN SCALES - WONG BAKER: WONGBAKER_NUMERICALRESPONSE: 0

## 2023-10-01 ASSESSMENT — PAIN DESCRIPTION - LOCATION
LOCATION: HEAD
LOCATION: GENERALIZED

## 2023-10-01 NOTE — PROGRESS NOTES
79.5* 79.4*   MCH 24.4* 24.6* 24.0*   MCHC 31.0* 31.0* 30.2*   RDW 17.8* 17.7* 17.6*    323 340   MPV 9.3 9.7 9.4     Lab Results   Component Value Date    CKTOTAL 40 09/28/2023    TROPONINI 2.60 (H) 09/17/2020    TROPONINI 1.40 (H) 09/16/2020    TROPONINI 0.20 (H) 09/16/2020     Lab Results   Component Value Date    INR 1.0 06/06/2023    INR 0.9 11/01/2022    PROTIME 10.8 06/06/2023    PROTIME 10.4 11/01/2022     Lab Results   Component Value Date    TSH 0.57 09/05/2023     No results found for: \"LABA1C\"  No results found for: \"EAG\"  Lab Results   Component Value Date    CHOL 171 06/06/2023    CHOL 123 11/01/2022    CHOL 130 04/29/2022     Lab Results   Component Value Date    TRIG 71 06/06/2023    TRIG 42 11/01/2022    TRIG 43 04/29/2022     Lab Results   Component Value Date    HDL 64 06/06/2023    HDL 64 11/01/2022    HDL 65 04/29/2022     Lab Results   Component Value Date    LDLCALC 93 06/06/2023    LDLCALC 51 11/01/2022    LDLCALC 56 04/29/2022     Lab Results   Component Value Date    LABVLDL 14 06/06/2023    LABVLDL 8 11/01/2022    LABVLDL 9 04/29/2022     No results found for: \"CHOLHDLRATIO\"    Cardiac Tests:  Telemetry findings reviewed: SR at rate 60s, no new tachy/bradyarrhythmias overnight     EKG: Normal sinus rhythm with frequent premature atrial complexes, nonspecific T wave changes, abnormal EKG. Labs and vitals were reviewed: As above blood pressure 119/85 heart rate 78 sats 98% on room air    Labs-K-3.3, BUN/creatinine 9/0 point-7, hemoglobin 10, iron 13 ferritin 107 iron sats 90          Cardiac Catheterization 09/17/2020 (Dr. Shante Alarcon)   The left main coronary artery arises normally from the left sinus of Valsalva. It is a good size vessel without any disease. It bifurcates into left anterior descending artery and left circumflex artery. The left anterior descending artery extends down to the apex. It tapers off distally.   It gives off a large diagonal branch that bifurcates into

## 2023-10-01 NOTE — PLAN OF CARE
Problem: Discharge Planning  Goal: Discharge to home or other facility with appropriate resources  10/1/2023 1108 by Rubén Palma RN  Outcome: Progressing  10/1/2023 0035 by Sushant Talley RN  Outcome: Progressing     Problem: Skin/Tissue Integrity  Goal: Absence of new skin breakdown  Description: 1. Monitor for areas of redness and/or skin breakdown  2. Assess vascular access sites hourly  3. Every 4-6 hours minimum:  Change oxygen saturation probe site  4. Every 4-6 hours:  If on nasal continuous positive airway pressure, respiratory therapy assess nares and determine need for appliance change or resting period.   10/1/2023 1108 by Rubén Palma RN  Outcome: Progressing  10/1/2023 0035 by Sushant Talley RN  Outcome: Progressing     Problem: Safety - Adult  Goal: Free from fall injury  10/1/2023 1108 by Rubén Palma RN  Outcome: Progressing  10/1/2023 0035 by Sushant Talley RN  Outcome: Progressing     Problem: ABCDS Injury Assessment  Goal: Absence of physical injury  10/1/2023 1108 by Rubén Palma RN  Outcome: Progressing  10/1/2023 0035 by Sushant Talley RN  Outcome: Progressing     Problem: Chronic Conditions and Co-morbidities  Goal: Patient's chronic conditions and co-morbidity symptoms are monitored and maintained or improved  10/1/2023 1108 by Rubén Palma RN  Outcome: Progressing  10/1/2023 0035 by Sushant Talley RN  Outcome: Progressing     Problem: Pain  Goal: Verbalizes/displays adequate comfort level or baseline comfort level  10/1/2023 1108 by Rubén Palma RN  Outcome: Progressing  10/1/2023 0035 by Sushant Talley RN  Outcome: Progressing

## 2023-10-01 NOTE — PLAN OF CARE
Problem: Discharge Planning  Goal: Discharge to home or other facility with appropriate resources  10/1/2023 0035 by Nadeen Renee RN  Outcome: Progressing  9/30/2023 1106 by Magali Zurita RN  Outcome: Progressing

## 2023-10-02 ENCOUNTER — TELEPHONE (OUTPATIENT)
Dept: PRIMARY CARE CLINIC | Age: 68
End: 2023-10-02

## 2023-10-02 PROBLEM — I50.30 DIASTOLIC CONGESTIVE HEART FAILURE (HCC): Status: ACTIVE | Noted: 2023-09-28

## 2023-10-02 LAB
ANION GAP SERPL CALCULATED.3IONS-SCNC: 8 MMOL/L (ref 7–16)
BASOPHILS # BLD: 0.03 K/UL (ref 0–0.2)
BASOPHILS NFR BLD: 0 % (ref 0–2)
BUN SERPL-MCNC: 10 MG/DL (ref 6–23)
CALCIUM SERPL-MCNC: 8.7 MG/DL (ref 8.6–10.2)
CHLORIDE SERPL-SCNC: 90 MMOL/L (ref 98–107)
CO2 SERPL-SCNC: 36 MMOL/L (ref 22–29)
CREAT SERPL-MCNC: 0.7 MG/DL (ref 0.5–1)
EOSINOPHIL # BLD: 0.17 K/UL (ref 0.05–0.5)
EOSINOPHILS RELATIVE PERCENT: 2 % (ref 0–6)
ERYTHROCYTE [DISTWIDTH] IN BLOOD BY AUTOMATED COUNT: 17.5 % (ref 11.5–15)
GFR SERPL CREATININE-BSD FRML MDRD: >60 ML/MIN/1.73M2
GLUCOSE SERPL-MCNC: 80 MG/DL (ref 74–99)
HCT VFR BLD AUTO: 29.9 % (ref 34–48)
HGB BLD-MCNC: 9 G/DL (ref 11.5–15.5)
IMM GRANULOCYTES # BLD AUTO: 0.07 K/UL (ref 0–0.58)
IMM GRANULOCYTES NFR BLD: 1 % (ref 0–5)
LYMPHOCYTES NFR BLD: 1.39 K/UL (ref 1.5–4)
LYMPHOCYTES RELATIVE PERCENT: 16 % (ref 20–42)
MAGNESIUM SERPL-MCNC: 2.2 MG/DL (ref 1.6–2.6)
MCH RBC QN AUTO: 24.1 PG (ref 26–35)
MCHC RBC AUTO-ENTMCNC: 30.1 G/DL (ref 32–34.5)
MCV RBC AUTO: 79.9 FL (ref 80–99.9)
MONOCYTES NFR BLD: 0.77 K/UL (ref 0.1–0.95)
MONOCYTES NFR BLD: 9 % (ref 2–12)
NEUTROPHILS NFR BLD: 73 % (ref 43–80)
NEUTS SEG NFR BLD: 6.39 K/UL (ref 1.8–7.3)
PLATELET # BLD AUTO: 309 K/UL (ref 130–450)
PMV BLD AUTO: 9.5 FL (ref 7–12)
POTASSIUM SERPL-SCNC: 3.5 MMOL/L (ref 3.5–5)
RBC # BLD AUTO: 3.74 M/UL (ref 3.5–5.5)
SODIUM SERPL-SCNC: 134 MMOL/L (ref 132–146)
WBC OTHER # BLD: 8.8 K/UL (ref 4.5–11.5)

## 2023-10-02 PROCEDURE — 2580000003 HC RX 258: Performed by: NURSE PRACTITIONER

## 2023-10-02 PROCEDURE — 6370000000 HC RX 637 (ALT 250 FOR IP): Performed by: STUDENT IN AN ORGANIZED HEALTH CARE EDUCATION/TRAINING PROGRAM

## 2023-10-02 PROCEDURE — 83735 ASSAY OF MAGNESIUM: CPT

## 2023-10-02 PROCEDURE — 99233 SBSQ HOSP IP/OBS HIGH 50: CPT | Performed by: INTERNAL MEDICINE

## 2023-10-02 PROCEDURE — 6370000000 HC RX 637 (ALT 250 FOR IP): Performed by: NURSE PRACTITIONER

## 2023-10-02 PROCEDURE — 6370000000 HC RX 637 (ALT 250 FOR IP): Performed by: PHYSICIAN ASSISTANT

## 2023-10-02 PROCEDURE — 80048 BASIC METABOLIC PNL TOTAL CA: CPT

## 2023-10-02 PROCEDURE — 6360000002 HC RX W HCPCS: Performed by: PHYSICIAN ASSISTANT

## 2023-10-02 PROCEDURE — 36415 COLL VENOUS BLD VENIPUNCTURE: CPT

## 2023-10-02 PROCEDURE — 99232 SBSQ HOSP IP/OBS MODERATE 35: CPT | Performed by: STUDENT IN AN ORGANIZED HEALTH CARE EDUCATION/TRAINING PROGRAM

## 2023-10-02 PROCEDURE — 6360000002 HC RX W HCPCS: Performed by: NURSE PRACTITIONER

## 2023-10-02 PROCEDURE — 2060000000 HC ICU INTERMEDIATE R&B

## 2023-10-02 PROCEDURE — 85025 COMPLETE CBC W/AUTO DIFF WBC: CPT

## 2023-10-02 RX ORDER — FERROUS SULFATE 325(65) MG
325 TABLET ORAL
Status: DISCONTINUED | OUTPATIENT
Start: 2023-10-02 | End: 2023-10-04 | Stop reason: HOSPADM

## 2023-10-02 RX ADMIN — FERROUS SULFATE TAB 325 MG (65 MG ELEMENTAL FE) 325 MG: 325 (65 FE) TAB at 14:17

## 2023-10-02 RX ADMIN — FUROSEMIDE 40 MG: 10 INJECTION, SOLUTION INTRAMUSCULAR; INTRAVENOUS at 08:35

## 2023-10-02 RX ADMIN — FLUTICASONE PROPIONATE 2 SPRAY: 50 SPRAY, METERED NASAL at 08:34

## 2023-10-02 RX ADMIN — FLUOXETINE HYDROCHLORIDE 20 MG: 20 CAPSULE ORAL at 08:35

## 2023-10-02 RX ADMIN — METOPROLOL TARTRATE 25 MG: 25 TABLET, FILM COATED ORAL at 08:34

## 2023-10-02 RX ADMIN — OXYBUTYNIN CHLORIDE 10 MG: 10 TABLET, EXTENDED RELEASE ORAL at 20:52

## 2023-10-02 RX ADMIN — BUPROPION HYDROCHLORIDE 300 MG: 300 TABLET, EXTENDED RELEASE ORAL at 08:34

## 2023-10-02 RX ADMIN — POTASSIUM BICARBONATE 40 MEQ: 782 TABLET, EFFERVESCENT ORAL at 08:34

## 2023-10-02 RX ADMIN — ENOXAPARIN SODIUM 40 MG: 100 INJECTION SUBCUTANEOUS at 08:34

## 2023-10-02 RX ADMIN — MICONAZOLE NITRATE: 20 POWDER TOPICAL at 20:54

## 2023-10-02 RX ADMIN — ATORVASTATIN CALCIUM 40 MG: 40 TABLET, FILM COATED ORAL at 20:52

## 2023-10-02 RX ADMIN — TRAZODONE HYDROCHLORIDE 100 MG: 50 TABLET ORAL at 20:52

## 2023-10-02 RX ADMIN — ASPIRIN 81 MG: 81 TABLET, COATED ORAL at 08:34

## 2023-10-02 RX ADMIN — MICONAZOLE NITRATE: 20 POWDER TOPICAL at 08:34

## 2023-10-02 RX ADMIN — ACETAMINOPHEN 650 MG: 325 TABLET ORAL at 10:01

## 2023-10-02 RX ADMIN — SODIUM CHLORIDE, PRESERVATIVE FREE 10 ML: 5 INJECTION INTRAVENOUS at 08:36

## 2023-10-02 RX ADMIN — CETIRIZINE HYDROCHLORIDE 10 MG: 10 TABLET, FILM COATED ORAL at 08:34

## 2023-10-02 RX ADMIN — FUROSEMIDE 40 MG: 10 INJECTION, SOLUTION INTRAMUSCULAR; INTRAVENOUS at 18:18

## 2023-10-02 RX ADMIN — SODIUM CHLORIDE, PRESERVATIVE FREE 10 ML: 5 INJECTION INTRAVENOUS at 20:54

## 2023-10-02 RX ADMIN — LEVOTHYROXINE SODIUM 125 MCG: 25 TABLET ORAL at 05:18

## 2023-10-02 ASSESSMENT — PAIN SCALES - WONG BAKER
WONGBAKER_NUMERICALRESPONSE: 0

## 2023-10-02 ASSESSMENT — PAIN DESCRIPTION - DESCRIPTORS: DESCRIPTORS: ACHING;DULL;DISCOMFORT

## 2023-10-02 ASSESSMENT — PAIN SCALES - GENERAL
PAINLEVEL_OUTOF10: 1
PAINLEVEL_OUTOF10: 0

## 2023-10-02 ASSESSMENT — PAIN DESCRIPTION - LOCATION: LOCATION: ABDOMEN;ARM

## 2023-10-02 NOTE — CARE COORDINATION
Social work / Discharge Planning:          AM [facility] 14/24 with patient walking 75 feet. She denies the need for SHUBHAM and also declined home care due to having two large dogs at home. Per IDR, currently on IV Lasix. Room air.     Electronically signed by DEBBY Quinteros on 10/2/2023 at 10:14 AM

## 2023-10-02 NOTE — PROGRESS NOTES
Patient in bed, incontinent of urine d/t placement of pleurx moved. Patient assisted to side of bed, this RN offered patient assitance, she states \"I have to do this by myself because  no one is going to help me at home\". Patient was able to get herself to edge of bed, wet pull up removed, ambulated to bathroom, bathed herself. Gown changed. Ambulated to chair. Tubi  placed on patient. Comfort measures offered, call light in reach. Safety maintained.

## 2023-10-02 NOTE — PROGRESS NOTES
Occupational Therapy  Attempted therapy this AM.  Pt sitting in the chair. States that she just completed all of her self care and just sat down. Also had a list of complaints. Nursing notified of pt complaints that she would like addressed prior to participating in therapy. Will attempt at a later time as schedule permits.    Hiram STRANGE

## 2023-10-02 NOTE — TELEPHONE ENCOUNTER
Pt called and is actively in the hospital, had some questions to ask you over a phone call. Please call pt at earliest convenience.

## 2023-10-03 LAB
ANION GAP SERPL CALCULATED.3IONS-SCNC: 9 MMOL/L (ref 7–16)
BUN SERPL-MCNC: 12 MG/DL (ref 6–23)
CALCIUM SERPL-MCNC: 9 MG/DL (ref 8.6–10.2)
CHLORIDE SERPL-SCNC: 88 MMOL/L (ref 98–107)
CO2 SERPL-SCNC: 36 MMOL/L (ref 22–29)
CREAT SERPL-MCNC: 0.9 MG/DL (ref 0.5–1)
GFR SERPL CREATININE-BSD FRML MDRD: >60 ML/MIN/1.73M2
GLUCOSE SERPL-MCNC: 95 MG/DL (ref 74–99)
POTASSIUM SERPL-SCNC: 3.6 MMOL/L (ref 3.5–5)
SODIUM SERPL-SCNC: 133 MMOL/L (ref 132–146)

## 2023-10-03 PROCEDURE — 99233 SBSQ HOSP IP/OBS HIGH 50: CPT | Performed by: INTERNAL MEDICINE

## 2023-10-03 PROCEDURE — 2060000000 HC ICU INTERMEDIATE R&B

## 2023-10-03 PROCEDURE — 36415 COLL VENOUS BLD VENIPUNCTURE: CPT

## 2023-10-03 PROCEDURE — 80048 BASIC METABOLIC PNL TOTAL CA: CPT

## 2023-10-03 PROCEDURE — 6370000000 HC RX 637 (ALT 250 FOR IP): Performed by: STUDENT IN AN ORGANIZED HEALTH CARE EDUCATION/TRAINING PROGRAM

## 2023-10-03 PROCEDURE — 6370000000 HC RX 637 (ALT 250 FOR IP): Performed by: NURSE PRACTITIONER

## 2023-10-03 PROCEDURE — 2580000003 HC RX 258: Performed by: NURSE PRACTITIONER

## 2023-10-03 PROCEDURE — 6360000002 HC RX W HCPCS: Performed by: NURSE PRACTITIONER

## 2023-10-03 PROCEDURE — 99231 SBSQ HOSP IP/OBS SF/LOW 25: CPT | Performed by: STUDENT IN AN ORGANIZED HEALTH CARE EDUCATION/TRAINING PROGRAM

## 2023-10-03 PROCEDURE — 6370000000 HC RX 637 (ALT 250 FOR IP): Performed by: PHYSICIAN ASSISTANT

## 2023-10-03 PROCEDURE — 6360000002 HC RX W HCPCS: Performed by: PHYSICIAN ASSISTANT

## 2023-10-03 RX ORDER — FERROUS SULFATE 325(65) MG
325 TABLET ORAL
Qty: 30 TABLET | Refills: 3 | Status: SHIPPED | OUTPATIENT
Start: 2023-10-04

## 2023-10-03 RX ORDER — ATORVASTATIN CALCIUM 40 MG/1
40 TABLET, FILM COATED ORAL NIGHTLY
Qty: 30 TABLET | Refills: 3 | Status: SHIPPED | OUTPATIENT
Start: 2023-10-03

## 2023-10-03 RX ADMIN — METOPROLOL TARTRATE 25 MG: 25 TABLET, FILM COATED ORAL at 08:08

## 2023-10-03 RX ADMIN — ATORVASTATIN CALCIUM 40 MG: 40 TABLET, FILM COATED ORAL at 20:06

## 2023-10-03 RX ADMIN — FUROSEMIDE 40 MG: 10 INJECTION, SOLUTION INTRAMUSCULAR; INTRAVENOUS at 16:50

## 2023-10-03 RX ADMIN — ACETAMINOPHEN 650 MG: 325 TABLET ORAL at 03:54

## 2023-10-03 RX ADMIN — SODIUM CHLORIDE, PRESERVATIVE FREE 10 ML: 5 INJECTION INTRAVENOUS at 08:08

## 2023-10-03 RX ADMIN — FLUOXETINE HYDROCHLORIDE 20 MG: 20 CAPSULE ORAL at 08:08

## 2023-10-03 RX ADMIN — FLUTICASONE PROPIONATE 2 SPRAY: 50 SPRAY, METERED NASAL at 08:09

## 2023-10-03 RX ADMIN — TRAZODONE HYDROCHLORIDE 100 MG: 50 TABLET ORAL at 20:06

## 2023-10-03 RX ADMIN — POTASSIUM BICARBONATE 40 MEQ: 782 TABLET, EFFERVESCENT ORAL at 08:08

## 2023-10-03 RX ADMIN — POTASSIUM BICARBONATE 40 MEQ: 782 TABLET, EFFERVESCENT ORAL at 16:50

## 2023-10-03 RX ADMIN — FERROUS SULFATE TAB 325 MG (65 MG ELEMENTAL FE) 325 MG: 325 (65 FE) TAB at 08:08

## 2023-10-03 RX ADMIN — ENOXAPARIN SODIUM 40 MG: 100 INJECTION SUBCUTANEOUS at 08:11

## 2023-10-03 RX ADMIN — BUPROPION HYDROCHLORIDE 300 MG: 300 TABLET, EXTENDED RELEASE ORAL at 08:08

## 2023-10-03 RX ADMIN — LEVOTHYROXINE SODIUM 125 MCG: 25 TABLET ORAL at 06:13

## 2023-10-03 RX ADMIN — MICONAZOLE NITRATE: 20 POWDER TOPICAL at 08:07

## 2023-10-03 RX ADMIN — ASPIRIN 81 MG: 81 TABLET, COATED ORAL at 08:08

## 2023-10-03 RX ADMIN — FUROSEMIDE 40 MG: 10 INJECTION, SOLUTION INTRAMUSCULAR; INTRAVENOUS at 08:10

## 2023-10-03 RX ADMIN — OXYBUTYNIN CHLORIDE 10 MG: 10 TABLET, EXTENDED RELEASE ORAL at 20:06

## 2023-10-03 RX ADMIN — METOPROLOL TARTRATE 25 MG: 25 TABLET, FILM COATED ORAL at 20:06

## 2023-10-03 RX ADMIN — CETIRIZINE HYDROCHLORIDE 10 MG: 10 TABLET, FILM COATED ORAL at 08:08

## 2023-10-03 RX ADMIN — SODIUM CHLORIDE, PRESERVATIVE FREE 10 ML: 5 INJECTION INTRAVENOUS at 20:08

## 2023-10-03 RX ADMIN — MICONAZOLE NITRATE: 20 POWDER TOPICAL at 20:08

## 2023-10-03 ASSESSMENT — PAIN DESCRIPTION - DESCRIPTORS
DESCRIPTORS: PRESSURE;ACHING;DISCOMFORT
DESCRIPTORS: ACHING

## 2023-10-03 ASSESSMENT — PAIN SCALES - GENERAL
PAINLEVEL_OUTOF10: 3
PAINLEVEL_OUTOF10: 7

## 2023-10-03 ASSESSMENT — PAIN DESCRIPTION - ORIENTATION: ORIENTATION: RIGHT;LEFT

## 2023-10-03 ASSESSMENT — PAIN DESCRIPTION - LOCATION
LOCATION: HEAD
LOCATION: HEAD

## 2023-10-03 NOTE — DISCHARGE INSTRUCTIONS
HEART FAILURE  / CONGESTIVE HEART FAILURE  DISCHARGE INSTRUCTIONS:  GUIDELINES TO FOLLOW AT HOME    Self- Managed Care:     MEDICATIONS:  Take your medication as directed. If you are experiencing any side effects, inform your doctor, Do not stop taking any of your medications without letting your doctor know. Check with your doctor before taking any over-the-counter medications / herbal / or dietary supplements. They may interfere with your other medications. Do not take ibuprofen (Advil or Motrin) and naproxen (Aleve) without talking to your doctor first. They could make your heart failure worse. WEIGHT MONITORING:   Weigh yourself everyday (with the same scale) around the same time of the day and write it down. (you can chart them on a calendar or keep track of them on paper. Notify your doctor of a weight gain of 3 pounds or more in 1 day   OR a total of 5 pounds or more in 1 week    Take your weight record to your doctor visits  Also, the same goes if you loose more than 3# in one day, let your heart doctor know. DIET:   Cardiac heart healthy diet- Low saturated / low trans fat, no added salt, caffeine restricted, Low sodium diet-   No more than 2,000mg (2 grams) of salt / sodium per day (which equals to a little less than  a teaspoon of salt)  If your doctor wants you on a fluid restriction. ..it is usually recommended a fluid limit of 2,000cc -  Fluid restriction- 2,000 ml (milliliters) = 64 ounces = you can have 8 glasses of fluid per day (each glass 8 ounces)    Follow a low salt diet - avoid using salt at the table, avoid / limit use of canned soups, processed / packaged foods, salted snacks, olives and pickles. Do not use a salt substitute without checking with your doctor, they may contain a high amount of potassioum. (Mrs. Alethea Younger is safe to use).     Limit the use of alcohol       CALL YOUR DOCTOR THE FIRST DAY YOU NOTICE ANY OF THESE   SYMPTOMS:  You have a

## 2023-10-03 NOTE — PROGRESS NOTES
Tubi  applied to BLE, Pt ambulated down hallway to scales with front wheeled walker, tolerated well. weight 199 pounds

## 2023-10-03 NOTE — PROGRESS NOTES
Occupational Therapy  Patient treatment attempted this PM.  Patient out of the room walking independently in the hallway. Will attempt at a later time.   Kevin Bates ALLEN/L [id number]

## 2023-10-03 NOTE — PLAN OF CARE
Problem: Discharge Planning  Goal: Discharge to home or other facility with appropriate resources  Outcome: Progressing  Flowsheets (Taken 10/2/2023 2127)  Discharge to home or other facility with appropriate resources:   Identify barriers to discharge with patient and caregiver   Arrange for needed discharge resources and transportation as appropriate   Identify discharge learning needs (meds, wound care, etc)     Problem: Skin/Tissue Integrity  Goal: Absence of new skin breakdown  Description: 1. Monitor for areas of redness and/or skin breakdown  2. Assess vascular access sites hourly  3. Every 4-6 hours minimum:  Change oxygen saturation probe site  4. Every 4-6 hours:  If on nasal continuous positive airway pressure, respiratory therapy assess nares and determine need for appliance change or resting period.   Outcome: Progressing     Problem: Safety - Adult  Goal: Free from fall injury  Outcome: Progressing     Problem: ABCDS Injury Assessment  Goal: Absence of physical injury  Outcome: Progressing     Problem: Chronic Conditions and Co-morbidities  Goal: Patient's chronic conditions and co-morbidity symptoms are monitored and maintained or improved  Outcome: Progressing  Flowsheets (Taken 10/2/2023 2127)  Care Plan - Patient's Chronic Conditions and Co-Morbidity Symptoms are Monitored and Maintained or Improved:   Monitor and assess patient's chronic conditions and comorbid symptoms for stability, deterioration, or improvement   Collaborate with multidisciplinary team to address chronic and comorbid conditions and prevent exacerbation or deterioration     Problem: Pain  Goal: Verbalizes/displays adequate comfort level or baseline comfort level  Outcome: Progressing  Flowsheets  Taken 10/2/2023 1530 by Júnior Marino RN  Verbalizes/displays adequate comfort level or baseline comfort level:   Encourage patient to monitor pain and request assistance   Assess pain using appropriate pain scale  Taken 10/2/2023

## 2023-10-03 NOTE — CARE COORDINATION
Social work / Discharge Planning:           Social work observed patient walking long distances in the hallway with nursing. She declines home care due to having 2 large dogs at home. CHF coordinator met with patient.     Electronically signed by DEBBY Soto on 10/3/2023 at 11:26 AM

## 2023-10-03 NOTE — PLAN OF CARE
Problem: Discharge Planning  Goal: Discharge to home or other facility with appropriate resources  Outcome: Progressing  Flowsheets  Taken 10/3/2023 1600 by Kirill Ríos RN  Discharge to home or other facility with appropriate resources: Identify barriers to discharge with patient and caregiver  Taken 10/3/2023 0817 by Josse Zabala RN  Discharge to home or other facility with appropriate resources:   Identify barriers to discharge with patient and caregiver   Identify discharge learning needs (meds, wound care, etc)     Problem: Skin/Tissue Integrity  Goal: Absence of new skin breakdown  Description: 1. Monitor for areas of redness and/or skin breakdown  2. Assess vascular access sites hourly  3. Every 4-6 hours minimum:  Change oxygen saturation probe site  4. Every 4-6 hours:  If on nasal continuous positive airway pressure, respiratory therapy assess nares and determine need for appliance change or resting period.   Outcome: Progressing     Problem: Safety - Adult  Goal: Free from fall injury  10/3/2023 1849 by Kirill Ríos RN  Outcome: Progressing  10/3/2023 1153 by Josse Zabala RN  Outcome: Progressing     Problem: ABCDS Injury Assessment  Goal: Absence of physical injury  10/3/2023 1849 by Kirill Ríos RN  Outcome: Progressing  10/3/2023 1153 by Josse Zabala RN  Outcome: Progressing     Problem: Chronic Conditions and Co-morbidities  Goal: Patient's chronic conditions and co-morbidity symptoms are monitored and maintained or improved  Outcome: Progressing  Flowsheets  Taken 10/3/2023 1600 by Kirill Ríos [facility] - Patient's Chronic Conditions and Co-Morbidity Symptoms are Monitored and Maintained or Improved: Monitor and assess patient's chronic conditions and comorbid symptoms for stability, deterioration, or improvement  Taken 10/3/2023 0817 by Josse Zabala RN  Care Plan - Patient's Chronic Conditions and Co-Morbidity Symptoms are Monitored and Maintained or Improved: Monitor and assess

## 2023-10-03 NOTE — PROGRESS NOTES
INPATIENT CARDIOLOGY FOLLOW-UP    Name: Stanley Bowden    Age: 79 y.o. Date of Admission: 9/28/2023  8:47 PM    Date of Service: 10/2/2023    Chief Complaint: Follow-up for AF and CHF    Interim History:  No significant event overnight    Review of Systems:   Cardiac: As per HPI  General: No fever, chills  Pulmonary: As per HPI  HEENT: No visual disturbances, difficult swallowing  GI: No nausea, vomiting  Endocrine: No thyroid disease or DM  Musculoskeletal: REBOLLAR x 4, no focal motor deficits  Skin: Intact, no rashes  Neuro/Psych: No headache or seizures    Problem List:  Patient Active Problem List   Diagnosis    Hypothyroidism    Graves disease    Depression    GERD (gastroesophageal reflux disease)    Non-ST elevation MI (NSTEMI) ([address])    Morbid obesity ([address])    Coronary artery disease involving native coronary artery of native heart without angina pectoris    LITO (generalized anxiety disorder)    Osteoarthritis of left knee    Bilateral primary osteoarthritis of knee    Osteoarthritis of right knee    S/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, right    Abdominal pain    Acute cholecystitis    Elevated LFTs    Hyperkalemia    Choledocholithiasis    Atrial fibrillation with RVR ([facility])    Nonrheumatic aortic valve stenosis    Primary hypertension    Cholecystitis    Biliary obstruction    Bilateral leg edema    Fall    Weakness    PAF (paroxysmal atrial fibrillation) ([facility])    Hypokalemia    Acute diastolic CHF (congestive heart failure) ([facility])    Syncope       Allergies:   Allergies   Allergen Reactions    Meloxicam Itching and Other (See Comments)       Current Medications:  Current Facility-Administered Medications   Medication Dose Route Frequency Provider Last Rate Last Admin    ferrous sulfate (IRON 325) tablet 325 mg  325 mg Oral Daily with breakfast Gunnar Calle MD   325 mg at 10/02/23 1417    miconazole (MICOTIN) 2 % powder   Topical BID Gunnar Calle MD   Given at 10/02/23 2054    furosemide (LASIX) injection 40 mg  40 mg [facility]VENous [facility]ID [facility]   40 mg at 10/02/23 1818    atorvastatin (LIPITOR) tablet 40 mg  40 mg Oral Nightly [facility], PA-C   40 mg at 10/02/23 2052    potassium bicarb-citric acid (EFFER-K) effervescent tablet 40 mEq  40 mEq Oral BID  Adan Braxton MD   40 mEq at 10/02/23 0834    sodium chloride flush 0.9 % injection 5-40 mL  5-40 mL IntraVENous 2 times per day Mabeline Delude, APRN - CNP   10 mL at 10/02/23 2054    sodium chloride flush 0.9 % injection 5-40 mL  5-40 mL IntraVENous PRN Mabeline Delude, APRN - CNP        0.9 % sodium chloride infusion   IntraVENous PRN Mabeline Delude, APRN - CNP        enoxaparin (LOVENOX) injection 40 mg  40 mg SubCUTAneous Daily Mabeline Delude, APRN - CNP   40 mg at 10/02/23 0834    polyethylene glycol (GLYCOLAX) packet 17 g  17 g Oral Daily PRN Mabeline Delude, APRN - CNP        acetaminophen (TYLENOL) tablet 650 mg  650 mg Oral Q6H PRN Mabeline Delude, APRN - CNP   650 mg at 10/02/23 1001    Or    acetaminophen (TYLENOL) suppository 650 mg  650 mg Rectal Q6H PRN Mabeline Delude, APRN - CNP        aspirin EC tablet 81 mg  81 mg Oral Daily Mabeline Delude, APRN - CNP   81 mg at 10/02/23 0834    buPROPion (WELLBUTRIN XL) extended release tablet 300 mg  300 mg Oral Daily Mabeline Delude, APRN - CNP   300 mg at 10/02/23 0834    FLUoxetine (PROZAC) capsule 20 mg  20 mg Oral Daily Mabeline Delude, APRN - CNP   20 mg at 10/02/23 0835    fluticasone (FLONASE) 50 MCG/ACT nasal spray 2 spray  2 spray Each Nostril Daily Mabeline Delude, APRN - CNP   2 spray at 10/02/23 0834    levothyroxine (SYNTHROID) tablet 125 mcg  125 mcg Oral Daily Mabeline Delude, APRN - CNP   125 mcg at 10/02/23 0518    cetirizine (ZYRTEC) tablet 10 mg  10 mg Oral Daily Mabeline Delude, APRN - CNP   10 mg at 10/02/23 0834    metoprolol tartrate (LOPRESSOR) tablet 25 mg  25 mg Oral BID Margarita Prieto APRN - CNP   25 mg at 10/02/23 0834    oxybutynin

## 2023-10-03 NOTE — PROGRESS NOTES
INPATIENT CARDIOLOGY FOLLOW-UP    Name: Norma Beavers    Age: 79 y.o. Date of Admission: 9/28/2023  8:47 PM    Date of Service: 10/3/2023    Chief Complaint: Follow-up for AF and CHF    Interim History:  No significant event overnight. Holding IV diuresis given significant hypochloremia  Repeat BMP in a.m. to monitor. Resume p.o. home dose Lasix tomorrow  Cardiology will see as needed. Please call with questions. Review of Systems:   Cardiac: As per HPI  General: No fever, chills  Pulmonary: As per HPI  HEENT: No visual disturbances, difficult swallowing  GI: No nausea, vomiting  Endocrine: No thyroid disease or DM  Musculoskeletal: REBOLLAR x 4, no focal motor deficits  Skin: Intact, no rashes  Neuro/Psych: No headache or seizures    Problem List:  Patient Active Problem List   Diagnosis    Hypothyroidism    Graves disease    Depression    GERD (gastroesophageal reflux disease)    Non-ST elevation MI (NSTEMI) ([address])    Morbid obesity ([address])    Coronary artery disease involving native coronary artery of native heart without angina pectoris    LTIO (generalized anxiety disorder)    Osteoarthritis of left knee    Bilateral primary osteoarthritis of knee    Osteoarthritis of right knee    S/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, right    Abdominal pain    Acute cholecystitis    Elevated LFTs    Hyperkalemia    Choledocholithiasis    Atrial fibrillation with RVR ([facility])    Nonrheumatic aortic valve stenosis    Primary hypertension    Cholecystitis    Biliary obstruction    Bilateral leg edema    Fall    Weakness    PAF (paroxysmal atrial fibrillation) ([facility])    Hypokalemia    Diastolic congestive heart failure ([facility])    Syncope       Allergies:   Allergies   Allergen Reactions    Meloxicam Itching and Other (See Comments)       Current Medications:  Current Facility-Administered Medications   Medication Dose Route Frequency Provider Last Rate Last Admin    ferrous sulfate (IRON 325) tablet 325 mg  325 mg Oral anterior descending artery extends down to the apex. It tapers off distally. It gives off a large diagonal branch that bifurcates into two OM branches. The left anterior descending artery and its diagonal branches, it gives off actually three diagonal branches, the first one is mid size, the second one is larger that bifurcates after it just takeoff to mid side branches and the third is mid size, the LAD and its branches have really no significant disease. The left circumflex artery is a large vessel that gives off four OM branches. The first two are small ones, the fourth one is large that is totally occluded. The right coronary artery arises normally from the right sinus of Valsalva. It is a large vessel dominant circulation without any CAD. IMPRESSION: Totally occluded fourth OM branch with successful deployment of 2.0 x 22 mm [place] Resolute drug-eluting stent with 0% residual stenosis and RITA-3 flow distally, (pre-PCI RITA-0 flow, post-PCI RITA-3 flow). No CAD noted in the rest of the coronary arteries. TTE 10/01/2020 (Dr. Pierre Camacho)  Normal left ventricle size and systolic function. Ejection fraction is visually estimated at 55-60%. Mild concentric left ventricular hypertrophy. No regional wall motion abnormalities seen. Indeterminate diastolic function. The left atrium is mildly dilated. Normal right ventricular size and function. TAPSE 19 mm. No systolic mitral regurgitation noted. There is mild-to-moderate aortic stenosis with valve area of 1.4 sq cm. Aortic valve peak velocity 2.3 m/s and mean gradient 10 mmHg. Physiologic and/or trace tricuspid regurgitation. RVSP is 25 mmHg. Normal estimated PA systolic pressure. No evidence for hemodynamically significant pericardial effusion. No previous echo for comparison     Echocardiogram: 9/6/23 (Dr. Brandee Kelly)   Ejection fraction is visually estimated at 50-55%. Normal right ventricular size and function (TAPSE 2.1 cm). Indeterminate diastolic function.

## 2023-10-04 VITALS
OXYGEN SATURATION: 95 % | RESPIRATION RATE: 18 BRPM | SYSTOLIC BLOOD PRESSURE: 96 MMHG | HEART RATE: 70 BPM | DIASTOLIC BLOOD PRESSURE: 60 MMHG | BODY MASS INDEX: 37.79 KG/M2 | TEMPERATURE: 98.1 F | WEIGHT: 205.38 LBS | HEIGHT: 62 IN

## 2023-10-04 LAB
ANION GAP SERPL CALCULATED.3IONS-SCNC: 12 MMOL/L (ref 7–16)
BUN SERPL-MCNC: 12 MG/DL (ref 6–23)
CALCIUM SERPL-MCNC: 9.3 MG/DL (ref 8.6–10.2)
CHLORIDE SERPL-SCNC: 89 MMOL/L (ref 98–107)
CO2 SERPL-SCNC: 34 MMOL/L (ref 22–29)
CREAT SERPL-MCNC: 0.8 MG/DL (ref 0.5–1)
GFR SERPL CREATININE-BSD FRML MDRD: >60 ML/MIN/1.73M2
GLUCOSE SERPL-MCNC: 105 MG/DL (ref 74–99)
POTASSIUM SERPL-SCNC: 3.5 MMOL/L (ref 3.5–5)
SODIUM SERPL-SCNC: 135 MMOL/L (ref 132–146)

## 2023-10-04 PROCEDURE — 2580000003 HC RX 258: Performed by: NURSE PRACTITIONER

## 2023-10-04 PROCEDURE — 6370000000 HC RX 637 (ALT 250 FOR IP): Performed by: NURSE PRACTITIONER

## 2023-10-04 PROCEDURE — 6370000000 HC RX 637 (ALT 250 FOR IP): Performed by: STUDENT IN AN ORGANIZED HEALTH CARE EDUCATION/TRAINING PROGRAM

## 2023-10-04 PROCEDURE — 99239 HOSP IP/OBS DSCHRG MGMT >30: CPT | Performed by: STUDENT IN AN ORGANIZED HEALTH CARE EDUCATION/TRAINING PROGRAM

## 2023-10-04 PROCEDURE — 99233 SBSQ HOSP IP/OBS HIGH 50: CPT | Performed by: INTERNAL MEDICINE

## 2023-10-04 PROCEDURE — 2500000003 HC RX 250 WO HCPCS: Performed by: STUDENT IN AN ORGANIZED HEALTH CARE EDUCATION/TRAINING PROGRAM

## 2023-10-04 PROCEDURE — 80048 BASIC METABOLIC PNL TOTAL CA: CPT

## 2023-10-04 PROCEDURE — 6360000002 HC RX W HCPCS: Performed by: NURSE PRACTITIONER

## 2023-10-04 RX ORDER — FUROSEMIDE 20 MG/1
20 TABLET ORAL DAILY PRN
Qty: 30 TABLET | Refills: 1 | Status: SHIPPED | OUTPATIENT
Start: 2023-10-04

## 2023-10-04 RX ADMIN — CETIRIZINE HYDROCHLORIDE 10 MG: 10 TABLET, FILM COATED ORAL at 09:40

## 2023-10-04 RX ADMIN — ASPIRIN 81 MG: 81 TABLET, COATED ORAL at 09:40

## 2023-10-04 RX ADMIN — ENOXAPARIN SODIUM 40 MG: 100 INJECTION SUBCUTANEOUS at 09:41

## 2023-10-04 RX ADMIN — METOPROLOL TARTRATE 25 MG: 25 TABLET, FILM COATED ORAL at 09:40

## 2023-10-04 RX ADMIN — MICONAZOLE NITRATE: 20 POWDER TOPICAL at 09:40

## 2023-10-04 RX ADMIN — POTASSIUM BICARBONATE 40 MEQ: 782 TABLET, EFFERVESCENT ORAL at 09:41

## 2023-10-04 RX ADMIN — FLUTICASONE PROPIONATE 2 SPRAY: 50 SPRAY, METERED NASAL at 09:41

## 2023-10-04 RX ADMIN — BUPROPION HYDROCHLORIDE 300 MG: 300 TABLET, EXTENDED RELEASE ORAL at 09:40

## 2023-10-04 RX ADMIN — LEVOTHYROXINE SODIUM 125 MCG: 25 TABLET ORAL at 06:02

## 2023-10-04 RX ADMIN — FERROUS SULFATE TAB 325 MG (65 MG ELEMENTAL FE) 325 MG: 325 (65 FE) TAB at 09:40

## 2023-10-04 RX ADMIN — FLUOXETINE HYDROCHLORIDE 20 MG: 20 CAPSULE ORAL at 09:40

## 2023-10-04 RX ADMIN — SODIUM CHLORIDE, PRESERVATIVE FREE 10 ML: 5 INJECTION INTRAVENOUS at 09:42

## 2023-10-04 NOTE — CARE COORDINATION
Social work / Discharge planning:          Discharge plan is home. Patient ambulating independently. She declines home care. Discharge is anticipated today.    Electronically signed by DEBBY Dale on 10/4/2023 at 10:19 AM

## 2023-10-04 NOTE — CONSULTS
Nutrition Note    Met with pt per her request to see a dietitian. Pt upset with current diet as she does not like OLY and all she can \"add is pepper\". Pt also reporting she is unable to eat the food here d/t no flavor however, nursing documenting % meal intakes. Explained limiting salt d/t CHF to help with fluid retention, pt not receptive at this time. Note pt seen by CHF educator 10/3. Pt with many other non-nutrition related complaints nursing addressed while in pt room.      Electronically signed by Nitesh Paiz RD, LD on 10/4/23 at 11:10 AM EDT  Contact: [phone]

## 2023-10-04 NOTE — PLAN OF CARE
Problem: Discharge Planning  Goal: Discharge to home or other facility with appropriate resources  10/3/2023 2103 by Kristine Heck RN  Outcome: Progressing  Flowsheets (Taken 10/3/2023 2000)  Discharge to home or other facility with appropriate resources:   Identify barriers to discharge with patient and caregiver   Arrange for needed discharge resources and transportation as appropriate   Identify discharge learning needs (meds, wound care, etc)     Problem: Skin/Tissue Integrity  Goal: Absence of new skin breakdown  Description: 1. Monitor for areas of redness and/or skin breakdown  2. Assess vascular access sites hourly  3. Every 4-6 hours minimum:  Change oxygen saturation probe site  4. Every 4-6 hours:  If on nasal continuous positive airway pressure, respiratory therapy assess nares and determine need for appliance change or resting period.   10/3/2023 2103 by Kristine Heck RN  Outcome: Progressing     Problem: Safety - Adult  Goal: Free from fall injury  10/3/2023 2103 by Kristine Heck RN  Outcome: Progressing     Problem: ABCDS Injury Assessment  Goal: Absence of physical injury  10/3/2023 2103 by Kristine Heck RN  Outcome: Progressing     Problem: Chronic Conditions and Co-morbidities  Goal: Patient's chronic conditions and co-morbidity symptoms are monitored and maintained or improved  10/3/2023 2103 by Kristine Heck RN  Outcome: Progressing  Flowsheets (Taken 10/3/2023 2000)  Care Plan - Patient's Chronic Conditions and Co-Morbidity Symptoms are Monitored and Maintained or Improved:   Monitor and assess patient's chronic conditions and comorbid symptoms for stability, deterioration, or improvement   Collaborate with multidisciplinary team to address chronic and comorbid conditions and prevent exacerbation or deterioration     Problem: Pain  Goal: Verbalizes/displays adequate comfort level or baseline comfort level  10/3/2023 2103 by Kristine Heck RN  Outcome: Progressing

## 2023-10-04 NOTE — DISCHARGE SUMMARY
[facility] Physician Discharge Summary       No follow-up provider specified. Activity level: As tolerated    Dispo: Home      Condition on discharge: Stable    Patient ID:  Lorena Rodriguez  [id number]  50 y.o.  1955    Admit date: 9/28/2023    Discharge date and time:  10/4/2023  2:46 PM    Admission Diagnoses: Principal Problem:    Bilateral leg edema  Active Problems:    Hypothyroidism    Fall    Weakness    PAF (paroxysmal atrial fibrillation) (HCC)    Hypokalemia    Diastolic congestive heart failure (HCC)    Syncope  Resolved Problems:    * No resolved hospital problems. *      Discharge Diagnoses: Principal Problem:    Bilateral leg edema  Active Problems:    Hypothyroidism    Fall    Weakness    PAF (paroxysmal atrial fibrillation) (HCC)    Hypokalemia    Diastolic congestive heart failure (HCC)    Syncope  Resolved Problems:    * No resolved hospital problems. *      Consults:  IP CONSULT TO CASE MANAGEMENT  IP CONSULT TO IV TEAM  IP CONSULT TO HEART FAILURE NURSE/COORDINATOR  IP CONSULT TO CARDIOLOGY  IP CONSULT TO HEART FAILURE NURSE/COORDINATOR  IP CONSULT TO IV TEAM  IP CONSULT TO DIETITIAN  IP CONSULT TO DIETITIAN    Procedures: none    Hospital Course:   Patient Lorena Rodriguez is a 79 y.o. presented with Bilateral leg edema [R60.0]  Brief summary:  Patient presented with volume overload and BLE edema in setting of CHFe. Underwent IV diuresis with significant improvement in volume status and was transitioned back to PO lasix at dispo. Did note hypochloremia d/t diuresis, improving on last BMP. Also requiting frequent K+ repletion, discharged with K+ supplementation. Will need f/u outpt with Cards and PCP to monitor volume status and diuresis. Discharged in stable condition. **Most recent hospitalist plan**  Acute diastolic CHF- BNP 0440. EF last admit 50-55%. Has mild-moderate AS and TR.  PCP started 20mg lasix daily as outpatient after last admit, however pt not taking any THE HEAD WITHOUT CONTRAST  9/28/2023 1:51 pm TECHNIQUE: CT of the head was performed without the administration of intravenous contrast. Automated exposure control, iterative reconstruction, and/or weight based adjustment of the mA/kV was utilized to reduce the radiation dose to as low as reasonably achievable. COMPARISON: None. HISTORY: ORDERING SYSTEM PROVIDED HISTORY: fall TECHNOLOGIST PROVIDED HISTORY: Reason for exam:->fall Has a \"code stroke\" or \"stroke alert\" been called? ->No Decision Support Exception - unselect if not a suspected or confirmed emergency medical condition->Emergency Medical Condition (MA) FINDINGS: BRAIN/VENTRICLES: There is no acute intracranial hemorrhage, mass effect or midline shift. No abnormal extra-axial fluid collection. The gray-white differentiation is maintained without evidence of an acute infarct. There is no evidence of hydrocephalus. ORBITS: The visualized portion of the orbits demonstrate no acute abnormality. SINUSES: The visualized paranasal sinuses and mastoid air cells demonstrate no acute abnormality. SOFT TISSUES/SKULL:  No acute abnormality of the visualized skull or soft tissues. No acute intracranial abnormality. Patient Instructions:      Medication List        START taking these medications      atorvastatin 40 MG tablet  Commonly known as: LIPITOR  Take 1 tablet by mouth nightly     ferrous sulfate 325 (65 Fe) MG tablet  Commonly known as: IRON 325  Take 1 tablet by mouth daily (with breakfast)            CHANGE how you take these medications      FLUoxetine 20 MG capsule  Commonly known as: PROZAC  TAKE 1 CAPSULE DAILY  What changed: Another medication with the same name was removed. Continue taking this medication, and follow the directions you see here.             CONTINUE taking these medications      aspirin 81 MG EC tablet     buPROPion 300 MG extended release tablet  Commonly known as: WELLBUTRIN XL  TAKE ONE TABLET BY MOUTH EVERY MORNING

## 2023-10-05 NOTE — PROGRESS NOTES
INPATIENT CARDIOLOGY FOLLOW-UP    Name: Jairo Galicia    Age: 79 y.o. Date of Admission: 9/28/2023  8:47 PM    Date of Service: 10/4/2023    Chief Complaint: Follow-up for AF and CHF    Interim History:  Report doing well and wished to discharge today. Follow-up with your primary care physician for repeat BMP with electrolyte      Review of Systems:   Cardiac: As per HPI  General: No fever, chills  Pulmonary: As per HPI  HEENT: No visual disturbances, difficult swallowing  GI: No nausea, vomiting  Endocrine: No thyroid disease or DM  Musculoskeletal: REBOLLAR x 4, no focal motor deficits  Skin: Intact, no rashes  Neuro/Psych: No headache or seizures    Problem List:  Patient Active Problem List   Diagnosis    Hypothyroidism    Graves disease    Depression    GERD (gastroesophageal reflux disease)    Non-ST elevation MI (NSTEMI) ([address])    Morbid obesity ([address])    Coronary artery disease involving native coronary artery of native heart without angina pectoris    LITO (generalized anxiety disorder)    Osteoarthritis of left knee    Bilateral primary osteoarthritis of knee    Osteoarthritis of right knee    S/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, right    Abdominal pain    Acute cholecystitis    Elevated LFTs    Hyperkalemia    Choledocholithiasis    Atrial fibrillation with RVR ([facility])    Nonrheumatic aortic valve stenosis    Primary hypertension    Cholecystitis    Biliary obstruction    Bilateral leg edema    Fall    Weakness    PAF (paroxysmal atrial fibrillation) ([facility])    Hypokalemia    Diastolic congestive heart failure ([facility])    Syncope       Allergies: Allergies   Allergen Reactions    Meloxicam Itching and Other (See Comments)       Current Medications:  No current facility-administered medications for this encounter.      Current Outpatient Medications   Medication Sig Dispense Refill    furosemide (LASIX) 20 MG tablet Take 1 tablet by mouth daily as needed (Swelling) 30 tablet 1    ferrous sulfate (IRON

## 2023-10-10 ENCOUNTER — OFFICE VISIT (OUTPATIENT)
Dept: PRIMARY CARE CLINIC | Age: 68
End: 2023-10-10
Payer: MEDICARE

## 2023-10-10 VITALS
TEMPERATURE: 97.8 F | BODY MASS INDEX: 36.25 KG/M2 | OXYGEN SATURATION: 97 % | WEIGHT: 197 LBS | SYSTOLIC BLOOD PRESSURE: 106 MMHG | HEART RATE: 63 BPM | HEIGHT: 62 IN | RESPIRATION RATE: 12 BRPM | DIASTOLIC BLOOD PRESSURE: 74 MMHG

## 2023-10-10 DIAGNOSIS — D50.9 IRON DEFICIENCY ANEMIA, UNSPECIFIED IRON DEFICIENCY ANEMIA TYPE: Primary | ICD-10-CM

## 2023-10-10 DIAGNOSIS — R53.83 OTHER FATIGUE: ICD-10-CM

## 2023-10-10 LAB — HGB, POC: 9.2

## 2023-10-10 PROCEDURE — 1124F ACP DISCUSS-NO DSCNMKR DOCD: CPT | Performed by: FAMILY MEDICINE

## 2023-10-10 PROCEDURE — 3074F SYST BP LT 130 MM HG: CPT | Performed by: FAMILY MEDICINE

## 2023-10-10 PROCEDURE — 99214 OFFICE O/P EST MOD 30 MIN: CPT | Performed by: FAMILY MEDICINE

## 2023-10-10 PROCEDURE — 3078F DIAST BP <80 MM HG: CPT | Performed by: FAMILY MEDICINE

## 2023-10-10 PROCEDURE — 96372 THER/PROPH/DIAG INJ SC/IM: CPT | Performed by: FAMILY MEDICINE

## 2023-10-10 PROCEDURE — 85018 HEMOGLOBIN: CPT | Performed by: FAMILY MEDICINE

## 2023-10-10 RX ORDER — CYANOCOBALAMIN 1000 UG/ML
1000 INJECTION, SOLUTION INTRAMUSCULAR; SUBCUTANEOUS ONCE
Status: COMPLETED | OUTPATIENT
Start: 2023-10-10 | End: 2023-10-10

## 2023-10-10 RX ORDER — SODIUM FERRIC GLUCONATE COMPLEX IN SUCROSE 12.5 MG/ML
125 INJECTION INTRAVENOUS ONCE
Qty: 10 ML | Refills: 0 | Status: SHIPPED | OUTPATIENT
Start: 2023-10-10 | End: 2023-10-10

## 2023-10-10 RX ADMIN — CYANOCOBALAMIN 1000 MCG: 1000 INJECTION, SOLUTION INTRAMUSCULAR; SUBCUTANEOUS at 16:41

## 2023-10-10 ASSESSMENT — ENCOUNTER SYMPTOMS
SHORTNESS OF BREATH: 0
DIARRHEA: 0
ABDOMINAL PAIN: 0
SORE THROAT: 0
CONSTIPATION: 0
BLOOD IN STOOL: 0
ABDOMINAL DISTENTION: 0
COUGH: 0
SINUS PRESSURE: 0
RHINORRHEA: 0
SINUS PAIN: 1

## 2023-10-10 NOTE — PROGRESS NOTES
Vincent Gilliland, a female of 79 y.o. came to the office 10/10/2023. 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 The current episode started in the past 7 days. The problem has been rapidly improving. Associated symptoms include anorexia, fatigue and weakness. Pertinent negatives include no abdominal pain, chills, congestion, coughing, diaphoresis, fever, headaches or sore throat. Exacerbated by: recent gb removal sg. Treatments tried: lasix. The treatment provided moderate relief. Anemia  Presents for follow-up visit. Symptoms include anorexia, confusion, light-headedness, malaise/fatigue, pallor and weight loss. There has been no abdominal pain, bruising/bleeding easily, fever or palpitations. There are no compliance problems.          Allergies   Allergen Reactions    Meloxicam Itching and Other (See Comments)       Current Outpatient Medications on File Prior to Visit   Medication Sig Dispense Refill    ferrous sulfate (IRON 325) 325 (65 Fe) MG tablet Take 1 tablet by mouth daily (with breakfast) 30 tablet 3    atorvastatin (LIPITOR) 40 MG

## 2023-10-11 DIAGNOSIS — D50.9 NORMOCYTIC HYPOCHROMIC ANEMIA: ICD-10-CM

## 2023-10-19 DIAGNOSIS — Z96.651 S/P TOTAL KNEE ARTHROPLASTY, RIGHT: Primary | ICD-10-CM

## 2023-10-25 ENCOUNTER — OFFICE VISIT (OUTPATIENT)
Dept: ORTHOPEDIC SURGERY | Age: 68
End: 2023-10-25
Payer: MEDICARE

## 2023-10-25 ENCOUNTER — HOSPITAL ENCOUNTER (OUTPATIENT)
Dept: GENERAL RADIOLOGY | Age: 68
Discharge: HOME | End: 2023-10-27
Payer: MEDICARE

## 2023-10-25 DIAGNOSIS — Z96.651 S/P TOTAL KNEE ARTHROPLASTY, RIGHT: ICD-10-CM

## 2023-10-25 DIAGNOSIS — R26.89 BALANCE DISORDER: ICD-10-CM

## 2023-10-25 DIAGNOSIS — Z96.651 S/P TOTAL KNEE ARTHROPLASTY, RIGHT: Primary | ICD-10-CM

## 2023-10-25 DIAGNOSIS — R29.6 FREQUENT FALLS: ICD-10-CM

## 2023-10-25 PROCEDURE — 73560 X-RAY EXAM OF KNEE 1 OR 2: CPT

## 2023-10-25 NOTE — PROGRESS NOTES
Chief Complaint   Patient presents with    Follow-up     15 wk TKA Right Knee. Pt had a few fall encounters since the LOV. Pt express when ascending stairs the Right Knee gave out and the patient fell. Pt has increased altered sensation in the Right Knee during the day. Pt has concerns about equilibrium disturbances that are causing the patient to fall often. OP:SURGEON: Dr. Veena Peterson MD  DATE OF PROCEDURE: 6-12-23  PROCEDURE: RIGHT KNEE TOTAL ARTHROPLASTY     POD: 4.5 months    Subjective:  Stanley Bowden is following up from the above surgery. She is WBAT on right lower extremity. She ambulates with no assistive device. Pain to extremity is mild and is not taking prescribed pain medication. They denies numbness or tingling to the right lower extremity. Denies calf pain, chest pain, or shortness of breath. Patient has finished DVT prophylaxis. Patient is not participating in therapy at this time. She is doing okay after surgery. She states that she did have a recent fall causing some increased pain and swelling in the knee. In addition, patient complains of equilibrium disturbances that are causing her to have frequent falls. Patient states that she would like to see a neurologist for this possibly. Review of Systems -  All pertinent positives/negative in HPI     Objective:    General: Alert and oriented X 3, normocephalic atraumatic, external ears and eye normal, sclera clear, no acute distress, respirations easy and unlabored with no audible wheezes, skin warm and dry, speech and dress appropriate for noted age, affect euthymic.     Extremity:  Right Lower Extremity  Skin clean dry and intact, without signs of infection   Incision well-healed  moderate edema noted to the knee area  Compartments supple throughout thigh and leg  Calf supple and not tender  negative Homans  Demonstrates active motion with HF, ankle DF/PF  Knee AROM 0-110  States sensation intact to touch in sural, deep peroneal,

## 2023-10-25 NOTE — PATIENT INSTRUCTIONS
Weightbearing as tolerated right lower extremity. Patient will be set up for land-based and aquatic PT at [facility]. Neurology referral.    Follow-up in 3-4 months for reevaluation and x-rays. Call if any questions or concerns.

## 2023-10-26 ENCOUNTER — OFFICE VISIT (OUTPATIENT)
Dept: CARDIOLOGY CLINIC | Age: 68
End: 2023-10-26
Payer: MEDICARE

## 2023-10-26 VITALS
BODY MASS INDEX: 37.17 KG/M2 | HEIGHT: 62 IN | WEIGHT: 202 LBS | RESPIRATION RATE: 18 BRPM | HEART RATE: 68 BPM | DIASTOLIC BLOOD PRESSURE: 80 MMHG | SYSTOLIC BLOOD PRESSURE: 122 MMHG

## 2023-10-26 DIAGNOSIS — I25.110 CORONARY ARTERY DISEASE INVOLVING NATIVE CORONARY ARTERY OF NATIVE HEART WITH UNSTABLE ANGINA PECTORIS (HCC): Primary | ICD-10-CM

## 2023-10-26 PROCEDURE — 99214 OFFICE O/P EST MOD 30 MIN: CPT | Performed by: INTERNAL MEDICINE

## 2023-10-26 PROCEDURE — 93000 ELECTROCARDIOGRAM COMPLETE: CPT | Performed by: INTERNAL MEDICINE

## 2023-10-26 PROCEDURE — 1124F ACP DISCUSS-NO DSCNMKR DOCD: CPT | Performed by: INTERNAL MEDICINE

## 2023-10-26 PROCEDURE — 3079F DIAST BP 80-89 MM HG: CPT | Performed by: INTERNAL MEDICINE

## 2023-10-26 PROCEDURE — 3074F SYST BP LT 130 MM HG: CPT | Performed by: INTERNAL MEDICINE

## 2023-10-26 RX ORDER — FLUOXETINE HYDROCHLORIDE 40 MG/1
40 CAPSULE ORAL DAILY
COMMUNITY
Start: 2023-10-15

## 2023-10-26 RX ORDER — PANTOPRAZOLE SODIUM 40 MG/1
40 TABLET, DELAYED RELEASE ORAL
Qty: 90 TABLET | Refills: 3 | Status: SHIPPED | OUTPATIENT
Start: 2023-10-26

## 2023-10-26 NOTE — PROGRESS NOTES
Indeterminate diastolic function. Moderately dilated left atrium by volume index. Mild-moderate aortic stenosis. Mild tricuspid regurgitation. PASP is estimated at 39 mmHg. 10/1/20 Summary   Normal left ventricle size and systolic function. Ejection fraction is visually estimated at 55-60%. Mild concentric left ventricular hypertrophy. No regional wall motion abnormalities seen. Indeterminate diastolic function. The left atrium is mildly dilated. Normal right ventricular size and function. TAPSE 19 mm. No systolic mitral regurgitation noted. There is mild-to-moderate aortic stenosis with valve area of 1.4 sq cm. Aortic valve peak velocity 2.3 m/s and mean gradient 10 mmHg. Physiologic and/or trace tricuspid regurgitation. RVSP is 25 mmHg. Normal estimated PA systolic pressure. No evidence for hemodynamically significant pericardial effusion. No previous echo for comparison    Stress Test:     Angiography: 9/17/20 1. Totally occluded fourth OM branch with successful deployment of 2.0 x 22 mm Valliant Resolute drug-eluting stent with 0% residual stenosis and RITA-3 flow distally, (pre-PCI RITA-0 flow, post-PCI RITA-3 flow). 2.  No CAD noted in the rest of the coronary arteries.      Cardiology Labs: BMP:    Lab Results   Component Value Date/Time     10/04/2023 11:30 AM    K 3.5 10/04/2023 11:30 AM    K 4.8 06/13/2023 06:16 AM    CL 89 10/04/2023 11:30 AM    CO2 34 10/04/2023 11:30 AM    BUN 12 10/04/2023 11:30 AM    CREATININE 0.8 10/04/2023 11:30 AM     CMP:    Lab Results   Component Value Date/Time     10/04/2023 11:30 AM    K 3.5 10/04/2023 11:30 AM    K 4.8 06/13/2023 06:16 AM    CL 89 10/04/2023 11:30 AM    CO2 34 10/04/2023 11:30 AM    BUN 12 10/04/2023 11:30 AM    CREATININE 0.8 10/04/2023 11:30 AM    PROT 7.6 09/28/2023 01:45 PM     CBC:    Lab Results   Component Value Date/Time    WBC 8.8 10/02/2023 05:15 AM    RBC 3.74 10/02/2023 05:15 AM    HGB 9.2 10/10/2023

## 2023-10-27 ENCOUNTER — TELEPHONE (OUTPATIENT)
Dept: CARDIOLOGY CLINIC | Age: 68
End: 2023-10-27

## 2023-10-27 NOTE — TELEPHONE ENCOUNTER
Patient called stated her insurance won't cover eliquis 5mg . Is there something else she can take.        Please advise
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
## 2023-11-01 ENCOUNTER — TELEPHONE (OUTPATIENT)
Dept: ORTHOPEDIC SURGERY | Age: 68
End: 2023-11-01

## 2023-11-01 DIAGNOSIS — Z96.651 S/P TOTAL KNEE ARTHROPLASTY, RIGHT: Primary | ICD-10-CM

## 2023-11-01 NOTE — TELEPHONE ENCOUNTER
Patient called office requesting order for physical therapy. OP:SURGEON: Dr. Bishop Pedro MD  DATE OF PROCEDURE: 6-12-23  PROCEDURE: RIGHT KNEE TOTAL ARTHROPLASTY    Last OV: 10/25/23  Per OV note, \"Patient will be set up for land-based and aquatic PT at [facility]. \"

## 2023-11-02 ENCOUNTER — TELEPHONE (OUTPATIENT)
Dept: ADMINISTRATIVE | Age: 68
End: 2023-11-02

## 2023-11-02 NOTE — TELEPHONE ENCOUNTER
Did not see referral or hospital discharge. Do not know why she needs appointment. Left message for patient to call the office.

## 2023-11-02 NOTE — TELEPHONE ENCOUNTER
Pt needs new pt appointment. No openings were available. Please call pt back when opening is available. Thanks!

## 2023-11-07 ENCOUNTER — OFFICE VISIT (OUTPATIENT)
Dept: PRIMARY CARE CLINIC | Age: 68
End: 2023-11-07
Payer: MEDICARE

## 2023-11-07 VITALS
OXYGEN SATURATION: 97 % | DIASTOLIC BLOOD PRESSURE: 80 MMHG | HEART RATE: 76 BPM | WEIGHT: 197 LBS | SYSTOLIC BLOOD PRESSURE: 128 MMHG | TEMPERATURE: 97.5 F | BODY MASS INDEX: 36.03 KG/M2

## 2023-11-07 DIAGNOSIS — Z00.00 INITIAL MEDICARE ANNUAL WELLNESS VISIT: Primary | ICD-10-CM

## 2023-11-07 DIAGNOSIS — D50.9 IRON DEFICIENCY ANEMIA, UNSPECIFIED IRON DEFICIENCY ANEMIA TYPE: ICD-10-CM

## 2023-11-07 DIAGNOSIS — R42 DIZZINESS: ICD-10-CM

## 2023-11-07 PROCEDURE — 1124F ACP DISCUSS-NO DSCNMKR DOCD: CPT | Performed by: FAMILY MEDICINE

## 2023-11-07 PROCEDURE — 3074F SYST BP LT 130 MM HG: CPT | Performed by: FAMILY MEDICINE

## 2023-11-07 PROCEDURE — 99212 OFFICE O/P EST SF 10 MIN: CPT | Performed by: FAMILY MEDICINE

## 2023-11-07 PROCEDURE — 3079F DIAST BP 80-89 MM HG: CPT | Performed by: FAMILY MEDICINE

## 2023-11-07 PROCEDURE — G0438 PPPS, INITIAL VISIT: HCPCS | Performed by: FAMILY MEDICINE

## 2023-11-07 RX ORDER — FUROSEMIDE 20 MG/1
20 TABLET ORAL DAILY PRN
Qty: 30 TABLET | Refills: 1 | Status: SHIPPED | OUTPATIENT
Start: 2023-11-07

## 2023-11-07 ASSESSMENT — COLUMBIA-SUICIDE SEVERITY RATING SCALE - C-SSRS
5. HAVE YOU STARTED TO WORK OUT OR WORKED OUT THE DETAILS OF HOW TO KILL YOURSELF? DO YOU INTEND TO CARRY OUT THIS PLAN?: NO
4. HAVE YOU HAD THESE THOUGHTS AND HAD SOME INTENTION OF ACTING ON THEM?: NO
7. DID THIS OCCUR IN THE LAST THREE MONTHS: NO
3. HAVE YOU BEEN THINKING ABOUT HOW YOU MIGHT KILL YOURSELF?: NO

## 2023-11-07 ASSESSMENT — LIFESTYLE VARIABLES
HOW MANY STANDARD DRINKS CONTAINING ALCOHOL DO YOU HAVE ON A TYPICAL DAY: PATIENT DOES NOT DRINK
HOW OFTEN DO YOU HAVE A DRINK CONTAINING ALCOHOL: NEVER

## 2023-11-07 ASSESSMENT — PATIENT HEALTH QUESTIONNAIRE - PHQ9
SUM OF ALL RESPONSES TO PHQ QUESTIONS 1-9: 0
SUM OF ALL RESPONSES TO PHQ QUESTIONS 1-9: 0
1. LITTLE INTEREST OR PLEASURE IN DOING THINGS: 0
2. FEELING DOWN, DEPRESSED OR HOPELESS: 0
3. TROUBLE FALLING OR STAYING ASLEEP: 0
SUM OF ALL RESPONSES TO PHQ QUESTIONS 1-9: 0
10. IF YOU CHECKED OFF ANY PROBLEMS, HOW DIFFICULT HAVE THESE PROBLEMS MADE IT FOR YOU TO DO YOUR WORK, TAKE CARE OF THINGS AT HOME, OR GET ALONG WITH OTHER PEOPLE: 0
4. FEELING TIRED OR HAVING LITTLE ENERGY: 0
SUM OF ALL RESPONSES TO PHQ QUESTIONS 1-9: 0
9. THOUGHTS THAT YOU WOULD BE BETTER OFF DEAD, OR OF HURTING YOURSELF: 0
7. TROUBLE CONCENTRATING ON THINGS, SUCH AS READING THE NEWSPAPER OR WATCHING TELEVISION: 0
6. FEELING BAD ABOUT YOURSELF - OR THAT YOU ARE A FAILURE OR HAVE LET YOURSELF OR YOUR FAMILY DOWN: 0
8. MOVING OR SPEAKING SO SLOWLY THAT OTHER PEOPLE COULD HAVE NOTICED. OR THE OPPOSITE, BEING SO FIGETY OR RESTLESS THAT YOU HAVE BEEN MOVING AROUND A LOT MORE THAN USUAL: 0
SUM OF ALL RESPONSES TO PHQ9 QUESTIONS 1 & 2: 0
5. POOR APPETITE OR OVEREATING: 0

## 2023-11-07 ASSESSMENT — ENCOUNTER SYMPTOMS
CONSTIPATION: 1
ABDOMINAL PAIN: 0
HEMATEMESIS: 0
HEMATOCHEZIA: 0

## 2023-11-07 NOTE — PROGRESS NOTES
traZODone (DESYREL) 100 MG tablet TAKE 1 TABLET NIGHTLY  Feliciano Dominguez DO   aspirin 81 MG EC tablet Take 1 tablet by mouth daily  ProviderTasha MD   FLUoxetine (PROZAC) 20 MG capsule TAKE 1 CAPSULE DAILY  Feliciano Dominguez DO   oxybutynin (DITROPAN XL) 10 MG extended release tablet Take 1 tablet by mouth at bedtime  Feliciano Dominguez DO   buPROPion (WELLBUTRIN XL) 300 MG extended release tablet TAKE ONE TABLET BY MOUTH EVERY MORNING  Nellie Levine DO   Multiple Vitamins-Minerals (MULTIVITAMIN WOMEN 50+ PO) Take by mouth  ProviderTasha MD   calcium carbonate 600 MG TABS tablet Take 1 tablet by mouth daily  Tasha Fernandes MD   loratadine (CLARITIN) 10 MG capsule Take 1 capsule by mouth daily  Mehrdad Dominguez DO   Tens Unit MISC by Does not apply route  Mary Kay Macedo PA-C   fluticasone (FLONASE) 50 MCG/ACT nasal spray USE 2 SPRAY(S) IN EACH NOSTRIL DAILY  Mehrdad Dominguez DO       CareTeam (Including outside providers/suppliers regularly involved in providing care):   Patient Care Team:  Cory Gamble DO as PCP - General (Family Medicine)  C[facility] Gamble [facility]  Vivien Mcelroy MD as Consulting Physician (Cardiology)     Reviewed and updated this visit:  Tobacco  Allergies  Meds  Problems  Med Hx  Surg Hx  Soc Hx  Fam Hx

## 2023-11-08 ENCOUNTER — HOSPITAL ENCOUNTER (OUTPATIENT)
Dept: INFUSION THERAPY | Age: 68
Setting detail: INFUSION SERIES
Discharge: HOME | End: 2023-11-08
Payer: MEDICARE

## 2023-11-08 VITALS
DIASTOLIC BLOOD PRESSURE: 58 MMHG | HEART RATE: 63 BPM | BODY MASS INDEX: 36.23 KG/M2 | RESPIRATION RATE: 18 BRPM | OXYGEN SATURATION: 97 % | HEIGHT: 62 IN | WEIGHT: 196.87 LBS | TEMPERATURE: 97.8 F | SYSTOLIC BLOOD PRESSURE: 131 MMHG

## 2023-11-08 DIAGNOSIS — D50.9 NORMOCYTIC HYPOCHROMIC ANEMIA: Primary | ICD-10-CM

## 2023-11-08 LAB
ALBUMIN SERPL-MCNC: 3.2 G/DL (ref 3.5–5.2)
ALP BLD-CCNC: 88 U/L (ref 35–104)
ALT SERPL-CCNC: 16 U/L (ref 0–32)
ANION GAP SERPL CALCULATED.3IONS-SCNC: 14 MMOL/L (ref 7–16)
AST SERPL-CCNC: 46 U/L (ref 0–31)
BILIRUB SERPL-MCNC: 0.3 MG/DL (ref 0–1.2)
BUN BLDV-MCNC: 10 MG/DL (ref 6–23)
CALCIUM SERPL-MCNC: 9.4 MG/DL (ref 8.6–10.2)
CHLORIDE BLD-SCNC: 104 MMOL/L (ref 98–107)
CO2: 22 MMOL/L (ref 22–29)
CREAT SERPL-MCNC: 0.6 MG/DL (ref 0.5–1)
GFR SERPL CREATININE-BSD FRML MDRD: >60 ML/MIN/1.73M2
GLUCOSE BLD-MCNC: 76 MG/DL (ref 74–99)
HCT VFR BLD CALC: 37.5 % (ref 34–48)
HEMOGLOBIN: 10.8 G/DL (ref 11.5–15.5)
MCH RBC QN AUTO: 26.7 PG (ref 26–35)
MCHC RBC AUTO-ENTMCNC: 28.8 G/DL (ref 32–34.5)
MCV RBC AUTO: 92.8 FL (ref 80–99.9)
PDW BLD-RTO: 24.9 % (ref 11.5–15)
PLATELET # BLD: 295 K/UL (ref 130–450)
PMV BLD AUTO: 10.6 FL (ref 7–12)
POTASSIUM SERPL-SCNC: 4.6 MMOL/L (ref 3.5–5)
RBC # BLD: 4.04 M/UL (ref 3.5–5.5)
SODIUM BLD-SCNC: 140 MMOL/L (ref 132–146)
TOTAL PROTEIN: 8 G/DL (ref 6.4–8.3)
WBC # BLD: 5.5 K/UL (ref 4.5–11.5)

## 2023-11-08 PROCEDURE — 2580000003 HC RX 258: Performed by: FAMILY MEDICINE

## 2023-11-08 PROCEDURE — 96365 THER/PROPH/DIAG IV INF INIT: CPT

## 2023-11-08 PROCEDURE — 6360000002 HC RX W HCPCS: Performed by: FAMILY MEDICINE

## 2023-11-08 RX ORDER — ALBUTEROL SULFATE 90 UG/1
4 AEROSOL, METERED RESPIRATORY (INHALATION) PRN
OUTPATIENT
Start: 2023-11-15

## 2023-11-08 RX ORDER — SODIUM CHLORIDE 9 MG/ML
INJECTION, SOLUTION INTRAVENOUS CONTINUOUS
OUTPATIENT
Start: 2023-11-15

## 2023-11-08 RX ORDER — EPINEPHRINE 1 MG/ML
0.3 INJECTION, SOLUTION, CONCENTRATE INTRAVENOUS PRN
Status: CANCELLED | OUTPATIENT
Start: 2023-11-15

## 2023-11-08 RX ORDER — HEPARIN 100 UNIT/ML
500 SYRINGE INTRAVENOUS PRN
Status: CANCELLED | OUTPATIENT
Start: 2023-11-15

## 2023-11-08 RX ORDER — HEPARIN 100 UNIT/ML
500 SYRINGE INTRAVENOUS PRN
OUTPATIENT
Start: 2023-11-15

## 2023-11-08 RX ORDER — DIPHENHYDRAMINE HYDROCHLORIDE 50 MG/ML
50 INJECTION INTRAMUSCULAR; INTRAVENOUS
Status: CANCELLED | OUTPATIENT
Start: 2023-11-15

## 2023-11-08 RX ORDER — DIPHENHYDRAMINE HYDROCHLORIDE 50 MG/ML
50 INJECTION INTRAMUSCULAR; INTRAVENOUS
OUTPATIENT
Start: 2023-11-15

## 2023-11-08 RX ORDER — SODIUM CHLORIDE 9 MG/ML
INJECTION, SOLUTION INTRAVENOUS CONTINUOUS
Status: CANCELLED | OUTPATIENT
Start: 2023-11-15

## 2023-11-08 RX ORDER — SODIUM CHLORIDE 9 MG/ML
5-250 INJECTION, SOLUTION INTRAVENOUS PRN
OUTPATIENT
Start: 2023-11-15

## 2023-11-08 RX ORDER — EPINEPHRINE 1 MG/ML
0.3 INJECTION, SOLUTION, CONCENTRATE INTRAVENOUS PRN
OUTPATIENT
Start: 2023-11-15

## 2023-11-08 RX ORDER — SODIUM CHLORIDE 9 MG/ML
5-250 INJECTION, SOLUTION INTRAVENOUS PRN
Status: DISCONTINUED | OUTPATIENT
Start: 2023-11-08 | End: 2023-11-09 | Stop reason: HOSPADM

## 2023-11-08 RX ORDER — SODIUM CHLORIDE 0.9 % (FLUSH) 0.9 %
5-40 SYRINGE (ML) INJECTION PRN
OUTPATIENT
Start: 2023-11-15

## 2023-11-08 RX ORDER — SODIUM CHLORIDE 0.9 % (FLUSH) 0.9 %
5-40 SYRINGE (ML) INJECTION PRN
Status: CANCELLED | OUTPATIENT
Start: 2023-11-15

## 2023-11-08 RX ORDER — SODIUM CHLORIDE 9 MG/ML
5-250 INJECTION, SOLUTION INTRAVENOUS PRN
Status: CANCELLED | OUTPATIENT
Start: 2023-11-15

## 2023-11-08 RX ORDER — SODIUM CHLORIDE 0.9 % (FLUSH) 0.9 %
5-40 SYRINGE (ML) INJECTION PRN
Status: DISCONTINUED | OUTPATIENT
Start: 2023-11-08 | End: 2023-11-09 | Stop reason: HOSPADM

## 2023-11-08 RX ORDER — ALBUTEROL SULFATE 90 UG/1
4 AEROSOL, METERED RESPIRATORY (INHALATION) PRN
Status: CANCELLED | OUTPATIENT
Start: 2023-11-15

## 2023-11-08 RX ADMIN — SODIUM CHLORIDE, PRESERVATIVE FREE 10 ML: 5 INJECTION INTRAVENOUS at 09:44

## 2023-11-08 RX ADMIN — SODIUM CHLORIDE 125 MG: 9 INJECTION, SOLUTION INTRAVENOUS at 08:44

## 2023-11-08 RX ADMIN — SODIUM CHLORIDE, PRESERVATIVE FREE 10 ML: 5 INJECTION INTRAVENOUS at 08:34

## 2023-11-08 RX ADMIN — SODIUM CHLORIDE 20 ML/HR: 9 INJECTION, SOLUTION INTRAVENOUS at 08:37

## 2023-11-09 ENCOUNTER — TELEPHONE (OUTPATIENT)
Dept: PRIMARY CARE CLINIC | Age: 68
End: 2023-11-09

## 2023-11-09 DIAGNOSIS — D50.9 IRON DEFICIENCY ANEMIA, UNSPECIFIED IRON DEFICIENCY ANEMIA TYPE: Primary | ICD-10-CM

## 2023-11-09 NOTE — TELEPHONE ENCOUNTER
Spoke with patient today. Discussed improved hemoglobin. She received her first iron infusion yesterday. She is scheduled for 5 more weekly. I want her to get labs done to see how her hemoglobin is after her third transfusion. At that time we may consider holding the remainder 3 if not needed. She is not on iron pill anymore due to constipation with it. Her energy level has improved.

## 2023-11-10 ENCOUNTER — HOSPITAL ENCOUNTER (OUTPATIENT)
Dept: PHYSICAL THERAPY | Age: 68
Setting detail: THERAPIES SERIES
Discharge: HOME | End: 2023-11-10
Payer: MEDICARE

## 2023-11-10 PROCEDURE — 97161 PT EVAL LOW COMPLEX 20 MIN: CPT

## 2023-11-15 ENCOUNTER — HOSPITAL ENCOUNTER (OUTPATIENT)
Dept: INFUSION THERAPY | Age: 68
Setting detail: INFUSION SERIES
Discharge: HOME | End: 2023-11-15
Payer: MEDICARE

## 2023-11-15 VITALS
DIASTOLIC BLOOD PRESSURE: 73 MMHG | OXYGEN SATURATION: 97 % | BODY MASS INDEX: 36.23 KG/M2 | HEIGHT: 62 IN | SYSTOLIC BLOOD PRESSURE: 131 MMHG | RESPIRATION RATE: 18 BRPM | TEMPERATURE: 96.8 F | WEIGHT: 196.87 LBS | HEART RATE: 61 BPM

## 2023-11-15 DIAGNOSIS — D50.9 NORMOCYTIC HYPOCHROMIC ANEMIA: Primary | ICD-10-CM

## 2023-11-15 PROCEDURE — 2580000003 HC RX 258: Performed by: FAMILY MEDICINE

## 2023-11-15 PROCEDURE — 6360000002 HC RX W HCPCS: Performed by: FAMILY MEDICINE

## 2023-11-15 PROCEDURE — 96365 THER/PROPH/DIAG IV INF INIT: CPT

## 2023-11-15 RX ORDER — SODIUM CHLORIDE 9 MG/ML
5-250 INJECTION, SOLUTION INTRAVENOUS PRN
Status: CANCELLED | OUTPATIENT
Start: 2023-11-22

## 2023-11-15 RX ORDER — HEPARIN 100 UNIT/ML
500 SYRINGE INTRAVENOUS PRN
Status: CANCELLED | OUTPATIENT
Start: 2023-11-22

## 2023-11-15 RX ORDER — SODIUM CHLORIDE 9 MG/ML
5-250 INJECTION, SOLUTION INTRAVENOUS PRN
Status: DISCONTINUED | OUTPATIENT
Start: 2023-11-15 | End: 2023-11-16 | Stop reason: HOSPADM

## 2023-11-15 RX ORDER — SODIUM CHLORIDE 0.9 % (FLUSH) 0.9 %
5-40 SYRINGE (ML) INJECTION PRN
Status: DISCONTINUED | OUTPATIENT
Start: 2023-11-15 | End: 2023-11-16 | Stop reason: HOSPADM

## 2023-11-15 RX ORDER — DIPHENHYDRAMINE HYDROCHLORIDE 50 MG/ML
50 INJECTION INTRAMUSCULAR; INTRAVENOUS
Status: CANCELLED | OUTPATIENT
Start: 2023-11-22

## 2023-11-15 RX ORDER — ALBUTEROL SULFATE 90 UG/1
4 AEROSOL, METERED RESPIRATORY (INHALATION) PRN
Status: CANCELLED | OUTPATIENT
Start: 2023-11-22

## 2023-11-15 RX ORDER — EPINEPHRINE 1 MG/ML
0.3 INJECTION, SOLUTION, CONCENTRATE INTRAVENOUS PRN
Status: CANCELLED | OUTPATIENT
Start: 2023-11-22

## 2023-11-15 RX ORDER — SODIUM CHLORIDE 0.9 % (FLUSH) 0.9 %
5-40 SYRINGE (ML) INJECTION PRN
Status: CANCELLED | OUTPATIENT
Start: 2023-11-22

## 2023-11-15 RX ORDER — SODIUM CHLORIDE 9 MG/ML
INJECTION, SOLUTION INTRAVENOUS CONTINUOUS
Status: CANCELLED | OUTPATIENT
Start: 2023-11-22

## 2023-11-15 RX ADMIN — SODIUM CHLORIDE, PRESERVATIVE FREE 10 ML: 5 INJECTION INTRAVENOUS at 08:55

## 2023-11-15 RX ADMIN — SODIUM CHLORIDE 125 MG: 9 INJECTION, SOLUTION INTRAVENOUS at 09:03

## 2023-11-15 RX ADMIN — SODIUM CHLORIDE 20 ML/HR: 9 INJECTION, SOLUTION INTRAVENOUS at 08:56

## 2023-11-15 RX ADMIN — SODIUM CHLORIDE, PRESERVATIVE FREE 10 ML: 5 INJECTION INTRAVENOUS at 10:03

## 2023-11-15 ASSESSMENT — PAIN DESCRIPTION - DESCRIPTORS: DESCRIPTORS: ACHING

## 2023-11-15 ASSESSMENT — PAIN DESCRIPTION - FREQUENCY: FREQUENCY: CONTINUOUS

## 2023-11-15 ASSESSMENT — PAIN DESCRIPTION - ORIENTATION: ORIENTATION: RIGHT

## 2023-11-15 ASSESSMENT — PAIN DESCRIPTION - PAIN TYPE: TYPE: CHRONIC PAIN

## 2023-11-15 ASSESSMENT — PAIN DESCRIPTION - LOCATION: LOCATION: KNEE

## 2023-11-15 ASSESSMENT — PAIN SCALES - GENERAL: PAINLEVEL_OUTOF10: 2

## 2023-11-22 ENCOUNTER — HOSPITAL ENCOUNTER (OUTPATIENT)
Dept: INFUSION THERAPY | Age: 68
Setting detail: INFUSION SERIES
Discharge: HOME | End: 2023-11-22
Payer: MEDICARE

## 2023-11-22 VITALS
WEIGHT: 196.87 LBS | RESPIRATION RATE: 18 BRPM | TEMPERATURE: 97.8 F | OXYGEN SATURATION: 99 % | SYSTOLIC BLOOD PRESSURE: 145 MMHG | HEIGHT: 62 IN | DIASTOLIC BLOOD PRESSURE: 75 MMHG | BODY MASS INDEX: 36.23 KG/M2 | HEART RATE: 61 BPM

## 2023-11-22 DIAGNOSIS — D50.9 NORMOCYTIC HYPOCHROMIC ANEMIA: Primary | ICD-10-CM

## 2023-11-22 PROCEDURE — 6360000002 HC RX W HCPCS: Performed by: FAMILY MEDICINE

## 2023-11-22 PROCEDURE — 96365 THER/PROPH/DIAG IV INF INIT: CPT

## 2023-11-22 PROCEDURE — 2580000003 HC RX 258: Performed by: FAMILY MEDICINE

## 2023-11-22 RX ORDER — ALBUTEROL SULFATE 90 UG/1
4 AEROSOL, METERED RESPIRATORY (INHALATION) PRN
Status: CANCELLED | OUTPATIENT
Start: 2023-11-29

## 2023-11-22 RX ORDER — DIPHENHYDRAMINE HYDROCHLORIDE 50 MG/ML
50 INJECTION INTRAMUSCULAR; INTRAVENOUS
Status: CANCELLED | OUTPATIENT
Start: 2023-11-29

## 2023-11-22 RX ORDER — SODIUM CHLORIDE 0.9 % (FLUSH) 0.9 %
5-40 SYRINGE (ML) INJECTION PRN
Status: DISCONTINUED | OUTPATIENT
Start: 2023-11-22 | End: 2023-11-23 | Stop reason: HOSPADM

## 2023-11-22 RX ORDER — SODIUM CHLORIDE 9 MG/ML
5-250 INJECTION, SOLUTION INTRAVENOUS PRN
Status: CANCELLED | OUTPATIENT
Start: 2023-11-29

## 2023-11-22 RX ORDER — SODIUM CHLORIDE 9 MG/ML
INJECTION, SOLUTION INTRAVENOUS CONTINUOUS
Status: CANCELLED | OUTPATIENT
Start: 2023-11-29

## 2023-11-22 RX ORDER — SODIUM CHLORIDE 9 MG/ML
5-250 INJECTION, SOLUTION INTRAVENOUS PRN
Status: DISCONTINUED | OUTPATIENT
Start: 2023-11-22 | End: 2023-11-23 | Stop reason: HOSPADM

## 2023-11-22 RX ORDER — EPINEPHRINE 1 MG/ML
0.3 INJECTION, SOLUTION, CONCENTRATE INTRAVENOUS PRN
Status: CANCELLED | OUTPATIENT
Start: 2023-11-29

## 2023-11-22 RX ORDER — HEPARIN 100 UNIT/ML
500 SYRINGE INTRAVENOUS PRN
Status: CANCELLED | OUTPATIENT
Start: 2023-11-29

## 2023-11-22 RX ORDER — SODIUM CHLORIDE 0.9 % (FLUSH) 0.9 %
5-40 SYRINGE (ML) INJECTION PRN
Status: CANCELLED | OUTPATIENT
Start: 2023-11-29

## 2023-11-22 RX ADMIN — SODIUM CHLORIDE, PRESERVATIVE FREE 10 ML: 5 INJECTION INTRAVENOUS at 10:05

## 2023-11-22 RX ADMIN — SODIUM CHLORIDE 20 ML/HR: 9 INJECTION, SOLUTION INTRAVENOUS at 08:57

## 2023-11-22 RX ADMIN — SODIUM CHLORIDE 125 MG: 9 INJECTION, SOLUTION INTRAVENOUS at 09:05

## 2023-11-22 RX ADMIN — SODIUM CHLORIDE, PRESERVATIVE FREE 10 ML: 5 INJECTION INTRAVENOUS at 08:56

## 2023-11-22 ASSESSMENT — PAIN DESCRIPTION - DESCRIPTORS: DESCRIPTORS: ACHING

## 2023-11-22 ASSESSMENT — PAIN SCALES - GENERAL: PAINLEVEL_OUTOF10: 3

## 2023-11-22 ASSESSMENT — PAIN DESCRIPTION - PAIN TYPE: TYPE: CHRONIC PAIN

## 2023-11-22 ASSESSMENT — PAIN DESCRIPTION - FREQUENCY: FREQUENCY: CONTINUOUS

## 2023-11-22 ASSESSMENT — PAIN DESCRIPTION - ORIENTATION: ORIENTATION: RIGHT

## 2023-11-22 ASSESSMENT — PAIN DESCRIPTION - LOCATION: LOCATION: KNEE

## 2023-11-27 ENCOUNTER — HOSPITAL ENCOUNTER (OUTPATIENT)
Age: 68
Discharge: HOME | End: 2023-11-27
Payer: MEDICARE

## 2023-11-27 DIAGNOSIS — D50.9 IRON DEFICIENCY ANEMIA, UNSPECIFIED IRON DEFICIENCY ANEMIA TYPE: ICD-10-CM

## 2023-11-27 LAB
ERYTHROCYTE [DISTWIDTH] IN BLOOD BY AUTOMATED COUNT: 21.7 % (ref 11.5–15)
HCT VFR BLD AUTO: 35.8 % (ref 34–48)
HGB BLD-MCNC: 11 G/DL (ref 11.5–15.5)
IRON SATN MFR SERPL: 21 % (ref 15–50)
IRON SERPL-MCNC: 53 UG/DL (ref 37–145)
MCH RBC QN AUTO: 28.6 PG (ref 26–35)
MCHC RBC AUTO-ENTMCNC: 30.7 G/DL (ref 32–34.5)
MCV RBC AUTO: 93 FL (ref 80–99.9)
PLATELET # BLD AUTO: 252 K/UL (ref 130–450)
PMV BLD AUTO: 9.5 FL (ref 7–12)
RBC # BLD AUTO: 3.85 M/UL (ref 3.5–5.5)
TIBC SERPL-MCNC: 257 UG/DL (ref 250–450)
WBC OTHER # BLD: 5.1 K/UL (ref 4.5–11.5)

## 2023-11-27 PROCEDURE — 85027 COMPLETE CBC AUTOMATED: CPT

## 2023-11-27 PROCEDURE — 36415 COLL VENOUS BLD VENIPUNCTURE: CPT

## 2023-11-27 PROCEDURE — 83540 ASSAY OF IRON: CPT

## 2023-11-27 PROCEDURE — 83550 IRON BINDING TEST: CPT

## 2023-11-29 ENCOUNTER — HOSPITAL ENCOUNTER (OUTPATIENT)
Dept: INFUSION THERAPY | Age: 68
Setting detail: INFUSION SERIES
Discharge: HOME | End: 2023-11-29
Payer: MEDICARE

## 2023-11-29 ENCOUNTER — TELEPHONE (OUTPATIENT)
Dept: PRIMARY CARE CLINIC | Age: 68
End: 2023-11-29

## 2023-11-29 VITALS
DIASTOLIC BLOOD PRESSURE: 71 MMHG | OXYGEN SATURATION: 99 % | SYSTOLIC BLOOD PRESSURE: 115 MMHG | TEMPERATURE: 96.8 F | RESPIRATION RATE: 18 BRPM | WEIGHT: 196.87 LBS | HEART RATE: 51 BPM | HEIGHT: 62 IN | BODY MASS INDEX: 36.23 KG/M2

## 2023-11-29 DIAGNOSIS — D50.9 NORMOCYTIC HYPOCHROMIC ANEMIA: Primary | ICD-10-CM

## 2023-11-29 PROCEDURE — 2580000003 HC RX 258: Performed by: FAMILY MEDICINE

## 2023-11-29 PROCEDURE — 6360000002 HC RX W HCPCS: Performed by: FAMILY MEDICINE

## 2023-11-29 PROCEDURE — 96365 THER/PROPH/DIAG IV INF INIT: CPT

## 2023-11-29 RX ORDER — HEPARIN 100 UNIT/ML
500 SYRINGE INTRAVENOUS PRN
OUTPATIENT
Start: 2023-12-06

## 2023-11-29 RX ORDER — ALBUTEROL SULFATE 90 UG/1
4 AEROSOL, METERED RESPIRATORY (INHALATION) PRN
OUTPATIENT
Start: 2023-12-06

## 2023-11-29 RX ORDER — EPINEPHRINE 1 MG/ML
0.3 INJECTION, SOLUTION, CONCENTRATE INTRAVENOUS PRN
OUTPATIENT
Start: 2023-12-06

## 2023-11-29 RX ORDER — SODIUM CHLORIDE 9 MG/ML
INJECTION, SOLUTION INTRAVENOUS CONTINUOUS
OUTPATIENT
Start: 2023-12-06

## 2023-11-29 RX ORDER — SODIUM CHLORIDE 0.9 % (FLUSH) 0.9 %
5-40 SYRINGE (ML) INJECTION PRN
OUTPATIENT
Start: 2023-12-06

## 2023-11-29 RX ORDER — SODIUM CHLORIDE 9 MG/ML
5-250 INJECTION, SOLUTION INTRAVENOUS PRN
OUTPATIENT
Start: 2023-12-06

## 2023-11-29 RX ORDER — DIPHENHYDRAMINE HYDROCHLORIDE 50 MG/ML
50 INJECTION INTRAMUSCULAR; INTRAVENOUS
OUTPATIENT
Start: 2023-12-06

## 2023-11-29 RX ORDER — SODIUM CHLORIDE 9 MG/ML
5-250 INJECTION, SOLUTION INTRAVENOUS PRN
Status: DISCONTINUED | OUTPATIENT
Start: 2023-11-29 | End: 2023-11-30 | Stop reason: HOSPADM

## 2023-11-29 RX ORDER — SODIUM CHLORIDE 0.9 % (FLUSH) 0.9 %
5-40 SYRINGE (ML) INJECTION PRN
Status: DISCONTINUED | OUTPATIENT
Start: 2023-11-29 | End: 2023-11-30 | Stop reason: HOSPADM

## 2023-11-29 RX ADMIN — SODIUM CHLORIDE, PRESERVATIVE FREE 10 ML: 5 INJECTION INTRAVENOUS at 08:59

## 2023-11-29 RX ADMIN — SODIUM CHLORIDE 20 ML/HR: 9 INJECTION, SOLUTION INTRAVENOUS at 09:01

## 2023-11-29 RX ADMIN — SODIUM CHLORIDE, PRESERVATIVE FREE 10 ML: 5 INJECTION INTRAVENOUS at 10:21

## 2023-11-29 RX ADMIN — SODIUM CHLORIDE 125 MG: 9 INJECTION, SOLUTION INTRAVENOUS at 09:18

## 2023-11-29 NOTE — TELEPHONE ENCOUNTER
Called patient hemoglobin at 11. We therefore can stop the iron infusions. She has received 4 was due for 2 more. We will reevaluate her labs in the future. She is unable to tolerate oral iron.

## 2023-11-30 ENCOUNTER — OFFICE VISIT (OUTPATIENT)
Dept: PRIMARY CARE CLINIC | Age: 68
End: 2023-11-30
Payer: MEDICARE

## 2023-11-30 VITALS
BODY MASS INDEX: 35.29 KG/M2 | SYSTOLIC BLOOD PRESSURE: 136 MMHG | HEART RATE: 61 BPM | OXYGEN SATURATION: 96 % | WEIGHT: 193 LBS | DIASTOLIC BLOOD PRESSURE: 80 MMHG | TEMPERATURE: 97.6 F

## 2023-11-30 DIAGNOSIS — L23.7 POISON IVY DERMATITIS: Primary | ICD-10-CM

## 2023-11-30 PROCEDURE — 3079F DIAST BP 80-89 MM HG: CPT | Performed by: FAMILY MEDICINE

## 2023-11-30 PROCEDURE — 99213 OFFICE O/P EST LOW 20 MIN: CPT | Performed by: FAMILY MEDICINE

## 2023-11-30 PROCEDURE — 3075F SYST BP GE 130 - 139MM HG: CPT | Performed by: FAMILY MEDICINE

## 2023-11-30 PROCEDURE — 1124F ACP DISCUSS-NO DSCNMKR DOCD: CPT | Performed by: FAMILY MEDICINE

## 2023-11-30 PROCEDURE — 96372 THER/PROPH/DIAG INJ SC/IM: CPT | Performed by: FAMILY MEDICINE

## 2023-11-30 RX ORDER — PREDNISONE 10 MG/1
TABLET ORAL
Qty: 21 TABLET | Refills: 0 | Status: SHIPPED | OUTPATIENT
Start: 2023-11-30

## 2023-11-30 RX ORDER — METHYLPREDNISOLONE ACETATE 40 MG/ML
40 INJECTION, SUSPENSION INTRA-ARTICULAR; INTRALESIONAL; INTRAMUSCULAR; SOFT TISSUE ONCE
Status: COMPLETED | OUTPATIENT
Start: 2023-11-30 | End: 2023-11-30

## 2023-11-30 RX ADMIN — METHYLPREDNISOLONE ACETATE 40 MG: 40 INJECTION, SUSPENSION INTRA-ARTICULAR; INTRALESIONAL; INTRAMUSCULAR; SOFT TISSUE at 16:31

## 2023-11-30 ASSESSMENT — ENCOUNTER SYMPTOMS
SHORTNESS OF BREATH: 0
EYES NEGATIVE: 1
ALLERGIC/IMMUNOLOGIC NEGATIVE: 1
RESPIRATORY NEGATIVE: 1
GASTROINTESTINAL NEGATIVE: 1

## 2023-11-30 NOTE — PROGRESS NOTES
Christ Mullen, a female of 79 y.o. came to the office 11/30/2023. Patient Active Problem List   Diagnosis    Hypothyroidism    Graves disease    Depression    GERD (gastroesophageal reflux disease)    Non-ST elevation MI (NSTEMI) ([address])    Morbid obesity ([address])    Coronary artery disease involving native coronary artery of native heart without angina pectoris    LITO (generalized anxiety disorder)    Bilateral primary osteoarthritis of knee    S/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, right    Hyperkalemia    Choledocholithiasis    Atrial fibrillation with RVR ([facility])    Nonrheumatic aortic valve stenosis    Primary hypertension    Cholecystitis    Biliary obstruction    PAF (paroxysmal atrial fibrillation) ([facility])    Hypokalemia    Diastolic congestive heart failure ([facility])    Normocytic hypochromic anemia          Rash  This is a new problem. Episode onset: since 11/28/23. The problem has been rapidly worsening since onset. The affected locations include the abdomen, right lower leg, right ankle, right foot and right upper leg. The rash is characterized by redness and itchiness. Associated with: started krill oil OTC on 11/28/23- started itching day before taking  krill oil. Analisa Rojase in grass at home on 11/16/2023 while she had pulling weeds out of ground- denies any chemicals in yard or grass. Associated symptoms include joint pain (right lumbar region pain reported). Pertinent negatives include no shortness of breath. Past treatments include moisturizer (OTC ezema ointment and moisturizer). The treatment provided no relief. There is no history of eczema.           Allergies   Allergen Reactions    Meloxicam Itching and Other (See Comments)       Current Outpatient Medications on File Prior to Visit   Medication Sig Dispense Refill    metoprolol tartrate (LOPRESSOR) 25 MG tablet Take 1 tablet by mouth 2 times daily 60 tablet 5    furosemide (LASIX) 20 MG tablet Take 1 tablet by mouth daily as needed (Swelling) 30

## 2023-12-04 DIAGNOSIS — F32.89 OTHER DEPRESSION: ICD-10-CM

## 2023-12-04 RX ORDER — BUPROPION HYDROCHLORIDE 300 MG/1
TABLET ORAL
Qty: 90 TABLET | Refills: 1 | Status: SHIPPED | OUTPATIENT
Start: 2023-12-04

## 2023-12-06 ENCOUNTER — HOSPITAL ENCOUNTER (OUTPATIENT)
Dept: INFUSION THERAPY | Age: 68
Setting detail: INFUSION SERIES
Discharge: HOME | End: 2023-12-06

## 2023-12-14 RX ORDER — ATORVASTATIN CALCIUM 40 MG/1
40 TABLET, FILM COATED ORAL NIGHTLY
Qty: 90 TABLET | Refills: 1 | Status: SHIPPED | OUTPATIENT
Start: 2023-12-14

## 2023-12-14 RX ORDER — PANTOPRAZOLE SODIUM 40 MG/1
40 TABLET, DELAYED RELEASE ORAL
Qty: 90 TABLET | Refills: 1 | Status: SHIPPED | OUTPATIENT
Start: 2023-12-14

## 2023-12-15 RX ORDER — PANTOPRAZOLE SODIUM 40 MG/1
40 TABLET, DELAYED RELEASE ORAL
Qty: 90 TABLET | Refills: 3 | Status: SHIPPED | OUTPATIENT
Start: 2023-12-15

## 2024-01-10 ENCOUNTER — OFFICE VISIT (OUTPATIENT)
Dept: PRIMARY CARE CLINIC | Age: 69
End: 2024-01-10
Payer: MEDICARE

## 2024-01-10 VITALS
HEIGHT: 62 IN | BODY MASS INDEX: 35.51 KG/M2 | OXYGEN SATURATION: 98 % | SYSTOLIC BLOOD PRESSURE: 122 MMHG | TEMPERATURE: 96.9 F | DIASTOLIC BLOOD PRESSURE: 72 MMHG | HEART RATE: 64 BPM | RESPIRATION RATE: 16 BRPM | WEIGHT: 193 LBS

## 2024-01-10 DIAGNOSIS — N39.0 URINARY TRACT INFECTION WITHOUT HEMATURIA, SITE UNSPECIFIED: Primary | ICD-10-CM

## 2024-01-10 DIAGNOSIS — M25.472 ANKLE EDEMA, BILATERAL: ICD-10-CM

## 2024-01-10 DIAGNOSIS — R30.0 DYSURIA: ICD-10-CM

## 2024-01-10 DIAGNOSIS — M25.471 ANKLE EDEMA, BILATERAL: ICD-10-CM

## 2024-01-10 LAB
BILIRUBIN, POC: NORMAL
BLOOD URINE, POC: NORMAL
CLARITY, POC: CLEAR
COLOR, POC: NORMAL
GLUCOSE URINE, POC: NORMAL
KETONES, POC: NORMAL
LEUKOCYTE EST, POC: NORMAL
NITRITE, POC: NORMAL
PH, POC: 5.5
PROTEIN, POC: NORMAL
SPECIFIC GRAVITY, POC: 1.03
UROBILINOGEN, POC: 0.2

## 2024-01-10 PROCEDURE — 3074F SYST BP LT 130 MM HG: CPT | Performed by: FAMILY MEDICINE

## 2024-01-10 PROCEDURE — 99213 OFFICE O/P EST LOW 20 MIN: CPT | Performed by: FAMILY MEDICINE

## 2024-01-10 PROCEDURE — 3078F DIAST BP <80 MM HG: CPT | Performed by: FAMILY MEDICINE

## 2024-01-10 PROCEDURE — 1124F ACP DISCUSS-NO DSCNMKR DOCD: CPT | Performed by: FAMILY MEDICINE

## 2024-01-10 PROCEDURE — 81002 URINALYSIS NONAUTO W/O SCOPE: CPT | Performed by: FAMILY MEDICINE

## 2024-01-10 RX ORDER — PHENAZOPYRIDINE HYDROCHLORIDE 200 MG/1
200 TABLET, FILM COATED ORAL 3 TIMES DAILY PRN
Qty: 6 TABLET | Refills: 0 | Status: SHIPPED | OUTPATIENT
Start: 2024-01-10

## 2024-01-10 ASSESSMENT — PATIENT HEALTH QUESTIONNAIRE - PHQ9
8. MOVING OR SPEAKING SO SLOWLY THAT OTHER PEOPLE COULD HAVE NOTICED. OR THE OPPOSITE, BEING SO FIGETY OR RESTLESS THAT YOU HAVE BEEN MOVING AROUND A LOT MORE THAN USUAL: 0
6. FEELING BAD ABOUT YOURSELF - OR THAT YOU ARE A FAILURE OR HAVE LET YOURSELF OR YOUR FAMILY DOWN: 0
SUM OF ALL RESPONSES TO PHQ9 QUESTIONS 1 & 2: 0
SUM OF ALL RESPONSES TO PHQ QUESTIONS 1-9: 0
5. POOR APPETITE OR OVEREATING: 0
SUM OF ALL RESPONSES TO PHQ QUESTIONS 1-9: 0
1. LITTLE INTEREST OR PLEASURE IN DOING THINGS: 0
7. TROUBLE CONCENTRATING ON THINGS, SUCH AS READING THE NEWSPAPER OR WATCHING TELEVISION: 0
2. FEELING DOWN, DEPRESSED OR HOPELESS: 0
4. FEELING TIRED OR HAVING LITTLE ENERGY: 0
SUM OF ALL RESPONSES TO PHQ QUESTIONS 1-9: 0
10. IF YOU CHECKED OFF ANY PROBLEMS, HOW DIFFICULT HAVE THESE PROBLEMS MADE IT FOR YOU TO DO YOUR WORK, TAKE CARE OF THINGS AT HOME, OR GET ALONG WITH OTHER PEOPLE: 0
3. TROUBLE FALLING OR STAYING ASLEEP: 0
SUM OF ALL RESPONSES TO PHQ QUESTIONS 1-9: 0
9. THOUGHTS THAT YOU WOULD BE BETTER OFF DEAD, OR OF HURTING YOURSELF: 0

## 2024-01-10 ASSESSMENT — ENCOUNTER SYMPTOMS
COUGH: 0
SHORTNESS OF BREATH: 0

## 2024-01-10 NOTE — PROGRESS NOTES
Viry Andrew, a female of 68 y.o. came to the office 1/10/2024.     Patient Active Problem List   Diagnosis    Hypothyroidism    Graves disease    Depression    GERD (gastroesophageal reflux disease)    Non-ST elevation MI (NSTEMI) ([facility])    Morbid obesity ([facility])    Coronary artery disease involving native coronary artery of native heart without angina pectoris    LITO (generalized anxiety disorder)    Bilateral primary osteoarthritis of knee    S/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, right    Hyperkalemia    Choledocholithiasis    Atrial fibrillation with RVR ([facility])    Nonrheumatic aortic valve stenosis    Primary hypertension    Cholecystitis    Biliary obstruction    PAF (paroxysmal atrial fibrillation) ([facility])    Hypokalemia    Diastolic congestive heart failure ([facility])    Normocytic hypochromic anemia          Urinary Tract Infection  This is a new problem. The current episode started in the past 7 days (5). The problem has been gradually worsening since onset. Pertinent negatives include no hematuria or pain.   Edema  This is a recurrent problem. The current episode started more than 1 month ago (in ankles.). Associated symptoms include a rash. Pertinent negatives include no chest pain or coughing. Nothing aggravates the symptoms. She has tried nothing for the symptoms.        Allergies   Allergen Reactions    Meloxicam Itching and Other (See Comments)       Current Outpatient Medications on File Prior to Visit   Medication Sig Dispense Refill    apixaban (ELIQUIS) 5 MG TABS tablet Take 1 tablet by mouth 2 times daily 180 tablet 3    pantoprazole (PROTONIX) 40 MG tablet Take 1 tablet by mouth every morning (before breakfast) 90 tablet 3    atorvastatin (LIPITOR) 40 MG tablet Take 1 tablet by mouth nightly 90 tablet 1    buPROPion (WELLBUTRIN XL) 300 MG extended release tablet TAKE 1 TABLET EVERY MORNING 90 tablet 1    metoprolol tartrate (LOPRESSOR) 25 MG tablet Take 1 tablet by mouth 2 times daily 60 tablet 5

## 2024-01-15 DIAGNOSIS — F41.1 GAD (GENERALIZED ANXIETY DISORDER): ICD-10-CM

## 2024-01-15 RX ORDER — FLUOXETINE HYDROCHLORIDE 20 MG/1
CAPSULE ORAL
Qty: 90 CAPSULE | Refills: 1 | Status: SHIPPED | OUTPATIENT
Start: 2024-01-15

## 2024-01-15 RX ORDER — FLUOXETINE HYDROCHLORIDE 40 MG/1
40 CAPSULE ORAL DAILY
Qty: 90 CAPSULE | Refills: 1 | Status: SHIPPED | OUTPATIENT
Start: 2024-01-15

## 2024-01-24 DIAGNOSIS — Z96.651 S/P TOTAL KNEE ARTHROPLASTY, RIGHT: Primary | ICD-10-CM

## 2024-01-25 ENCOUNTER — HOSPITAL ENCOUNTER (OUTPATIENT)
Dept: GENERAL RADIOLOGY | Age: 69
Discharge: HOME | End: 2024-01-27
Payer: MEDICARE

## 2024-01-25 ENCOUNTER — OFFICE VISIT (OUTPATIENT)
Dept: ORTHOPEDIC SURGERY | Age: 69
End: 2024-01-25
Payer: MEDICARE

## 2024-01-25 DIAGNOSIS — Z96.651 S/P TOTAL KNEE ARTHROPLASTY, RIGHT: Primary | ICD-10-CM

## 2024-01-25 DIAGNOSIS — Z96.651 S/P TOTAL KNEE ARTHROPLASTY, RIGHT: ICD-10-CM

## 2024-01-25 PROCEDURE — 73560 X-RAY EXAM OF KNEE 1 OR 2: CPT

## 2024-01-25 PROCEDURE — 1124F ACP DISCUSS-NO DSCNMKR DOCD: CPT | Performed by: PHYSICIAN ASSISTANT

## 2024-01-25 PROCEDURE — 99212 OFFICE O/P EST SF 10 MIN: CPT

## 2024-01-25 PROCEDURE — 99213 OFFICE O/P EST LOW 20 MIN: CPT | Performed by: PHYSICIAN ASSISTANT

## 2024-01-25 NOTE — PROGRESS NOTES
Chief Complaint   Patient presents with    Post-Op Check     3mo Po R TKA. Pt ambulating w/o assist. Pt states no pain at this time.      OP:SURGEON: Dr. Nik Trivedi MD  DATE OF PROCEDURE: 6-12-23  PROCEDURE: RIGHT KNEE TOTAL ARTHROPLASTY    Subjective:  Viry Andrew is approximately 7 months follow-up from the above surgery.  She is weightbearing as tolerated to the right lower extremity not using any assistive devices.  States that she had her initial evaluation with physical therapy previously, but they never called her for subsequent sessions.  States that she has been doing home exercises on her own and is doing very well with no pain to the right knee and improved range of motion.     Review of Systems -  all pertinent positives and negatives in HPI.      Objective:    General: Alert and oriented X 3, normocephalic atraumatic, external ears and eye normal, sclera clear, no acute distress, respirations easy and unlabored with no audible wheezes, skin warm and dry, speech and dress appropriate for noted age, affect euthymic.    Extremity:  Right Lower Extremity  Skin clean dry and intact, without signs of infection  Incision healed  Full AROM of the knee  Stable to varus and valgus stress  Nontender throughout the knee  Compartments supple throughout thigh and leg  Calf supple and nontender  Demonstrates active knee flexion/extension, ankle plantar/dorsiflexion/great toe extension.   States sensation intact to touch in sural/deep peroneal/superficial peroneal/saphenous/posterior tibial nerve distributions to foot/ankle.   Palpable dorsalis pedis and posterior tibialis pulses, cap refill brisk in toes, foot warm/perfused.           XR:   2 views of R knee demonstrating s/p R TKA that appears intact without interval displacement, loosening, or failure. No significant change in alignment. No acute fractures or dislocations or any other osseus abnormality identified.    Assessment:   Diagnosis Orders   1. S/P

## 2024-01-29 ENCOUNTER — PROCEDURE VISIT (OUTPATIENT)
Dept: AUDIOLOGY | Age: 69
End: 2024-01-29
Payer: MEDICARE

## 2024-01-29 ENCOUNTER — OFFICE VISIT (OUTPATIENT)
Dept: ENT CLINIC | Age: 69
End: 2024-01-29
Payer: MEDICARE

## 2024-01-29 VITALS
WEIGHT: 193 LBS | BODY MASS INDEX: 35.51 KG/M2 | SYSTOLIC BLOOD PRESSURE: 104 MMHG | DIASTOLIC BLOOD PRESSURE: 68 MMHG | HEIGHT: 62 IN | RESPIRATION RATE: 12 BRPM | HEART RATE: 53 BPM

## 2024-01-29 DIAGNOSIS — R42 DISEQUILIBRIUM: Primary | ICD-10-CM

## 2024-01-29 DIAGNOSIS — H90.3 SENSORINEURAL HEARING LOSS (SNHL) OF BOTH EARS: ICD-10-CM

## 2024-01-29 DIAGNOSIS — R42 DIZZINESS: Primary | ICD-10-CM

## 2024-01-29 PROCEDURE — 1124F ACP DISCUSS-NO DSCNMKR DOCD: CPT | Performed by: OTOLARYNGOLOGY

## 2024-01-29 PROCEDURE — 92567 TYMPANOMETRY: CPT | Performed by: AUDIOLOGIST

## 2024-01-29 PROCEDURE — 3074F SYST BP LT 130 MM HG: CPT | Performed by: OTOLARYNGOLOGY

## 2024-01-29 PROCEDURE — 3078F DIAST BP <80 MM HG: CPT | Performed by: OTOLARYNGOLOGY

## 2024-01-29 PROCEDURE — 99204 OFFICE O/P NEW MOD 45 MIN: CPT | Performed by: OTOLARYNGOLOGY

## 2024-01-29 PROCEDURE — 92557 COMPREHENSIVE HEARING TEST: CPT | Performed by: AUDIOLOGIST

## 2024-01-29 RX ORDER — AZELASTINE 1 MG/ML
2 SPRAY, METERED NASAL 2 TIMES DAILY
Qty: 30 ML | Refills: 1 | Status: SHIPPED | OUTPATIENT
Start: 2024-01-29

## 2024-01-29 NOTE — PROGRESS NOTES
[facility]  Dr. Saúl Lr D.O. Ms.Ed.  New Consult       Patient Name:  Viry Andrew  :  1955     CHIEF C/O:    Chief Complaint   Patient presents with    New Patient     New Patient dizziness patient states she'd had an ear infection, then struggled with a hollow sound in her ears, and the dizziness episodes have been going on for about 10 years. She states that they happen sometimes when she is driving/sits up. She notices they correlate with her headaches and sinus pressure. She states it gets worse during the season changes/colder weather       HISTORY OBTAINED FROM:  patient    HISTORY OF PRESENT ILLNESS:       Viry is a 68 y.o. year old female, here today for:       Patient seen and examined for history of dizziness.  Patient states she has had ongoing greater than 10-year history of disequilibrium where she feels like she has sensation of dizziness or off-balance, denies any room spinning vertigo.  Denies any hearing loss or hearing changes, she did undergo full audiogram today which was reviewed.  She has not been seen by other specialist, and no neurological workup at this point.  She feels that strongly correlated when she gets headaches, she will then develop dizziness she denies any history of active or chronic sinus disease, no current complaints of nasal congestion fever chills or sore throat.  Patient does complain of chronic rhinitis with postnasal drainage intermittently causes hoarseness.  No active hemoptysis, no difficulty swallowing or weight loss no noted neck masses.  Previous imaging studies at this point have all been negative for any intracranial changes.  Full audiogram was conducted today, showing mild hearing loss bilaterally with normal tympanograms.           Past Medical History:   Diagnosis Date    Arthritis     CAD (coronary artery disease)     20 yisel 2.0x22 francisca om.     COVID-19 virus infection 2020    Depression     GERD (gastroesophageal

## 2024-01-30 NOTE — PROGRESS NOTES
This patient was referred for audiometric and tympanometric testing by Dr. Lr due to dizziness.     Audiometry using pure tone air and bone conduction testing revealed a mild to moderate sensorineural hearing loss, bilaterally. Reliability was good. Speech reception thresholds were in good agreement with the pure tone averages, bilaterally. Speech discrimination scores were excellent, bilaterally.    Tympanometry revealed normal middle ear peak pressure and compliance, bilaterally.    The results were reviewed with the patient.     Recommendations for follow up will be made pending physician consult.      Brendon Garner CCC-A  [facility] [id number]   Electronically signed by Brendon Garner on 1/30/2024 at 9:27 AM

## 2024-01-31 ASSESSMENT — ENCOUNTER SYMPTOMS
COUGH: 0
VOMITING: 0
SHORTNESS OF BREATH: 0

## 2024-02-06 DIAGNOSIS — E05.00 GRAVES DISEASE: ICD-10-CM

## 2024-02-06 DIAGNOSIS — F51.01 PRIMARY INSOMNIA: ICD-10-CM

## 2024-02-07 RX ORDER — LEVOTHYROXINE SODIUM 0.12 MG/1
TABLET ORAL
Qty: 90 TABLET | Refills: 1 | Status: SHIPPED | OUTPATIENT
Start: 2024-02-07

## 2024-02-07 RX ORDER — TRAZODONE HYDROCHLORIDE 100 MG/1
TABLET ORAL
Qty: 90 TABLET | Refills: 1 | Status: SHIPPED | OUTPATIENT
Start: 2024-02-07

## 2024-02-20 RX ORDER — OXYBUTYNIN CHLORIDE 10 MG/1
10 TABLET, EXTENDED RELEASE ORAL NIGHTLY
Qty: 90 TABLET | Refills: 1 | Status: SHIPPED | OUTPATIENT
Start: 2024-02-20

## 2024-03-18 ENCOUNTER — OFFICE VISIT (OUTPATIENT)
Dept: PRIMARY CARE CLINIC | Age: 69
End: 2024-03-18
Payer: MEDICARE

## 2024-03-18 VITALS
SYSTOLIC BLOOD PRESSURE: 120 MMHG | WEIGHT: 191 LBS | OXYGEN SATURATION: 98 % | HEIGHT: 62 IN | DIASTOLIC BLOOD PRESSURE: 70 MMHG | RESPIRATION RATE: 16 BRPM | BODY MASS INDEX: 35.15 KG/M2 | HEART RATE: 52 BPM | TEMPERATURE: 97.5 F

## 2024-03-18 DIAGNOSIS — Z12.31 ENCOUNTER FOR SCREENING MAMMOGRAM FOR MALIGNANT NEOPLASM OF BREAST: ICD-10-CM

## 2024-03-18 DIAGNOSIS — E03.9 HYPOTHYROIDISM, UNSPECIFIED TYPE: Primary | Chronic | ICD-10-CM

## 2024-03-18 DIAGNOSIS — E78.5 HYPERLIPIDEMIA, UNSPECIFIED HYPERLIPIDEMIA TYPE: ICD-10-CM

## 2024-03-18 DIAGNOSIS — Z12.39 BREAST SCREENING: ICD-10-CM

## 2024-03-18 LAB
CHOLESTEROL: 120 MG/DL
HCT VFR BLD CALC: 39.3 % (ref 34–48)
HDLC SERPL-MCNC: 56 MG/DL
HEMOGLOBIN: 12.2 G/DL (ref 11.5–15.5)
LDL CHOLESTEROL: 56 MG/DL
MCH RBC QN AUTO: 31.4 PG (ref 26–35)
MCHC RBC AUTO-ENTMCNC: 31 G/DL (ref 32–34.5)
MCV RBC AUTO: 101 FL (ref 80–99.9)
PDW BLD-RTO: 14.7 % (ref 11.5–15)
PLATELET # BLD: 180 K/UL (ref 130–450)
PMV BLD AUTO: 11.5 FL (ref 7–12)
RBC # BLD: 3.89 M/UL (ref 3.5–5.5)
T4 FREE: 1.6 NG/DL (ref 0.9–1.7)
TRIGL SERPL-MCNC: 42 MG/DL
TSH SERPL DL<=0.05 MIU/L-ACNC: 1.57 UIU/ML (ref 0.27–4.2)
VLDLC SERPL CALC-MCNC: 8 MG/DL
WBC # BLD: 4.9 K/UL (ref 4.5–11.5)

## 2024-03-18 PROCEDURE — 99213 OFFICE O/P EST LOW 20 MIN: CPT | Performed by: FAMILY MEDICINE

## 2024-03-18 PROCEDURE — 1124F ACP DISCUSS-NO DSCNMKR DOCD: CPT | Performed by: FAMILY MEDICINE

## 2024-03-18 PROCEDURE — 3074F SYST BP LT 130 MM HG: CPT | Performed by: FAMILY MEDICINE

## 2024-03-18 PROCEDURE — 3078F DIAST BP <80 MM HG: CPT | Performed by: FAMILY MEDICINE

## 2024-03-18 ASSESSMENT — ENCOUNTER SYMPTOMS
HAIR LOSS: 0
DIARRHEA: 0
CONSTIPATION: 0
SHORTNESS OF BREATH: 0

## 2024-03-18 NOTE — PROGRESS NOTES
Viry Andrew, a female of 68 y.o. came to the office 3/18/2024.     Patient Active Problem List   Diagnosis    Hypothyroidism    Graves disease    Depression    GERD (gastroesophageal reflux disease)    Non-ST elevation MI (NSTEMI) ([facility])    Morbid obesity ([facility])    Coronary artery disease involving native coronary artery of native heart without angina pectoris    LITO (generalized anxiety disorder)    Bilateral primary osteoarthritis of knee    S/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, right    Hyperkalemia    Choledocholithiasis    Atrial fibrillation with RVR ([facility])    Nonrheumatic aortic valve stenosis    Primary hypertension    Cholecystitis    Biliary obstruction    PAF (paroxysmal atrial fibrillation) ([facility])    Hypokalemia    Diastolic congestive heart failure ([facility])    Normocytic hypochromic anemia          Hypertension  This is a chronic problem. The current episode started more than 1 year ago. The problem is controlled. Pertinent negatives include no chest pain, headaches, palpitations, peripheral edema or shortness of breath. Identifiable causes of hypertension include a thyroid problem.   Thyroid Problem  Presents for follow-up visit. Symptoms include cold intolerance and nail problem (breaking). Patient reports no constipation, depressed mood, diarrhea, hair loss, heat intolerance, palpitations, weight gain or weight loss. Her past medical history is significant for hyperlipidemia.   Hyperlipidemia  This is a chronic problem. The current episode started more than 1 year ago. The problem is controlled. Pertinent negatives include no chest pain, leg pain, myalgias or shortness of breath. Current antihyperlipidemic treatment includes statins. The current treatment provides moderate improvement of lipids. There are no compliance problems.     Psych: just buried son who passed away 10 days ago from gi bleed. Who was 42.     Allergies   Allergen Reactions    Meloxicam Itching and Other (See Comments)

## 2024-03-19 LAB
ALBUMIN SERPL-MCNC: 4.3 G/DL (ref 3.5–5.2)
ALP BLD-CCNC: 72 U/L (ref 35–104)
ALT SERPL-CCNC: 13 U/L (ref 0–32)
ANION GAP SERPL CALCULATED.3IONS-SCNC: 17 MMOL/L (ref 7–16)
AST SERPL-CCNC: 30 U/L (ref 0–31)
BILIRUB SERPL-MCNC: 0.8 MG/DL (ref 0–1.2)
BUN BLDV-MCNC: 12 MG/DL (ref 6–23)
CALCIUM SERPL-MCNC: 9.6 MG/DL (ref 8.6–10.2)
CHLORIDE BLD-SCNC: 100 MMOL/L (ref 98–107)
CO2: 23 MMOL/L (ref 22–29)
CREAT SERPL-MCNC: 0.9 MG/DL (ref 0.5–1)
GFR SERPL CREATININE-BSD FRML MDRD: >60 ML/MIN/1.73M2
GLUCOSE BLD-MCNC: 92 MG/DL (ref 74–99)
POTASSIUM SERPL-SCNC: 4.4 MMOL/L (ref 3.5–5)
SODIUM BLD-SCNC: 140 MMOL/L (ref 132–146)
TOTAL PROTEIN: 7.3 G/DL (ref 6.4–8.3)

## 2024-03-27 ENCOUNTER — HOSPITAL ENCOUNTER (OUTPATIENT)
Dept: MAMMOGRAPHY | Age: 69
Discharge: HOME | End: 2024-03-29
Payer: MEDICARE

## 2024-03-27 VITALS — WEIGHT: 183 LBS | BODY MASS INDEX: 33.47 KG/M2

## 2024-03-27 DIAGNOSIS — Z12.39 BREAST SCREENING: ICD-10-CM

## 2024-03-27 DIAGNOSIS — Z12.31 ENCOUNTER FOR SCREENING MAMMOGRAM FOR MALIGNANT NEOPLASM OF BREAST: ICD-10-CM

## 2024-03-27 PROCEDURE — 77063 BREAST TOMOSYNTHESIS BI: CPT

## 2024-04-23 RX ORDER — ATORVASTATIN CALCIUM 40 MG/1
40 TABLET, FILM COATED ORAL NIGHTLY
Qty: 90 TABLET | Refills: 3 | Status: SHIPPED | OUTPATIENT
Start: 2024-04-23

## 2024-06-03 DIAGNOSIS — F32.89 OTHER DEPRESSION: ICD-10-CM

## 2024-06-03 RX ORDER — BUPROPION HYDROCHLORIDE 300 MG/1
TABLET ORAL
Qty: 90 TABLET | Refills: 1 | Status: SHIPPED | OUTPATIENT
Start: 2024-06-03

## 2024-06-12 ENCOUNTER — OFFICE VISIT (OUTPATIENT)
Dept: NEUROLOGY | Age: 69
End: 2024-06-12
Payer: MEDICARE

## 2024-06-12 VITALS
SYSTOLIC BLOOD PRESSURE: 118 MMHG | TEMPERATURE: 97.6 F | OXYGEN SATURATION: 97 % | BODY MASS INDEX: 32.43 KG/M2 | DIASTOLIC BLOOD PRESSURE: 77 MMHG | HEIGHT: 63 IN | WEIGHT: 183 LBS | HEART RATE: 46 BPM

## 2024-06-12 DIAGNOSIS — F15.20 CAFFEINE DEPENDENCE (HCC): ICD-10-CM

## 2024-06-12 DIAGNOSIS — F43.9 STRESS AT HOME: ICD-10-CM

## 2024-06-12 DIAGNOSIS — R41.89 BRAIN FOG: ICD-10-CM

## 2024-06-12 DIAGNOSIS — R41.3 MEMORY LOSS: Primary | ICD-10-CM

## 2024-06-12 PROCEDURE — 99204 OFFICE O/P NEW MOD 45 MIN: CPT | Performed by: NURSE PRACTITIONER

## 2024-06-12 PROCEDURE — 1124F ACP DISCUSS-NO DSCNMKR DOCD: CPT | Performed by: NURSE PRACTITIONER

## 2024-06-12 PROCEDURE — 3078F DIAST BP <80 MM HG: CPT | Performed by: NURSE PRACTITIONER

## 2024-06-12 PROCEDURE — 3074F SYST BP LT 130 MM HG: CPT | Performed by: NURSE PRACTITIONER

## 2024-06-12 NOTE — PROGRESS NOTES
[facility]  NEUROLOGY  Basil Tom Jr., M.D., F.A.C.P.  Karsten Bocanegra, DNP, APRN, ACNS-BC  Ja Quijano, MSN, APRN-FNP-C  Ashanti Enriquez, MSPAS, PA-C  Sola Cornejo, MSN, APRN-FNP-C  Gwen Lopez, MSN, APRN-FNP-C   Nisha Rothman, MSN, APRN-FNP-C  [facility] [address]  Phone: [phone] [address] [address]  Phone: [phone]  Fax: [phone]       Viry Andrew is a 68 y.o. right handed female     Patient presents to neurology clinic today for memory concerns    Patient presents alone and is deemed a decent historian    Past Medical History:     Past Medical History:   Diagnosis Date    Arthritis     CAD (coronary artery disease)     9-17-20 yisel 2.0x22 francisca om.     COVID-19 virus infection 07/2020    Depression     GERD (gastroesophageal reflux disease)     Graves disease     Hypertension     States she has never had HTN    Hypothyroidism     SECONDARY TO GRAVES DISEASE    NSTEMI (non-ST elevated myocardial infarction) (HCC) 09/16/2020    Obesity     S/P total knee arthroplasty, left 11/07/2022       Past Surgical History:       Past Surgical History:   Procedure Laterality Date    CARDIAC CATHETERIZATION  09/17/2020    stent x 1    CHOLECYSTECTOMY, LAPAROSCOPIC N/A 09/05/2023    LAPAROSCOPIC ROBOTIC XI ASSISTED CHOLECYSTECTOMY with ioc performed by Kimberly Eden MD at [facility] OR    COLONOSCOPY  11/10/2010    DR GUILLERMO    CORONARY ANGIOPLASTY WITH STENT PLACEMENT  09/17/2020    Dhruv OM     ERCP N/A 09/04/2023    ERCP ENDOSCOPIC RETROGRADE CHOLANGIOPANCREATOGRAPHY ABORTED performed by Gentry Phillips MD at [facility] OR    ERCP N/A 09/07/2023    LAPAROSCOPIC ASSISTED ERCP ENDOSCOPIC RETROGRADE CHOLANGIOPANCREATOGRAPHY performed by Miri Escalera MD at [facility] OR    GASTRIC BYPASS SURGERY  05/02/2006    DR HAIRSTON    TOTAL KNEE ARTHROPLASTY Left 11/07/2022    LEFT KNEE TOTAL ARTHROPLASTY [place]edi [place] [facility] [place]

## 2024-06-14 ENCOUNTER — OFFICE VISIT (OUTPATIENT)
Dept: CARDIOLOGY CLINIC | Age: 69
End: 2024-06-14
Payer: MEDICARE

## 2024-06-14 VITALS
HEART RATE: 54 BPM | RESPIRATION RATE: 18 BRPM | DIASTOLIC BLOOD PRESSURE: 64 MMHG | SYSTOLIC BLOOD PRESSURE: 110 MMHG | WEIGHT: 187.8 LBS | HEIGHT: 63 IN | BODY MASS INDEX: 33.27 KG/M2 | OXYGEN SATURATION: 96 %

## 2024-06-14 DIAGNOSIS — I48.91 ATRIAL FIBRILLATION WITH RVR (HCC): ICD-10-CM

## 2024-06-14 DIAGNOSIS — I25.110 CORONARY ARTERY DISEASE INVOLVING NATIVE CORONARY ARTERY OF NATIVE HEART WITH UNSTABLE ANGINA PECTORIS (HCC): Primary | ICD-10-CM

## 2024-06-14 DIAGNOSIS — I21.4 NON-ST ELEVATION MI (NSTEMI) (HCC): ICD-10-CM

## 2024-06-14 PROCEDURE — 1124F ACP DISCUSS-NO DSCNMKR DOCD: CPT | Performed by: INTERNAL MEDICINE

## 2024-06-14 PROCEDURE — 99214 OFFICE O/P EST MOD 30 MIN: CPT | Performed by: INTERNAL MEDICINE

## 2024-06-14 PROCEDURE — 3078F DIAST BP <80 MM HG: CPT | Performed by: INTERNAL MEDICINE

## 2024-06-14 PROCEDURE — 93000 ELECTROCARDIOGRAM COMPLETE: CPT | Performed by: INTERNAL MEDICINE

## 2024-06-14 PROCEDURE — 3074F SYST BP LT 130 MM HG: CPT | Performed by: INTERNAL MEDICINE

## 2024-06-26 ENCOUNTER — HOSPITAL ENCOUNTER (OUTPATIENT)
Dept: GENERAL RADIOLOGY | Age: 69
Discharge: HOME | End: 2024-06-28
Payer: MEDICARE

## 2024-06-26 ENCOUNTER — OFFICE VISIT (OUTPATIENT)
Dept: ORTHOPEDIC SURGERY | Age: 69
End: 2024-06-26
Payer: MEDICARE

## 2024-06-26 DIAGNOSIS — Z96.651 S/P TOTAL KNEE ARTHROPLASTY, RIGHT: ICD-10-CM

## 2024-06-26 DIAGNOSIS — Z96.651 S/P TOTAL KNEE ARTHROPLASTY, RIGHT: Primary | ICD-10-CM

## 2024-06-26 PROCEDURE — 73560 X-RAY EXAM OF KNEE 1 OR 2: CPT

## 2024-06-26 PROCEDURE — 1124F ACP DISCUSS-NO DSCNMKR DOCD: CPT

## 2024-06-26 PROCEDURE — 99213 OFFICE O/P EST LOW 20 MIN: CPT

## 2024-06-26 PROCEDURE — 99212 OFFICE O/P EST SF 10 MIN: CPT

## 2024-06-26 NOTE — PROGRESS NOTES
Chief Complaint   Patient presents with    Post-Op Check     1 year OV Rt TKA 6/12/23 DOS. Ambulating without assist. Denies pain       OP:SURGEON: Dr. Nik Trivedi MD  DATE OF PROCEDURE: 6-12-23  PROCEDURE: RIGHT KNEE TOTAL ARTHROPLASTY    Subjective:  Viry Andrew is approximately 1 year follow-up from the above surgery. Patient states she is doing very well, no complaints of pain, numbness or tingling. Patient ambulating without assist. Patient very happy with TKA. Denies calf pain, CP, SOB, fever, chills, myalgias.    Review of Systems -  all pertinent positives and negatives in HPI.      Objective:    General: Alert and oriented X 3, normocephalic atraumatic, external ears and eye normal, sclera clear, no acute distress, respirations easy and unlabored with no audible wheezes, skin warm and dry, speech and dress appropriate for noted age, affect euthymic.    Extremity:  Right Lower Extremity  Skin clean dry and intact, without signs of infection  Incision healed  Full AROM of the knee  Stable to varus and valgus stress  Nontender throughout the knee  Compartments supple throughout thigh and leg  Calf supple and nontender  Demonstrates active knee flexion/extension, ankle plantar/dorsiflexion/great toe extension.   States sensation intact to touch in sural/deep peroneal/superficial peroneal/saphenous/posterior tibial nerve distributions to foot/ankle.   Palpable dorsalis pedis and posterior tibialis pulses, cap refill brisk in toes, foot warm/perfused.    XR:   2 views of R knee demonstrating s/p R TKA that appears intact without interval displacement, loosening, or failure. No significant change in alignment. No acute fractures or dislocations or any other osseus abnormality identified.     Assessment:   Diagnosis Orders   1. S/P total knee arthroplasty, right            Plan:  Reviewed imaging with patient today in office   Otc analgesics, RICE tx prn  Full WB  Follow up as needed    Follow up

## 2024-07-08 ENCOUNTER — HOSPITAL ENCOUNTER (OUTPATIENT)
Dept: MRI IMAGING | Age: 69
Discharge: HOME | End: 2024-07-10
Payer: MEDICARE

## 2024-07-08 DIAGNOSIS — R41.3 MEMORY LOSS: ICD-10-CM

## 2024-07-08 PROCEDURE — 70551 MRI BRAIN STEM W/O DYE: CPT

## 2024-07-11 DIAGNOSIS — F41.1 GAD (GENERALIZED ANXIETY DISORDER): ICD-10-CM

## 2024-07-11 RX ORDER — FLUOXETINE HYDROCHLORIDE 40 MG/1
40 CAPSULE ORAL DAILY
Qty: 90 CAPSULE | Refills: 1 | Status: SHIPPED | OUTPATIENT
Start: 2024-07-11

## 2024-07-11 RX ORDER — FLUOXETINE HYDROCHLORIDE 20 MG/1
CAPSULE ORAL
Qty: 90 CAPSULE | Refills: 1 | Status: SHIPPED | OUTPATIENT
Start: 2024-07-11

## 2024-07-23 NOTE — PLAN OF CARE
Problem: Chronic Conditions and Co-morbidities  Goal: Patient's chronic conditions and co-morbidity symptoms are monitored and maintained or improved  Outcome: Progressing No risk alerts present

## 2024-08-05 DIAGNOSIS — E05.00 GRAVES DISEASE: ICD-10-CM

## 2024-08-05 DIAGNOSIS — F51.01 PRIMARY INSOMNIA: ICD-10-CM

## 2024-08-05 RX ORDER — LEVOTHYROXINE SODIUM 0.12 MG/1
TABLET ORAL
Qty: 90 TABLET | Refills: 0 | Status: SHIPPED | OUTPATIENT
Start: 2024-08-05

## 2024-08-05 RX ORDER — TRAZODONE HYDROCHLORIDE 100 MG/1
TABLET ORAL
Qty: 90 TABLET | Refills: 0 | Status: SHIPPED | OUTPATIENT
Start: 2024-08-05

## 2024-08-19 RX ORDER — OXYBUTYNIN CHLORIDE 10 MG/1
10 TABLET, EXTENDED RELEASE ORAL NIGHTLY
Qty: 90 TABLET | Refills: 1 | Status: SHIPPED | OUTPATIENT
Start: 2024-08-19

## 2024-09-12 ENCOUNTER — OFFICE VISIT (OUTPATIENT)
Dept: NEUROLOGY | Age: 69
End: 2024-09-12
Payer: MEDICARE

## 2024-09-12 VITALS
OXYGEN SATURATION: 96 % | HEIGHT: 61 IN | DIASTOLIC BLOOD PRESSURE: 64 MMHG | BODY MASS INDEX: 36.44 KG/M2 | WEIGHT: 193 LBS | SYSTOLIC BLOOD PRESSURE: 110 MMHG | TEMPERATURE: 98.2 F | HEART RATE: 64 BPM

## 2024-09-12 DIAGNOSIS — R41.89 BRAIN FOG: ICD-10-CM

## 2024-09-12 DIAGNOSIS — F43.9 STRESS AT HOME: ICD-10-CM

## 2024-09-12 DIAGNOSIS — R41.3 MEMORY LOSS: Primary | ICD-10-CM

## 2024-09-12 DIAGNOSIS — F43.21 GRIEF AT LOSS OF CHILD: ICD-10-CM

## 2024-09-12 DIAGNOSIS — Z63.4 GRIEF AT LOSS OF CHILD: ICD-10-CM

## 2024-09-12 DIAGNOSIS — F43.21 GRIEF: ICD-10-CM

## 2024-09-12 PROCEDURE — 1124F ACP DISCUSS-NO DSCNMKR DOCD: CPT | Performed by: NURSE PRACTITIONER

## 2024-09-12 PROCEDURE — 3074F SYST BP LT 130 MM HG: CPT | Performed by: NURSE PRACTITIONER

## 2024-09-12 PROCEDURE — 99214 OFFICE O/P EST MOD 30 MIN: CPT | Performed by: NURSE PRACTITIONER

## 2024-09-12 PROCEDURE — 3078F DIAST BP <80 MM HG: CPT | Performed by: NURSE PRACTITIONER

## 2024-09-12 SDOH — SOCIAL STABILITY - SOCIAL INSECURITY: DISSAPEARANCE AND DEATH OF FAMILY MEMBER: Z63.4

## 2024-09-30 ENCOUNTER — OFFICE VISIT (OUTPATIENT)
Dept: ENT CLINIC | Age: 69
End: 2024-09-30
Payer: MEDICARE

## 2024-09-30 ENCOUNTER — PROCEDURE VISIT (OUTPATIENT)
Dept: AUDIOLOGY | Age: 69
End: 2024-09-30
Payer: MEDICARE

## 2024-09-30 VITALS
DIASTOLIC BLOOD PRESSURE: 81 MMHG | SYSTOLIC BLOOD PRESSURE: 126 MMHG | HEART RATE: 60 BPM | HEIGHT: 61 IN | BODY MASS INDEX: 37 KG/M2 | WEIGHT: 196 LBS

## 2024-09-30 DIAGNOSIS — H90.3 SENSORINEURAL HEARING LOSS (SNHL) OF BOTH EARS: Primary | ICD-10-CM

## 2024-09-30 DIAGNOSIS — H90.3 SENSORINEURAL HEARING LOSS (SNHL) OF BOTH EARS: ICD-10-CM

## 2024-09-30 DIAGNOSIS — H93.8X9 PRESSURE SENSATION IN EAR, UNSPECIFIED LATERALITY: Primary | ICD-10-CM

## 2024-09-30 DIAGNOSIS — H69.93 DYSFUNCTION OF BOTH EUSTACHIAN TUBES: ICD-10-CM

## 2024-09-30 PROCEDURE — 1124F ACP DISCUSS-NO DSCNMKR DOCD: CPT | Performed by: OTOLARYNGOLOGY

## 2024-09-30 PROCEDURE — 3074F SYST BP LT 130 MM HG: CPT | Performed by: OTOLARYNGOLOGY

## 2024-09-30 PROCEDURE — 99213 OFFICE O/P EST LOW 20 MIN: CPT | Performed by: OTOLARYNGOLOGY

## 2024-09-30 PROCEDURE — 3079F DIAST BP 80-89 MM HG: CPT | Performed by: OTOLARYNGOLOGY

## 2024-09-30 PROCEDURE — 92567 TYMPANOMETRY: CPT

## 2024-09-30 NOTE — PROGRESS NOTES
Does not apply route, Disp: 1 each, Rfl: 0  •  fluticasone (FLONASE) 50 MCG/ACT nasal spray, USE 2 SPRAY(S) IN EACH NOSTRIL DAILY, Disp: 16 g, Rfl: 2  Meloxicam  Social History     Tobacco Use   • Smoking status: Former     Current packs/day: 0.00     Average packs/day: 1 pack/day for 3.0 years (3.0 ttl pk-yrs)     Types: Cigarettes     Start date:      Quit date: 3/4/1977     Years since quittin.6   • Smokeless tobacco: Never   Vaping Use   • Vaping status: Never Used   Substance Use Topics   • Alcohol use: Not Currently     Comment: rare   • Drug use: Never     Family History   Problem Relation Age of Onset   • Diabetes Mother    • Heart Attack Father    • Cancer Sister    • Heart Attack Brother        Review of Systems    /81 (Site: Left Upper Arm, Position: Sitting, Cuff Size: Large Adult)   Pulse 60   Ht 1.549 m (5' 1\")   Wt 88.9 kg (196 lb)   BMI 37.03 kg/m²   Physical Exam    IMPRESSION/PLAN:  1. Sensorineural hearing loss (SNHL) of both ears  2. Dysfunction of both eustachian tubes    Patient is for follow-up for history of bilateral sensorineural hearing loss, with left-sided ear fullness and history of intermittent episodes of dizziness, no complaints of actual room spinning vertigo, no complaints of ear pain drainage at this time.  Exam shows eustachian tube function has returned to normal, she is tolerating aspirin well which is improving her overall symptoms, she will continue nasal Astelin 2 sprays each nostril up to twice daily, and follow-up in 1 year with repeat audiogram or sooner with any degeneration and symptoms.    Dr. Saúl Lr D.O. Ms. Ed.  Otolaryngology Facial Plastic Surgery  :  [facility] Otolaryngology Residency  Associate Clinical Professor:  [facility] [employer] [employer]  [facility]

## 2024-10-01 NOTE — PROGRESS NOTES
This patient was referred for tympanometric testing by Dr. Lr due to ear fullness.     Tympanometry revealed normal middle ear peak pressure and compliance, bilaterally.    The results were reviewed with the patient and ordering provider.     Recommendations for follow up will be made pending ordering provider consult.    Hearing aids were discussed per ENT recommendation.  Patient has [employer] insurance.  Patient was given the  outside benefits form reviewing insurance hearing aid coverage. This form was reviewed and signed and scanned into the EMR chart. Patient will call their insurance about coverage and where to go for hearing aids.  ROCIO signed and audio given to the patient, Six month rule for hearing aid fitting from hearing testing date reviewed, Thirty day trial period reviewed, and the patient will call back if they wish to proceed with hearing aids through [facility].    Brendon Felipe/CCC-A  [place] Lic [id number]  Electronically signed by Brendon Felipe on 10/1/2024 at 8:00 AM

## 2024-10-31 RX ORDER — METOPROLOL TARTRATE 25 MG/1
25 TABLET, FILM COATED ORAL 2 TIMES DAILY
Qty: 180 TABLET | Refills: 1 | Status: SHIPPED | OUTPATIENT
Start: 2024-10-31

## 2024-11-04 DIAGNOSIS — F51.01 PRIMARY INSOMNIA: ICD-10-CM

## 2024-11-04 DIAGNOSIS — E05.00 GRAVES DISEASE: ICD-10-CM

## 2024-11-04 RX ORDER — LEVOTHYROXINE SODIUM 125 UG/1
TABLET ORAL
Qty: 90 TABLET | Refills: 0 | Status: SHIPPED | OUTPATIENT
Start: 2024-11-04

## 2024-11-04 RX ORDER — TRAZODONE HYDROCHLORIDE 100 MG/1
TABLET ORAL
Qty: 90 TABLET | Refills: 0 | Status: SHIPPED | OUTPATIENT
Start: 2024-11-04

## 2024-11-18 ENCOUNTER — TELEPHONE (OUTPATIENT)
Dept: PRIMARY CARE CLINIC | Age: 69
End: 2024-11-18

## 2024-11-27 ENCOUNTER — OFFICE VISIT (OUTPATIENT)
Dept: PRIMARY CARE CLINIC | Age: 69
End: 2024-11-27

## 2024-11-27 VITALS
HEIGHT: 61 IN | DIASTOLIC BLOOD PRESSURE: 78 MMHG | TEMPERATURE: 97.3 F | BODY MASS INDEX: 38.33 KG/M2 | WEIGHT: 203 LBS | OXYGEN SATURATION: 97 % | HEART RATE: 44 BPM | SYSTOLIC BLOOD PRESSURE: 128 MMHG

## 2024-11-27 DIAGNOSIS — M65.311 TRIGGER FINGER OF RIGHT THUMB: ICD-10-CM

## 2024-11-27 DIAGNOSIS — E05.00 GRAVES DISEASE: Primary | ICD-10-CM

## 2024-11-27 DIAGNOSIS — I25.10 CORONARY ARTERY DISEASE INVOLVING NATIVE CORONARY ARTERY OF NATIVE HEART WITHOUT ANGINA PECTORIS: ICD-10-CM

## 2024-11-27 DIAGNOSIS — F51.01 PRIMARY INSOMNIA: ICD-10-CM

## 2024-11-27 DIAGNOSIS — N39.41 URGE INCONTINENCE: ICD-10-CM

## 2024-11-27 LAB
ALBUMIN: 3.9 G/DL (ref 3.5–5.2)
ALP BLD-CCNC: 72 U/L (ref 35–104)
ALT SERPL-CCNC: 22 U/L (ref 0–32)
ANION GAP SERPL CALCULATED.3IONS-SCNC: 13 MMOL/L (ref 7–16)
AST SERPL-CCNC: 36 U/L (ref 0–31)
BILIRUB SERPL-MCNC: 0.6 MG/DL (ref 0–1.2)
BUN BLDV-MCNC: 17 MG/DL (ref 6–23)
CALCIUM SERPL-MCNC: 9.4 MG/DL (ref 8.6–10.2)
CHLORIDE BLD-SCNC: 103 MMOL/L (ref 98–107)
CHOLESTEROL, TOTAL: 117 MG/DL
CO2: 27 MMOL/L (ref 22–29)
CREAT SERPL-MCNC: 0.9 MG/DL (ref 0.5–1)
GFR, ESTIMATED: 74 ML/MIN/1.73M2
GLUCOSE BLD-MCNC: 82 MG/DL (ref 74–99)
HCT VFR BLD CALC: 40.1 % (ref 34–48)
HDLC SERPL-MCNC: 67 MG/DL
HEMOGLOBIN: 12.7 G/DL (ref 11.5–15.5)
LDL CHOLESTEROL: 43 MG/DL
MCH RBC QN AUTO: 32.4 PG (ref 26–35)
MCHC RBC AUTO-ENTMCNC: 31.7 G/DL (ref 32–34.5)
MCV RBC AUTO: 102.3 FL (ref 80–99.9)
PDW BLD-RTO: 12.8 % (ref 11.5–15)
PLATELET # BLD: 198 K/UL (ref 130–450)
PMV BLD AUTO: 10.9 FL (ref 7–12)
POTASSIUM SERPL-SCNC: 4.6 MMOL/L (ref 3.5–5)
RBC # BLD: 3.92 M/UL (ref 3.5–5.5)
SODIUM BLD-SCNC: 143 MMOL/L (ref 132–146)
T4 FREE: 1.2 NG/DL (ref 0.9–1.7)
TOTAL PROTEIN: 7.2 G/DL (ref 6.4–8.3)
TRIGL SERPL-MCNC: 34 MG/DL
TSH SERPL DL<=0.05 MIU/L-ACNC: 1.54 UIU/ML (ref 0.27–4.2)
VLDLC SERPL CALC-MCNC: 7 MG/DL
WBC # BLD: 5.2 K/UL (ref 4.5–11.5)

## 2024-11-27 RX ORDER — METHYLPREDNISOLONE ACETATE 40 MG/ML
40 INJECTION, SUSPENSION INTRA-ARTICULAR; INTRALESIONAL; INTRAMUSCULAR; SOFT TISSUE ONCE
Status: COMPLETED | OUTPATIENT
Start: 2024-11-27 | End: 2024-11-27

## 2024-11-27 RX ORDER — OXYBUTYNIN CHLORIDE 15 MG/1
15 TABLET, EXTENDED RELEASE ORAL DAILY
Qty: 90 TABLET | Refills: 1 | Status: SHIPPED | OUTPATIENT
Start: 2024-11-27

## 2024-11-27 RX ORDER — TRAZODONE HYDROCHLORIDE 150 MG/1
150 TABLET ORAL NIGHTLY
Qty: 90 TABLET | Refills: 1 | Status: SHIPPED | OUTPATIENT
Start: 2024-11-27

## 2024-11-27 RX ORDER — OXYBUTYNIN CHLORIDE 15 MG/1
15 TABLET, EXTENDED RELEASE ORAL DAILY
Qty: 90 TABLET | Refills: 1 | Status: SHIPPED
Start: 2024-11-27 | End: 2024-11-27 | Stop reason: CLARIF

## 2024-11-27 RX ADMIN — METHYLPREDNISOLONE ACETATE 40 MG: 40 INJECTION, SUSPENSION INTRA-ARTICULAR; INTRALESIONAL; INTRAMUSCULAR; SOFT TISSUE at 10:19

## 2024-11-27 SDOH — ECONOMIC STABILITY: INCOME INSECURITY: HOW HARD IS IT FOR YOU TO PAY FOR THE VERY BASICS LIKE FOOD, HOUSING, MEDICAL CARE, AND HEATING?: HARD

## 2024-11-27 SDOH — ECONOMIC STABILITY: FOOD INSECURITY: WITHIN THE PAST 12 MONTHS, THE FOOD YOU BOUGHT JUST DIDN'T LAST AND YOU DIDN'T HAVE MONEY TO GET MORE.: NEVER TRUE

## 2024-11-27 SDOH — ECONOMIC STABILITY: FOOD INSECURITY: WITHIN THE PAST 12 MONTHS, YOU WORRIED THAT YOUR FOOD WOULD RUN OUT BEFORE YOU GOT MONEY TO BUY MORE.: NEVER TRUE

## 2024-11-27 ASSESSMENT — ENCOUNTER SYMPTOMS
HAIR LOSS: 0
CONSTIPATION: 0
SHORTNESS OF BREATH: 0
DIARRHEA: 0
CHEST TIGHTNESS: 0

## 2024-11-27 NOTE — PROGRESS NOTES
MG extended release tablet; Take 1 tablet by mouth daily    Primary insomnia  -     traZODone (DESYREL) 150 MG tablet; Take 1 tablet by mouth nightly    Other orders  -     Discontinue: oxyBUTYnin (DITROPAN XL) 15 MG extended release tablet; Take 1 tablet by mouth daily    A tendon sheath injection was performed at the flexor tendon sheath of distal metacarpal area using 0.75 mL of 2% Xylocaine and 0.25 mL of Depo-Medrol 40 mg/mL.  This was well-tolerated.   - increase Trazodone to 150 mg  - low chol diet and exercise.     Return in about 4 months (around 3/27/2025) for or for acute problem.    Feliciano Dominguez, DO

## 2024-12-02 DIAGNOSIS — F32.89 OTHER DEPRESSION: ICD-10-CM

## 2024-12-03 ENCOUNTER — OFFICE VISIT (OUTPATIENT)
Dept: CARDIOLOGY CLINIC | Age: 69
End: 2024-12-03

## 2024-12-03 VITALS
WEIGHT: 207 LBS | HEIGHT: 61 IN | DIASTOLIC BLOOD PRESSURE: 58 MMHG | RESPIRATION RATE: 20 BRPM | SYSTOLIC BLOOD PRESSURE: 100 MMHG | HEART RATE: 49 BPM | BODY MASS INDEX: 39.08 KG/M2

## 2024-12-03 DIAGNOSIS — I25.110 CORONARY ARTERY DISEASE INVOLVING NATIVE CORONARY ARTERY OF NATIVE HEART WITH UNSTABLE ANGINA PECTORIS (HCC): Primary | ICD-10-CM

## 2024-12-03 RX ORDER — BUPROPION HYDROCHLORIDE 300 MG/1
TABLET ORAL
Qty: 90 TABLET | Refills: 1 | Status: SHIPPED | OUTPATIENT
Start: 2024-12-03

## 2024-12-03 NOTE — PROGRESS NOTES
[facility] Cardiology Progress Note  Dr. Monique Bartlett      Referring Physician: Feliciano Dominguez DO  CHIEF COMPLAINT: CAD, atrial fibrillation  Chief Complaint   Patient presents with    Coronary Artery Disease     6mo ov..pt has no c/c       HISTORY OF PRESENT ILLNESS:   Patient is 69 years old female with CAD, s/p PCI to OM, paroxysmal atrial fibrillation, is here for follow up visit.  Patient denies any chest pain, no shortness of breath, no lightheadedness, no dizziness, no palpitations, no pedal edema, no PND, no orthopnea, no syncope, no presyncopal episodes.  Functional capacity is at baseline  Complaining of weight gain and not being able to control her appetite    Past Medical History:   Diagnosis Date    Arthritis     CAD (coronary artery disease)     9-17-20 yisel 2.0x22 francisca om.     COVID-19 virus infection 07/2020    Depression     GERD (gastroesophageal reflux disease)     Graves disease     Hypertension     States she has never had HTN    Hypothyroidism     SECONDARY TO GRAVES DISEASE    NSTEMI (non-ST elevated myocardial infarction) (HCC) 09/16/2020    Obesity     S/P total knee arthroplasty, left 11/07/2022         Past Surgical History:   Procedure Laterality Date    CARDIAC CATHETERIZATION  09/17/2020    stent x 1    CHOLECYSTECTOMY, LAPAROSCOPIC N/A 09/05/2023    LAPAROSCOPIC ROBOTIC XI ASSISTED CHOLECYSTECTOMY with ioc performed by Kimberly Edne MD at [facility] OR    COLONOSCOPY  11/10/2010    DR GUILLERMO    CORONARY ANGIOPLASTY WITH STENT PLACEMENT  09/17/2020    [facility]     ERCP N/A 09/04/2023    ERCP ENDOSCOPIC RETROGRADE CHOLANGIOPANCREATOGRAPHY ABORTED performed by Gentry Phillips MD at [facility] OR    ERCP N/A 09/07/2023    LAPAROSCOPIC ASSISTED ERCP ENDOSCOPIC RETROGRADE CHOLANGIOPANCREATOGRAPHY performed by Miri Escalera MD at [facility] OR    GASTRIC BYPASS SURGERY  05/02/2006    DR HAIRSTON    TOTAL KNEE ARTHROPLASTY Left 11/07/2022    LEFT KNEE TOTAL ARTHROPLASTY performed by Nik

## 2024-12-04 DIAGNOSIS — N39.41 URGE INCONTINENCE: ICD-10-CM

## 2024-12-05 RX ORDER — OXYBUTYNIN CHLORIDE 15 MG/1
15 TABLET, EXTENDED RELEASE ORAL DAILY
Qty: 90 TABLET | Refills: 1 | Status: SHIPPED | OUTPATIENT
Start: 2024-12-05

## 2024-12-26 RX ORDER — PANTOPRAZOLE SODIUM 40 MG/1
40 TABLET, DELAYED RELEASE ORAL
Qty: 90 TABLET | Refills: 3 | Status: SHIPPED | OUTPATIENT
Start: 2024-12-26

## 2025-01-07 DIAGNOSIS — F41.1 GAD (GENERALIZED ANXIETY DISORDER): ICD-10-CM

## 2025-01-07 RX ORDER — FLUOXETINE 40 MG/1
40 CAPSULE ORAL DAILY
Qty: 90 CAPSULE | Refills: 1 | Status: SHIPPED | OUTPATIENT
Start: 2025-01-07

## 2025-02-03 DIAGNOSIS — E05.00 GRAVES DISEASE: ICD-10-CM

## 2025-02-04 RX ORDER — LEVOTHYROXINE SODIUM 125 UG/1
TABLET ORAL
Qty: 90 TABLET | Refills: 1 | Status: SHIPPED | OUTPATIENT
Start: 2025-02-04

## 2025-03-19 ENCOUNTER — OFFICE VISIT (OUTPATIENT)
Dept: PRIMARY CARE CLINIC | Age: 70
End: 2025-03-19
Payer: MEDICARE

## 2025-03-19 VITALS
HEIGHT: 61 IN | HEART RATE: 59 BPM | OXYGEN SATURATION: 97 % | TEMPERATURE: 97.7 F | SYSTOLIC BLOOD PRESSURE: 102 MMHG | BODY MASS INDEX: 40.59 KG/M2 | DIASTOLIC BLOOD PRESSURE: 60 MMHG | WEIGHT: 215 LBS

## 2025-03-19 DIAGNOSIS — E66.01 MORBID OBESITY: Chronic | ICD-10-CM

## 2025-03-19 DIAGNOSIS — I10 PRIMARY HYPERTENSION: ICD-10-CM

## 2025-03-19 DIAGNOSIS — F41.1 GAD (GENERALIZED ANXIETY DISORDER): Primary | ICD-10-CM

## 2025-03-19 DIAGNOSIS — F32.89 OTHER DEPRESSION: ICD-10-CM

## 2025-03-19 PROCEDURE — 1159F MED LIST DOCD IN RCRD: CPT | Performed by: FAMILY MEDICINE

## 2025-03-19 PROCEDURE — 1160F RVW MEDS BY RX/DR IN RCRD: CPT | Performed by: FAMILY MEDICINE

## 2025-03-19 PROCEDURE — 3078F DIAST BP <80 MM HG: CPT | Performed by: FAMILY MEDICINE

## 2025-03-19 PROCEDURE — 3074F SYST BP LT 130 MM HG: CPT | Performed by: FAMILY MEDICINE

## 2025-03-19 PROCEDURE — 1124F ACP DISCUSS-NO DSCNMKR DOCD: CPT | Performed by: FAMILY MEDICINE

## 2025-03-19 PROCEDURE — 99213 OFFICE O/P EST LOW 20 MIN: CPT | Performed by: FAMILY MEDICINE

## 2025-03-19 RX ORDER — VITAMIN B COMPLEX
1 CAPSULE ORAL DAILY
COMMUNITY

## 2025-03-19 RX ORDER — VENLAFAXINE HYDROCHLORIDE 75 MG/1
75 CAPSULE, EXTENDED RELEASE ORAL DAILY
Qty: 30 CAPSULE | Refills: 1 | Status: SHIPPED | OUTPATIENT
Start: 2025-03-19

## 2025-03-19 RX ORDER — TOPIRAMATE 25 MG/1
25 TABLET, FILM COATED ORAL NIGHTLY
Qty: 90 TABLET | Refills: 1 | Status: SHIPPED | OUTPATIENT
Start: 2025-03-19

## 2025-03-19 RX ORDER — HYDROCHLOROTHIAZIDE 25 MG/1
25 TABLET ORAL DAILY
Qty: 30 TABLET | Refills: 1 | Status: SHIPPED | OUTPATIENT
Start: 2025-03-19

## 2025-03-19 SDOH — ECONOMIC STABILITY: FOOD INSECURITY: WITHIN THE PAST 12 MONTHS, THE FOOD YOU BOUGHT JUST DIDN'T LAST AND YOU DIDN'T HAVE MONEY TO GET MORE.: NEVER TRUE

## 2025-03-19 SDOH — ECONOMIC STABILITY: FOOD INSECURITY: WITHIN THE PAST 12 MONTHS, YOU WORRIED THAT YOUR FOOD WOULD RUN OUT BEFORE YOU GOT MONEY TO BUY MORE.: NEVER TRUE

## 2025-03-19 ASSESSMENT — ENCOUNTER SYMPTOMS
BLURRED VISION: 0
SHORTNESS OF BREATH: 0

## 2025-03-19 ASSESSMENT — PATIENT HEALTH QUESTIONNAIRE - PHQ9
4. FEELING TIRED OR HAVING LITTLE ENERGY: NEARLY EVERY DAY
9. THOUGHTS THAT YOU WOULD BE BETTER OFF DEAD, OR OF HURTING YOURSELF: NOT AT ALL
10. IF YOU CHECKED OFF ANY PROBLEMS, HOW DIFFICULT HAVE THESE PROBLEMS MADE IT FOR YOU TO DO YOUR WORK, TAKE CARE OF THINGS AT HOME, OR GET ALONG WITH OTHER PEOPLE: NOT DIFFICULT AT ALL
SUM OF ALL RESPONSES TO PHQ QUESTIONS 1-9: 12
6. FEELING BAD ABOUT YOURSELF - OR THAT YOU ARE A FAILURE OR HAVE LET YOURSELF OR YOUR FAMILY DOWN: NOT AT ALL
5. POOR APPETITE OR OVEREATING: NEARLY EVERY DAY
SUM OF ALL RESPONSES TO PHQ QUESTIONS 1-9: 12
SUM OF ALL RESPONSES TO PHQ QUESTIONS 1-9: 12
1. LITTLE INTEREST OR PLEASURE IN DOING THINGS: NOT AT ALL
2. FEELING DOWN, DEPRESSED OR HOPELESS: NOT AT ALL
8. MOVING OR SPEAKING SO SLOWLY THAT OTHER PEOPLE COULD HAVE NOTICED. OR THE OPPOSITE, BEING SO FIGETY OR RESTLESS THAT YOU HAVE BEEN MOVING AROUND A LOT MORE THAN USUAL: NOT AT ALL
3. TROUBLE FALLING OR STAYING ASLEEP: NEARLY EVERY DAY
SUM OF ALL RESPONSES TO PHQ QUESTIONS 1-9: 12
7. TROUBLE CONCENTRATING ON THINGS, SUCH AS READING THE NEWSPAPER OR WATCHING TELEVISION: NEARLY EVERY DAY

## 2025-03-19 NOTE — PROGRESS NOTES
disorder)  -     hydroCHLOROthiazide (HYDRODIURIL) 25 MG tablet; Take 1 tablet by mouth daily    Primary hypertension    Morbid obesity  -     topiramate (TOPAMAX) 25 MG tablet; Take 1 tablet by mouth nightly    Other depression  -     venlafaxine (EFFEXOR XR) 75 MG extended release capsule; Take 1 capsule by mouth daily    - add Vistaril prn for anxiety. Continue wellbutrin and Prozac at current doses  - bp stable   - wean off Prozac over next 2 wks and then start Effexor XR 75 mg     Return in about 6 weeks (around 4/30/2025) for medicare pe and ov, or for acute problem.    Feliciano Dominguez, DO

## 2025-03-28 DIAGNOSIS — F41.1 GAD (GENERALIZED ANXIETY DISORDER): ICD-10-CM

## 2025-03-28 RX ORDER — HYDROCHLOROTHIAZIDE 25 MG/1
TABLET ORAL
Refills: 0 | OUTPATIENT
Start: 2025-03-28

## 2025-04-07 DIAGNOSIS — E66.01 MORBID OBESITY: Chronic | ICD-10-CM

## 2025-04-07 DIAGNOSIS — F32.89 OTHER DEPRESSION: ICD-10-CM

## 2025-04-08 RX ORDER — VENLAFAXINE HYDROCHLORIDE 75 MG/1
75 CAPSULE, EXTENDED RELEASE ORAL DAILY
Qty: 90 CAPSULE | Refills: 0 | Status: SHIPPED | OUTPATIENT
Start: 2025-04-08

## 2025-04-08 RX ORDER — TOPIRAMATE 25 MG/1
25 TABLET, FILM COATED ORAL NIGHTLY
Qty: 90 TABLET | Refills: 1 | Status: SHIPPED | OUTPATIENT
Start: 2025-04-08

## 2025-04-08 RX ORDER — APIXABAN 5 MG/1
5 TABLET, FILM COATED ORAL 2 TIMES DAILY
Qty: 180 TABLET | Refills: 1 | Status: SHIPPED | OUTPATIENT
Start: 2025-04-08

## 2025-04-15 ENCOUNTER — OFFICE VISIT (OUTPATIENT)
Dept: PRIMARY CARE CLINIC | Age: 70
End: 2025-04-15
Payer: MEDICARE

## 2025-04-15 VITALS
HEART RATE: 64 BPM | RESPIRATION RATE: 18 BRPM | OXYGEN SATURATION: 96 % | HEIGHT: 61 IN | WEIGHT: 212 LBS | TEMPERATURE: 97.4 F | DIASTOLIC BLOOD PRESSURE: 86 MMHG | BODY MASS INDEX: 40.02 KG/M2 | SYSTOLIC BLOOD PRESSURE: 124 MMHG

## 2025-04-15 DIAGNOSIS — I25.10 CORONARY ARTERY DISEASE INVOLVING NATIVE CORONARY ARTERY OF NATIVE HEART WITHOUT ANGINA PECTORIS: ICD-10-CM

## 2025-04-15 DIAGNOSIS — Z00.00 MEDICARE ANNUAL WELLNESS VISIT, SUBSEQUENT: Primary | ICD-10-CM

## 2025-04-15 DIAGNOSIS — E66.01 MORBID OBESITY: Chronic | ICD-10-CM

## 2025-04-15 DIAGNOSIS — E03.9 HYPOTHYROIDISM, UNSPECIFIED TYPE: Chronic | ICD-10-CM

## 2025-04-15 DIAGNOSIS — F41.1 GAD (GENERALIZED ANXIETY DISORDER): ICD-10-CM

## 2025-04-15 DIAGNOSIS — F32.89 OTHER DEPRESSION: ICD-10-CM

## 2025-04-15 LAB
HCT VFR BLD CALC: 40.2 % (ref 34–48)
HEMOGLOBIN: 13.2 G/DL (ref 11.5–15.5)
MCH RBC QN AUTO: 31.8 PG (ref 26–35)
MCHC RBC AUTO-ENTMCNC: 32.8 G/DL (ref 32–34.5)
MCV RBC AUTO: 96.9 FL (ref 80–99.9)
PDW BLD-RTO: 12.2 % (ref 11.5–15)
PLATELET # BLD: 229 K/UL (ref 130–450)
PMV BLD AUTO: 10.7 FL (ref 7–12)
RBC # BLD: 4.15 M/UL (ref 3.5–5.5)
WBC # BLD: 7.2 K/UL (ref 4.5–11.5)

## 2025-04-15 PROCEDURE — 1160F RVW MEDS BY RX/DR IN RCRD: CPT | Performed by: FAMILY MEDICINE

## 2025-04-15 PROCEDURE — G0439 PPPS, SUBSEQ VISIT: HCPCS | Performed by: FAMILY MEDICINE

## 2025-04-15 PROCEDURE — 3079F DIAST BP 80-89 MM HG: CPT | Performed by: FAMILY MEDICINE

## 2025-04-15 PROCEDURE — 1124F ACP DISCUSS-NO DSCNMKR DOCD: CPT | Performed by: FAMILY MEDICINE

## 2025-04-15 PROCEDURE — 3074F SYST BP LT 130 MM HG: CPT | Performed by: FAMILY MEDICINE

## 2025-04-15 PROCEDURE — 36415 COLL VENOUS BLD VENIPUNCTURE: CPT | Performed by: FAMILY MEDICINE

## 2025-04-15 PROCEDURE — 1159F MED LIST DOCD IN RCRD: CPT | Performed by: FAMILY MEDICINE

## 2025-04-15 PROCEDURE — 1126F AMNT PAIN NOTED NONE PRSNT: CPT | Performed by: FAMILY MEDICINE

## 2025-04-15 RX ORDER — HYDROXYZINE PAMOATE 25 MG/1
25 CAPSULE ORAL 3 TIMES DAILY PRN
Qty: 90 CAPSULE | Refills: 1 | Status: SHIPPED | OUTPATIENT
Start: 2025-04-15

## 2025-04-15 RX ORDER — HYDROXYZINE PAMOATE 25 MG/1
25 CAPSULE ORAL 3 TIMES DAILY PRN
Qty: 30 CAPSULE | Refills: 1 | Status: SHIPPED
Start: 2025-04-15 | End: 2025-04-15

## 2025-04-15 RX ORDER — TOPIRAMATE 25 MG/1
25 TABLET, FILM COATED ORAL NIGHTLY
Qty: 90 TABLET | Refills: 1 | Status: SHIPPED | OUTPATIENT
Start: 2025-04-15

## 2025-04-15 ASSESSMENT — PATIENT HEALTH QUESTIONNAIRE - PHQ9
4. FEELING TIRED OR HAVING LITTLE ENERGY: NOT AT ALL
1. LITTLE INTEREST OR PLEASURE IN DOING THINGS: NOT AT ALL
SUM OF ALL RESPONSES TO PHQ QUESTIONS 1-9: 0
5. POOR APPETITE OR OVEREATING: NOT AT ALL
SUM OF ALL RESPONSES TO PHQ QUESTIONS 1-9: 0
3. TROUBLE FALLING OR STAYING ASLEEP: NOT AT ALL
8. MOVING OR SPEAKING SO SLOWLY THAT OTHER PEOPLE COULD HAVE NOTICED. OR THE OPPOSITE, BEING SO FIGETY OR RESTLESS THAT YOU HAVE BEEN MOVING AROUND A LOT MORE THAN USUAL: NOT AT ALL
SUM OF ALL RESPONSES TO PHQ QUESTIONS 1-9: 0
7. TROUBLE CONCENTRATING ON THINGS, SUCH AS READING THE NEWSPAPER OR WATCHING TELEVISION: NOT AT ALL
10. IF YOU CHECKED OFF ANY PROBLEMS, HOW DIFFICULT HAVE THESE PROBLEMS MADE IT FOR YOU TO DO YOUR WORK, TAKE CARE OF THINGS AT HOME, OR GET ALONG WITH OTHER PEOPLE: NOT DIFFICULT AT ALL
2. FEELING DOWN, DEPRESSED OR HOPELESS: NOT AT ALL
6. FEELING BAD ABOUT YOURSELF - OR THAT YOU ARE A FAILURE OR HAVE LET YOURSELF OR YOUR FAMILY DOWN: NOT AT ALL
SUM OF ALL RESPONSES TO PHQ QUESTIONS 1-9: 0
9. THOUGHTS THAT YOU WOULD BE BETTER OFF DEAD, OR OF HURTING YOURSELF: NOT AT ALL

## 2025-04-15 ASSESSMENT — ENCOUNTER SYMPTOMS
SHORTNESS OF BREATH: 0
CHEST TIGHTNESS: 0

## 2025-04-15 NOTE — PROGRESS NOTES
Medicare Annual Wellness Visit    Viry Andrew is here for Medicare AWV    Assessment & Plan   Medicare annual wellness visit, subsequent  LITO (generalized anxiety disorder)  -     hydrOXYzine pamoate (VISTARIL) 25 MG capsule; Take 1 capsule by mouth 3 times daily as needed for Anxiety, Disp-90 capsule, R-1Normal  Other depression  Coronary artery disease involving native coronary artery of native heart without angina pectoris  -     Lipid Panel  -     Comprehensive Metabolic Panel  -     CBC  Hypothyroidism, unspecified type  -     TSH  -     T4, Free  Morbid obesity  -     topiramate (TOPAMAX) 25 MG tablet; Take 1 tablet by mouth nightly, Disp-90 tablet, R-1Normal       Return in 3 weeks (on 5/6/2025) for cyst removal  and Medicare Annual Wellness Visit in 1 year.     Subjective   The following acute and/or chronic problems were also addressed today:    Anxiety: Jonnie Andrew, a female of 69 y.o. came to the office 4/15/2025.     Patient Active Problem List   Diagnosis    Hypothyroidism    Graves disease    Depression    GERD (gastroesophageal reflux disease)    Non-ST elevation MI (NSTEMI) ([facility])    Morbid obesity    Coronary artery disease involving native coronary artery of native heart without angina pectoris    LITO (generalized anxiety disorder)    Bilateral primary osteoarthritis of knee    S/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, right    Hyperkalemia    Choledocholithiasis    Atrial fibrillation with RVR ([facility])    Nonrheumatic aortic valve stenosis    Primary hypertension    Cholecystitis    Biliary obstruction (HCC)    PAF (paroxysmal atrial fibrillation) ([facility])    Hypokalemia    Diastolic congestive heart failure (HCC)    Normocytic hypochromic anemia          Coronary Artery Disease  Presents for follow-up visit. Pertinent negatives include no chest pain, chest pressure, chest tightness, leg swelling or shortness of breath. The symptoms have been resolved. Compliance with diet is good.

## 2025-04-15 NOTE — PATIENT INSTRUCTIONS

## 2025-04-17 ENCOUNTER — RESULTS FOLLOW-UP (OUTPATIENT)
Dept: PRIMARY CARE CLINIC | Age: 70
End: 2025-04-17

## 2025-04-17 LAB
ALBUMIN: 4.2 G/DL (ref 3.5–5.2)
ALP BLD-CCNC: 71 U/L (ref 35–104)
ALT SERPL-CCNC: 20 U/L (ref 0–35)
ANION GAP SERPL CALCULATED.3IONS-SCNC: 11 MMOL/L (ref 7–16)
AST SERPL-CCNC: 38 U/L (ref 0–35)
BILIRUB SERPL-MCNC: 0.5 MG/DL (ref 0–1.2)
BUN BLDV-MCNC: 18 MG/DL (ref 8–23)
CALCIUM SERPL-MCNC: 10 MG/DL (ref 8.8–10.2)
CHLORIDE BLD-SCNC: 98 MMOL/L (ref 98–107)
CHOLESTEROL, TOTAL: 119 MG/DL
CO2: 28 MMOL/L (ref 22–29)
CREAT SERPL-MCNC: 1 MG/DL (ref 0.5–1)
GFR, ESTIMATED: 62 ML/MIN/1.73M2
GLUCOSE BLD-MCNC: 98 MG/DL (ref 74–99)
HDLC SERPL-MCNC: 69 MG/DL
LDL CHOLESTEROL: 39 MG/DL
POTASSIUM SERPL-SCNC: 4.5 MMOL/L (ref 3.4–4.5)
SODIUM BLD-SCNC: 138 MMOL/L (ref 136–145)
T4 FREE: 1.4 NG/DL (ref 0.9–1.7)
TOTAL PROTEIN: 7.5 G/DL (ref 6.4–8.3)
TRIGL SERPL-MCNC: 59 MG/DL
TSH SERPL DL<=0.05 MIU/L-ACNC: 1.56 UIU/ML (ref 0.27–4.2)
VLDLC SERPL CALC-MCNC: 12 MG/DL

## 2025-04-21 RX ORDER — ATORVASTATIN CALCIUM 40 MG/1
40 TABLET, FILM COATED ORAL NIGHTLY
Qty: 90 TABLET | Refills: 1 | Status: SHIPPED | OUTPATIENT
Start: 2025-04-21

## 2025-04-21 NOTE — TELEPHONE ENCOUNTER
Patients last appointment 4/15/2025.  Patients next scheduled appointment   Future Appointments   Date Time Provider Department Center   5/2/2025  8:45 AM Feliciano Dominguez DO [facility]   6/9/2025  9:00 AM Monique Bartlett MD [facility]ARD[facility]   10/13/2025  9:00 AM SCHEDULE, HILDA ARMENTA AUDIO [facility] AUDIO [facility]   10/13/2025  9:15 AM [facility] L[facility]

## 2025-04-28 RX ORDER — METOPROLOL TARTRATE 25 MG/1
25 TABLET, FILM COATED ORAL 2 TIMES DAILY
Qty: 180 TABLET | Refills: 1 | Status: SHIPPED | OUTPATIENT
Start: 2025-04-28

## 2025-05-02 ENCOUNTER — OFFICE VISIT (OUTPATIENT)
Dept: PRIMARY CARE CLINIC | Age: 70
End: 2025-05-02
Payer: MEDICARE

## 2025-05-02 VITALS
SYSTOLIC BLOOD PRESSURE: 124 MMHG | BODY MASS INDEX: 41.35 KG/M2 | HEART RATE: 57 BPM | OXYGEN SATURATION: 95 % | DIASTOLIC BLOOD PRESSURE: 72 MMHG | TEMPERATURE: 97.4 F | HEIGHT: 61 IN | WEIGHT: 219 LBS

## 2025-05-02 DIAGNOSIS — L72.11 PILAR CYST OF SCALP: Primary | ICD-10-CM

## 2025-05-02 PROCEDURE — 3078F DIAST BP <80 MM HG: CPT | Performed by: FAMILY MEDICINE

## 2025-05-02 PROCEDURE — 1160F RVW MEDS BY RX/DR IN RCRD: CPT | Performed by: FAMILY MEDICINE

## 2025-05-02 PROCEDURE — 1126F AMNT PAIN NOTED NONE PRSNT: CPT | Performed by: FAMILY MEDICINE

## 2025-05-02 PROCEDURE — 99213 OFFICE O/P EST LOW 20 MIN: CPT | Performed by: FAMILY MEDICINE

## 2025-05-02 PROCEDURE — 10060 I&D ABSCESS SIMPLE/SINGLE: CPT | Performed by: FAMILY MEDICINE

## 2025-05-02 PROCEDURE — 1124F ACP DISCUSS-NO DSCNMKR DOCD: CPT | Performed by: FAMILY MEDICINE

## 2025-05-02 PROCEDURE — 3074F SYST BP LT 130 MM HG: CPT | Performed by: FAMILY MEDICINE

## 2025-05-02 PROCEDURE — 1159F MED LIST DOCD IN RCRD: CPT | Performed by: FAMILY MEDICINE

## 2025-05-02 ASSESSMENT — ENCOUNTER SYMPTOMS: ROS SKIN COMMENTS: SEE HPI

## 2025-05-02 NOTE — PROGRESS NOTES
Viry Andrew, a female of 69 y.o. came to the office 5/2/2025.     Patient Active Problem List   Diagnosis    Hypothyroidism    Graves disease    Depression    GERD (gastroesophageal reflux disease)    Non-ST elevation MI (NSTEMI) ([facility])    Morbid obesity ([facility])    Coronary artery disease involving native coronary artery of native heart without angina pectoris    LITO (generalized anxiety disorder)    Bilateral primary osteoarthritis of knee    S/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, right    Hyperkalemia    Choledocholithiasis    Atrial fibrillation with RVR ([facility])    Nonrheumatic aortic valve stenosis    Primary hypertension    Cholecystitis    Biliary obstruction (HCC)    PAF (paroxysmal atrial fibrillation) ([facility])    Hypokalemia    Diastolic congestive heart failure ([facility])    Normocytic hypochromic anemia          Cyst  This is a chronic problem. The current episode started more than 1 year ago (on top of head). Nothing aggravates the symptoms. She has tried nothing for the symptoms.        Allergies   Allergen Reactions    Meloxicam Itching and Other (See Comments)       Current Outpatient Medications on File Prior to Visit   Medication Sig Dispense Refill    metoprolol tartrate (LOPRESSOR) 25 MG tablet TAKE 1 TABLET TWICE A  tablet 1    atorvastatin (LIPITOR) 40 MG tablet TAKE 1 TABLET NIGHTLY 90 tablet 1    topiramate (TOPAMAX) 25 MG tablet Take 1 tablet by mouth nightly 90 tablet 1    apixaban (ELIQUIS) 5 MG TABS tablet TAKE 1 TABLET TWICE A  tablet 1    venlafaxine (EFFEXOR XR) 75 MG extended release capsule Take 1 capsule by mouth daily 90 capsule 0    Magnesium 400 MG CAPS Take by mouth      b complex vitamins capsule Take 1 capsule by mouth daily      aspirin-acetaminophen-caffeine (EXCEDRIN MIGRAINE) 250-250-65 MG per tablet Take 1 tablet by mouth every 6 hours as needed for Headaches      MISC NATURAL PRODUCTS PO Take by mouth daily Lissa Laurent Chewable      MISC NATURAL PRODUCTS PO Take

## 2025-05-06 NOTE — TELEPHONE ENCOUNTER
We can start her on Coumadin if she is agreeable to it Medical Necessity Clause: This procedure was medically necessary because the lesion that was treated was: Detail Level: Detailed Size Of Lesion (*Required): 0.4 X Size Of Lesion Width In Cm (Optional): 0 Punch Size In Mm: 5 Repair Type: None Complex Requirements: Extensive Undermining Performed?: No Undermining Type: Entire Wound Debridement Text: The wound edges were debrided prior to proceeding with the closure to facilitate wound healing. Helical Rim Text: The closure involved the helical rim. Vermilion Border Text: The closure involved the vermilion border. Nostril Rim Text: The closure involved the nostril rim. Retention Suture Text: Retention sutures were placed to support the closure and prevent dehiscence. Include Undermining Statement In The Repair Note?: Yes Anesthesia Type: 1% lidocaine with epinephrine Anesthesia Volume In Cc: 1 Hemostasis: Aluminum Chloride Epidermal Sutures: 4-0 Prolene Epidermal Closure: simple interrupted Wound Care: Petrolatum Wound Dressings: a bandage Suture Removal: 14 days Lab: [id number] Lab Facility: [id number] Path Notes (To The Dermatopathologist): Please check margins. 1.5 Mm Punch Excision Text: A 1.5 mm punch biopsy was used to excise the lesion to the level of the subcutaneous fat.  Blunt dissection was used to free the lesion from the surrounding tissues and the lesion was removed. 2 Mm Punch Excision Text: A 2 mm punch biopsy was used to excise the lesion to the level of the subcutaneous fat.  Blunt dissection was used to free the lesion from the surrounding tissues and the lesion was removed. 2.5 Mm Punch Excision Text: A 2.5 mm punch biopsy was used to excise the lesion to the level of the subcutaneous fat.  Blunt dissection was used to free the lesion from the surrounding tissues and the lesion was removed. 3 Mm Punch Excision Text: A 3 mm punch biopsy was used to excise the lesion to the level of the subcutaneous fat.  Blunt dissection was used to free the lesion from the surrounding tissues and the lesion was removed. 3.5 Mm Punch Excision Text: A 3.5 mm punch biopsy was used to excise the lesion to the level of the subcutaneous fat.  Blunt dissection was used to free the lesion from the surrounding tissues and the lesion was removed. 4 Mm Punch Excision Text: A 4 mm punch biopsy was used to excise the lesion to the level of the subcutaneous fat.  Blunt dissection was used to free the lesion from the surrounding tissues and the lesion was removed. 4.5 Mm Punch Excision Text: A 4.5 mm punch biopsy was used to excise the lesion to the level of the subcutaneous fat.  Blunt dissection was used to free the lesion from the surrounding tissues and the lesion was removed. 5 Mm Punch Excision Text: A 5 mm punch biopsy was used to excise the lesion to the level of the subcutaneous fat.  Blunt dissection was used to free the lesion from the surrounding tissues and the lesion was removed. 6 Mm Punch Excision Text: A 6 mm punch biopsy was used to excise the lesion to the level of the subcutaneous fat.  Blunt dissection was used to free the lesion from the surrounding tissues and the lesion was removed. 7 Mm Punch Excision Text: A 7 mm punch biopsy was used to excise the lesion to the level of the subcutaneous fat.  Blunt dissection was used to free the lesion from the surrounding tissues and the lesion was removed. 8 Mm Punch Excision Text: A 8 mm punch biopsy was used to excise the lesion to the level of the subcutaneous fat.  Blunt dissection was used to free the lesion from the surrounding tissues and the lesion was removed. 10 Mm Punch Excision Text: A 10 mm punch biopsy was used to excise the lesion to the level of the subcutaneous fat.  Blunt dissection was used to free the lesion from the surrounding tissues and the lesion was removed. 12 Mm Punch Excision Text: A 12 mm punch biopsy was used to excise the lesion to the level of the subcutaneous fat.  Blunt dissection was used to free the lesion from the surrounding tissues and the lesion was removed. Consent was obtained from the patient. The risks and benefits to therapy were discussed in detail. Specifically, the risks of infection, scarring, bleeding, prolonged wound healing, incomplete removal, allergy to anesthesia, nerve injury and recurrence were addressed. Prior to the procedure, the treatment site was clearly identified and confirmed by the patient. All components of Universal Protocol/PAUSE Rule completed. Post-Care Instructions: I reviewed with the patient in detail post-care instructions. Patient is to keep the biopsy site dry overnight, and then apply bacitracin twice daily until healed. Patient may apply hydrogen peroxide soaks to remove any crusting. Notification Instructions: Patient will be notified of biopsy results. However, patient instructed to call the office if not contacted within 2 weeks. Billing Type: Third-Party Bill

## 2025-05-08 DIAGNOSIS — F51.01 PRIMARY INSOMNIA: ICD-10-CM

## 2025-05-09 RX ORDER — TRAZODONE HYDROCHLORIDE 150 MG/1
150 TABLET ORAL NIGHTLY
Qty: 90 TABLET | Refills: 1 | Status: SHIPPED | OUTPATIENT
Start: 2025-05-09

## 2025-05-29 DIAGNOSIS — F32.89 OTHER DEPRESSION: ICD-10-CM

## 2025-05-29 RX ORDER — BUPROPION HYDROCHLORIDE 300 MG/1
300 TABLET ORAL EVERY MORNING
Qty: 90 TABLET | Refills: 1 | Status: SHIPPED | OUTPATIENT
Start: 2025-05-29

## 2025-05-29 NOTE — TELEPHONE ENCOUNTER
Patients last appointment 5/2/2025.  Patients next scheduled appointment   Future Appointments   Date Time Provider Department Center   6/9/2025  9:00 AM Monique Bartlett MD [facility] CARDIO [facility]   10/13/2025  9:00 AM SCHEDULE, HILDA CARPENTER [facility] AUDIO [facility]   10/13/2025  9:15 AM Saúl Lr DO [facility] ENT [facility]

## 2025-06-03 ENCOUNTER — OFFICE VISIT (OUTPATIENT)
Dept: PRIMARY CARE CLINIC | Age: 70
End: 2025-06-03
Payer: MEDICARE

## 2025-06-03 VITALS
WEIGHT: 220 LBS | BODY MASS INDEX: 41.59 KG/M2 | DIASTOLIC BLOOD PRESSURE: 80 MMHG | HEART RATE: 62 BPM | SYSTOLIC BLOOD PRESSURE: 130 MMHG | OXYGEN SATURATION: 97 %

## 2025-06-03 DIAGNOSIS — R42 DIZZY: ICD-10-CM

## 2025-06-03 DIAGNOSIS — M79.645 PAIN OF LEFT THUMB: Primary | ICD-10-CM

## 2025-06-03 DIAGNOSIS — E66.01 MORBID OBESITY (HCC): Chronic | ICD-10-CM

## 2025-06-03 LAB
CHP ED QC CHECK: NORMAL
GLUCOSE BLD-MCNC: 72 MG/DL

## 2025-06-03 PROCEDURE — 1159F MED LIST DOCD IN RCRD: CPT | Performed by: FAMILY MEDICINE

## 2025-06-03 PROCEDURE — 3075F SYST BP GE 130 - 139MM HG: CPT | Performed by: FAMILY MEDICINE

## 2025-06-03 PROCEDURE — 99213 OFFICE O/P EST LOW 20 MIN: CPT | Performed by: FAMILY MEDICINE

## 2025-06-03 PROCEDURE — 3079F DIAST BP 80-89 MM HG: CPT | Performed by: FAMILY MEDICINE

## 2025-06-03 PROCEDURE — 82962 GLUCOSE BLOOD TEST: CPT | Performed by: FAMILY MEDICINE

## 2025-06-03 PROCEDURE — 1124F ACP DISCUSS-NO DSCNMKR DOCD: CPT | Performed by: FAMILY MEDICINE

## 2025-06-03 PROCEDURE — 1160F RVW MEDS BY RX/DR IN RCRD: CPT | Performed by: FAMILY MEDICINE

## 2025-06-03 RX ORDER — TOPIRAMATE 25 MG/1
25 TABLET, FILM COATED ORAL NIGHTLY
Qty: 90 TABLET | Refills: 1 | Status: SHIPPED | OUTPATIENT
Start: 2025-06-03

## 2025-06-03 NOTE — PROGRESS NOTES
Viry Andrew, a female of 69 y.o. came to the office 6/3/2025.     Patient Active Problem List   Diagnosis    Hypothyroidism    Graves disease    Depression    GERD (gastroesophageal reflux disease)    Non-ST elevation MI (NSTEMI) ([facility])    Morbid obesity ([facility])    Coronary artery disease involving native coronary artery of native heart without angina pectoris    LITO (generalized anxiety disorder)    Bilateral primary osteoarthritis of knee    S/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, right    Hyperkalemia    Choledocholithiasis    Atrial fibrillation with RVR ([facility])    Nonrheumatic aortic valve stenosis    Primary hypertension    Cholecystitis    Biliary obstruction ([facility])    PAF (paroxysmal atrial fibrillation) ([facility])    Hypokalemia    Diastolic congestive heart failure ([facility])    Normocytic hypochromic anemia          Wrist Pain   Pain location: left thumb. There has been no history of extremity trauma. The problem has been gradually worsening. The pain is moderate. Associated symptoms include numbness (hands), stiffness (in am of hands) and tingling. Pertinent negatives include no joint locking or joint swelling. The symptoms are aggravated by activity. She has tried nothing for the symptoms. Her past medical history is significant for osteoarthritis.        Allergies   Allergen Reactions    Meloxicam Itching and Other (See Comments)       Current Outpatient Medications on File Prior to Visit   Medication Sig Dispense Refill    buPROPion (WELLBUTRIN XL) 300 MG extended release tablet TAKE 1 TABLET EVERY MORNING 90 tablet 1    traZODone (DESYREL) 150 MG tablet TAKE 1 TABLET NIGHTLY 90 tablet 1    metoprolol tartrate (LOPRESSOR) 25 MG tablet TAKE 1 TABLET TWICE A  tablet 1    atorvastatin (LIPITOR) 40 MG tablet TAKE 1 TABLET NIGHTLY 90 tablet 1    hydrOXYzine pamoate (VISTARIL) 25 MG capsule Take 1 capsule by mouth 3 times daily as needed for Anxiety 90 capsule 1    apixaban (ELIQUIS) 5 MG TABS tablet TAKE 1

## 2025-06-09 ENCOUNTER — OFFICE VISIT (OUTPATIENT)
Dept: CARDIOLOGY CLINIC | Age: 70
End: 2025-06-09
Payer: MEDICARE

## 2025-06-09 VITALS
HEART RATE: 47 BPM | WEIGHT: 219 LBS | DIASTOLIC BLOOD PRESSURE: 74 MMHG | HEIGHT: 61 IN | SYSTOLIC BLOOD PRESSURE: 110 MMHG | RESPIRATION RATE: 18 BRPM | BODY MASS INDEX: 41.35 KG/M2

## 2025-06-09 DIAGNOSIS — I25.110 CORONARY ARTERY DISEASE INVOLVING NATIVE CORONARY ARTERY OF NATIVE HEART WITH UNSTABLE ANGINA PECTORIS (HCC): Primary | ICD-10-CM

## 2025-06-09 PROCEDURE — 99214 OFFICE O/P EST MOD 30 MIN: CPT | Performed by: INTERNAL MEDICINE

## 2025-06-09 PROCEDURE — 1160F RVW MEDS BY RX/DR IN RCRD: CPT | Performed by: INTERNAL MEDICINE

## 2025-06-09 PROCEDURE — 3074F SYST BP LT 130 MM HG: CPT | Performed by: INTERNAL MEDICINE

## 2025-06-09 PROCEDURE — 93000 ELECTROCARDIOGRAM COMPLETE: CPT | Performed by: INTERNAL MEDICINE

## 2025-06-09 PROCEDURE — 1124F ACP DISCUSS-NO DSCNMKR DOCD: CPT | Performed by: INTERNAL MEDICINE

## 2025-06-09 PROCEDURE — 3078F DIAST BP <80 MM HG: CPT | Performed by: INTERNAL MEDICINE

## 2025-06-09 PROCEDURE — 1159F MED LIST DOCD IN RCRD: CPT | Performed by: INTERNAL MEDICINE

## 2025-06-09 NOTE — PROGRESS NOTES
[facility] Cardiology Progress Note  Dr. Monique Bartlett      Referring Physician: Feliciano Dominguez DO  CHIEF COMPLAINT: CAD, atrial fibrillation  Chief Complaint   Patient presents with    Follow-up     6 month follow up ov- pt has no c/c.       HISTORY OF PRESENT ILLNESS:   Patient is 70 years old female with CAD, s/p PCI to OM, paroxysmal atrial fibrillation, is here for follow up visit.  Patient denies any chest pain, no shortness of breath, no lightheadedness, no dizziness, no palpitations, no pedal edema, no PND, no orthopnea, no syncope, no presyncopal episodes.  Functional capacity is good for age, she does a lot of walking    Past Medical History:   Diagnosis Date    Arthritis     CAD (coronary artery disease)     9-17-20 yisel 2.0x22 francisca om.     COVID-19 virus infection 07/2020    Depression     GERD (gastroesophageal reflux disease)     Graves disease     Hypertension     States she has never had HTN    Hypothyroidism     SECONDARY TO GRAVES DISEASE    NSTEMI (non-ST elevated myocardial infarction) ([facility]) 09/16/2020    Obesity     S/P total knee arthroplasty, left 11/07/2022         Past Surgical History:   Procedure Laterality Date    CARDIAC CATHETERIZATION  09/17/2020    stent x 1    CHOLECYSTECTOMY, LAPAROSCOPIC N/A 09/05/2023    LAPAROSCOPIC ROBOTIC XI ASSISTED CHOLECYSTECTOMY with ioc performed by Kimberly Eden MD at [facility] OR    COLONOSCOPY  11/10/2010    DR GUILLERMO    CORONARY ANGIOPLASTY WITH STENT PLACEMENT  09/17/2020    [facility]     ERCP N/A 09/04/2023    ERCP ENDOSCOPIC RETROGRADE CHOLANGIOPANCREATOGRAPHY ABORTED performed by Gentry Phillips MD at [facility] OR    ERCP N/A 09/07/2023    LAPAROSCOPIC ASSISTED ERCP ENDOSCOPIC RETROGRADE CHOLANGIOPANCREATOGRAPHY performed by Miri Escalera MD at [facility] OR    GASTRIC BYPASS SURGERY  05/02/2006    DR HAIRSTON    TOTAL KNEE ARTHROPLASTY Left 11/07/2022    LEFT KNEE TOTAL ARTHROPLASTY performed [place]rivedi [place] [facility] [place]    TOTAL KNEE

## 2025-06-19 DIAGNOSIS — F32.89 OTHER DEPRESSION: ICD-10-CM

## 2025-06-19 RX ORDER — VENLAFAXINE HYDROCHLORIDE 75 MG/1
75 CAPSULE, EXTENDED RELEASE ORAL DAILY
Qty: 90 CAPSULE | Refills: 1 | Status: SHIPPED | OUTPATIENT
Start: 2025-06-19

## 2025-09-04 ENCOUNTER — OFFICE VISIT (OUTPATIENT)
Dept: PRIMARY CARE CLINIC | Age: 70
End: 2025-09-04
Payer: MEDICARE

## 2025-09-04 VITALS
TEMPERATURE: 97 F | HEIGHT: 62 IN | OXYGEN SATURATION: 98 % | RESPIRATION RATE: 16 BRPM | HEART RATE: 61 BPM | DIASTOLIC BLOOD PRESSURE: 62 MMHG | SYSTOLIC BLOOD PRESSURE: 118 MMHG | WEIGHT: 208 LBS | BODY MASS INDEX: 38.28 KG/M2

## 2025-09-04 DIAGNOSIS — Z12.31 ENCOUNTER FOR SCREENING MAMMOGRAM FOR MALIGNANT NEOPLASM OF BREAST: ICD-10-CM

## 2025-09-04 DIAGNOSIS — Z12.39 BREAST SCREENING: ICD-10-CM

## 2025-09-04 DIAGNOSIS — F41.1 GAD (GENERALIZED ANXIETY DISORDER): Primary | ICD-10-CM

## 2025-09-04 DIAGNOSIS — E03.9 ACQUIRED HYPOTHYROIDISM: Chronic | ICD-10-CM

## 2025-09-04 DIAGNOSIS — N39.41 URGE INCONTINENCE: ICD-10-CM

## 2025-09-04 DIAGNOSIS — I48.0 PAF (PAROXYSMAL ATRIAL FIBRILLATION) (HCC): ICD-10-CM

## 2025-09-04 PROCEDURE — 99214 OFFICE O/P EST MOD 30 MIN: CPT | Performed by: FAMILY MEDICINE

## 2025-09-04 PROCEDURE — 1159F MED LIST DOCD IN RCRD: CPT | Performed by: FAMILY MEDICINE

## 2025-09-04 PROCEDURE — 1160F RVW MEDS BY RX/DR IN RCRD: CPT | Performed by: FAMILY MEDICINE

## 2025-09-04 PROCEDURE — 1124F ACP DISCUSS-NO DSCNMKR DOCD: CPT | Performed by: FAMILY MEDICINE

## 2025-09-04 PROCEDURE — 3078F DIAST BP <80 MM HG: CPT | Performed by: FAMILY MEDICINE

## 2025-09-04 PROCEDURE — 3074F SYST BP LT 130 MM HG: CPT | Performed by: FAMILY MEDICINE

## 2025-09-04 RX ORDER — OXYBUTYNIN CHLORIDE 5 MG/1
5 TABLET ORAL 3 TIMES DAILY
Qty: 270 TABLET | Refills: 1 | Status: SHIPPED | OUTPATIENT
Start: 2025-09-04

## 2025-09-04 RX ORDER — LEVOTHYROXINE SODIUM 125 UG/1
125 TABLET ORAL DAILY
Qty: 90 TABLET | Refills: 1 | Status: SHIPPED | OUTPATIENT
Start: 2025-09-04

## 2025-09-04 ASSESSMENT — ENCOUNTER SYMPTOMS
SHORTNESS OF BREATH: 0
DIARRHEA: 0
CONSTIPATION: 0

## (undated) DEVICE — APPLICATOR SURG XL L38CM FOR ARISTA ABSRB HEMSTAT FLEXITIP

## (undated) DEVICE — PLUMEPORT ACTIV LAPAROSCOPIC SMOKE FILTRATION DEVICE: Brand: PLUMEPORT ACTIVE

## (undated) DEVICE — DRESSING HYDROFIBER AQUACEL AG ADVANTAGE 3.5X12 IN

## (undated) DEVICE — BLADE,STAINLESS-STEEL,11,STRL,DISPOSABLE: Brand: MEDLINE

## (undated) DEVICE — HANDPIECE SET WITH BONE CLEANING TIP AND SUCTION TUBE: Brand: INTERPULSE

## (undated) DEVICE — SHEET,DRAPE,53X77,STERILE: Brand: MEDLINE

## (undated) DEVICE — INSUFFLATION NEEDLE TO ESTABLISH PNEUMOPERITONEUM.: Brand: INSUFFLATION NEEDLE

## (undated) DEVICE — GOWN,AURORA,BRTHSLV,2XL,18/CS: Brand: MEDLINE

## (undated) DEVICE — DRAIN SURG 15FR L3 16IN DIA47MM SIL RND HUBLESS FULL FLUT

## (undated) DEVICE — 3M™ STERI-DRAPE™ U-DRAPE 1015: Brand: STERI-DRAPE™

## (undated) DEVICE — GLOVE SURG SZ 6 L12IN FNGR THK94MIL TRNSLUC YEL LTX HYDRGEL

## (undated) DEVICE — SOLUTION IRRIG 3000ML 0.9% SOD CHL USP UROMATIC PLAS CONT

## (undated) DEVICE — MEDIUM-LARGE CLIP APPLIER: Brand: ENDOWRIST

## (undated) DEVICE — KIT PLT RATIO DISPNS KT 2IN CANN TIP SPRY TIP DISP MAGELLAN

## (undated) DEVICE — RETRIEVAL BALLOON CATHETER: Brand: EXTRACTOR™ PRO RX

## (undated) DEVICE — GLOVE SURG SZ 65 THK91MIL LTX FREE SYN POLYISOPRENE

## (undated) DEVICE — STAPLER INT L34CM 60MM LNG ENDOSCP ARTC PWR + ECHELON FLX

## (undated) DEVICE — WARMER SCP 2 ANTIFOG LAP DISP

## (undated) DEVICE — ANCHOR TISSUE RETRIEVAL SYSTEM, BAG SIZE 125 ML, PORT SIZE 8 MM: Brand: ANCHOR TISSUE RETRIEVAL SYSTEM

## (undated) DEVICE — GLOVE ORANGE PI 7 1/2   MSG9075

## (undated) DEVICE — STRYKER PERFORMANCE SERIES SAGITTAL BLADE: Brand: STRYKER PERFORMANCE SERIES

## (undated) DEVICE — DRESSING COMP W4XL4IN N ADH PD W2.5XL2.5IN GZ BORDERED ADH

## (undated) DEVICE — BASIC DOUBLE BASIN 2-LF: Brand: MEDLINE INDUSTRIES, INC.

## (undated) DEVICE — SOLUTION IRRIG 1000ML 0.9% SOD CHL USP POUR PLAS BTL

## (undated) DEVICE — ARM DRAPE

## (undated) DEVICE — PERMANENT CAUTERY HOOK: Brand: ENDOWRIST

## (undated) DEVICE — DRAPE,REIN 53X77,STERILE: Brand: MEDLINE

## (undated) DEVICE — INSUFFLATION TUBING SET WITH FILTER, FUNNEL CONNECTOR AND LUER LOCK: Brand: JOSNOE MEDICAL INC

## (undated) DEVICE — SUCTION IRRIGATOR: Brand: ENDOWRIST

## (undated) DEVICE — GOWN,SIRUS,FABRNF,XL,20/CS: Brand: MEDLINE

## (undated) DEVICE — STAPLER INT L28CM 60MM 12 FIRING B FRM PWD + ECHELON FLX

## (undated) DEVICE — PEEL-AWAY HOOD: Brand: FLYTE, SURGICOOL

## (undated) DEVICE — THE KUMAR CATHETER®, USED IN CONJUNCTION WITH KUMAR CHOLANGIOGRAPHY® CLAMP, IS MEANT TO PROVIDE A MEANS OF LAPAROSCOPIC CHOLANGIOGRAPHY. IT COMPRISES A TRANSLUCENT TUBING ( 76 CM. LENGTH AND 16 GA. ) THAT CARRIES A 19 GA., 1.25 CM LONG NEEDLE AT THE END. THE KUMAR CATHETER® IS USED TO PUNCTURE THE HARTMANN'S POUCH OF THE GALLBLADDER FOR BILIARY ACCESS AND / OR ASPIRATION. PRODUCT IS LATEX FREE.: Brand: KUMAR CATHETER®

## (undated) DEVICE — PIN FIX L3.5IN DIA1/8IN LNG HDLSS FOR MIS KNEE JT REPL

## (undated) DEVICE — SYRINGE 20ML LL S/C 50

## (undated) DEVICE — TOWEL,OR,DSP,ST,BLUE,STD,6/PK,12PK/CS: Brand: MEDLINE

## (undated) DEVICE — SYRINGE, LUER LOCK, 5ML: Brand: MEDLINE

## (undated) DEVICE — TROCAR: Brand: KII FIOS FIRST ENTRY

## (undated) DEVICE — ELECTRODE PT RET AD L9FT HI MOIST COND ADH HYDRGEL CORDED

## (undated) DEVICE — SKIN AFFIX SURG ADHESIVE 72/CS 0.55ML: Brand: MEDLINE

## (undated) DEVICE — BOWL AND CEMENT CARTRIDGE WITH BREAKAWAY FEMORAL NOZZLE: Brand: ACM

## (undated) DEVICE — LIQUIBAND RAPID ADHESIVE 36/CS 0.8ML: Brand: MEDLINE

## (undated) DEVICE — SPONGE LAP W18XL18IN WHT COT 4 PLY FLD STRUNG RADPQ DISP ST 2 PER PACK

## (undated) DEVICE — CANNULA SEAL

## (undated) DEVICE — 2108 SERIES SAGITTAL BLADE FAN, OFFSET  (34.5 X 0.8 X 64.0MM)

## (undated) DEVICE — ELECTRODE ES 36CM LAP FLAT L HK COAT DISP CLEANCOAT

## (undated) DEVICE — SHEET,DRAPE,40X58,STERILE: Brand: MEDLINE

## (undated) DEVICE — COVER HNDL LT DISP

## (undated) DEVICE — MARKER,SKIN,WI/RULER AND LABELS: Brand: MEDLINE

## (undated) DEVICE — CADIERE FORCEPS: Brand: ENDOWRIST

## (undated) DEVICE — SPHINCTEROTOME: Brand: JAGTOME RX 39

## (undated) DEVICE — COOK ENDOSCOPIC CHOLANGIOGRAPHY SET: Brand: COOK

## (undated) DEVICE — PACK PROCEDURE SURG GEN CUST

## (undated) DEVICE — TROCAR: Brand: KII SLEEVE

## (undated) DEVICE — SYRINGE MED 50ML LUERLOCK TIP

## (undated) DEVICE — DOUBLE BASIN SET: Brand: MEDLINE INDUSTRIES, INC.

## (undated) DEVICE — TROCARS: Brand: KII® OPTICAL ACCESS SYSTEM

## (undated) DEVICE — COLUMN DRAPE

## (undated) DEVICE — GRADUATE TRIANG MEASURE 1000ML BLK PRNT

## (undated) DEVICE — Device

## (undated) DEVICE — ROUND TIP SCISSORS: Brand: ENDOWRIST

## (undated) DEVICE — GOWN,SIRUS,FABRNF,2XL,18/CS: Brand: MEDLINE

## (undated) DEVICE — SPHINCTEROTOME: Brand: HYDRATOME RX 44

## (undated) DEVICE — DRAPE,LAP,CHOLE,W/TROUGHS,STERILE: Brand: MEDLINE

## (undated) DEVICE — PIN GUIDE ORTHO 3.2X89MM FLTD DISP PK OF 4 PER PATIENT

## (undated) DEVICE — SYSTEM BX CAP BILI RAP EXCHG CAP LOK DEV COMPATIBLE W/ OLY

## (undated) DEVICE — GAMMEX® NON-LATEX PI ORTHO SIZE 8, STERILE POLYISOPRENE POWDER-FREE SURGICAL GLOVE: Brand: GAMMEX

## (undated) DEVICE — GUIDEWIRE ENDOSCP L260CM DIA0.035IN BILI STR RND TIP HI

## (undated) DEVICE — SYRINGE, LUER LOCK, 10ML: Brand: MEDLINE

## (undated) DEVICE — GOWN,REINFORCE,POLY,SIRUS,XLNG/XLG: Brand: MEDLINE

## (undated) DEVICE — ZIMMER® STERILE DISPOSABLE TOURNIQUET CUFF WITH PROTECTIVE SLEEVE AND PLC, DUAL PORT, SINGLE BLADDER, 34 IN. (86 CM)

## (undated) DEVICE — SPONGE GZ W4XL4IN RAYON POLY CVR W/NONWOVEN FAB STRL 2/PK

## (undated) DEVICE — ANCHOR TISSUE RETRIEVAL SYSTEM, BAG SIZE 175 ML, PORT SIZE 10 MM: Brand: ANCHOR TISSUE RETRIEVAL SYSTEM

## (undated) DEVICE — KIT,ANTI FOG,W/SPONGE & FLUID,SOFT PACK: Brand: MEDLINE

## (undated) DEVICE — CANNULATING SPHINCTEROTOME: Brand: JAGTOME™ REVOLUTION RX

## (undated) DEVICE — C-ARM: Brand: UNBRANDED

## (undated) DEVICE — BLOCK BITE 60FR RUBBER ADLT DENTAL

## (undated) DEVICE — RELOAD STPL L60MM H1.5-3.6MM REG TISS BLU GRIPPING SURF B

## (undated) DEVICE — SOLUTION IRRIG 1000ML STRL H2O USP PLAS POUR BTL

## (undated) DEVICE — GOWN,SIRUS,FABRNF,L,20/CS: Brand: MEDLINE

## (undated) DEVICE — 4-PORT MANIFOLD: Brand: NEPTUNE 2

## (undated) DEVICE — AGENT HEMSTAT 5GM ARISTA AH

## (undated) DEVICE — APPLICATOR MEDICATED 26 CC SOLUTION HI LT ORNG CHLORAPREP

## (undated) DEVICE — BLADELESS OBTURATOR: Brand: WECK VISTA

## (undated) DEVICE — GLOVE SURG SZ 8 L12IN FNGR THK79MIL GRN LTX FREE

## (undated) DEVICE — NEEDLE HYPO 25GA L1.5IN BLU POLYPR HUB S STL REG BVL STR

## (undated) DEVICE — LARGE HEM-O-LOK CLIP APPLIER: Brand: ENDOWRIST

## (undated) DEVICE — LAPAROSCOPIC SCISSORS: Brand: EPIX LAPAROSCOPIC SCISSORS